# Patient Record
Sex: FEMALE | Race: WHITE | NOT HISPANIC OR LATINO | Employment: FULL TIME | URBAN - METROPOLITAN AREA
[De-identification: names, ages, dates, MRNs, and addresses within clinical notes are randomized per-mention and may not be internally consistent; named-entity substitution may affect disease eponyms.]

---

## 2017-06-12 ENCOUNTER — HOSPITAL ENCOUNTER (OUTPATIENT)
Dept: RADIOLOGY | Facility: HOSPITAL | Age: 32
Discharge: HOME | End: 2017-06-12
Attending: NURSE PRACTITIONER
Payer: MEDICAID

## 2017-06-12 DIAGNOSIS — R05.9 COUGH: ICD-10-CM

## 2017-06-12 PROCEDURE — 71020 XR CHEST PA AND LATERAL: CPT | Mod: 26,,, | Performed by: RADIOLOGY

## 2017-06-12 PROCEDURE — 71020 XR CHEST PA AND LATERAL: CPT | Mod: TC

## 2017-06-26 ENCOUNTER — HOSPITAL ENCOUNTER (EMERGENCY)
Facility: HOSPITAL | Age: 32
Discharge: HOME | End: 2017-06-26
Attending: EMERGENCY MEDICINE
Payer: MEDICAID

## 2017-06-26 VITALS
HEIGHT: 62 IN | OXYGEN SATURATION: 96 % | BODY MASS INDEX: 37.17 KG/M2 | DIASTOLIC BLOOD PRESSURE: 82 MMHG | HEART RATE: 100 BPM | SYSTOLIC BLOOD PRESSURE: 147 MMHG | TEMPERATURE: 99 F | WEIGHT: 202 LBS | RESPIRATION RATE: 20 BRPM

## 2017-06-26 DIAGNOSIS — J02.0 STREP PHARYNGITIS: Primary | ICD-10-CM

## 2017-06-26 PROCEDURE — 99283 EMERGENCY DEPT VISIT LOW MDM: CPT

## 2017-06-26 RX ORDER — CLINDAMYCIN HYDROCHLORIDE 150 MG/1
450 CAPSULE ORAL EVERY 8 HOURS
Qty: 45 CAPSULE | Refills: 0 | Status: SHIPPED | OUTPATIENT
Start: 2017-06-26 | End: 2017-07-01

## 2017-06-26 NOTE — ED PROVIDER NOTES
"SCRIBE #1 NOTE: I, Inder Suarezcoran, am scribing for, and in the presence of, Geovanny Roca MD. I have scribed the entire note.      History      Chief Complaint   Patient presents with    Sore Throat     PT states, "My throat is hurting so bad I'm having a hard time swallowing."       Review of patient's allergies indicates:   Allergen Reactions    Demerol (pf) [meperidine (pf)] Hives    Medrol [methylprednisolone] Anaphylaxis     Any cortisone    Adhesive      Other reaction(s): Unknown    Amoxicillin-pot clavulanate      Other reaction(s): Unknown    Atarax [hydroxyzine hcl]     Hydralazine analogues Hives    Iodine      Other reaction(s): Unknown    Penicillins Hives and Nausea And Vomiting    Phenergan [promethazine] Nausea And Vomiting    Ativan [lorazepam] Hives and Anxiety        HPI   HPI    2017, 7:13 AM   History obtained from the patient      History of Present Illness: Priti Swann is a 32 y.o. female patient who presents to the Emergency Department for sore throat which onset gradually overnight. Symptoms are constant and moderate in severity. Sx are exacerbated by nothing and relieved by nothing. Associated sxs include non productive cough, subjective fever, and L ear pain. No other sxs reported.  Patient denies any N/V/D, chills, facial swelling, difficulty swallowing, congestion, tinnitus and all other sxs at this time. No further complaints or concerns at this time.     Arrival mode: Personal vehicle      PCP: RICHI Ferrer       Past Medical History:  Past Medical History:   Diagnosis Date    Allergy     Asthma     Bipolar 1 disorder     Depression     GERD (gastroesophageal reflux disease)     Hypertension     pt denied    Pancreatitis     PTSD (post-traumatic stress disorder)     S/P partial hysterectomy 2011       Past Surgical History:  Past Surgical History:   Procedure Laterality Date    APPENDECTOMY       SECTION      " CHOLECYSTECTOMY      HYSTERECTOMY      LASER LAPAROSCOPY           Family History:  Family History   Problem Relation Age of Onset    Diabetes Mother     Hypertension Mother     Atrial fibrillation Maternal Grandfather        Social History:  Social History     Social History Main Topics    Smoking status: Former Smoker     Quit date: 6/5/2013    Smokeless tobacco: Not on file    Alcohol use No      Comment: occasional     Drug use: No    Sexual activity: Yes     Partners: Male       ROS   Review of Systems   Constitutional: Positive for fever (subjective). Negative for chills.   HENT: Positive for ear pain (L) and sore throat. Negative for congestion, dental problem, facial swelling, rhinorrhea, sinus pressure and trouble swallowing.    Respiratory: Positive for cough. Negative for chest tightness and shortness of breath.    Cardiovascular: Negative for chest pain.   Gastrointestinal: Negative for abdominal pain, nausea and vomiting.   Genitourinary: Negative for difficulty urinating and dysuria.   Musculoskeletal: Negative for back pain and neck pain.   Skin: Negative for rash.   Neurological: Negative for dizziness, weakness, light-headedness, numbness and headaches.   Psychiatric/Behavioral: Negative for agitation and confusion.   All other systems reviewed and are negative.      Physical Exam      Initial Vitals [06/26/17 0711]   BP Pulse Resp Temp SpO2   (!) 147/82 100 20 99.1 °F (37.3 °C) 96 %      MAP       103.67          Physical Exam  Nursing Notes and Vital Signs Reviewed.  Constitutional: Patient is in no apparent distress. Well-developed and well-nourished.  Head: Atraumatic. Normocephalic.  Eyes: PERRL. EOM intact. Conjunctivae are not pale. No scleral icterus.  Ears: Right TM normal. Bulging noted to the L TM. No erythema appreciated. No effusion or air-fluid levels. No perforation.   Nose: Patent nares. Turbinates are normal. No drainage.   Throat: Moist mucous membranes. Posterior  "oropharynx is erythematous. Tonsillar exudate is present. 2+ tonsils noted. No trismus. Normal handling of secretions. No stridor.  Neck: Supple. Full ROM. Cervical lymphadenopathy noted.  Cardiovascular: Regular rate. Regular rhythm. No murmurs, rubs, or gallops. Distal pulses are 2+ and symmetric.  Pulmonary/Chest: No respiratory distress. Clear to auscultation bilaterally. No wheezing, rales, or rhonchi.  Abdominal: Soft and non-distended.  There is no tenderness.  No rebound, guarding, or rigidity. Good bowel sounds.  Musculoskeletal: Moves all extremities. No obvious deformities. No edema. No calf tenderness.  Skin: Warm and dry.  Neurological:  Alert, awake, and appropriate.  Normal speech.  No acute focal neurological deficits are appreciated.  Psychiatric: Normal affect. Good eye contact. Appropriate in content.    ED Course    Procedures  ED Vital Signs:  Vitals:    06/26/17 0711   BP: (!) 147/82   Pulse: 100   Resp: 20   Temp: 99.1 °F (37.3 °C)   TempSrc: Oral   SpO2: 96%   Weight: 91.6 kg (202 lb)   Height: 5' 2" (1.575 m)       Abnormal Lab Results:  Labs Reviewed - No data to display     All Lab Results:  None    Imaging Results:  Imaging Results    None                 The Emergency Provider reviewed the vital signs and test results, which are outlined above.    ED Discussion     7:18 AM: Pt is in NAD. Pt is awake, alert, and oriented. Discussed with pt all pertinent ED information and results. Discussed pt dx and plan of tx. Gave pt all f/u and return to the ED instructions. All questions and concerns were addressed at this time. Pt expresses understanding of information and instructions, and is comfortable with plan to discharge. Pt is stable for discharge.        ED Medication(s):  Medications - No data to display    Discharge Medication List as of 6/26/2017  7:19 AM      START taking these medications    Details   clindamycin (CLEOCIN) 150 MG capsule Take 3 capsules (450 mg total) by mouth every 8 " (eight) hours., Starting Mon 6/26/2017, Until Sat 7/1/2017, Print             Follow-up Information     RICHI Ferrer.    Specialty:  Family Medicine  Contact information:  [address] [facility] [address]  [address] [place] [id number]  [phone]                     Medical Decision Making              Scribe Attestation:   Scribe #1: I performed the above scribed service and the documentation accurately describes the services I performed. I attest to the accuracy of the note.    Attending:   Physician Attestation Statement for Scribe #1: I, Geovanny Roca MD, personally performed the services described in this documentation, as scribed by Inder Holt, in my presence, and it is both accurate and complete.          Clinical Impression       ICD-10-CM ICD-9-CM   1. Strep pharyngitis J02.0 034.0       Disposition:   Disposition: Discharged  Condition: Stable         Geovanny Roca MD  06/26/17 0818

## 2017-06-27 ENCOUNTER — HOSPITAL ENCOUNTER (EMERGENCY)
Facility: HOSPITAL | Age: 32
Discharge: HOME | End: 2017-06-27
Attending: EMERGENCY MEDICINE
Payer: MEDICAID

## 2017-06-27 VITALS
RESPIRATION RATE: 18 BRPM | HEART RATE: 89 BPM | OXYGEN SATURATION: 95 % | TEMPERATURE: 99 F | SYSTOLIC BLOOD PRESSURE: 110 MMHG | DIASTOLIC BLOOD PRESSURE: 64 MMHG

## 2017-06-27 DIAGNOSIS — J02.0 STREP PHARYNGITIS: Primary | ICD-10-CM

## 2017-06-27 DIAGNOSIS — J02.9 SORE THROAT: ICD-10-CM

## 2017-06-27 DIAGNOSIS — R11.10 VOMITING, INTRACTABILITY OF VOMITING NOT SPECIFIED, PRESENCE OF NAUSEA NOT SPECIFIED, UNSPECIFIED VOMITING TYPE: ICD-10-CM

## 2017-06-27 LAB
ALBUMIN SERPL BCP-MCNC: 4 G/DL
ALP SERPL-CCNC: 60 U/L
ALT SERPL W/O P-5'-P-CCNC: 19 U/L
ANION GAP SERPL CALC-SCNC: 12 MMOL/L
AST SERPL-CCNC: 19 U/L
B-HCG UR QL: NEGATIVE
BACTERIA #/AREA URNS HPF: ABNORMAL /HPF
BASOPHILS # BLD AUTO: 0.03 K/UL
BASOPHILS NFR BLD: 0.2 %
BILIRUB SERPL-MCNC: 0.6 MG/DL
BILIRUB UR QL STRIP: ABNORMAL
BUN SERPL-MCNC: 15 MG/DL
CALCIUM SERPL-MCNC: 9 MG/DL
CHLORIDE SERPL-SCNC: 105 MMOL/L
CLARITY UR: CLEAR
CO2 SERPL-SCNC: 20 MMOL/L
COLOR UR: YELLOW
CREAT SERPL-MCNC: 0.8 MG/DL
DIFFERENTIAL METHOD: ABNORMAL
EOSINOPHIL # BLD AUTO: 0 K/UL
EOSINOPHIL NFR BLD: 0.2 %
ERYTHROCYTE [DISTWIDTH] IN BLOOD BY AUTOMATED COUNT: 13.6 %
EST. GFR  (AFRICAN AMERICAN): >60 ML/MIN/1.73 M^2
EST. GFR  (NON AFRICAN AMERICAN): >60 ML/MIN/1.73 M^2
GLUCOSE SERPL-MCNC: 148 MG/DL
GLUCOSE UR QL STRIP: NEGATIVE
HCT VFR BLD AUTO: 43.3 %
HGB BLD-MCNC: 15.8 G/DL
HGB UR QL STRIP: ABNORMAL
HYALINE CASTS #/AREA URNS LPF: 0 /LPF
KETONES UR QL STRIP: ABNORMAL
LEUKOCYTE ESTERASE UR QL STRIP: NEGATIVE
LYMPHOCYTES # BLD AUTO: 1.3 K/UL
LYMPHOCYTES NFR BLD: 6.3 %
MCH RBC QN AUTO: 30.4 PG
MCHC RBC AUTO-ENTMCNC: 36.5 %
MCV RBC AUTO: 83 FL
MICROSCOPIC COMMENT: ABNORMAL
MONOCYTES # BLD AUTO: 1.3 K/UL
MONOCYTES NFR BLD: 6.5 %
NEUTROPHILS # BLD AUTO: 17.1 K/UL
NEUTROPHILS NFR BLD: 86.8 %
NITRITE UR QL STRIP: NEGATIVE
PH UR STRIP: 7 [PH] (ref 5–8)
PLATELET # BLD AUTO: 249 K/UL
PMV BLD AUTO: 10.5 FL
POTASSIUM SERPL-SCNC: 3.7 MMOL/L
PROT SERPL-MCNC: 8.4 G/DL
PROT UR QL STRIP: ABNORMAL
RBC # BLD AUTO: 5.19 M/UL
RBC #/AREA URNS HPF: 5 /HPF (ref 0–4)
SODIUM SERPL-SCNC: 137 MMOL/L
SP GR UR STRIP: 1.02 (ref 1–1.03)
SQUAMOUS #/AREA URNS HPF: 15 /HPF
URN SPEC COLLECT METH UR: ABNORMAL
UROBILINOGEN UR STRIP-ACNC: NEGATIVE EU/DL
WBC # BLD AUTO: 19.71 K/UL
WBC #/AREA URNS HPF: 4 /HPF (ref 0–5)

## 2017-06-27 PROCEDURE — 96375 TX/PRO/DX INJ NEW DRUG ADDON: CPT

## 2017-06-27 PROCEDURE — 85025 COMPLETE CBC W/AUTO DIFF WBC: CPT

## 2017-06-27 PROCEDURE — 96374 THER/PROPH/DIAG INJ IV PUSH: CPT

## 2017-06-27 PROCEDURE — 25000003 PHARM REV CODE 250: Performed by: EMERGENCY MEDICINE

## 2017-06-27 PROCEDURE — 63600175 PHARM REV CODE 636 W HCPCS: Performed by: EMERGENCY MEDICINE

## 2017-06-27 PROCEDURE — 99284 EMERGENCY DEPT VISIT MOD MDM: CPT | Mod: 25

## 2017-06-27 PROCEDURE — 81000 URINALYSIS NONAUTO W/SCOPE: CPT

## 2017-06-27 PROCEDURE — 80053 COMPREHEN METABOLIC PANEL: CPT

## 2017-06-27 PROCEDURE — 81025 URINE PREGNANCY TEST: CPT

## 2017-06-27 PROCEDURE — 96361 HYDRATE IV INFUSION ADD-ON: CPT

## 2017-06-27 RX ORDER — KETOROLAC TROMETHAMINE 30 MG/ML
30 INJECTION, SOLUTION INTRAMUSCULAR; INTRAVENOUS
Status: COMPLETED | OUTPATIENT
Start: 2017-06-27 | End: 2017-06-27

## 2017-06-27 RX ORDER — ONDANSETRON 4 MG/1
4 TABLET, ORALLY DISINTEGRATING ORAL EVERY 6 HOURS PRN
Qty: 15 TABLET | Refills: 0 | Status: SHIPPED | OUTPATIENT
Start: 2017-06-27 | End: 2017-12-30 | Stop reason: CLARIF

## 2017-06-27 RX ORDER — CLINDAMYCIN PALMITATE HYDROCHLORIDE (PEDIATRIC) 75 MG/5ML
300 SOLUTION ORAL EVERY 6 HOURS
Qty: 560 ML | Refills: 0 | Status: SHIPPED | OUTPATIENT
Start: 2017-06-27 | End: 2017-07-04

## 2017-06-27 RX ORDER — HYDROCODONE BITARTRATE AND ACETAMINOPHEN 7.5; 325 MG/15ML; MG/15ML
10 SOLUTION ORAL EVERY 4 HOURS PRN
Qty: 120 ML | Refills: 0 | Status: SHIPPED | OUTPATIENT
Start: 2017-06-27 | End: 2017-12-30 | Stop reason: CLARIF

## 2017-06-27 RX ORDER — ONDANSETRON 2 MG/ML
4 INJECTION INTRAMUSCULAR; INTRAVENOUS
Status: COMPLETED | OUTPATIENT
Start: 2017-06-27 | End: 2017-06-27

## 2017-06-27 RX ADMIN — ONDANSETRON 4 MG: 2 INJECTION INTRAMUSCULAR; INTRAVENOUS at 11:06

## 2017-06-27 RX ADMIN — KETOROLAC TROMETHAMINE 30 MG: 30 INJECTION, SOLUTION INTRAMUSCULAR; INTRAVENOUS at 11:06

## 2017-06-27 RX ADMIN — SODIUM CHLORIDE 1000 ML: 0.9 INJECTION, SOLUTION INTRAVENOUS at 11:06

## 2017-06-27 NOTE — ED PROVIDER NOTES
SCRIBE #1 NOTE: I, Inder Holt, am scribing for, and in the presence of, Geovanny Roca MD. I have scribed the entire note.      History      Chief Complaint   Patient presents with    Sore Throat     pt here yesterday for sore throat, states she is now vomiting and throat pain is worse       Review of patient's allergies indicates:   Allergen Reactions    Demerol (pf) [meperidine (pf)] Hives    Medrol [methylprednisolone] Anaphylaxis     Any cortisone    Adhesive      Other reaction(s): Unknown    Amoxicillin-pot clavulanate      Other reaction(s): Unknown    Atarax [hydroxyzine hcl]     Hydralazine analogues Hives    Iodine      Other reaction(s): Unknown    Penicillins Hives and Nausea And Vomiting    Phenergan [promethazine] Nausea And Vomiting    Ativan [lorazepam] Hives and Anxiety        HPI   HPI    6/27/2017, 10:55 AM   History obtained from the patient      History of Present Illness: Priti Swann is a 32 y.o. female patient who presents to the Emergency Department for N/V which onset suddenly this AM. Symptoms are episodic and moderate in severity. Sx are exacerbated by nothing and relieved by nothing. Associated sxs include sore throat and fever. Pt was seen here in the ED yesterday and she was treated and dx with strep. Pt states she is not able to take the clindamycin she was prescribed. No other sxs reported. Patient denies any chills, facial swelling, difficulty swallowing, inability to handle secretions, cough, congestion, rhinorrhea, ear pain and all other sxs at this time. No further complaints or concerns at this time.     Arrival mode: Personal vehicle      PCP: RICHI Ferrer       Past Medical History:  Past Medical History:   Diagnosis Date    Allergy     Asthma     Bipolar 1 disorder     Depression     GERD (gastroesophageal reflux disease)     Hypertension     pt denied    Pancreatitis     PTSD (post-traumatic stress disorder)     S/P partial  hysterectomy 2011       Past Surgical History:  Past Surgical History:   Procedure Laterality Date    APPENDECTOMY       SECTION      CHOLECYSTECTOMY      HYSTERECTOMY      LASER LAPAROSCOPY           Family History:  Family History   Problem Relation Age of Onset    Diabetes Mother     Hypertension Mother     Atrial fibrillation Maternal Grandfather        Social History:  Social History     Social History Main Topics    Smoking status: Former Smoker     Quit date: 2013    Smokeless tobacco: Not on file    Alcohol use No      Comment: occasional     Drug use: No    Sexual activity: Yes     Partners: Male       ROS   Review of Systems   Constitutional: Positive for fever. Negative for chills.   HENT: Positive for sore throat. Negative for congestion, dental problem, ear discharge, ear pain, facial swelling, sinus pressure and trouble swallowing.    Respiratory: Negative for chest tightness and shortness of breath.    Cardiovascular: Negative for chest pain.   Gastrointestinal: Positive for nausea and vomiting. Negative for abdominal pain.   Musculoskeletal: Negative for back pain and neck pain.   Skin: Negative for rash.   Neurological: Negative for dizziness, numbness and headaches.   Psychiatric/Behavioral: Negative for agitation and confusion.   All other systems reviewed and are negative.      Physical Exam      Initial Vitals [17 1054]   BP Pulse Resp Temp SpO2   (!) 157/99 102 20 (!) 101.4 °F (38.6 °C) 98 %      MAP       118.33          Physical Exam  Nursing Notes and Vital Signs Reviewed.  Constitutional: Patient is in mild distress. Well-developed and well-nourished. Actively vomiting.  Head: Atraumatic. Normocephalic.  Eyes: PERRL. EOM intact. Conjunctivae are not pale. No scleral icterus.  ENT: Mucous membranes are moist. Oropharynx is erythematous and symmetric with exudates present.    Neck: Supple. Full ROM. Anterior cervical lymphadenopathy  noted.  Cardiovascular: Regular rate. Regular rhythm. No murmurs, rubs, or gallops. Distal pulses are 2+ and symmetric.  Pulmonary/Chest: No respiratory distress. Clear to auscultation bilaterally. No wheezing, rales, or rhonchi.  Abdominal: Soft and non-distended.  There is no tenderness.  No rebound, guarding, or rigidity. Good bowel sounds.  Musculoskeletal: Moves all extremities. No obvious deformities. No edema. No calf tenderness.  Skin: Warm and dry.  Neurological:  Alert, awake, and appropriate.  Normal speech.  No acute focal neurological deficits are appreciated.  Psychiatric: Normal affect. Good eye contact. Appropriate in content.    ED Course    Procedures  ED Vital Signs:  Vitals:    06/27/17 1054   BP: (!) 157/99   Pulse: 102   Resp: 20   Temp: (!) 101.4 °F (38.6 °C)   TempSrc: Oral   SpO2: 98%       Abnormal Lab Results:  Labs Reviewed   CBC W/ AUTO DIFFERENTIAL - Abnormal; Notable for the following:        Result Value    WBC 19.71 (*)     MCHC 36.5 (*)     Gran # 17.1 (*)     Mono # 1.3 (*)     Gran% 86.8 (*)     Lymph% 6.3 (*)     All other components within normal limits   COMPREHENSIVE METABOLIC PANEL - Abnormal; Notable for the following:     CO2 20 (*)     Glucose 148 (*)     All other components within normal limits   URINALYSIS - Abnormal; Notable for the following:     Protein, UA 1+ (*)     Ketones, UA 2+ (*)     Bilirubin (UA) 1+ (*)     Occult Blood UA 1+ (*)     All other components within normal limits   URINALYSIS MICROSCOPIC - Abnormal; Notable for the following:     RBC, UA 5 (*)     Bacteria, UA Few (*)     All other components within normal limits   PREGNANCY TEST, URINE RAPID        All Lab Results:  None    Imaging Results:  Imaging Results    None                 The Emergency Provider reviewed the vital signs and test results, which are outlined above.    ED Discussion     12:35 AM: Reassessed pt at this time.  Pt states her condition has improved at this time and she is  feeling better. Pt is laying comfortably in ED bed and in NAD. Pt is awake, alert, and oriented. Discussed with pt all pertinent ED information and results. Discussed pt dx and plan of tx. Gave pt all f/u and return to the ED instructions. All questions and concerns were addressed at this time. Pt expresses understanding of information and instructions, and is comfortable with plan to discharge. Pt is stable for discharge.        ED Medication(s):  Medications   ondansetron injection 4 mg (4 mg Intravenous Given 6/27/17 1113)   sodium chloride 0.9% bolus 1,000 mL (0 mLs Intravenous Stopped 6/27/17 1148)   ketorolac injection 30 mg (30 mg Intravenous Given 6/27/17 1143)       New Prescriptions    CLINDAMYCIN (CLEOCIN) 75 MG/5 ML SOLR    Take 20 mLs (300 mg total) by mouth every 6 (six) hours.    ONDANSETRON (ZOFRAN-ODT) 4 MG TBDL    Take 1 tablet (4 mg total) by mouth every 6 (six) hours as needed.       Follow-up Information     RICHI Ferrer.    Specialty:  Family Medicine  Contact information:  [address] [address]  [address]  [facility] [address]  [phone]                     Medical Decision Making              Scribe Attestation:   Scribe #1: I performed the above scribed service and the documentation accurately describes the services I performed. I attest to the accuracy of the note.    Attending:   Physician Attestation Statement for Scribe #1: I, Geovanny Roca MD, personally performed the services described in this documentation, as scribed by Inder Holt, in my presence, and it is both accurate and complete.          Clinical Impression       ICD-10-CM ICD-9-CM   1. Strep pharyngitis J02.0 034.0   2. Sore throat J02.9 462   3. Vomiting, intractability of vomiting not specified, presence of nausea not specified, unspecified vomiting type R11.10 787.03       Disposition:   Disposition: Discharged  Condition: Stable         Geovanny Roca MD  06/27/17 0781

## 2017-06-27 NOTE — ED NOTES
Patient complains of sore throat, n/v. Symptoms have been present since yesterday. Pt states she was dx with strep throat yesterday and was given a rx of clindamycin, she reports that family member had been vomiting yesterday.   Level of Consciousness: The patient is awake, alert, and oriented.  Appearance: Sitting up in treat area with no acute distress noted. Clothing and hygiene are clean and worn appropriately.  Skin: Skin is intact; color consistent with ethnicity.    Musculoskeletal: Moves all extremities well in full range of motion. No obvious deformities or swelling noted.  Respiratory: Airway open and patent, respirations spontaneous, even and unlabored. No accessory muscles in use.   Cardiac: Regular rate, no peripheral edema noted..  Abdomen:  No distention noted. N/V reported  Neurologic: PERRLA, face exhibits symmetrical expression, reports normal sensation to all extremities and face.    Patient verbalized understanding of status and plan of care.

## 2017-10-05 ENCOUNTER — HOSPITAL ENCOUNTER (EMERGENCY)
Facility: HOSPITAL | Age: 32
Discharge: HOME | End: 2017-10-05
Attending: EMERGENCY MEDICINE
Payer: MEDICAID

## 2017-10-05 VITALS
HEIGHT: 62 IN | DIASTOLIC BLOOD PRESSURE: 79 MMHG | SYSTOLIC BLOOD PRESSURE: 136 MMHG | HEART RATE: 59 BPM | WEIGHT: 217 LBS | TEMPERATURE: 98 F | OXYGEN SATURATION: 98 % | RESPIRATION RATE: 18 BRPM | BODY MASS INDEX: 39.93 KG/M2

## 2017-10-05 DIAGNOSIS — T78.40XA ALLERGIC REACTION, INITIAL ENCOUNTER: Primary | ICD-10-CM

## 2017-10-05 PROCEDURE — 99283 EMERGENCY DEPT VISIT LOW MDM: CPT | Mod: 25

## 2017-10-05 PROCEDURE — 96372 THER/PROPH/DIAG INJ SC/IM: CPT

## 2017-10-05 PROCEDURE — 25000003 PHARM REV CODE 250: Performed by: EMERGENCY MEDICINE

## 2017-10-05 PROCEDURE — 63600175 PHARM REV CODE 636 W HCPCS: Performed by: EMERGENCY MEDICINE

## 2017-10-05 RX ORDER — DIPHENHYDRAMINE HYDROCHLORIDE 50 MG/ML
25 INJECTION INTRAMUSCULAR; INTRAVENOUS
Status: COMPLETED | OUTPATIENT
Start: 2017-10-05 | End: 2017-10-05

## 2017-10-05 RX ORDER — FAMOTIDINE 20 MG/1
20 TABLET, FILM COATED ORAL
Status: COMPLETED | OUTPATIENT
Start: 2017-10-05 | End: 2017-10-05

## 2017-10-05 RX ADMIN — DIPHENHYDRAMINE HYDROCHLORIDE 25 MG: 50 INJECTION, SOLUTION INTRAMUSCULAR; INTRAVENOUS at 02:10

## 2017-10-05 RX ADMIN — FAMOTIDINE 20 MG: 20 TABLET ORAL at 02:10

## 2017-10-05 NOTE — ED PROVIDER NOTES
SCRIBE #1 NOTE: I, Dlephine Angel, am scribing for, and in the presence of, Kenneth Glass MD. I have scribed the entire note.      History      Chief Complaint   Patient presents with    Allergic Reaction     x 2 hours, with itching and vomiting        Review of patient's allergies indicates:   Allergen Reactions    Demerol (pf) [meperidine (pf)] Hives    Medrol [methylprednisolone] Anaphylaxis     Any cortisone    Adhesive      Other reaction(s): Unknown    Amoxicillin-pot clavulanate      Other reaction(s): Unknown    Atarax [hydroxyzine hcl]     Hydralazine analogues Hives    Iodine      Other reaction(s): Unknown    Penicillins Hives and Nausea And Vomiting    Phenergan [promethazine] Nausea And Vomiting    Ativan [lorazepam] Hives and Anxiety        HPI   HPI    10/5/2017, 2:42 AM   History obtained from the patient      History of Present Illness: Priti Obrien is a 32 y.o. female patient who presents to the Emergency Department for allergic reaction which onset gradually 2 hours ago. Pt states she was at work when sxs started. Pt states she is unsure if she was bitten by an insect. Symptoms are constant and moderate in severity. No mitigating or exacerbating factors reported. Associated sxs include itching to face and bilat arms. Patient denies any fever, chills, cough, wheezing, SOB, difficulty swallowing, voice change, and all other sxs at this time. No further complaints or concerns at this time.     Arrival mode: Personal vehicle     PCP: RICHI Ferrer       Past Medical History:  Past Medical History:   Diagnosis Date    Allergy     Asthma     Bipolar 1 disorder     Depression     GERD (gastroesophageal reflux disease)     Hypertension     pt denied    Pancreatitis     PTSD (post-traumatic stress disorder)     S/P partial hysterectomy 2011       Past Surgical History:  Past Surgical History:   Procedure Laterality Date    APPENDECTOMY       SECTION       CHOLECYSTECTOMY      HYSTERECTOMY      LASER LAPAROSCOPY           Family History:  Family History   Problem Relation Age of Onset    Diabetes Mother     Hypertension Mother     Atrial fibrillation Maternal Grandfather        Social History:  Social History     Social History Main Topics    Smoking status: Former Smoker     Quit date: 6/5/2013    Smokeless tobacco: Unknown    Alcohol use No      Comment: occasional     Drug use: No    Sexual activity: Yes     Partners: Male       ROS   Review of Systems   Constitutional: Negative for chills and fever.   HENT: Negative for sore throat, trouble swallowing and voice change.    Respiratory: Negative for cough, shortness of breath and wheezing.    Cardiovascular: Negative for chest pain.   Gastrointestinal: Negative for nausea.   Genitourinary: Negative for dysuria.   Musculoskeletal: Negative for back pain.   Skin: Negative for rash.        (+) itching to face and bilat arms   Neurological: Negative for weakness.   Hematological: Does not bruise/bleed easily.   All other systems reviewed and are negative.      Physical Exam      Initial Vitals [10/05/17 0222]   BP Pulse Resp Temp SpO2   (!) 147/106 78 16 98.2 °F (36.8 °C) 96 %      MAP       119.67          Physical Exam  Nursing Notes and Vital Signs Reviewed.  Constitutional: Patient is in no acute distress. Well-developed and well-nourished.  Head: Atraumatic. Normocephalic.  Eyes: PERRL. EOM intact. Conjunctivae are not pale. No scleral icterus.  ENT: Mucous membranes are moist. Oropharynx is clear and symmetric.    Neck: Supple. Full ROM. No lymphadenopathy.  Cardiovascular: Regular rate. Regular rhythm. No murmurs, rubs, or gallops. Distal pulses are 2+ and symmetric.  Pulmonary/Chest: No respiratory distress. Clear to auscultation bilaterally. No wheezing, rales, or rhonchi.  Abdominal: Soft and non-distended.  There is no tenderness.  No rebound, guarding, or rigidity. Good bowel  "sounds.  Genitourinary: No CVA tenderness  Musculoskeletal: Moves all extremities. No obvious deformities. No edema. No calf tenderness.  Skin: Warm and dry. Erythema bilat arms and face with mild edema.  Neurological:  Alert, awake, and appropriate.  Normal speech.  No acute focal neurological deficits are appreciated.  Psychiatric: Normal affect. Good eye contact. Appropriate in content.    ED Course    Procedures  ED Vital Signs:  Vitals:    10/05/17 0222 10/05/17 0346   BP: (!) 147/106 136/79   Pulse: 78 (!) 59   Resp: 16 18   Temp: 98.2 °F (36.8 °C)    TempSrc: Oral    SpO2: 96% 98%   Weight: 98.4 kg (217 lb)    Height: 5' 2" (1.575 m)             The Emergency Provider reviewed the vital signs and test results, which are outlined above.    ED Discussion     3:41 AM: Reassessed pt at this time.  Pt states her condition has improved at this time. Discussed with pt all pertinent ED information and results. Discussed pt dx of allergic reaction and plan of tx. Gave pt all f/u and return to the ED instructions. All questions and concerns were addressed at this time. Pt expresses understanding of information and instructions, and is comfortable with plan to discharge. Pt is stable for discharge.    Patient is safe for discharge. There is no suggestion of airway or ENT emergency. Patient is hemodynamically stable and there is no suggestion of active anaphylaxis or progressive worsening of current symptoms.    I discussed with patient and/or family/caretaker that evaluation in the ED does not suggest any emergent or life threatening medical conditions requiring immediate intervention beyond what was provided in the ED, and I believe patient is safe for discharge.  Regardless, an unremarkable evaluation in the ED does not preclude the development or presence of a serious of life threatening condition. As such, patient was instructed to return immediately for any worsening or change in current symptoms.    ED " Medication(s):  Medications   diphenhydrAMINE injection 25 mg (25 mg Intramuscular Given 10/5/17 0252)   famotidine tablet 20 mg (20 mg Oral Given 10/5/17 0252)       Discharge Medication List as of 10/5/2017  3:41 AM          Follow-up Information     RICHI Ferrer In 2 days.    Specialty:  Family Medicine  Contact information:  [address]  [address]  [facility] [address]  [phone]             [facility] .    Specialty:  Emergency Medicine  Why:  As needed, If symptoms worsen  Contact information:  [address]  [facility] [address] [phone]                   Medical Decision Making              Scribe Attestation:   Scribe #1: I performed the above scribed service and the documentation accurately describes the services I performed. I attest to the accuracy of the note.    Attending:   Physician Attestation Statement for Scribe #1: I, Kenneth Glass MD, personally performed the services described in this documentation, as scribed by Delphine Angel, in my presence, and it is both accurate and complete.          Clinical Impression       ICD-10-CM ICD-9-CM   1. Allergic reaction, initial encounter T78.40XA 995.3       Disposition:   Disposition: Discharged  Condition: Stable         Kenneth Glass MD  10/05/17 0514

## 2017-10-05 NOTE — ED NOTES
Pt examined by Kenneth Glass MD.  Patient has been educated on prescriptions, given discharge instructions and discharged to [facility]. See provider notes for exam.

## 2017-11-07 ENCOUNTER — HOSPITAL ENCOUNTER (EMERGENCY)
Facility: HOSPITAL | Age: 32
Discharge: HOME | End: 2017-11-07
Attending: EMERGENCY MEDICINE
Payer: MEDICAID

## 2017-11-07 VITALS
RESPIRATION RATE: 18 BRPM | HEART RATE: 81 BPM | DIASTOLIC BLOOD PRESSURE: 91 MMHG | SYSTOLIC BLOOD PRESSURE: 143 MMHG | OXYGEN SATURATION: 99 % | TEMPERATURE: 98 F | HEIGHT: 62 IN

## 2017-11-07 DIAGNOSIS — Z72.0 TOBACCO ABUSE DISORDER: ICD-10-CM

## 2017-11-07 DIAGNOSIS — J40 BRONCHITIS: Primary | ICD-10-CM

## 2017-11-07 PROCEDURE — 99283 EMERGENCY DEPT VISIT LOW MDM: CPT

## 2017-11-07 NOTE — ED PROVIDER NOTES
SCRIBE #1 NOTE: I, Temi Gomez, am scribing for, and in the presence of, Madelin Foster MD. I have scribed the entire note.      History      Chief Complaint   Patient presents with    Cough     productive cough since last night       Review of patient's allergies indicates:   Allergen Reactions    Demerol (pf) [meperidine (pf)] Hives    Medrol [methylprednisolone] Anaphylaxis     Any cortisone    Adhesive      Other reaction(s): Unknown    Amoxicillin-pot clavulanate      Other reaction(s): Unknown    Atarax [hydroxyzine hcl]     Hydralazine analogues Hives    Iodine      Other reaction(s): Unknown    Penicillins Hives and Nausea And Vomiting    Phenergan [promethazine] Nausea And Vomiting    Ativan [lorazepam] Hives and Anxiety        HPI   HPI    2017, 12:48 PM   History obtained from the patient      History of Present Illness: Priti Obrien is a 32 y.o. female patient who presents to the Emergency Department for cough which onset gradually 1 day ago. Symptoms are constant and moderate in severity. Pt states that she is a smoker and is trying to quit. No mitigating or exacerbating factors reported. Associated sxs include CP secondary to cough. Patient denies any fever, n/v/d, abd pain, SOB, congestion, rhinorrhea, sore throat, HA, weakness, wheezing, and all other sxs at this time. No further complaints or concerns at this time.       Arrival mode: Personal vehicle    PCP: RICHI Ferrer       Past Medical History:  Past Medical History:   Diagnosis Date    Allergy     Asthma     Bipolar 1 disorder     Depression     GERD (gastroesophageal reflux disease)     Hypertension     pt denied    Pancreatitis     PTSD (post-traumatic stress disorder)     S/P partial hysterectomy 2011       Past Surgical History:  Past Surgical History:   Procedure Laterality Date    APPENDECTOMY       SECTION      CHOLECYSTECTOMY      HYSTERECTOMY      LASER LAPAROSCOPY            Family History:  Family History   Problem Relation Age of Onset    Diabetes Mother     Hypertension Mother     Atrial fibrillation Maternal Grandfather        Social History:  Social History     Social History Main Topics    Smoking status: Former Smoker     Quit date: 6/5/2013    Smokeless tobacco: Not given    Alcohol use No      Comment: occasional     Drug use: No    Sexual activity: Yes     Partners: Male       ROS   Review of Systems   Constitutional: Negative for fever.   HENT: Negative for congestion, rhinorrhea and sore throat.    Respiratory: Positive for cough. Negative for shortness of breath.    Cardiovascular: Positive for chest pain (secondary to cough).   Gastrointestinal: Negative for abdominal pain, constipation, diarrhea, nausea and vomiting.   Genitourinary: Negative for decreased urine volume, difficulty urinating, dysuria, flank pain and hematuria.   Musculoskeletal: Negative for back pain.   Skin: Negative for rash.   Neurological: Negative for dizziness, weakness, light-headedness, numbness and headaches.   Hematological: Does not bruise/bleed easily.       Physical Exam      Initial Vitals [11/07/17 1245]   BP Pulse Resp Temp SpO2   (!) 143/91 81 18 98.1 °F (36.7 °C) 99 %      MAP       108.33          Physical Exam  Nursing Notes and Vital Signs Reviewed.  Constitutional: Patient is in no acute distress. Well-developed and well-nourished.  Head: Atraumatic. Normocephalic.  Eyes: PERRL. EOM intact. Conjunctivae are not pale. No scleral icterus.  ENT: Mucous membranes are moist. Oropharynx is clear and symmetric.    Neck: Supple. Full ROM. No lymphadenopathy.  Cardiovascular: Regular rate. Regular rhythm. No murmurs, rubs, or gallops. Distal pulses are 2+ and symmetric.  Pulmonary/Chest: No respiratory distress. Clear to auscultation bilaterally. No wheezing, rales, or rhonchi. Dry cough  Abdominal: Soft and non-distended.  There is no tenderness.  No rebound, guarding, or  "rigidity. Good bowel sounds.  Musculoskeletal: Moves all extremities. No obvious deformities. No edema. No calf tenderness.  Skin: Warm and dry.  Neurological:  Alert, awake, and appropriate.  Normal speech.  No acute focal neurological deficits are appreciated.  Psychiatric: Normal affect. Good eye contact. Appropriate in content.    ED Course    Procedures  ED Vital Signs:  Vitals:    11/07/17 1245   BP: (!) 143/91   Pulse: 81   Resp: 18   Temp: 98.1 °F (36.7 °C)   TempSrc: Oral   SpO2: 99%   Height: 5' 2" (1.575 m)            The Emergency Provider reviewed the vital signs and test results, which are outlined above.    ED Discussion     12:55 PM: Discussed with pt all pertinent ED information and plan of tx. Gave pt all f/u and return to the ED instructions. All questions and concerns were addressed at this time. Pt expresses understanding of information and instructions, and is comfortable with plan to discharge. Pt is stable for discharge.      ED Medication(s):  Medications - No data to display    Discharge Medication List as of 11/7/2017 12:53 PM          Follow-up Information     RICHI Ferrer. Schedule an appointment as soon as possible for a visit in 2 days.    Specialty:  Family Medicine  Why:  Return to the Emergency Room, If symptoms worsen  Contact information:  [address]  [address] [place] [id number]  [phone]                     Medical Decision Making        Additional MDM:   Smoking Cessation: The patient was counseled on tobacco related  health complications. Appropriate patient literature was given to the patient concerning tobacco cessation.        Scribe Attestation:   Scribe #1: I performed the above scribed service and the documentation accurately describes the services I performed. I attest to the accuracy of the note.    Attending:   Physician Attestation Statement for Scribe #1: I, Madelin Foster MD, personally performed the services described in this " documentation, as scribed by Temi Gomez, in my presence, and it is both accurate and complete.          Clinical Impression       ICD-10-CM ICD-9-CM   1. Bronchitis J40 490   2. Tobacco abuse disorder Z72.0 305.1       Disposition:   Disposition: Discharged  Condition: Stable         Madelin Foster MD  11/07/17 0874

## 2017-11-14 NOTE — PHYSICIAN QUERY
PT Name: Priti Obrien  MR #: [id number]     Physician Query Form - Documentation Clarification      CDS/: Darrius Hewitt               Contact information:    Query not valid    This form is a permanent document in the medical record.     Query Date: November 14, 2017    By submitting this query, we are merely seeking further clarification of documentation. Please utilize your independent clinical judgment when addressing the question(s) below.    The Medical record reflects the following:    Supporting Clinical Findings Location in Medical Record   Social History Main Topics    Smoking status: Former Smoker       Quit date: 6/5/2013    Smokeless tobacco: Not given     Smoking Cessation: The patient was counseled on tobacco related  health complications. Appropriate patient literature was given to the patient concerning tobacco cessation.     2. Tobacco abuse disorder Z72.0 305.1                  Social Hx              MDM            Clinical Impression                                                                                      Doctor, Please specify diagnosis or diagnoses associated with above clinical findings.    Provider Use Only                                                                                                                               [  ] Clinically undetermined

## 2017-12-30 ENCOUNTER — HOSPITAL ENCOUNTER (EMERGENCY)
Facility: HOSPITAL | Age: 32
Discharge: HOME | End: 2017-12-30
Payer: MEDICAID

## 2017-12-30 VITALS
DIASTOLIC BLOOD PRESSURE: 93 MMHG | WEIGHT: 218.81 LBS | BODY MASS INDEX: 40.27 KG/M2 | SYSTOLIC BLOOD PRESSURE: 144 MMHG | HEART RATE: 77 BPM | RESPIRATION RATE: 20 BRPM | HEIGHT: 62 IN | TEMPERATURE: 99 F | OXYGEN SATURATION: 97 %

## 2017-12-30 DIAGNOSIS — R05.9 COUGH: ICD-10-CM

## 2017-12-30 DIAGNOSIS — R09.81 NASAL CONGESTION: ICD-10-CM

## 2017-12-30 DIAGNOSIS — J02.9 SORE THROAT: ICD-10-CM

## 2017-12-30 DIAGNOSIS — J06.9 ACUTE URI: Primary | ICD-10-CM

## 2017-12-30 LAB
DEPRECATED S PYO AG THROAT QL EIA: NEGATIVE
FLUAV AG SPEC QL IA: NEGATIVE
FLUBV AG SPEC QL IA: NEGATIVE
SPECIMEN SOURCE: NORMAL

## 2017-12-30 PROCEDURE — 87880 STREP A ASSAY W/OPTIC: CPT | Mod: 59

## 2017-12-30 PROCEDURE — 87400 INFLUENZA A/B EACH AG IA: CPT

## 2017-12-30 PROCEDURE — 87147 CULTURE TYPE IMMUNOLOGIC: CPT | Mod: 59

## 2017-12-30 PROCEDURE — 99283 EMERGENCY DEPT VISIT LOW MDM: CPT

## 2017-12-30 PROCEDURE — 87081 CULTURE SCREEN ONLY: CPT

## 2017-12-30 RX ORDER — HYDROCHLOROTHIAZIDE 25 MG/1
25 TABLET ORAL DAILY
COMMUNITY
End: 2019-02-09

## 2017-12-30 RX ORDER — HYDROCODONE BITARTRATE AND ACETAMINOPHEN 5; 325 MG/1; MG/1
1 TABLET ORAL EVERY 6 HOURS PRN
Status: ON HOLD | COMMUNITY
End: 2018-02-13 | Stop reason: HOSPADM

## 2017-12-30 RX ORDER — BUSPIRONE HYDROCHLORIDE 10 MG/1
10 TABLET ORAL 3 TIMES DAILY
Status: ON HOLD | COMMUNITY
End: 2018-02-20 | Stop reason: HOSPADM

## 2017-12-30 RX ORDER — MELOXICAM 7.5 MG/1
7.5 TABLET ORAL DAILY
COMMUNITY
End: 2019-02-09

## 2017-12-30 RX ORDER — TIZANIDINE 4 MG/1
4 TABLET ORAL EVERY 6 HOURS PRN
Status: ON HOLD | COMMUNITY
End: 2018-02-20 | Stop reason: HOSPADM

## 2017-12-31 NOTE — ED PROVIDER NOTES
SCRIBE #1 NOTE: I, Maritza Collins, am scribing for, and in the presence of, ANIKET Willingham. I have scribed the entire note.      History      Chief Complaint   Patient presents with    URI     congested, sore throat for 2 days, headache       Review of patient's allergies indicates:   Allergen Reactions    Demerol (pf) [meperidine (pf)] Hives    Medrol [methylprednisolone] Anaphylaxis     Any cortisone    Adhesive      Other reaction(s): Unknown    Amoxicillin-pot clavulanate      Other reaction(s): Unknown    Atarax [hydroxyzine hcl]     Hydralazine analogues Hives    Iodine      Other reaction(s): Unknown    Penicillins Hives and Nausea And Vomiting    Phenergan [promethazine] Nausea And Vomiting    Ativan [lorazepam] Hives and Anxiety        HPI   HPI    2017, 7:40 PM   History obtained from the patient      History of Present Illness: Priti Obrien is a 32 y.o. female patient who presents to the Emergency Department for sore throat which onset gradually 2 days ago. Associated sx include mild cough, nasal congestion, and post nasal drip. Symptoms are constant and mild in severity. Reports strep and flu exposure. No prior tx for sxs. No modifying factors reported. Associated sxs include chills and hot flashes. Patient denies myalgias, voice change, difficulty swallowing, ear pain, rash, abd pain, N/V/D, and all other sxs at this time. No further complaints or concerns at this time.     Arrival mode: Personal vehicle    PCP: RICHI Ferrer       Past Medical History:  Past Medical History:   Diagnosis Date    Allergy     Asthma     Bipolar 1 disorder     Depression     GERD (gastroesophageal reflux disease)     Hypertension     pt denied    Pancreatitis     PTSD (post-traumatic stress disorder)     S/P partial hysterectomy 2011       Past Surgical History:  Past Surgical History:   Procedure Laterality Date    APPENDECTOMY       SECTION      CHOLECYSTECTOMY       HYSTERECTOMY      LASER LAPAROSCOPY           Family History:  Family History   Problem Relation Age of Onset    Diabetes Mother     Hypertension Mother     Atrial fibrillation Maternal Grandfather        Social History:  Social History     Social History Main Topics    Smoking status: Current Every Day Smoker     Packs/day: 0.25     Types: Cigarettes     Last attempt to quit: 6/5/2013    Smokeless tobacco: Never Used    Alcohol use No      Comment: occasional     Drug use: No    Sexual activity: Yes     Partners: Male       ROS   Review of Systems   Constitutional: Positive for chills. Negative for fever.   HENT: Positive for congestion, postnasal drip and sore throat. Negative for ear discharge, ear pain, trouble swallowing and voice change.    Respiratory: Positive for cough. Negative for shortness of breath.    Cardiovascular: Negative for chest pain.   Gastrointestinal: Negative for abdominal pain, diarrhea, nausea and vomiting.   Genitourinary: Negative for dysuria.   Musculoskeletal: Negative for back pain.   Skin: Negative for rash.   Neurological: Negative for weakness.   Hematological: Does not bruise/bleed easily.   All other systems reviewed and are negative.      Physical Exam      Initial Vitals [12/30/17 1920]   BP Pulse Resp Temp SpO2   (!) 144/93 77 20 98.5 °F (36.9 °C) 97 %      MAP       110          Physical Exam  Nursing Notes and Vital Signs Reviewed.  Constitutional: Patient is in no acute distress. Awake and alert. Well-developed and well-nourished.  Head: Atraumatic. Normocephalic.  Eyes: PERRL. EOM intact. Conjunctivae are not pale. No scleral icterus.  Ears: Right TM normal. Left TM normal. No erythema. No bulging. No effusion or air-fluid levels. No perforation.   Nose: Patent nares. Turbinates are normal. No drainage.   Throat: Moist mucous membranes. Posterior oropharynx is mildly erythematous with post nasal drip. Tonsillar exudate is not present. No trismus. Normal  "handling of secretions. No stridor. No peritonsillar abscess. No uvula shift.   Neck: Supple. Full ROM. No lymphadenopathy.  Cardiovascular: Regular rate. Regular rhythm. No murmurs, rubs, or gallops. .  Pulmonary/Chest: No respiratory distress. Clear to auscultation bilaterally. No wheezing, rales, or rhonchi.  Abdominal: Soft. Non-distended.  Musculoskeletal: Moves all extremities. No obvious deformities.   Skin: Warm and dry. No rash.  Neurological:  Alert, awake, and appropriate.  Normal speech.  No acute focal neurological deficits are appreciated.  Psychiatric: Normal affect. Good eye contact. Appropriate in content.    ED Course    Procedures  ED Vital Signs:  Vitals:    12/30/17 1920   BP: (!) 144/93   Pulse: 77   Resp: 20   Temp: 98.5 °F (36.9 °C)   TempSrc: Oral   SpO2: 97%   Weight: 99.2 kg (218 lb 12.9 oz)   Height: 5' 2" (1.575 m)       Abnormal Lab Results:  Labs Reviewed   THROAT SCREEN, RAPID   CULTURE, STREP A,  THROAT   INFLUENZA A AND B ANTIGEN        All Lab Results:  Results for orders placed or performed during the hospital encounter of 12/30/17   Rapid strep screen   Result Value Ref Range    Rapid Strep A Screen Negative Negative   Influenza antigen Nasopharyngeal Swab   Result Value Ref Range    Influenza A Ag, EIA Negative Negative    Influenza B Ag, EIA Negative Negative    Flu A & B Source Nasopharyngeal Swab      The Emergency Provider reviewed the vital signs and test results, which are outlined above.    ED Discussion     8:26 PM: Discussed with pt strep and flu are negative. Discussed pt dx and plan of tx. Informed pt to follow up with PCP. All questions and concerns were addressed at this time. Pt expresses understanding of information and instructions, and is comfortable with plan to discharge. Pt is stable for discharge.    Pre-hypertension/Hypertension: The pt has been informed that they may have pre-hypertension or hypertension based on a blood pressure reading in the ED. I " recommend that the pt call the PCP listed on their discharge instructions or a physician of their choice this week to arrange f/u for further evaluation of possible pre-hypertension or hypertension.       ED Medication(s):  Medications - No data to display    Discharge Medication List as of 12/30/2017  8:30 PM          Follow-up Information     RICHI Ferrer In 3 days.    Specialty:  Family Medicine  Contact information:  [address] [address]  [address] [place] [address]  [phone]                    Medical Decision Making    Medical Decision Making:   Clinical Tests:   Lab Tests: Reviewed and Ordered           Scribe Attestation:   Scribe #1: I performed the above scribed service and the documentation accurately describes the services I performed. I attest to the accuracy of the note.    Attending:   Physician Attestation Statement for Scribe #1: I, ANIKET Willingham, personally performed the services described in this documentation, as scribed by Maritza Collins, in my presence, and it is both accurate and complete.          Clinical Impression       ICD-10-CM ICD-9-CM   1. Acute URI J06.9 465.9   2. Sore throat J02.9 462   3. Nasal congestion R09.81 478.19   4. Cough R05 786.2       Disposition:   Disposition: Discharged  Condition: Stable           Nevaeh Bryson PA-C  12/30/17 5055

## 2018-01-03 LAB
BACTERIA THROAT CULT: NORMAL
BACTERIA THROAT CULT: NORMAL

## 2018-02-09 ENCOUNTER — HOSPITAL ENCOUNTER (INPATIENT)
Facility: HOSPITAL | Age: 33
LOS: 4 days | Discharge: PSYCHIATRIC OR SUBSTANCE USE FACILITY | DRG: 917 | End: 2018-02-13
Attending: EMERGENCY MEDICINE | Admitting: INTERNAL MEDICINE

## 2018-02-09 DIAGNOSIS — G93.41 ACUTE METABOLIC ENCEPHALOPATHY: ICD-10-CM

## 2018-02-09 DIAGNOSIS — J69.0 ASPIRATION PNEUMONIA OF RIGHT MIDDLE LOBE, UNSPECIFIED ASPIRATION PNEUMONIA TYPE: ICD-10-CM

## 2018-02-09 DIAGNOSIS — J69.0 ASPIRATION PNEUMONIA OF RIGHT UPPER LOBE, UNSPECIFIED ASPIRATION PNEUMONIA TYPE: ICD-10-CM

## 2018-02-09 DIAGNOSIS — R41.82 ALTERED MENTAL STATUS: ICD-10-CM

## 2018-02-09 DIAGNOSIS — T50.901A DRUG OVERDOSE: Primary | ICD-10-CM

## 2018-02-09 DIAGNOSIS — Z97.8 ENDOTRACHEALLY INTUBATED: ICD-10-CM

## 2018-02-09 PROBLEM — T14.91XA SUICIDAL BEHAVIOR WITH ATTEMPTED SELF-INJURY: Status: ACTIVE | Noted: 2018-02-09

## 2018-02-09 LAB
ALBUMIN SERPL BCP-MCNC: 3.5 G/DL
ALLENS TEST: ABNORMAL
ALP SERPL-CCNC: 44 U/L
ALT SERPL W/O P-5'-P-CCNC: 18 U/L
AMPHET+METHAMPHET UR QL: ABNORMAL
ANION GAP SERPL CALC-SCNC: 12 MMOL/L
APAP SERPL-MCNC: 14 UG/ML
APTT BLDCRRT: 24 SEC
AST SERPL-CCNC: 18 U/L
B-HCG UR QL: NEGATIVE
BACTERIA #/AREA URNS HPF: ABNORMAL /HPF
BARBITURATES UR QL SCN>200 NG/ML: ABNORMAL
BASOPHILS # BLD AUTO: 0.02 K/UL
BASOPHILS NFR BLD: 0.2 %
BENZODIAZ UR QL SCN>200 NG/ML: NEGATIVE
BILIRUB SERPL-MCNC: 0.7 MG/DL
BILIRUB UR QL STRIP: NEGATIVE
BUN SERPL-MCNC: 12 MG/DL
BZE UR QL SCN: NEGATIVE
CALCIUM SERPL-MCNC: 7.8 MG/DL
CANNABINOIDS UR QL SCN: NEGATIVE
CHLORIDE SERPL-SCNC: 107 MMOL/L
CK MB SERPL-MCNC: 1.6 NG/ML
CK MB SERPL-RTO: 1.4 %
CK SERPL-CCNC: 113 U/L
CK SERPL-CCNC: 113 U/L
CLARITY UR: CLEAR
CO2 SERPL-SCNC: 20 MMOL/L
COLOR UR: YELLOW
CREAT SERPL-MCNC: 0.7 MG/DL
CREAT UR-MCNC: 378.6 MG/DL
DELSYS: ABNORMAL
DIFFERENTIAL METHOD: ABNORMAL
EOSINOPHIL # BLD AUTO: 0.1 K/UL
EOSINOPHIL NFR BLD: 1 %
ERYTHROCYTE [DISTWIDTH] IN BLOOD BY AUTOMATED COUNT: 13.5 %
ERYTHROCYTE [SEDIMENTATION RATE] IN BLOOD BY WESTERGREN METHOD: 20 MM/H
EST. GFR  (AFRICAN AMERICAN): >60 ML/MIN/1.73 M^2
EST. GFR  (NON AFRICAN AMERICAN): >60 ML/MIN/1.73 M^2
ETHANOL SERPL-MCNC: <10 MG/DL
FIO2: 100
GLUCOSE SERPL-MCNC: 188 MG/DL
GLUCOSE UR QL STRIP: NEGATIVE
HCO3 UR-SCNC: 24.3 MMOL/L (ref 24–28)
HCT VFR BLD AUTO: 35.2 %
HGB BLD-MCNC: 12.2 G/DL
HGB UR QL STRIP: NEGATIVE
HYALINE CASTS #/AREA URNS LPF: 2 /LPF
INR PPP: 1.1
KETONES UR QL STRIP: NEGATIVE
LACTATE SERPL-SCNC: 2.2 MMOL/L
LEUKOCYTE ESTERASE UR QL STRIP: NEGATIVE
LYMPHOCYTES # BLD AUTO: 1.3 K/UL
LYMPHOCYTES NFR BLD: 15.3 %
MCH RBC QN AUTO: 30 PG
MCHC RBC AUTO-ENTMCNC: 34.7 G/DL
MCV RBC AUTO: 87 FL
METHADONE UR QL SCN>300 NG/ML: NEGATIVE
MICROSCOPIC COMMENT: ABNORMAL
MODE: ABNORMAL
MONOCYTES # BLD AUTO: 0.4 K/UL
MONOCYTES NFR BLD: 5.2 %
NEUTROPHILS # BLD AUTO: 6.4 K/UL
NEUTROPHILS NFR BLD: 78.3 %
NITRITE UR QL STRIP: NEGATIVE
OPIATES UR QL SCN: NEGATIVE
PCO2 BLDA: 44.6 MMHG (ref 35–45)
PCP UR QL SCN>25 NG/ML: NEGATIVE
PEEP: 5
PH SMN: 7.34 [PH] (ref 7.35–7.45)
PH UR STRIP: 6 [PH] (ref 5–8)
PLATELET # BLD AUTO: 250 K/UL
PMV BLD AUTO: 10.3 FL
PO2 BLDA: 354 MMHG (ref 80–100)
POC BE: -1 MMOL/L
POC SATURATED O2: 100 % (ref 95–100)
POTASSIUM SERPL-SCNC: 3.5 MMOL/L
PROT SERPL-MCNC: 6.2 G/DL
PROT UR QL STRIP: ABNORMAL
PROTHROMBIN TIME: 11.2 SEC
RBC # BLD AUTO: 4.06 M/UL
RBC #/AREA URNS HPF: 1 /HPF (ref 0–4)
SALICYLATES SERPL-MCNC: <5 MG/DL
SAMPLE: ABNORMAL
SITE: ABNORMAL
SODIUM SERPL-SCNC: 139 MMOL/L
SP GR UR STRIP: >=1.03 (ref 1–1.03)
SQUAMOUS #/AREA URNS HPF: 1 /HPF
TOXICOLOGY INFORMATION: ABNORMAL
TROPONIN I SERPL DL<=0.01 NG/ML-MCNC: <0.006 NG/ML
URN SPEC COLLECT METH UR: ABNORMAL
UROBILINOGEN UR STRIP-ACNC: NEGATIVE EU/DL
VT: 450
WBC # BLD AUTO: 8.23 K/UL
WBC #/AREA URNS HPF: 0 /HPF (ref 0–5)

## 2018-02-09 PROCEDURE — 84295 ASSAY OF SERUM SODIUM: CPT

## 2018-02-09 PROCEDURE — 85347 COAGULATION TIME ACTIVATED: CPT

## 2018-02-09 PROCEDURE — 25000003 PHARM REV CODE 250: Performed by: EMERGENCY MEDICINE

## 2018-02-09 PROCEDURE — 25000003 PHARM REV CODE 250

## 2018-02-09 PROCEDURE — 96365 THER/PROPH/DIAG IV INF INIT: CPT

## 2018-02-09 PROCEDURE — 81025 URINE PREGNANCY TEST: CPT

## 2018-02-09 PROCEDURE — 27100108

## 2018-02-09 PROCEDURE — 80329 ANALGESICS NON-OPIOID 1 OR 2: CPT

## 2018-02-09 PROCEDURE — 87040 BLOOD CULTURE FOR BACTERIA: CPT

## 2018-02-09 PROCEDURE — 25000003 PHARM REV CODE 250: Performed by: INTERNAL MEDICINE

## 2018-02-09 PROCEDURE — 81000 URINALYSIS NONAUTO W/SCOPE: CPT

## 2018-02-09 PROCEDURE — 80320 DRUG SCREEN QUANTALCOHOLS: CPT

## 2018-02-09 PROCEDURE — 84484 ASSAY OF TROPONIN QUANT: CPT

## 2018-02-09 PROCEDURE — 85025 COMPLETE CBC W/AUTO DIFF WBC: CPT

## 2018-02-09 PROCEDURE — 84132 ASSAY OF SERUM POTASSIUM: CPT

## 2018-02-09 PROCEDURE — 96361 HYDRATE IV INFUSION ADD-ON: CPT

## 2018-02-09 PROCEDURE — 80307 DRUG TEST PRSMV CHEM ANLYZR: CPT

## 2018-02-09 PROCEDURE — 82330 ASSAY OF CALCIUM: CPT

## 2018-02-09 PROCEDURE — 93005 ELECTROCARDIOGRAM TRACING: CPT

## 2018-02-09 PROCEDURE — 63600175 PHARM REV CODE 636 W HCPCS: Performed by: EMERGENCY MEDICINE

## 2018-02-09 PROCEDURE — 36600 WITHDRAWAL OF ARTERIAL BLOOD: CPT

## 2018-02-09 PROCEDURE — 27200966 HC CLOSED SUCTION SYSTEM

## 2018-02-09 PROCEDURE — 83605 ASSAY OF LACTIC ACID: CPT

## 2018-02-09 PROCEDURE — 5A1935Z RESPIRATORY VENTILATION, LESS THAN 24 CONSECUTIVE HOURS: ICD-10-PCS | Performed by: EMERGENCY MEDICINE

## 2018-02-09 PROCEDURE — 94002 VENT MGMT INPAT INIT DAY: CPT

## 2018-02-09 PROCEDURE — 96376 TX/PRO/DX INJ SAME DRUG ADON: CPT

## 2018-02-09 PROCEDURE — 85730 THROMBOPLASTIN TIME PARTIAL: CPT

## 2018-02-09 PROCEDURE — 85610 PROTHROMBIN TIME: CPT

## 2018-02-09 PROCEDURE — 82550 ASSAY OF CK (CPK): CPT

## 2018-02-09 PROCEDURE — 99900035 HC TECH TIME PER 15 MIN (STAT)

## 2018-02-09 PROCEDURE — 31500 INSERT EMERGENCY AIRWAY: CPT

## 2018-02-09 PROCEDURE — 63600175 PHARM REV CODE 636 W HCPCS

## 2018-02-09 PROCEDURE — 82553 CREATINE MB FRACTION: CPT

## 2018-02-09 PROCEDURE — 80053 COMPREHEN METABOLIC PANEL: CPT

## 2018-02-09 PROCEDURE — 99291 CRITICAL CARE FIRST HOUR: CPT | Mod: 25

## 2018-02-09 PROCEDURE — 93010 ELECTROCARDIOGRAM REPORT: CPT | Mod: ,,, | Performed by: INTERNAL MEDICINE

## 2018-02-09 PROCEDURE — 0BH17EZ INSERTION OF ENDOTRACHEAL AIRWAY INTO TRACHEA, VIA NATURAL OR ARTIFICIAL OPENING: ICD-10-PCS | Performed by: EMERGENCY MEDICINE

## 2018-02-09 PROCEDURE — 20000000 HC ICU ROOM

## 2018-02-09 RX ORDER — SODIUM CHLORIDE 9 MG/ML
INJECTION, SOLUTION INTRAVENOUS CONTINUOUS
Status: ACTIVE | OUTPATIENT
Start: 2018-02-09 | End: 2018-02-10

## 2018-02-09 RX ORDER — IPRATROPIUM BROMIDE AND ALBUTEROL SULFATE 2.5; .5 MG/3ML; MG/3ML
3 SOLUTION RESPIRATORY (INHALATION) EVERY 4 HOURS PRN
Status: DISCONTINUED | OUTPATIENT
Start: 2018-02-10 | End: 2018-02-11

## 2018-02-09 RX ORDER — ONDANSETRON 2 MG/ML
4 INJECTION INTRAMUSCULAR; INTRAVENOUS EVERY 8 HOURS PRN
Status: DISCONTINUED | OUTPATIENT
Start: 2018-02-10 | End: 2018-02-13 | Stop reason: HOSPADM

## 2018-02-09 RX ORDER — ETOMIDATE 2 MG/ML
20 INJECTION INTRAVENOUS
Status: COMPLETED | OUTPATIENT
Start: 2018-02-09 | End: 2018-02-09

## 2018-02-09 RX ORDER — PROPOFOL 10 MG/ML
20 INJECTION, EMULSION INTRAVENOUS
Status: DISCONTINUED | OUTPATIENT
Start: 2018-02-09 | End: 2018-02-10

## 2018-02-09 RX ORDER — FENTANYL CITRATE-0.9 % NACL/PF 10 MCG/ML
PLASTIC BAG, INJECTION (ML) INTRAVENOUS CONTINUOUS
Status: DISCONTINUED | OUTPATIENT
Start: 2018-02-10 | End: 2018-02-10

## 2018-02-09 RX ORDER — ACETAMINOPHEN 325 MG/1
650 TABLET ORAL EVERY 6 HOURS PRN
Status: DISCONTINUED | OUTPATIENT
Start: 2018-02-10 | End: 2018-02-13 | Stop reason: HOSPADM

## 2018-02-09 RX ORDER — SUCCINYLCHOLINE CHLORIDE 20 MG/ML
100 INJECTION INTRAMUSCULAR; INTRAVENOUS
Status: COMPLETED | OUTPATIENT
Start: 2018-02-09 | End: 2018-02-09

## 2018-02-09 RX ORDER — PROPOFOL 10 MG/ML
20 INJECTION, EMULSION INTRAVENOUS
Status: COMPLETED | OUTPATIENT
Start: 2018-02-09 | End: 2018-02-09

## 2018-02-09 RX ORDER — PROPOFOL 10 MG/ML
40 VIAL (ML) INTRAVENOUS
Status: COMPLETED | OUTPATIENT
Start: 2018-02-09 | End: 2018-02-09

## 2018-02-09 RX ORDER — TRAZODONE HYDROCHLORIDE 50 MG/1
50 TABLET ORAL NIGHTLY
Status: ON HOLD | COMMUNITY
End: 2018-02-20 | Stop reason: HOSPADM

## 2018-02-09 RX ORDER — CEFTRIAXONE 1 G/1
1 INJECTION, POWDER, FOR SOLUTION INTRAMUSCULAR; INTRAVENOUS
Status: DISCONTINUED | OUTPATIENT
Start: 2018-02-09 | End: 2018-02-10

## 2018-02-09 RX ORDER — ENOXAPARIN SODIUM 100 MG/ML
40 INJECTION SUBCUTANEOUS EVERY 24 HOURS
Status: DISCONTINUED | OUTPATIENT
Start: 2018-02-09 | End: 2018-02-12

## 2018-02-09 RX ORDER — SUCCINYLCHOLINE CHLORIDE 20 MG/ML
100 INJECTION INTRAMUSCULAR; INTRAVENOUS
Status: DISCONTINUED | OUTPATIENT
Start: 2018-02-09 | End: 2018-02-09

## 2018-02-09 RX ORDER — ETOMIDATE 2 MG/ML
20 INJECTION INTRAVENOUS
Status: DISCONTINUED | OUTPATIENT
Start: 2018-02-09 | End: 2018-02-09

## 2018-02-09 RX ORDER — FAMOTIDINE 10 MG/ML
20 INJECTION INTRAVENOUS 2 TIMES DAILY
Status: DISCONTINUED | OUTPATIENT
Start: 2018-02-09 | End: 2018-02-12

## 2018-02-09 RX ORDER — CYCLOBENZAPRINE HCL 5 MG
5 TABLET ORAL 3 TIMES DAILY PRN
Status: ON HOLD | COMMUNITY
End: 2018-02-20 | Stop reason: HOSPADM

## 2018-02-09 RX ADMIN — PROPOFOL 40 MG: 10 INJECTION, EMULSION INTRAVENOUS at 09:02

## 2018-02-09 RX ADMIN — ETOMIDATE 20 MG: 20 INJECTION, SOLUTION INTRAVENOUS at 08:02

## 2018-02-09 RX ADMIN — SODIUM CHLORIDE 1000 ML: 0.9 INJECTION, SOLUTION INTRAVENOUS at 09:02

## 2018-02-09 RX ADMIN — PROPOFOL 20 MCG/KG/MIN: 10 INJECTION, EMULSION INTRAVENOUS at 09:02

## 2018-02-09 RX ADMIN — SUCCINYLCHOLINE CHLORIDE 100 MG: 20 INJECTION, SOLUTION INTRAMUSCULAR; INTRAVENOUS at 08:02

## 2018-02-09 RX ADMIN — SODIUM CHLORIDE 1000 ML: 0.9 INJECTION, SOLUTION INTRAVENOUS at 10:02

## 2018-02-09 RX ADMIN — SODIUM CHLORIDE: 0.9 INJECTION, SOLUTION INTRAVENOUS at 11:02

## 2018-02-09 RX ADMIN — AZITHROMYCIN MONOHYDRATE 500 MG: 500 INJECTION, POWDER, LYOPHILIZED, FOR SOLUTION INTRAVENOUS at 11:02

## 2018-02-10 PROBLEM — J18.9 PNEUMONIA: Status: ACTIVE | Noted: 2018-02-10

## 2018-02-10 LAB
ALBUMIN SERPL BCP-MCNC: 3.3 G/DL
ALLENS TEST: ABNORMAL
ALP SERPL-CCNC: 41 U/L
ALT SERPL W/O P-5'-P-CCNC: 19 U/L
ANION GAP SERPL CALC-SCNC: 9 MMOL/L
APAP SERPL-MCNC: 4 UG/ML
AST SERPL-CCNC: 22 U/L
BASOPHILS # BLD AUTO: 0.02 K/UL
BASOPHILS NFR BLD: 0.2 %
BILIRUB SERPL-MCNC: 0.5 MG/DL
BUN SERPL-MCNC: 13 MG/DL
CALCIUM SERPL-MCNC: 7.8 MG/DL
CHLORIDE SERPL-SCNC: 110 MMOL/L
CO2 SERPL-SCNC: 21 MMOL/L
CREAT SERPL-MCNC: 0.8 MG/DL
DELSYS: ABNORMAL
DIFFERENTIAL METHOD: ABNORMAL
EOSINOPHIL # BLD AUTO: 0.1 K/UL
EOSINOPHIL NFR BLD: 1.2 %
ERYTHROCYTE [DISTWIDTH] IN BLOOD BY AUTOMATED COUNT: 13.8 %
ERYTHROCYTE [SEDIMENTATION RATE] IN BLOOD BY WESTERGREN METHOD: 20 MM/H
EST. GFR  (AFRICAN AMERICAN): >60 ML/MIN/1.73 M^2
EST. GFR  (NON AFRICAN AMERICAN): >60 ML/MIN/1.73 M^2
FIO2: 50
GLUCOSE SERPL-MCNC: 100 MG/DL
HCO3 UR-SCNC: 22.1 MMOL/L (ref 24–28)
HCT VFR BLD AUTO: 35.3 %
HGB BLD-MCNC: 12 G/DL
INR PPP: 1.1
LDH SERPL L TO P-CCNC: 155 U/L
LYMPHOCYTES # BLD AUTO: 2 K/UL
LYMPHOCYTES NFR BLD: 21.8 %
MAGNESIUM SERPL-MCNC: 1.9 MG/DL
MCH RBC QN AUTO: 29.7 PG
MCHC RBC AUTO-ENTMCNC: 34 G/DL
MCV RBC AUTO: 87 FL
MODE: ABNORMAL
MONOCYTES # BLD AUTO: 0.7 K/UL
MONOCYTES NFR BLD: 7.4 %
NEUTROPHILS # BLD AUTO: 6.5 K/UL
NEUTROPHILS NFR BLD: 69.4 %
PCO2 BLDA: 37.6 MMHG (ref 35–45)
PEEP: 5
PH SMN: 7.38 [PH] (ref 7.35–7.45)
PHOSPHATE SERPL-MCNC: 3.6 MG/DL
PLATELET # BLD AUTO: 248 K/UL
PMV BLD AUTO: 10.5 FL
PO2 BLDA: 223 MMHG (ref 80–100)
POC BE: -3 MMOL/L
POC SATURATED O2: 100 % (ref 95–100)
POTASSIUM SERPL-SCNC: 3.8 MMOL/L
PROT SERPL-MCNC: 5.9 G/DL
PROTHROMBIN TIME: 11.3 SEC
RBC # BLD AUTO: 4.04 M/UL
SAMPLE: ABNORMAL
SITE: ABNORMAL
SODIUM SERPL-SCNC: 140 MMOL/L
VT: 400
WBC # BLD AUTO: 9.33 K/UL

## 2018-02-10 PROCEDURE — 83735 ASSAY OF MAGNESIUM: CPT

## 2018-02-10 PROCEDURE — 25000003 PHARM REV CODE 250: Performed by: FAMILY MEDICINE

## 2018-02-10 PROCEDURE — 63600175 PHARM REV CODE 636 W HCPCS: Performed by: INTERNAL MEDICINE

## 2018-02-10 PROCEDURE — 27000221 HC OXYGEN, UP TO 24 HOURS

## 2018-02-10 PROCEDURE — 25000242 PHARM REV CODE 250 ALT 637 W/ HCPCS: Performed by: INTERNAL MEDICINE

## 2018-02-10 PROCEDURE — 94003 VENT MGMT INPAT SUBQ DAY: CPT

## 2018-02-10 PROCEDURE — 97802 MEDICAL NUTRITION INDIV IN: CPT

## 2018-02-10 PROCEDURE — 99291 CRITICAL CARE FIRST HOUR: CPT | Mod: ,,, | Performed by: INTERNAL MEDICINE

## 2018-02-10 PROCEDURE — 82803 BLOOD GASES ANY COMBINATION: CPT

## 2018-02-10 PROCEDURE — 85025 COMPLETE CBC W/AUTO DIFF WBC: CPT

## 2018-02-10 PROCEDURE — 84100 ASSAY OF PHOSPHORUS: CPT

## 2018-02-10 PROCEDURE — 99900035 HC TECH TIME PER 15 MIN (STAT)

## 2018-02-10 PROCEDURE — S0030 INJECTION, METRONIDAZOLE: HCPCS | Performed by: FAMILY MEDICINE

## 2018-02-10 PROCEDURE — 20000000 HC ICU ROOM

## 2018-02-10 PROCEDURE — 36415 COLL VENOUS BLD VENIPUNCTURE: CPT

## 2018-02-10 PROCEDURE — 85610 PROTHROMBIN TIME: CPT

## 2018-02-10 PROCEDURE — 80053 COMPREHEN METABOLIC PANEL: CPT

## 2018-02-10 PROCEDURE — 36600 WITHDRAWAL OF ARTERIAL BLOOD: CPT

## 2018-02-10 PROCEDURE — 25000003 PHARM REV CODE 250: Performed by: INTERNAL MEDICINE

## 2018-02-10 PROCEDURE — 63600175 PHARM REV CODE 636 W HCPCS: Performed by: EMERGENCY MEDICINE

## 2018-02-10 PROCEDURE — 94640 AIRWAY INHALATION TREATMENT: CPT

## 2018-02-10 PROCEDURE — S0028 INJECTION, FAMOTIDINE, 20 MG: HCPCS | Performed by: INTERNAL MEDICINE

## 2018-02-10 PROCEDURE — 83615 LACTATE (LD) (LDH) ENZYME: CPT

## 2018-02-10 PROCEDURE — 80329 ANALGESICS NON-OPIOID 1 OR 2: CPT

## 2018-02-10 RX ORDER — METRONIDAZOLE 500 MG/100ML
500 INJECTION, SOLUTION INTRAVENOUS
Status: DISCONTINUED | OUTPATIENT
Start: 2018-02-10 | End: 2018-02-10

## 2018-02-10 RX ORDER — MOXIFLOXACIN HYDROCHLORIDE 400 MG/1
400 TABLET ORAL DAILY
Status: DISCONTINUED | OUTPATIENT
Start: 2018-02-11 | End: 2018-02-13 | Stop reason: HOSPADM

## 2018-02-10 RX ADMIN — ENOXAPARIN SODIUM 40 MG: 100 INJECTION SUBCUTANEOUS at 12:02

## 2018-02-10 RX ADMIN — ENOXAPARIN SODIUM 40 MG: 100 INJECTION SUBCUTANEOUS at 04:02

## 2018-02-10 RX ADMIN — CEFTRIAXONE SODIUM 1 G: 1 INJECTION, POWDER, FOR SOLUTION INTRAMUSCULAR; INTRAVENOUS at 10:02

## 2018-02-10 RX ADMIN — CEFTRIAXONE SODIUM 1 G: 1 INJECTION, POWDER, FOR SOLUTION INTRAMUSCULAR; INTRAVENOUS at 12:02

## 2018-02-10 RX ADMIN — SODIUM CHLORIDE: 0.9 INJECTION, SOLUTION INTRAVENOUS at 09:02

## 2018-02-10 RX ADMIN — SODIUM CHLORIDE: 0.9 INJECTION, SOLUTION INTRAVENOUS at 05:02

## 2018-02-10 RX ADMIN — METRONIDAZOLE 500 MG: 500 SOLUTION INTRAVENOUS at 10:02

## 2018-02-10 RX ADMIN — SODIUM CHLORIDE 1000 ML: 0.9 INJECTION, SOLUTION INTRAVENOUS at 04:02

## 2018-02-10 RX ADMIN — FAMOTIDINE 20 MG: 10 INJECTION, SOLUTION INTRAVENOUS at 08:02

## 2018-02-10 RX ADMIN — FAMOTIDINE 20 MG: 10 INJECTION, SOLUTION INTRAVENOUS at 12:02

## 2018-02-10 RX ADMIN — Medication 50 MCG/HR: at 12:02

## 2018-02-10 RX ADMIN — ACETAMINOPHEN 650 MG: 325 TABLET ORAL at 11:02

## 2018-02-10 RX ADMIN — FAMOTIDINE 20 MG: 10 INJECTION, SOLUTION INTRAVENOUS at 09:02

## 2018-02-10 RX ADMIN — IPRATROPIUM BROMIDE AND ALBUTEROL SULFATE 3 ML: .5; 3 SOLUTION RESPIRATORY (INHALATION) at 11:02

## 2018-02-10 NOTE — ASSESSMENT & PLAN NOTE
Due to intentional polypharmacy overdose of various prescribed medications (Trazodone, Meloxicam, Cyclobenzaprine, Tizanidine, and Buspirone).  UDS positive for amphetamines and barbiturates.

## 2018-02-10 NOTE — HOSPITAL COURSE
Intubated and on full mechanical ventilation in the MICU. Hemodynamically stable.     2/11 - Extubated successfully. Physicians emergency certificate executed. Social work conulted for psych placement.   2/12 doing well, no more sputum production. Some chest tightness

## 2018-02-10 NOTE — ASSESSMENT & PLAN NOTE
PEC when extubated.  If she survives and recovers neurologically, will need in-patient psychiatric placement.

## 2018-02-10 NOTE — PLAN OF CARE
Problem: Patient Care Overview  Goal: Plan of Care Review  Outcome: Ongoing (interventions implemented as appropriate)  Recommendation/Intervention:   1. If medically feasible, recommend to initiate enteral nutrition within 48h. Recommend Peptamen Intense VHP @ goal rate of 50ml/hr. Initiate @ 10ml/hr, increase by 10ml q8h until goal. Provides 1200 kcal (100% EEN), 110gm protein (100% EPN), 924ml free water, and 120% RDIs. Hold for residuals >500ml.  2. If pt is extubated, advance diet as tolerated to reguar with texture per SLP     Goals:   1. Meet >85% EEN and EPN within 72h  2. Prevent weight loss of >2% per week   3. Promote nutrition related labs wnl

## 2018-02-10 NOTE — EICU
Patient admitted from ED after suicide attempt with intentional polysubstance overdose.  Intubated in ED due to obtundation.  CXR suggests possible aspiration pneumonitis.  Urine toxicology is positive for barbiturates and amphetamines.  Acetaminophen level is measurable, although unknown dose/amount of ingestion.    She is not overbreathing the ventilator rate with  ml (6.7 ml/kg).  HR 45-50 and systolic BP in 90s while on propofol, so sedation changed to fentanyl.  Of note, allergies include hives with meperidine, so it will be necessary to watch closely for possible reaction to fentanyl.    · Recheck acetaminophen level to determine whether N-acetylcysteine is warranted.  · Soft restraints ordered.  · Fentanyl infusion for sedation at present.

## 2018-02-10 NOTE — ASSESSMENT & PLAN NOTE
Continue ventilator support. Ventilator settings reviewed and adjusted to optimize gas exchange. Initiate ventilator liberation. Weaning parameters. Extubate when criteria met.  Titrate FIO2 to keep SAO2 > 92%. Bronchodilators per protocol.

## 2018-02-10 NOTE — CONSULTS
[facility]  Adult Nutrition  Consult Note    SUMMARY     Recommendations    Recommendation/Intervention:   1. If medically feasible, recommend to initiate enteral nutrition within 48h. Recommend Peptamen Intense VHP @ goal rate of 50ml/hr. Initiate @ 10ml/hr, increase by 10ml q8h until goal. Provides 1200 kcal (100% EEN), 110gm protein (100% EPN), 924ml free water, and 120% RDIs. Hold for residuals >500ml.  2. If pt is extubated, advance diet as tolerated to reguar with texture per SLP    Goals:   1. Meet >85% EEN and EPN within 72h  2. Prevent weight loss of >2% per week   3. Promote nutrition related labs wnl    Nutrition Goal Status: new  Communication of RD Recs: other (comment) (care plan)    Reason for Assessment    Reason for Assessment: physician consult  Diagnosis:   1. Drug overdose    2. Endotracheally intubated    3. Altered mental status    4. Aspiration pneumonia of right upper lobe, unspecified aspiration pneumonia type    5. Acute metabolic encephalopathy      Past Medical History:   Diagnosis Date    Allergy     Asthma     Bipolar 1 disorder     Depression     GERD (gastroesophageal reflux disease)     Hypertension     pt denied    Pancreatitis     PTSD (post-traumatic stress disorder)     S/P partial hysterectomy September 2011     General Information Comments: Remote assessment completed. Pt is intubated and sedated, no nutrition support at this time.    Nutrition Discharge Planning: pending medical course    Nutrition Prescription Ordered    Current Diet Order: NPO    Evaluation of Received Nutrients/Fluid Intake    % Intake of Estimated Energy Needs: 0 - 25 %  % Meal Intake: NPO     Nutrition/Diet History    Food Preferences: No cultural/spiritual preferences noted  Factors Affecting Nutritional Intake: NPO, on mechanical ventilation    Labs/Tests/Procedures/Meds    Lab Results   Component Value Date     02/10/2018    K 3.8 02/10/2018     02/10/2018    CO2  "21 (L) 02/10/2018    BUN 13 02/10/2018    CREATININE 0.8 02/10/2018    CALCIUM 7.8 (L) 02/10/2018    PHOS 3.6 02/10/2018    MG 1.9 02/10/2018    ESTGFRAFRICA >60 02/10/2018    EGFRNONAA >60 02/10/2018    ALBUMIN 3.3 (L) 02/10/2018     Lab Results   Component Value Date    ALT 19 02/10/2018    AST 22 02/10/2018    ALKPHOS 41 (L) 02/10/2018     No results found for: POCTGLUCOSE  Lab Results   Component Value Date    HGBA1C 7.0 (H) 01/25/2015     Scheduled Meds:   cefTRIAXone (ROCEPHIN) IVPB  1 g Intravenous Q12H    enoxaparin  40 mg Subcutaneous Daily    famotidine (PF)  20 mg Intravenous BID    metronidazole  500 mg Intravenous Q8H     Continuous Infusions:   sodium chloride 0.9% 150 mL/hr at 02/10/18 1017    fentanyl 5 mL/hr at 02/10/18 1000     Physical Findings    Tubes: orogastric tube  Oral/Mouth Cavity: WDL  Skin: intact    Anthropometrics    Temp: 97.7 °F (36.5 °C)     Height: 5' 6" (167.6 cm)  Weight Method: Bed Scale  Weight: 98.4 kg (216 lb 14.9 oz)     Ideal Body Weight (IBW), Female: 130 lb     % Ideal Body Weight, Female (lb): 166.87 lb  BMI (Calculated): 35.1  BMI Grade: 35 - 39.9 - obesity - grade II     Estimated/Assessed Needs    Weight Used For Calorie Calculations: 98.4 kg (216 lb 14.9 oz)   Energy Calorie Requirements (kcal): 5828-6445  Energy Need Method: Kcal/kg (11-14 g/kg)    Weight Used For Protein Calculations: 59 kg (130 lb) (IBW)  Protein Requirements: 108-118 gm/d (1.8-2 gm/kg IBW)     Fluid Requirements: 1 ml/kcal, or per team     Assessment and Plan    Drug overdose    Nutrition Diagnosis:  Inadequate oral intake    Related to (etiology):   Intubation s/p drug overdose    Signs and Symptoms (as evidenced by):   Pt intubated and NPO without nutrition support    Interventions/Recommendations (treatment strategy):  If medically feasible, recommend to initiate enteral nutrition within 48h. Recommend Peptamen Intense VHP @ goal rate of 50ml/hr. See RD note 2/10/2018 for full recs "     Nutrition Diagnosis Status:   New          Monitor and Evaluation    Food and Nutrient Intake: enteral nutrition intake  Food and Nutrient Adminstration: enteral and parenteral nutrition administration  Anthropometric Measurements: weight, weight change  Biochemical Data, Medical Tests and Procedures: electrolyte and renal panel, gastrointestinal profile, glucose/endocrine profile, inflammatory profile  Nutrition-Focused Physical Findings: overall appearance, extremities, muscles and bones, skin    Nutrition Risk    Level of Risk: other (see comments) (f/u 2x/wk)    Nutrition Follow-Up    RD Follow-up?: Yes

## 2018-02-10 NOTE — SUBJECTIVE & OBJECTIVE
Past Medical History:   Diagnosis Date    Allergy     Asthma     Bipolar 1 disorder     Depression     GERD (gastroesophageal reflux disease)     Hypertension     pt denied    Pancreatitis     PTSD (post-traumatic stress disorder)     S/P partial hysterectomy 2011       Past Surgical History:   Procedure Laterality Date    APPENDECTOMY       SECTION      CHOLECYSTECTOMY      HYSTERECTOMY      LASER LAPAROSCOPY         Review of patient's allergies indicates:   Allergen Reactions    Demerol (pf) [meperidine (pf)] Hives    Medrol [methylprednisolone] Anaphylaxis     Any cortisone    Adhesive      Other reaction(s): Unknown    Amoxicillin-pot clavulanate      Other reaction(s): Unknown    Atarax [hydroxyzine hcl]     Hydralazine analogues Hives    Iodine      Other reaction(s): Unknown    Penicillins Hives and Nausea And Vomiting    Phenergan [promethazine] Nausea And Vomiting    Ativan [lorazepam] Hives and Anxiety       No current facility-administered medications on file prior to encounter.      Current Outpatient Prescriptions on File Prior to Encounter   Medication Sig    busPIRone (BUSPAR) 10 MG tablet Take 10 mg by mouth 3 (three) times daily.    meloxicam (MOBIC) 7.5 MG tablet Take 7.5 mg by mouth once daily.    tiZANidine (ZANAFLEX) 4 MG tablet Take 4 mg by mouth every 6 (six) hours as needed.    blood-glucose meter (FREESTYLE SYSTEM KIT) kit Use as instructed    hydroCHLOROthiazide (HYDRODIURIL) 25 MG tablet Take 25 mg by mouth once daily.    hydrocodone-acetaminophen 5-325mg (NORCO) 5-325 mg per tablet Take 1 tablet by mouth every 6 (six) hours as needed for Pain.     Family History     Problem Relation (Age of Onset)    Atrial fibrillation Maternal Grandfather    Diabetes Mother    Hypertension Mother        Social History Main Topics    Smoking status: Current Every Day Smoker     Packs/day: 0.25     Types: Cigarettes     Last attempt to quit: 2013     Smokeless tobacco: Never Used    Alcohol use No      Comment: occasional     Drug use: No    Sexual activity: Yes     Partners: Male     Review of Systems   Unable to perform ROS: Patient unresponsive     Objective:     Vital Signs (Most Recent):  Temp: 98.9 °F (37.2 °C) (02/09/18 2158)  Pulse: (!) 53 (02/09/18 2247)  Resp: 20 (02/09/18 2247)  BP: (!) 104/57 (02/09/18 2247)  SpO2: 100 % (02/09/18 2247) Vital Signs (24h Range):  Temp:  [98.9 °F (37.2 °C)] 98.9 °F (37.2 °C)  Pulse:  [43-64] 53  Resp:  [7-26] 20  SpO2:  [92 %-100 %] 100 %  BP: ()/(48-93) 104/57     Weight: 100.4 kg (221 lb 4.8 oz)  Body mass index is 35.72 kg/m².    Physical Exam   Constitutional: She appears well-developed and well-nourished. She is intubated.   Obese female, intubated   HENT:   Head: Normocephalic and atraumatic.   Eyes: Conjunctivae are normal. No scleral icterus.   Pupils very sluggish, barely reactive.   Cardiovascular: Regular rhythm, S1 normal, S2 normal and normal heart sounds.    No murmur heard.  Pulmonary/Chest: Breath sounds normal. She is intubated. She has no wheezes. She has no rales.   Abdominal: Soft. Bowel sounds are normal. She exhibits no distension.   Musculoskeletal: She exhibits no edema or deformity.   Lymphadenopathy:     She has no cervical adenopathy.   Neurological: She is unresponsive. GCS eye subscore is 1. GCS verbal subscore is 1. GCS motor subscore is 1.   Skin: Skin is warm. No rash noted. No erythema.   Psychiatric:   Defered   Nursing note and vitals reviewed.          Significant Labs:   ABGs:   Recent Labs  Lab 02/09/18 2120   PH 7.344*   PCO2 44.6   HCO3 24.3   POCSATURATED 100   BE -1     Bilirubin:   Recent Labs  Lab 02/09/18 2113   BILITOT 0.7     Blood Culture: No results for input(s): LABBLOO in the last 48 hours.  BMP:   Recent Labs  Lab 02/09/18 2113   *      K 3.5      CO2 20*   BUN 12   CREATININE 0.7   CALCIUM 7.8*     CBC:   Recent Labs  Lab 02/09/18 2113    WBC 8.23   HGB 12.2   HCT 35.2*        CMP:   Recent Labs  Lab 02/09/18 2113      K 3.5      CO2 20*   *   BUN 12   CREATININE 0.7   CALCIUM 7.8*   PROT 6.2   ALBUMIN 3.5   BILITOT 0.7   ALKPHOS 44*   AST 18   ALT 18   ANIONGAP 12   EGFRNONAA >60     Cardiac Markers: No results for input(s): CKMB, MYOGLOBIN, BNP, TROPISTAT in the last 48 hours.  Coagulation:   Recent Labs  Lab 02/09/18 2113   INR 1.1   APTT 24.0     Lactic Acid: No results for input(s): LACTATE in the last 48 hours.  Lipase: No results for input(s): LIPASE in the last 48 hours.  Lipid Panel: No results for input(s): CHOL, HDL, LDLCALC, TRIG, CHOLHDL in the last 48 hours.  Magnesium: No results for input(s): MG in the last 48 hours.  Prealbumin: No results for input(s): PREALBUMIN in the last 48 hours.  Respiratory Culture: No results for input(s): GSRESP, RESPIRATORYC in the last 48 hours.  Troponin:   Recent Labs  Lab 02/09/18 2113   TROPONINI <0.006     TSH: No results for input(s): TSH in the last 4320 hours.  Urine Culture: No results for input(s): LABURIN in the last 48 hours.  Urine Studies:   Recent Labs  Lab 02/09/18 2113   COLORU Yellow   APPEARANCEUA Clear   PHUR 6.0   SPECGRAV >=1.030*   PROTEINUA 2+*   GLUCUA Negative   KETONESU Negative   BILIRUBINUA Negative   OCCULTUA Negative   NITRITE Negative   UROBILINOGEN Negative   LEUKOCYTESUR Negative   RBCUA 1   WBCUA 0   BACTERIA Rare   SQUAMEPITHEL 1   HYALINECASTS 2*     All pertinent labs within the past 24 hours have been reviewed.    Significant Imaging: I have reviewed and interpreted all pertinent imaging results/findings within the past 24 hours.     Imaging Results          CT Head Without Contrast (Final result)  Result time 02/09/18 22:02:55    Final result by Fransico Best MD (02/09/18 22:02:55)                 Impression:         Negative noncontrast CT scan of the brain.   All CT scans at this facility use dose modulation, iterative  reconstruction, and/or weight based dosing when appropriate to reduce radiation dose to as low as reasonable achievable.      Electronically signed by: LORENA WILSON MD  Date:     02/09/18  Time:    22:02              Narrative:    CT HEAD WITHOUT CONTRAST    Date: 02/09/18 21:51:50    Clinical indication:  Decreased alertness      Technique:  Standard noncontrast CT scan of the brain. Comparison is made with previous study of 02/14/2014.     Findings:  The ventricles are nondilated. Gray and white matter structures reveal normal attenuation. No hemorrhage, mass effect or edema is identified.     There is chronic scalloped concavity to the vertex on the right.  This is unchanged compared with previous exam.                             X-Ray Chest AP Portable (Final result)  Result time 02/09/18 21:22:18    Final result by Lorena Wilson MD (02/09/18 21:22:18)                 Impression:      The patient has been intubated.  Endotracheal tube and nasogastric tube appear in good position.  There is patchy infiltrate in the right suprahilar region.      Electronically signed by: LORENA WILSON MD  Date:     02/09/18  Time:    21:22              Narrative:    Exam: XR CHEST AP PORTABLE,    Date:  02/09/18 21:13:44    History: Presence of specified devices    Comparison:  06/12/2017    Findings: The patient has been intubated with endotracheal tube positioned with its tip at the level of T3.  Nasogastric tube extends in the stomach.  A right central line remains in place with tip in superior vena cava.  The heart is normal in size.  There is patchy infiltrate in the right suprahilar region..                              I have independently reviewed and interpreted the EKG. My findings include sinus bradycardia 48 bpm.    I have independently reviewed all pertinent labs within the past 24 hours.    I have independently reviewed, visualized and interpreted all pertinent imaging results within the past 24 hours and  discussed the findings with the ED physician, Dr. Griggs.

## 2018-02-10 NOTE — ASSESSMENT & PLAN NOTE
Hyperglycemia:   low dose SSI for hyperglycemia with monitoring for glucose control and prevention of insulin toxicity. Blood glucose target 100 - 180 mg/dl

## 2018-02-10 NOTE — PLAN OF CARE
Problem: Patient Care Overview  Goal: Plan of Care Review  Outcome: Ongoing (interventions implemented as appropriate)  Pt extubated at 1225, drowsy- but oriented.  Pt remains calm and cooperative through the shift.  Pt gives nurse permission to share information with her Grandmother and Aunt freely.  POC discussed at bedside, pt is agreeable.

## 2018-02-10 NOTE — PLAN OF CARE
Problem: Patient Care Overview  Goal: Plan of Care Review  Outcome: Ongoing (interventions implemented as appropriate)  poc reviewed c pt.  Pt nods approp on vent, follows commands, remains calm. Low dose fent for sedation, ivf.  1 L NS fluid bolus ordered for low bp, positive response.  Persistent bradycardia in 40s-50s.

## 2018-02-10 NOTE — ASSESSMENT & PLAN NOTE
Monitor fever curve. panculture for temperatures greater than 101 degrees F. Source control: MOXIFLOXACIN
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
## 2018-02-10 NOTE — PROGRESS NOTES
Pt brought in per ambulance; pt not responding; pt intubated per Dr. Griggs; placed on mechanical ventilator.

## 2018-02-10 NOTE — ED PROVIDER NOTES
"SCRIBE #1 NOTE: I, Maritza Dennis, am scribing for, and in the presence of, Nico Griggs MD. I have scribed the entire note.      History      Chief Complaint   Patient presents with    Drug Overdose       Review of patient's allergies indicates:   Allergen Reactions    Demerol (pf) [meperidine (pf)] Hives    Medrol [methylprednisolone] Anaphylaxis     Any cortisone    Adhesive      Other reaction(s): Unknown    Amoxicillin-pot clavulanate      Other reaction(s): Unknown    Atarax [hydroxyzine hcl]     Hydralazine analogues Hives    Iodine      Other reaction(s): Unknown    Penicillins Hives and Nausea And Vomiting    Phenergan [promethazine] Nausea And Vomiting    Ativan [lorazepam] Hives and Anxiety        HPI   HPI    2/9/2018, 8:49 PM   History obtained from the EMS  HPI limited due to patient's unresponsiveness      History of Present Illness: Priti Obrien is a 32 y.o. female patient who presents to the Emergency Department for a drug overdose that occurred within the past hour. 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 The medications include Trazodone, Meloxicam, Cyclobenzaprine, Tizanidine, and Buspirone. Unsure of how many pills patient took but EMS states the medications were filled in November and some bottles are empty. EMS also brought patient's suicide note which states that patient is sorry for being a mess up and to kiss her baby girl for her. Pt was given Narcan 2 mg with no response. Her BP is 106/63, , HR 48.    Arrival mode: EMS    PCP: RICHI Ferrer       Past Medical History:  Past Medical History:   Diagnosis Date    Allergy     Asthma     Bipolar 1 disorder     Depression     GERD (gastroesophageal reflux disease)     Hypertension     pt denied    " Pancreatitis     PTSD (post-traumatic stress disorder)     S/P partial hysterectomy 2011       Past Surgical History:  Past Surgical History:   Procedure Laterality Date    APPENDECTOMY       SECTION      CHOLECYSTECTOMY      HYSTERECTOMY      LASER LAPAROSCOPY           Family History:  Family History   Problem Relation Age of Onset    Diabetes Mother     Hypertension Mother     Atrial fibrillation Maternal Grandfather        Social History:  Social History     Social History Main Topics    Smoking status: Current Every Day Smoker     Packs/day: 0.25     Types: Cigarettes     Last attempt to quit: 2013    Smokeless tobacco: Never Used    Alcohol use No      Comment: occasional     Drug use: No    Sexual activity: Yes     Partners: Male       ROS   Review of Systems   Unable to perform ROS: Patient unresponsive       Physical Exam      Initial Vitals [18]   BP Pulse Resp Temp SpO2   (!) 100/50 (!) 49 (!) 7 -- (!) 92 %      MAP       66.67         Physical Exam  Nursing Notes and Vital Signs Reviewed.  Physical exam is limited due to the patient's condition.   General: Somnolent. Patient is unresponsive to verbal and painful stimuli (does not respond to sternal rub).   Head: Atraumatic   Eyes: Pupils are 3 mm and sluggish. No corneal reflex.  ENT: Airway patent. ETT is in place. Pt has a gag reflex.  Cardiovascular: Bradycardic.  Respiratory: Agonal respirations.   Abdominal: Soft. No distension   Musculoskeletal: No deformities   Skin: Pale   Neurological: Full neuro exam limited due to patient's condition. GCS of 6.      ED Course    Intubation  Date/Time: 2018 9:00 PM  Performed by: JEANNETTE TITUS  Authorized by: JEANNETTE TITUS   Consent Done: Emergent Situation  Indications: respiratory failure  Intubation method: direct  Patient status: paralyzed (RSI)  Preoxygenation: BVM  Sedatives: etomidate  Paralytic: succinylcholine  Laryngoscope size:  Mac 4  Tube size: 7.5 mm  Tube type: cuffed  Number of attempts: 2  Ventilation between attempts: BVM  Cricoid pressure: yes  Cords visualized: yes  Post-procedure assessment: chest rise,  ETCO2 monitor and CO2 detector  Breath sounds: clear (bilaterally)  Cuff inflated: yes  ETT to lip: 22 cm  Tube secured with: adhesive tape  Chest x-ray interpreted by radiologist.  Chest x-ray findings: endotracheal tube in appropriate position  Patient tolerance: Patient tolerated the procedure well with no immediate complications  Complications: No  Specimens: No  Implants: No    Critical Care  Date/Time: 2/9/2018 10:02 PM  Performed by: JEANNETTE TITUS  Authorized by: JEANNETTE TITUS   Direct patient critical care time: 20 minutes  Additional history critical care time: 8 minutes  Ordering / reviewing critical care time: 10 minutes  Documentation critical care time: 7 minutes  Consulting other physicians critical care time: 5 minutes  Total critical care time (exclusive of procedural time) : 50 minutes  Critical care time was exclusive of separately billable procedures and treating other patients and teaching time.  Critical care was necessary to treat or prevent imminent or life-threatening deterioration of the following conditions: respiratory failure, CNS failure or compromise and toxidrome.  Critical care was time spent personally by me on the following activities: blood draw for specimens, development of treatment plan with patient or surrogate, discussions with consultants, gastric intubation, interpretation of cardiac output measurements, evaluation of patient's response to treatment, examination of patient, obtaining history from patient or surrogate, ordering and performing treatments and interventions, ordering and review of laboratory studies, ordering and review of radiographic studies, pulse oximetry, re-evaluation of patient's condition, review of old charts and ventilator management.        ED  "Vital Signs:  Vitals:    02/09/18 2037 02/09/18 2039 02/09/18 2040 02/09/18 2048   BP: (!) 100/50 (!) 100/50  (!) 157/93   Pulse: (!) 49 (!) 59 (!) 52 (!) 48   Resp: (!) 7 (!) 26  10   Temp:       TempSrc:       SpO2: (!) 92% 99%  100%   Weight:       Height:        02/09/18 2057 02/09/18 2059 02/09/18 2100 02/09/18 2103   BP:    (!) 127/58   Pulse: 64  (!) 46 (!) 44   Resp:    20   Temp:       TempSrc:       SpO2: 100%  100% 100%   Weight:  100.4 kg (221 lb 4.8 oz)     Height:  5' 6" (1.676 m)      02/09/18 2118 02/09/18 2124 02/09/18 2158 02/09/18 2201   BP: (!) 93/48   (!) 89/54   Pulse: (!) 44 (!) 43  (!) 50   Resp: 20   20   Temp:   98.9 °F (37.2 °C)    TempSrc:   Rectal    SpO2: 100% 100%  100%   Weight:       Height:        02/09/18 2237   BP:    Pulse: (!) 49   Resp:    Temp:    TempSrc:    SpO2: 100%   Weight:    Height:        Abnormal Lab Results:  Labs Reviewed   URINALYSIS - Abnormal; Notable for the following:        Result Value    Specific Gravity, UA >=1.030 (*)     Protein, UA 2+ (*)     All other components within normal limits   DRUG SCREEN PANEL, URINE EMERGENCY - Abnormal; Notable for the following:     Creatinine, Random Ur 378.6 (*)     All other components within normal limits   COMPREHENSIVE METABOLIC PANEL - Abnormal; Notable for the following:     CO2 20 (*)     Glucose 188 (*)     Calcium 7.8 (*)     Alkaline Phosphatase 44 (*)     All other components within normal limits   CBC W/ AUTO DIFFERENTIAL - Abnormal; Notable for the following:     Hematocrit 35.2 (*)     Gran% 78.3 (*)     Lymph% 15.3 (*)     All other components within normal limits   URINALYSIS MICROSCOPIC - Abnormal; Notable for the following:     Hyaline Casts, UA 2 (*)     All other components within normal limits   ISTAT PROCEDURE - Abnormal; Notable for the following:     POC PH 7.344 (*)     POC PO2 354 (*)     All other components within normal limits   CULTURE, BLOOD   CULTURE, BLOOD   PREGNANCY TEST, URINE RAPID "   TROPONIN I   APTT   PROTIME-INR   CK   CK-MB   ACETAMINOPHEN LEVEL   ALCOHOL,MEDICAL (ETHANOL)   SALICYLATE LEVEL        All Lab Results:  Results for orders placed or performed during the hospital encounter of 02/09/18   Urinalysis   Result Value Ref Range    Specimen UA Urine, Catheterized     Color, UA Yellow Yellow, Straw, [place]    Appearance, UA Clear Clear    pH, UA 6.0 5.0 - 8.0    Specific Gravity, UA >=1.030 (A) 1.005 - 1.030    Protein, UA 2+ (A) Negative    Glucose, UA Negative Negative    Ketones, UA Negative Negative    Bilirubin (UA) Negative Negative    Occult Blood UA Negative Negative    Nitrite, UA Negative Negative    Urobilinogen, UA Negative <2.0 EU/dL    Leukocytes, UA Negative Negative   Pregnancy, urine rapid   Result Value Ref Range    Preg Test, Ur Negative    Drug screen panel, emergency   Result Value Ref Range    Benzodiazepines Negative     Methadone metabolites Negative     Cocaine (Metab.) Negative     Opiate Scrn, Ur Negative     Barbiturate Screen, Ur Presumptive Positive     Amphetamine Screen, Ur Presumptive Positive     THC Negative     Phencyclidine Negative     Creatinine, Random Ur 378.6 (H) 15.0 - 325.0 mg/dL    Toxicology Information SEE COMMENT    Comprehensive metabolic panel   Result Value Ref Range    Sodium 139 136 - 145 mmol/L    Potassium 3.5 3.5 - 5.1 mmol/L    Chloride 107 95 - 110 mmol/L    CO2 20 (L) 23 - 29 mmol/L    Glucose 188 (H) 70 - 110 mg/dL    BUN, Bld 12 6 - 20 mg/dL    Creatinine 0.7 0.5 - 1.4 mg/dL    Calcium 7.8 (L) 8.7 - 10.5 mg/dL    Total Protein 6.2 6.0 - 8.4 g/dL    Albumin 3.5 3.5 - 5.2 g/dL    Total Bilirubin 0.7 0.1 - 1.0 mg/dL    Alkaline Phosphatase 44 (L) 55 - 135 U/L    AST 18 10 - 40 U/L    ALT 18 10 - 44 U/L    Anion Gap 12 8 - 16 mmol/L    eGFR if African American >60 >60 mL/min/1.73 m^2    eGFR if non African American >60 >60 mL/min/1.73 m^2   CBC auto differential   Result Value Ref Range    WBC 8.23 3.90 - 12.70 K/uL    RBC 4.06  4.00 - 5.40 M/uL    Hemoglobin 12.2 12.0 - 16.0 g/dL    Hematocrit 35.2 (L) 37.0 - 48.5 %    MCV 87 82 - 98 fL    MCH 30.0 27.0 - 31.0 pg    MCHC 34.7 32.0 - 36.0 g/dL    RDW 13.5 11.5 - 14.5 %    Platelets 250 150 - 350 K/uL    MPV 10.3 9.2 - 12.9 fL    Gran # (ANC) 6.4 1.8 - 7.7 K/uL    Lymph # 1.3 1.0 - 4.8 K/uL    Mono # 0.4 0.3 - 1.0 K/uL    Eos # 0.1 0.0 - 0.5 K/uL    Baso # 0.02 0.00 - 0.20 K/uL    Gran% 78.3 (H) 38.0 - 73.0 %    Lymph% 15.3 (L) 18.0 - 48.0 %    Mono% 5.2 4.0 - 15.0 %    Eosinophil% 1.0 0.0 - 8.0 %    Basophil% 0.2 0.0 - 1.9 %    Differential Method Automated    Troponin I   Result Value Ref Range    Troponin I <0.006 0.000 - 0.026 ng/mL   APTT   Result Value Ref Range    aPTT 24.0 21.0 - 32.0 sec   Protime-INR   Result Value Ref Range    Prothrombin Time 11.2 9.0 - 12.5 sec    INR 1.1 0.8 - 1.2   CK   Result Value Ref Range     20 - 180 U/L   CK-MB   Result Value Ref Range     20 - 180 U/L    CPK MB 1.6 0.1 - 6.5 ng/mL    MB% 1.4 0.0 - 5.0 %   Urinalysis Microscopic   Result Value Ref Range    RBC, UA 1 0 - 4 /hpf    WBC, UA 0 0 - 5 /hpf    Bacteria, UA Rare None-Occ /hpf    Squam Epithel, UA 1 /hpf    Hyaline Casts, UA 2 (A) 0-1/lpf /lpf    Microscopic Comment SEE COMMENT    ISTAT PROCEDURE   Result Value Ref Range    POC PH 7.344 (L) 7.35 - 7.45    POC PCO2 44.6 35 - 45 mmHg    POC PO2 354 (H) 80 - 100 mmHg    POC HCO3 24.3 24 - 28 mmol/L    POC BE -1 -2 to 2 mmol/L    POC SATURATED O2 100 95 - 100 %    Rate 20     Sample ARTERIAL     Site RR     Allens Test Pass     DelSys Adult Vent     Mode AC/PRVC     Vt 450     PEEP 5     FiO2 100      Imaging Results:  Imaging Results          CT Head Without Contrast (Final result)  Result time 02/09/18 22:02:55    Final result by Fransico Best MD (02/09/18 22:02:55)                 Impression:         Negative noncontrast CT scan of the brain.   All CT scans at this facility use dose modulation, iterative reconstruction, and/or  weight based dosing when appropriate to reduce radiation dose to as low as reasonable achievable.      Electronically signed by: LORENA WILSON MD  Date:     02/09/18  Time:    22:02              Narrative:    CT HEAD WITHOUT CONTRAST    Date: 02/09/18 21:51:50    Clinical indication:  Decreased alertness      Technique:  Standard noncontrast CT scan of the brain. Comparison is made with previous study of 02/14/2014.     Findings:  The ventricles are nondilated. Gray and white matter structures reveal normal attenuation. No hemorrhage, mass effect or edema is identified.     There is chronic scalloped concavity to the vertex on the right.  This is unchanged compared with previous exam.                             X-Ray Chest AP Portable (Final result)  Result time 02/09/18 21:22:18    Final result by Lorena Wilson MD (02/09/18 21:22:18)                 Impression:      The patient has been intubated.  Endotracheal tube and nasogastric tube appear in good position.  There is patchy infiltrate in the right suprahilar region.      Electronically signed by: LORENA WILSON MD  Date:     02/09/18  Time:    21:22              Narrative:    Exam: XR CHEST AP PORTABLE,    Date:  02/09/18 21:13:44    History: Presence of specified devices    Comparison:  06/12/2017    Findings: The patient has been intubated with endotracheal tube positioned with its tip at the level of T3.  Nasogastric tube extends in the stomach.  A right central line remains in place with tip in superior vena cava.  The heart is normal in size.  There is patchy infiltrate in the right suprahilar region..                             The EKG was ordered, reviewed, and independently interpreted by the ED provider.  Interpretation time: 22:41  Rate: 44 BPM  Rhythm: sinus bradycardia  Interpretation: Sinus Bradycardia. No STEMI.      The Emergency Provider reviewed the vital signs and test results, which are outlined above.    ED Discussion     8:55 PM:  Patient is more awake now. She is trying to remove the ET tube. She is not opening her eyes or following any commands. Will give patient Propofol.    9:48 PM: Consult with Poison Control. They think Tizanidine may be the reason why patient is bradycardic and recommend symptomatic support at this time.     10:31 PM: Discussed case with Dr. Cooper ([facility]). Dr. Cooper agrees with current care and management of pt and accepts admission.   Admitting Service: Hospital medicine   Admitting Physician: Dr. Cooper  Admit to: ICU      ED Medication(s):  Medications   cefTRIAXone injection 1 g (not administered)   azithromycin 500 mg in 0.9 % sodium chloride 250 mL IVPB (ready to mix system) (not administered)   propofol (DIPRIVAN) 10 mg/mL IVP (40 mg Intravenous Given 2/9/18 2111)   propofol (DIPRIVAN) 10 mg/mL infusion (0 mcg/kg/min × 100.4 kg Intravenous Stopped 2/9/18 2240)   sodium chloride 0.9% bolus 1,000 mL (0 mLs Intravenous Stopped 2/9/18 2206)   etomidate injection 20 mg (20 mg Intravenous Given 2/9/18 2035)   succinylcholine injection 100 mg (100 mg Intravenous Given 2/9/18 2043)   sodium chloride 0.9% bolus 1,000 mL (1,000 mLs Intravenous New Bag 2/9/18 2226)       New Prescriptions    No medications on file            Medical Decision Making    Medical Decision Making:   Clinical Tests:   Lab Tests: Ordered and Reviewed  Radiological Study: Reviewed and Ordered           Scribe Attestation:   Scribe #1: I performed the above scribed service and the documentation accurately describes the services I performed. I attest to the accuracy of the note.    Attending:   Physician Attestation Statement for Scribe #1: I, Nico Griggs MD, personally performed the services described in this documentation, as scribed by Maritza Collins, in my presence, and it is both accurate and complete.          Clinical Impression       ICD-10-CM ICD-9-CM   1. Drug overdose T50.901A 977.9     E980.5   2. Endotracheally  intubated Z97.8 V49.89   3. Altered mental status R41.82 780.97   4. Aspiration pneumonia of right upper lobe, unspecified aspiration pneumonia type J69.0 507.0       Disposition:   Disposition: Admitted (ICU)  Condition: Critical           Nico Griggs MD  02/09/18 2337       Nico Griggs MD  02/10/18 0050

## 2018-02-10 NOTE — ED NOTES
Report received and care assumed at this time. Pt to ER via EMS from home s/p suicide attempt in which pt took various prescribed medications after writing a suicide note.  Pt arrived with GCS of 6. No family present at this time

## 2018-02-10 NOTE — ASSESSMENT & PLAN NOTE
SHORT TERM SEDATION:     []        Midazolam  [x]        Propofol  []        Dexmedetomidine     ANALGESIA:     []        Morphine   [x]        Fentanyl  []        Hydromorphone.         RASS  => - 2     Neurochecks per routine.  Daily sedation vacation

## 2018-02-10 NOTE — ASSESSMENT & PLAN NOTE
Nutrition Diagnosis:  Inadequate oral intake    Related to (etiology):   Intubation s/p drug overdose    Signs and Symptoms (as evidenced by):   Pt intubated and NPO without nutrition support    Interventions/Recommendations (treatment strategy):  If medically feasible, recommend to initiate enteral nutrition within 48h. Recommend Peptamen Intense VHP @ goal rate of 50ml/hr. See RD note 2/10/2018 for full recs     Nutrition Diagnosis Status:   New

## 2018-02-10 NOTE — PLAN OF CARE
D/C planning initiated . Patient is intubated and medical condition not stablwe at this time. Patient is PEC. CM to determine placement once patient i     02/10/18 0830   Discharge Assessment   Assessment Type Discharge Planning Assessment   Confirmed/corrected address and phone number on facesheet? No   Assessment information obtained from? Medical Record   Expected Length of Stay (days) (TBD)   Communicated expected length of stay with patient/caregiver no   Prior to hospitilization cognitive status: Coma/Sedated/Intubated   Current cognitive status: Coma/Sedated/Intubated   Able to Return to Prior Arrangements unable to determine at this time (comments)   Is patient able to care for self after discharge? Unable to determine at this time (comments)   Patient's perception of discharge disposition psychiatric hospital  (PEC)   Readmission Within The Last 30 Days no previous admission in last 30 days   Patient currently being followed by outpatient case management? Unable to determine (comments)   Equipment Currently Used at Home other (see comments)   Do you have any problems affording any of your prescribed medications? TBD   Discharge Plan A Psychiatric hospital   Discharge Plan B Psychiatric hospital   Patient/Family In Agreement With Plan other (see comments)   s medically cleared.

## 2018-02-10 NOTE — HPI
"Ms. 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 After the news broke out on local news channels today, per grandmother, patient left a suicide note that states "I'm sorry for being a mess up and please kiss my baby girl, etc". She then ingested unknown amount of Trazodone, Meloxicam, Cyclobenzaprine, Tizanidine, and Buspirone. All these bottles are empty at the bedside. Patient was brought to the ED in an obtunded state, she was emergently intubated. Patient received narcan 2 mg with no effect. UDS positive for amphetamines and barbiturates. Poison control was contacted, who suggested supportive care.  "

## 2018-02-10 NOTE — H&P
"[facility] Hospital Medicine  History & Physical    Patient Name: Priti Obrien  MRN: [id number]  Admission Date: 2018  Attending Physician: Ezio Cooper MD  Primary Care Provider: RICHI Ferrer         Patient information was obtained from past medical records and ER records.     Subjective:     Principal Problem:Acute metabolic encephalopathy    Chief Complaint:   Chief Complaint   Patient presents with    Drug Overdose        HPI: Ms. Obrien is a 31 y/o obese  female with h/o bipolar disorder who works as a  at a shelter was fired from her job for sexual misconduct with a male inmate. After the news broke out on local news channels today, per grandmother, patient left a suicide note that states "I'm sorry for being a mess up and please kiss my baby girl, etc". She then ingested unknown amount of Trazodone, Meloxicam, Cyclobenzaprine, Tizanidine, and Buspirone. All these bottles are empty at the bedside. Patient was brought to the ED in an obtunded state, she was emergently intubated. Patient received narcan 2 mg with no effect. UDS positive for amphetamines and barbiturates. Poison control was contacted, who suggested supportive care.    Past Medical History:   Diagnosis Date    Allergy     Asthma     Bipolar 1 disorder     Depression     GERD (gastroesophageal reflux disease)     Hypertension     pt denied    Pancreatitis     PTSD (post-traumatic stress disorder)     S/P partial hysterectomy 2011       Past Surgical History:   Procedure Laterality Date    APPENDECTOMY       SECTION      CHOLECYSTECTOMY      HYSTERECTOMY      LASER LAPAROSCOPY         Review of patient's allergies indicates:   Allergen Reactions    Demerol (pf) [meperidine (pf)] Hives    Medrol [methylprednisolone] Anaphylaxis     Any cortisone    Adhesive      Other reaction(s): Unknown    Amoxicillin-pot clavulanate      Other reaction(s): Unknown    " Atarax [hydroxyzine hcl]     Hydralazine analogues Hives    Iodine      Other reaction(s): Unknown    Penicillins Hives and Nausea And Vomiting    Phenergan [promethazine] Nausea And Vomiting    Ativan [lorazepam] Hives and Anxiety       No current facility-administered medications on file prior to encounter.      Current Outpatient Prescriptions on File Prior to Encounter   Medication Sig    busPIRone (BUSPAR) 10 MG tablet Take 10 mg by mouth 3 (three) times daily.    meloxicam (MOBIC) 7.5 MG tablet Take 7.5 mg by mouth once daily.    tiZANidine (ZANAFLEX) 4 MG tablet Take 4 mg by mouth every 6 (six) hours as needed.    blood-glucose meter (FREESTYLE SYSTEM KIT) kit Use as instructed    hydroCHLOROthiazide (HYDRODIURIL) 25 MG tablet Take 25 mg by mouth once daily.    hydrocodone-acetaminophen 5-325mg (NORCO) 5-325 mg per tablet Take 1 tablet by mouth every 6 (six) hours as needed for Pain.     Family History     Problem Relation (Age of Onset)    Atrial fibrillation Maternal Grandfather    Diabetes Mother    Hypertension Mother        Social History Main Topics    Smoking status: Current Every Day Smoker     Packs/day: 0.25     Types: Cigarettes     Last attempt to quit: 6/5/2013    Smokeless tobacco: Never Used    Alcohol use No      Comment: occasional     Drug use: No    Sexual activity: Yes     Partners: Male     Review of Systems   Unable to perform ROS: Patient unresponsive     Objective:     Vital Signs (Most Recent):  Temp: 98.9 °F (37.2 °C) (02/09/18 2158)  Pulse: (!) 53 (02/09/18 2247)  Resp: 20 (02/09/18 2247)  BP: (!) 104/57 (02/09/18 2247)  SpO2: 100 % (02/09/18 2247) Vital Signs (24h Range):  Temp:  [98.9 °F (37.2 °C)] 98.9 °F (37.2 °C)  Pulse:  [43-64] 53  Resp:  [7-26] 20  SpO2:  [92 %-100 %] 100 %  BP: ()/(48-93) 104/57     Weight: 100.4 kg (221 lb 4.8 oz)  Body mass index is 35.72 kg/m².    Physical Exam   Constitutional: She appears well-developed and well-nourished. She is  intubated.   Obese female, intubated   HENT:   Head: Normocephalic and atraumatic.   Eyes: Conjunctivae are normal. No scleral icterus.   Pupils very sluggish, barely reactive.   Cardiovascular: Regular rhythm, S1 normal, S2 normal and normal heart sounds.    No murmur heard.  Pulmonary/Chest: Breath sounds normal. She is intubated. She has no wheezes. She has no rales.   Abdominal: Soft. Bowel sounds are normal. She exhibits no distension.   Musculoskeletal: She exhibits no edema or deformity.   Lymphadenopathy:     She has no cervical adenopathy.   Neurological: She is unresponsive. GCS eye subscore is 1. GCS verbal subscore is 1. GCS motor subscore is 1.   Skin: Skin is warm. No rash noted. No erythema.   Psychiatric:   Defered   Nursing note and vitals reviewed.          Significant Labs:   ABGs:   Recent Labs  Lab 02/09/18 2120   PH 7.344*   PCO2 44.6   HCO3 24.3   POCSATURATED 100   BE -1     Bilirubin:   Recent Labs  Lab 02/09/18 2113   BILITOT 0.7     Blood Culture: No results for input(s): LABBLOO in the last 48 hours.  BMP:   Recent Labs  Lab 02/09/18 2113   *      K 3.5      CO2 20*   BUN 12   CREATININE 0.7   CALCIUM 7.8*     CBC:   Recent Labs  Lab 02/09/18 2113   WBC 8.23   HGB 12.2   HCT 35.2*        CMP:   Recent Labs  Lab 02/09/18 2113      K 3.5      CO2 20*   *   BUN 12   CREATININE 0.7   CALCIUM 7.8*   PROT 6.2   ALBUMIN 3.5   BILITOT 0.7   ALKPHOS 44*   AST 18   ALT 18   ANIONGAP 12   EGFRNONAA >60     Cardiac Markers: No results for input(s): CKMB, MYOGLOBIN, BNP, TROPISTAT in the last 48 hours.  Coagulation:   Recent Labs  Lab 02/09/18 2113   INR 1.1   APTT 24.0     Lactic Acid: No results for input(s): LACTATE in the last 48 hours.  Lipase: No results for input(s): LIPASE in the last 48 hours.  Lipid Panel: No results for input(s): CHOL, HDL, LDLCALC, TRIG, CHOLHDL in the last 48 hours.  Magnesium: No results for input(s): MG in the last 48  hours.  Prealbumin: No results for input(s): PREALBUMIN in the last 48 hours.  Respiratory Culture: No results for input(s): GSRESP, RESPIRATORYC in the last 48 hours.  Troponin:   Recent Labs  Lab 02/09/18 2113   TROPONINI <0.006     TSH: No results for input(s): TSH in the last 4320 hours.  Urine Culture: No results for input(s): LABURIN in the last 48 hours.  Urine Studies:   Recent Labs  Lab 02/09/18 2113   COLORU Yellow   APPEARANCEUA Clear   PHUR 6.0   SPECGRAV >=1.030*   PROTEINUA 2+*   GLUCUA Negative   KETONESU Negative   BILIRUBINUA Negative   OCCULTUA Negative   NITRITE Negative   UROBILINOGEN Negative   LEUKOCYTESUR Negative   RBCUA 1   WBCUA 0   BACTERIA Rare   SQUAMEPITHEL 1   HYALINECASTS 2*     All pertinent labs within the past 24 hours have been reviewed.    Significant Imaging: I have reviewed and interpreted all pertinent imaging results/findings within the past 24 hours.     Imaging Results          CT Head Without Contrast (Final result)  Result time 02/09/18 22:02:55    Final result by Lorena Wilson MD (02/09/18 22:02:55)                 Impression:         Negative noncontrast CT scan of the brain.   All CT scans at this facility use dose modulation, iterative reconstruction, and/or weight based dosing when appropriate to reduce radiation dose to as low as reasonable achievable.      Electronically signed by: LORENA WILSON MD  Date:     02/09/18  Time:    22:02              Narrative:    CT HEAD WITHOUT CONTRAST    Date: 02/09/18 21:51:50    Clinical indication:  Decreased alertness      Technique:  Standard noncontrast CT scan of the brain. Comparison is made with previous study of 02/14/2014.     Findings:  The ventricles are nondilated. Gray and white matter structures reveal normal attenuation. No hemorrhage, mass effect or edema is identified.     There is chronic scalloped concavity to the vertex on the right.  This is unchanged compared with previous exam.                              X-Ray Chest AP Portable (Final result)  Result time 02/09/18 21:22:18    Final result by Lorena Wilson MD (02/09/18 21:22:18)                 Impression:      The patient has been intubated.  Endotracheal tube and nasogastric tube appear in good position.  There is patchy infiltrate in the right suprahilar region.      Electronically signed by: LORENA WILSON MD  Date:     02/09/18  Time:    21:22              Narrative:    Exam: XR CHEST AP PORTABLE,    Date:  02/09/18 21:13:44    History: Presence of specified devices    Comparison:  06/12/2017    Findings: The patient has been intubated with endotracheal tube positioned with its tip at the level of T3.  Nasogastric tube extends in the stomach.  A right central line remains in place with tip in superior vena cava.  The heart is normal in size.  There is patchy infiltrate in the right suprahilar region..                              I have independently reviewed and interpreted the EKG. My findings include sinus bradycardia 48 bpm.    I have independently reviewed all pertinent labs within the past 24 hours.    I have independently reviewed, visualized and interpreted all pertinent imaging results within the past 24 hours and discussed the findings with the ED physician, Dr. Griggs.            Assessment/Plan:     * Acute metabolic encephalopathy    GCS 6 upon arrival. Intubated in ED.  Currently on propofol due to agitation.  Continue Supportive care.  Intubated. Pulmonary consult.          Drug overdose    Due to intentional polypharmacy overdose of various prescribed medications (Trazodone, Meloxicam, Cyclobenzaprine, Tizanidine, and Buspirone).  UDS positive for amphetamines and barbiturates.            Borderline personality disorder    On multiple psychotropic medications at home.          Suicidal behavior with attempted self-injury    PEC when extubated.  If she survives and recovers neurologically, will need in-patient psychiatric  placement.          Morbid obesity with BMI of 40.0-44.9, adult                 VTE Risk Mitigation         Ordered     enoxaparin injection 40 mg  Daily     Route:  Subcutaneous        02/09/18 2316     Medium Risk of VTE  Once      02/09/18 2316     Place sequential compression device  Until discontinued      02/09/18 2316          Ezio Cooper MD  Department of Hospital Medicine   [facility]

## 2018-02-10 NOTE — ASSESSMENT & PLAN NOTE
Patchy infiltrate in right suprahilar region. ? Aspiration.  Empiric coverage: De escalate to oral Moxifloxacin once extubated.

## 2018-02-10 NOTE — SUBJECTIVE & OBJECTIVE
Past Medical History:   Diagnosis Date    Allergy     Asthma     Bipolar 1 disorder     Depression     GERD (gastroesophageal reflux disease)     Hypertension     pt denied    Pancreatitis     PTSD (post-traumatic stress disorder)     S/P partial hysterectomy 2011       Past Surgical History:   Procedure Laterality Date    APPENDECTOMY       SECTION      CHOLECYSTECTOMY      HYSTERECTOMY      LASER LAPAROSCOPY         Review of patient's allergies indicates:   Allergen Reactions    Demerol (pf) [meperidine (pf)] Hives    Medrol [methylprednisolone] Anaphylaxis     Any cortisone    Adhesive      Other reaction(s): Unknown    Amoxicillin-pot clavulanate      Other reaction(s): Unknown    Atarax [hydroxyzine hcl]     Hydralazine analogues Hives    Iodine      Other reaction(s): Unknown    Penicillins Hives and Nausea And Vomiting    Phenergan [promethazine] Nausea And Vomiting    Ativan [lorazepam] Hives and Anxiety       Scheduled Meds:   cefTRIAXone (ROCEPHIN) IVPB  1 g Intravenous Q12H    enoxaparin  40 mg Subcutaneous Daily    famotidine (PF)  20 mg Intravenous BID    metronidazole  500 mg Intravenous Q8H     Continuous Infusions:   sodium chloride 0.9% 150 mL/hr at 02/10/18 1400     PRN Meds:.acetaminophen, albuterol-ipratropium 2.5mg-0.5mg/3mL, ondansetron    Family History     Problem Relation (Age of Onset)    Atrial fibrillation Maternal Grandfather    Diabetes Mother    Hypertension Mother        Social History Main Topics    Smoking status: Current Every Day Smoker     Packs/day: 0.25     Types: Cigarettes     Last attempt to quit: 2013    Smokeless tobacco: Never Used    Alcohol use No      Comment: occasional     Drug use: No    Sexual activity: Yes     Partners: Male         Review of Systems   Unable to perform ROS: Patient unresponsive     Objective:     Vital Signs (Most Recent):  Temp: 98.2 °F (36.8 °C) (02/10/18 1315)  Pulse: (!) 57 (02/10/18  1400)  Resp: 16 (02/10/18 1400)  BP: (!) 132/50 (02/10/18 1400)  SpO2: 100 % (02/10/18 1400) Vital Signs (24h Range):  Temp:  [96.6 °F (35.9 °C)-98.9 °F (37.2 °C)] 98.2 °F (36.8 °C)  Pulse:  [43-66] 57  Resp:  [7-26] 16  SpO2:  [92 %-100 %] 100 %  BP: ()/(36-93) 132/50     Weight: 98.4 kg (216 lb 14.9 oz)  Body mass index is 35.01 kg/m².      Intake/Output Summary (Last 24 hours) at 02/10/18 1517  Last data filed at 02/10/18 1400   Gross per 24 hour   Intake          6325.42 ml   Output             1950 ml   Net          4375.42 ml       Physical Exam   Constitutional: She appears well-developed and well-nourished. She is sedated and intubated.   HENT:   Head: Normocephalic and atraumatic.   Eyes: Conjunctivae are normal. No scleral icterus.   Cardiovascular: Regular rhythm, S1 normal, S2 normal and normal heart sounds.    No murmur heard.  Pulmonary/Chest: Breath sounds normal. She is intubated. She has no wheezes. She has no rales.   Abdominal: Soft. Bowel sounds are normal. She exhibits no distension.   Musculoskeletal: She exhibits no edema or deformity.   Lymphadenopathy:     She has no cervical adenopathy.   Skin: Skin is warm. No rash noted. No erythema.   Psychiatric:   Defered   Nursing note and vitals reviewed.      Vents:  Vent Mode: Spont (02/10/18 1102)  Set Rate: 0 bmp (02/10/18 1102)  Vt Set: 400 mL (02/10/18 1102)  Pressure Support: 10 cmH20 (02/10/18 1102)  PEEP/CPAP: 5 cmH20 (02/10/18 1102)  Oxygen Concentration (%): 28 (02/10/18 1230)  Peak Airway Pressure: 15 cmH2O (02/10/18 1102)  Plateau Pressure: 0 cmH20 (02/10/18 1102)  Total Ve: 7.1 mL (02/10/18 1102)  Negative Inspiratory Force (cm H2O): -50 (02/10/18 1225)  F/VT Ratio<105 (RSBI): (!) 28.51 (02/10/18 1102)    Lines/Drains/Airways     Peripheral Intravenous Line                 Peripheral IV - Single Lumen 02/09/18 2000 Left less than 1 day         Peripheral IV - Single Lumen 02/10/18 0001 Left Forearm less than 1 day          Peripheral IV - Single Lumen 02/10/18 0300 Right Hand less than 1 day                Significant Labs:    CBC/Anemia Profile:    Recent Labs  Lab 02/09/18  2113 02/10/18  0415   WBC 8.23 9.33   HGB 12.2 12.0   HCT 35.2* 35.3*    248   MCV 87 87   RDW 13.5 13.8        Chemistries:    Recent Labs  Lab 02/09/18  2113 02/10/18  0222 02/10/18  0415     --  140   K 3.5  --  3.8     --  110   CO2 20*  --  21*   BUN 12  --  13   CREATININE 0.7  --  0.8   CALCIUM 7.8*  --  7.8*   ALBUMIN 3.5  --  3.3*   PROT 6.2  --  5.9*   BILITOT 0.7  --  0.5   ALKPHOS 44*  --  41*   ALT 18  --  19   AST 18  --  22   MG  --  1.9  --    PHOS  --  3.6  --        ABGs:   Recent Labs  Lab 02/10/18  0515   PH 7.378   PCO2 37.6   HCO3 22.1*   POCSATURATED 100   BE -3     Coagulation:   Recent Labs  Lab 02/09/18  2113 02/10/18  0222   INR 1.1 1.1   APTT 24.0  --      Lactic Acid:   Recent Labs  Lab 02/09/18  2251   LACTATE 2.2     Troponin:   Recent Labs  Lab 02/09/18 2113   TROPONINI <0.006     Significant Imaging:       X-Ray Chest AP Portable      The patient has been intubated with endotracheal tube positioned with its tip at the level of T3.  Nasogastric tube extends in the stomach.  A right central line remains in place with tip in superior vena cava.  The heart is normal in size.  There is patchy infiltrate in the right suprahilar region.    CT Head Without Contrast        The ventricles are nondilated. Gray and white matter structures reveal normal attenuation. No hemorrhage, mass effect or edema is identified.

## 2018-02-10 NOTE — CONSULTS
"[facility] -   Critical Care Medicine  Consult Note    Patient Name: Priti Obrien  MRN: [id number]  Admission Date: 2018  Hospital Length of Stay: 1 days  Code Status: Full Code  Attending Physician: Anish Vela MD   Primary Care Provider: RICHI Ferrer   Principal Problem: Acute metabolic encephalopathy      Subjective:     HPI:  33 y/o female with h/o bipolar disorder who works as a  at a [employer] was fired from her job for sexual misconduct with a male inmate. After the news broke out on local news channels today, per grandmother, patient left a suicide note that states "I'm sorry for being a mess up and please kiss my baby girl, etc". She then ingested unknown amount of Trazodone, Meloxicam, Cyclobenzaprine, Tizanidine, and Buspirone. All these bottles are empty at the bedside. Patient was brought to the ED in an obtunded state, she was emergently intubated. Patient received narcan 2 mg with no effect. UDS positive for amphetamines and barbiturates. Poison control was contacted, who suggested supportive care.       Hospital/ICU Course:  Intubated and on full mechanical ventilation in the MICU. Hemodynamically stable.     Past Medical History:   Diagnosis Date    Allergy     Asthma     Bipolar 1 disorder     Depression     GERD (gastroesophageal reflux disease)     Hypertension     pt denied    Pancreatitis     PTSD (post-traumatic stress disorder)     S/P partial hysterectomy 2011       Past Surgical History:   Procedure Laterality Date    APPENDECTOMY       SECTION      CHOLECYSTECTOMY      HYSTERECTOMY      LASER LAPAROSCOPY         Review of patient's allergies indicates:   Allergen Reactions    Demerol (pf) [meperidine (pf)] Hives    Medrol [methylprednisolone] Anaphylaxis     Any cortisone    Adhesive      Other reaction(s): Unknown    Amoxicillin-pot clavulanate      Other reaction(s): Unknown    Atarax [hydroxyzine hcl]  "    Hydralazine analogues Hives    Iodine      Other reaction(s): Unknown    Penicillins Hives and Nausea And Vomiting    Phenergan [promethazine] Nausea And Vomiting    Ativan [lorazepam] Hives and Anxiety       Scheduled Meds:   cefTRIAXone (ROCEPHIN) IVPB  1 g Intravenous Q12H    enoxaparin  40 mg Subcutaneous Daily    famotidine (PF)  20 mg Intravenous BID    metronidazole  500 mg Intravenous Q8H     Continuous Infusions:   sodium chloride 0.9% 150 mL/hr at 02/10/18 1400     PRN Meds:.acetaminophen, albuterol-ipratropium 2.5mg-0.5mg/3mL, ondansetron    Family History     Problem Relation (Age of Onset)    Atrial fibrillation Maternal Grandfather    Diabetes Mother    Hypertension Mother        Social History Main Topics    Smoking status: Current Every Day Smoker     Packs/day: 0.25     Types: Cigarettes     Last attempt to quit: 6/5/2013    Smokeless tobacco: Never Used    Alcohol use No      Comment: occasional     Drug use: No    Sexual activity: Yes     Partners: Male         Review of Systems   Unable to perform ROS: Patient unresponsive     Objective:     Vital Signs (Most Recent):  Temp: 98.2 °F (36.8 °C) (02/10/18 1315)  Pulse: (!) 57 (02/10/18 1400)  Resp: 16 (02/10/18 1400)  BP: (!) 132/50 (02/10/18 1400)  SpO2: 100 % (02/10/18 1400) Vital Signs (24h Range):  Temp:  [96.6 °F (35.9 °C)-98.9 °F (37.2 °C)] 98.2 °F (36.8 °C)  Pulse:  [43-66] 57  Resp:  [7-26] 16  SpO2:  [92 %-100 %] 100 %  BP: ()/(36-93) 132/50     Weight: 98.4 kg (216 lb 14.9 oz)  Body mass index is 35.01 kg/m².      Intake/Output Summary (Last 24 hours) at 02/10/18 1517  Last data filed at 02/10/18 1400   Gross per 24 hour   Intake          6325.42 ml   Output             1950 ml   Net          4375.42 ml       Physical Exam   Constitutional: She appears well-developed and well-nourished. She is sedated and intubated.   HENT:   Head: Normocephalic and atraumatic.   Eyes: Conjunctivae are normal. No scleral icterus.    Cardiovascular: Regular rhythm, S1 normal, S2 normal and normal heart sounds.    No murmur heard.  Pulmonary/Chest: Breath sounds normal. She is intubated. She has no wheezes. She has no rales.   Abdominal: Soft. Bowel sounds are normal. She exhibits no distension.   Musculoskeletal: She exhibits no edema or deformity.   Lymphadenopathy:     She has no cervical adenopathy.   Skin: Skin is warm. No rash noted. No erythema.   Psychiatric:   Defered   Nursing note and vitals reviewed.      Vents:  Vent Mode: Spont (02/10/18 1102)  Set Rate: 0 bmp (02/10/18 1102)  Vt Set: 400 mL (02/10/18 1102)  Pressure Support: 10 cmH20 (02/10/18 1102)  PEEP/CPAP: 5 cmH20 (02/10/18 1102)  Oxygen Concentration (%): 28 (02/10/18 1230)  Peak Airway Pressure: 15 cmH2O (02/10/18 1102)  Plateau Pressure: 0 cmH20 (02/10/18 1102)  Total Ve: 7.1 mL (02/10/18 1102)  Negative Inspiratory Force (cm H2O): -50 (02/10/18 1225)  F/VT Ratio<105 (RSBI): (!) 28.51 (02/10/18 1102)    Lines/Drains/Airways     Peripheral Intravenous Line                 Peripheral IV - Single Lumen 02/09/18 2000 Left less than 1 day         Peripheral IV - Single Lumen 02/10/18 0001 Left Forearm less than 1 day         Peripheral IV - Single Lumen 02/10/18 0300 Right Hand less than 1 day                Significant Labs:    CBC/Anemia Profile:    Recent Labs  Lab 02/09/18  2113 02/10/18  0415   WBC 8.23 9.33   HGB 12.2 12.0   HCT 35.2* 35.3*    248   MCV 87 87   RDW 13.5 13.8        Chemistries:    Recent Labs  Lab 02/09/18  2113 02/10/18  0222 02/10/18  0415     --  140   K 3.5  --  3.8     --  110   CO2 20*  --  21*   BUN 12  --  13   CREATININE 0.7  --  0.8   CALCIUM 7.8*  --  7.8*   ALBUMIN 3.5  --  3.3*   PROT 6.2  --  5.9*   BILITOT 0.7  --  0.5   ALKPHOS 44*  --  41*   ALT 18  --  19   AST 18  --  22   MG  --  1.9  --    PHOS  --  3.6  --        ABGs:   Recent Labs  Lab 02/10/18  0515   PH 7.378   PCO2 37.6   HCO3 22.1*   POCSATURATED 100   BE -3      Coagulation:   Recent Labs  Lab 02/09/18  2113 02/10/18  0222   INR 1.1 1.1   APTT 24.0  --      Lactic Acid:   Recent Labs  Lab 02/09/18  2251   LACTATE 2.2     Troponin:   Recent Labs  Lab 02/09/18 2113   TROPONINI <0.006     Significant Imaging:       X-Ray Chest AP Portable      The patient has been intubated with endotracheal tube positioned with its tip at the level of T3.  Nasogastric tube extends in the stomach.  A right central line remains in place with tip in superior vena cava.  The heart is normal in size.  There is patchy infiltrate in the right suprahilar region.    CT Head Without Contrast        The ventricles are nondilated. Gray and white matter structures reveal normal attenuation. No hemorrhage, mass effect or edema is identified.     Assessment/Plan:       I have reviewed all labs and imaging studies and compared to previous results. I have also discussed labs with all the teams in the medical care of the patient and my plan is outlined below     Neuro   * Acute metabolic encephalopathy      SHORT TERM SEDATION:     []        Midazolam  [x]        Propofol  []        Dexmedetomidine     ANALGESIA:     []        Morphine   [x]        Fentanyl  []        Hydromorphone.         RASS  => - 2     Neurochecks per routine.  Daily sedation vacation        Psychiatric   Suicidal behavior with attempted self-injury    Physicians emergency certificate initiated.        Borderline personality disorder    Psych medications on hold whilst patient is mechanically ventilated.        Pulmonary    Continue ventilator support. Ventilator settings reviewed and adjusted to optimize gas exchange. Initiate ventilator liberation. Weaning parameters. Extubate when criteria met.  Titrate FIO2 to keep SAO2 > 92%. Bronchodilators per protocol.        Pneumonia    Patchy infiltrate in right suprahilar region. ? Aspiration.  Empiric coverage: De escalate to oral Moxifloxacin once extubated.         Cardiac/Vascular      Monitor hemodynamics. MAP goal of 60 mmHg.        Renal/    Monitor BUN / Cr. Montor urine output. Monitor and replete electrolytes as needed.         ID    Monitor fever curve. panculture for temperatures greater than 101 degrees F. Source control: MOXIFLOXACIN        Immunology/Multi System    Immunization status reviewed and updated.        Hematology    Monitor hemogram. Transfuse as needed.        Endocrine   Diabetes mellitus type 2 in obese    Hyperglycemia:   low dose SSI for hyperglycemia with monitoring for glucose control and prevention of insulin toxicity. Blood glucose target 100 - 180 mg/dl            Other   Drug overdose    Supportive care per Poison Control.             Critical Care Daily Checklist:    A: Awake: RASS Goal/Actual Goal: RASS Goal: 0-->alert and calm  Actual: [place]ant Agitation Sedation Scale (RASS): Alert and calm   B: Spontaneous Breathing Trial Performed? Spon. Breathing Trial Initiated?: Initiated (by MD at bedside) (02/10/18 0928)   C: SAT & SBT Coordinated?  Yes                      D: Delirium: CAM-ICU Overall CAM-ICU: Negative   E: Early Mobility Performed? Yes   F: Feeding Goal: Goals: 1. Meet >85% EEN and EPN 2. Prevent weight loss of >2% per week 3. Promote nutrition related labs wnl  Status: Nutrition Goal Status: new   Current Diet Order   Procedures    Diet Diabetic 1800 Calories     PEC- please send all styrofoam and plastic utensils      AS: Analgesia/Sedation IV FENTANYL   T: Thromboembolic Prophylaxis ENOXAPARIN   H: HOB > 300 Yes   U: Stress Ulcer Prophylaxis (if needed) FAMOTIDINE   G: Glucose Control SBGM   B: Bowel Function Stool Occurrence: 0   I: Indwelling Catheter (Lines & [place]re) Necessity YES   D: De-escalation of Antimicrobials/Pharmacotherapies YES    Plan for the day/ETD Extubate patient    Code Status:  Family/Goals of Care: Full Code  Home self care     Critical Care Time: 60 minutes  Critical secondary to Patient has a condition that poses threat to  life and bodily function: ACUTE RESPIRATORY FAILURE, ACUTE METABOLIC ENCEPHALOPATHY, DRUG OVERDOSE,SUICIDAL BEHAVIOUR WITH ATTEMPTED SELF INJURY.     Critical care was time spent personally by me on the following activities: development of treatment plan with patient or surrogate and bedside caregivers, discussions with consultants, evaluation of patient's response to treatment, examination of patient, ordering and performing treatments and interventions, ordering and review of laboratory studies, ordering and review of radiographic studies, pulse oximetry, re-evaluation of patient's condition. This critical care time did not overlap with that of any other provider or involve time for any procedures.    Thank you for your consult. I will follow-up with patient. Please contact us if you have any additional questions.     Vijay Storey MD  Critical Care Medicine  [facility]

## 2018-02-10 NOTE — ED NOTES
Per dr. Griggs, ok to hook OG up to low wall suction intermittent. Yellow drainage noted in suction cannister

## 2018-02-10 NOTE — ASSESSMENT & PLAN NOTE
GCS 6 upon arrival. Intubated in ED.  Currently on propofol due to agitation.  Continue Supportive care.  Intubated. Pulmonary consult.

## 2018-02-11 PROBLEM — G93.41 ACUTE METABOLIC ENCEPHALOPATHY: Status: RESOLVED | Noted: 2018-02-09 | Resolved: 2018-02-11

## 2018-02-11 LAB
ALBUMIN SERPL BCP-MCNC: 3.1 G/DL
ALP SERPL-CCNC: 41 U/L
ALT SERPL W/O P-5'-P-CCNC: 17 U/L
ANION GAP SERPL CALC-SCNC: 8 MMOL/L
AST SERPL-CCNC: 20 U/L
BASOPHILS # BLD AUTO: 0.02 K/UL
BASOPHILS NFR BLD: 0.2 %
BILIRUB SERPL-MCNC: 0.4 MG/DL
BUN SERPL-MCNC: 8 MG/DL
CALCIUM SERPL-MCNC: 7.9 MG/DL
CHLORIDE SERPL-SCNC: 113 MMOL/L
CO2 SERPL-SCNC: 19 MMOL/L
CREAT SERPL-MCNC: 0.8 MG/DL
DIFFERENTIAL METHOD: ABNORMAL
EOSINOPHIL # BLD AUTO: 0.1 K/UL
EOSINOPHIL NFR BLD: 1.5 %
ERYTHROCYTE [DISTWIDTH] IN BLOOD BY AUTOMATED COUNT: 14 %
EST. GFR  (AFRICAN AMERICAN): >60 ML/MIN/1.73 M^2
EST. GFR  (NON AFRICAN AMERICAN): >60 ML/MIN/1.73 M^2
GLUCOSE SERPL-MCNC: 100 MG/DL
HCT VFR BLD AUTO: 34 %
HGB BLD-MCNC: 11.5 G/DL
LYMPHOCYTES # BLD AUTO: 1.9 K/UL
LYMPHOCYTES NFR BLD: 21.8 %
MCH RBC QN AUTO: 29.4 PG
MCHC RBC AUTO-ENTMCNC: 33.8 G/DL
MCV RBC AUTO: 87 FL
MONOCYTES # BLD AUTO: 0.6 K/UL
MONOCYTES NFR BLD: 6.7 %
NEUTROPHILS # BLD AUTO: 5.9 K/UL
NEUTROPHILS NFR BLD: 69.8 %
PLATELET # BLD AUTO: 127 K/UL
PMV BLD AUTO: 11 FL
POTASSIUM SERPL-SCNC: 3.6 MMOL/L
PROT SERPL-MCNC: 6 G/DL
RBC # BLD AUTO: 3.91 M/UL
SODIUM SERPL-SCNC: 140 MMOL/L
WBC # BLD AUTO: 8.5 K/UL

## 2018-02-11 PROCEDURE — 63600175 PHARM REV CODE 636 W HCPCS: Performed by: INTERNAL MEDICINE

## 2018-02-11 PROCEDURE — 85025 COMPLETE CBC W/AUTO DIFF WBC: CPT

## 2018-02-11 PROCEDURE — 80053 COMPREHEN METABOLIC PANEL: CPT

## 2018-02-11 PROCEDURE — 20000000 HC ICU ROOM

## 2018-02-11 PROCEDURE — 94640 AIRWAY INHALATION TREATMENT: CPT

## 2018-02-11 PROCEDURE — 27000221 HC OXYGEN, UP TO 24 HOURS

## 2018-02-11 PROCEDURE — S0028 INJECTION, FAMOTIDINE, 20 MG: HCPCS | Performed by: INTERNAL MEDICINE

## 2018-02-11 PROCEDURE — 25000003 PHARM REV CODE 250: Performed by: INTERNAL MEDICINE

## 2018-02-11 PROCEDURE — 36415 COLL VENOUS BLD VENIPUNCTURE: CPT

## 2018-02-11 PROCEDURE — 25000242 PHARM REV CODE 250 ALT 637 W/ HCPCS: Performed by: FAMILY MEDICINE

## 2018-02-11 PROCEDURE — 99233 SBSQ HOSP IP/OBS HIGH 50: CPT | Mod: ,,, | Performed by: INTERNAL MEDICINE

## 2018-02-11 RX ORDER — IPRATROPIUM BROMIDE AND ALBUTEROL SULFATE 2.5; .5 MG/3ML; MG/3ML
3 SOLUTION RESPIRATORY (INHALATION)
Status: DISCONTINUED | OUTPATIENT
Start: 2018-02-11 | End: 2018-02-12

## 2018-02-11 RX ADMIN — IPRATROPIUM BROMIDE AND ALBUTEROL SULFATE 3 ML: .5; 3 SOLUTION RESPIRATORY (INHALATION) at 08:02

## 2018-02-11 RX ADMIN — ACETAMINOPHEN 650 MG: 325 TABLET ORAL at 11:02

## 2018-02-11 RX ADMIN — IPRATROPIUM BROMIDE AND ALBUTEROL SULFATE 3 ML: .5; 3 SOLUTION RESPIRATORY (INHALATION) at 03:02

## 2018-02-11 RX ADMIN — FAMOTIDINE 20 MG: 10 INJECTION, SOLUTION INTRAVENOUS at 09:02

## 2018-02-11 RX ADMIN — ENOXAPARIN SODIUM 40 MG: 100 INJECTION SUBCUTANEOUS at 04:02

## 2018-02-11 RX ADMIN — FAMOTIDINE 20 MG: 10 INJECTION, SOLUTION INTRAVENOUS at 08:02

## 2018-02-11 RX ADMIN — IPRATROPIUM BROMIDE AND ALBUTEROL SULFATE 3 ML: .5; 3 SOLUTION RESPIRATORY (INHALATION) at 11:02

## 2018-02-11 RX ADMIN — MOXIFLOXACIN HYDROCHLORIDE 400 MG: 400 TABLET, FILM COATED ORAL at 08:02

## 2018-02-11 NOTE — HOSPITAL COURSE
Pt able to respond to Q's, she has been inpt for mental health issues and has had prior suicide attempt.  GM (confirms HPI) and aunt present and pt authorizes GM as surrogate decision maker.  Labs ok, clinically stable  2/11 - Pt extubated yesterday afternoon, doing well. Labs stable (creatinine and liver enzymes ok), pt is uncertain if she has to go to long-term at time of discharge, is concerned about not being able to see her daughter again. Case discussed w/pulm, stable for downgrade to tele. Will continue oral antibx due to concern for aspiration pneumonia.  2/12 - platelets decreased, otherwise normal kidney and liver function in patient, having no side effects of overdose.  2/13/18 No acute issues overnight. K+ 3.0 today, will replete. Platelets increased to 220 today. Continue PO avelox for treatment of PNA. The patient is medically cleared for placement. The patient was seen and examined today and deemed stable for discharge. The patient is being discharged to [facility] for inpatient psych treatment. The patient is being discharged on Levaquin and will replace a 7 day total ABX coarse. The patient will follow up with her PCP.

## 2018-02-11 NOTE — PROGRESS NOTES
"[facility] Hospital Medicine  Progress Note    Patient Name: Priti Obrien  MRN: [id number]  Patient Class: IP- Inpatient   Admission Date: 2/9/2018  Length of Stay: 1 days  Attending Physician: Anish Vela MD  Primary Care Provider: RICHI Ferrer        Subjective:     Principal Problem:Acute metabolic encephalopathy    HPI:  Ms. Obrien is a 31 y/o obese  female with h/o bipolar disorder who works as a  at a [employer] was fired from her job for sexual misconduct with a male inmate. After the news broke out on local news channels today, per grandmother, patient left a suicide note that states "I'm sorry for being a mess up and please kiss my baby girl, etc". She then ingested unknown amount of Trazodone, Meloxicam, Cyclobenzaprine, Tizanidine, and Buspirone. All these bottles are empty at the bedside. Patient was brought to the ED in an obtunded state, she was emergently intubated. Patient received narcan 2 mg with no effect. UDS positive for amphetamines and barbiturates. Poison control was contacted, who suggested supportive care.    Hospital Course:  Pt able to respond to Q's, she has been inpt for mental health issues and has had prior suicide attempt.  GM (confirms HPI) and aunt present and pt authorizes GM as surrogate decision maker.  Labs ok, clinically stable    Interval History:     Review of Systems   Unable to perform ROS: Intubated     Objective:     Vital Signs (Most Recent):  Temp: 97.8 °F (36.6 °C) (02/10/18 1515)  Pulse: 72 (02/10/18 1700)  Resp: 17 (02/10/18 1700)  BP: (!) 131/52 (02/10/18 1700)  SpO2: 99 % (02/10/18 1700) Vital Signs (24h Range):  Temp:  [96.6 °F (35.9 °C)-98.9 °F (37.2 °C)] 97.8 °F (36.6 °C)  Pulse:  [43-72] 72  Resp:  [7-26] 17  SpO2:  [92 %-100 %] 99 %  BP: ()/(36-93) 131/52     Weight: 98.4 kg (216 lb 14.9 oz)  Body mass index is 35.01 kg/m².    Intake/Output Summary (Last 24 hours) at 02/10/18 1830  Last data filed " at 02/10/18 1800   Gross per 24 hour   Intake          7165.42 ml   Output             2000 ml   Net          5165.42 ml      Physical Exam   Constitutional: She appears well-developed and well-nourished. She is intubated.   Obese female, intubated   HENT:   Head: Normocephalic and atraumatic.   Eyes: Conjunctivae are normal. No scleral icterus.   Pupils very sluggish, barely reactive.   Cardiovascular: Regular rhythm, S1 normal, S2 normal and normal heart sounds.    No murmur heard.  Pulmonary/Chest: Breath sounds normal. She is intubated. She has no wheezes. She has no rales.   Abdominal: Soft. Bowel sounds are normal. She exhibits no distension.   Musculoskeletal: She exhibits no edema or deformity.   Lymphadenopathy:     She has no cervical adenopathy.   Neurological: She is unresponsive. GCS eye subscore is 1. GCS verbal subscore is 1. GCS motor subscore is 1.   Skin: Skin is warm. No rash noted. No erythema.   Psychiatric:   Defered   Nursing note and vitals reviewed.      Significant Labs: All pertinent labs within the past 24 hours have been reviewed.    Significant Imaging: I have reviewed and interpreted all pertinent imaging results/findings within the past 24 hours.    Assessment/Plan:      * Acute metabolic encephalopathy    GCS 6 upon arrival. Intubated in ED.  Currently on propofol due to agitation.  Continue Supportive care.  Intubated. Pulmonary consult.    02/10 - resolved          Pneumonia    On antibiotics, following cultures (STREPTOCOCCUS GROUP C from throat)        Suicidal behavior with attempted self-injury    PEC when extubated.  If she survives and recovers neurologically, will need in-patient psychiatric placement.    02/10 - CM consulted for placement          Drug overdose    Due to intentional polypharmacy overdose of various prescribed medications (Trazodone, Meloxicam, Cyclobenzaprine, Tizanidine, and Buspirone).  UDS positive for amphetamines and barbiturates.    02/10 - labs ok,  increased IV fluids            Morbid obesity with BMI of 40.0-44.9, adult               Borderline personality disorder    On multiple psychotropic medications at home.            VTE Risk Mitigation         Ordered     enoxaparin injection 40 mg  Daily     Route:  Subcutaneous        02/09/18 2316     Medium Risk of VTE  Once      02/09/18 2316     Place sequential compression device  Until discontinued      02/09/18 2316          Critical care time spent on the evaluation and treatment of severe organ dysfunction, review of pertinent labs and imaging studies, discussions with consulting providers and discussions with patient/family: 66 minutes.    Anish Vela MD  Department of Hospital Medicine   [facility]

## 2018-02-11 NOTE — PROGRESS NOTES
Patient complained of chest pain when breathing in and coughing.  I called and spoke with Dr Cox concerning patients complaint.  Patient has been placed back on nasal cannula due to low O2 sats to 88 and 89% on room air.   PRN breathing treatments are ordered and she agreed to giving patient a treatment and reassess.  Patient has a loose cough that is non productive.  After breathing treatment patient reports she does have relief.  Will continue to monitor, chest xray to be done in a.m.

## 2018-02-11 NOTE — DISCHARGE INSTRUCTIONS
You will need to get an STD workup after leaving the hospital.  Address through primary care.  You need to establish care with a mental health profession after hospital discharge and follow up as instructed.  You need to follow all advice for treatment.

## 2018-02-11 NOTE — PLAN OF CARE
Problem: Patient Care Overview  Goal: Plan of Care Review  Outcome: Ongoing (interventions implemented as appropriate)  Patient remains PEC'd this a.m.  She has had multiple crying episodes during the night.  I have sat and offered to listen if she would like to talk.  She refused either time.  IV fluids discontinued at midnight.  Patient stated her chest was better after breathing treatment.  A chest xray is ordered for this a.m.  See MAR for medication doses and times of administration.

## 2018-02-11 NOTE — ASSESSMENT & PLAN NOTE
Patchy infiltrate in right suprahilar region. ? Aspiration.  Empiric coverage: Oral Moxifloxacin X 7 days

## 2018-02-11 NOTE — PROGRESS NOTES
[facility] -   Pulmonology  Progress Note    Patient Name: Priti Obrien  MRN: [id number]  Admission Date: 2/9/2018  Hospital Length of Stay: 2 days  Code Status: Full Code  Attending Provider: Anish Vela MD  Primary Care Provider: RICHI Ferrer   Principal Problem: Acute metabolic encephalopathy    Subjective:     Interval History: Seen and examined at bedside. Hospital chart reviewed. No acute interval detrimental events noted. She reports that she  has moderately improved    Review of Systems   Constitutional: Negative for chills and weight loss.   HENT: Negative for congestion, hearing loss and nosebleeds.    Eyes: Negative for double vision and photophobia.   Respiratory: Negative for cough and shortness of breath.    Cardiovascular: Negative for chest pain and palpitations.   Gastrointestinal: Negative for nausea and vomiting.   Genitourinary: Negative for dysuria and urgency.   Musculoskeletal: Negative for myalgias and neck pain.   Skin: Negative for itching and rash.   Neurological: Negative for dizziness and headaches.   Endo/Heme/Allergies: Negative for environmental allergies.   Psychiatric/Behavioral: Positive for suicidal ideas.       Scheduled Meds:   albuterol-ipratropium 2.5mg-0.5mg/3mL  3 mL Nebulization Q4H WAKE    enoxaparin  40 mg Subcutaneous Daily    famotidine (PF)  20 mg Intravenous BID    moxifloxacin  400 mg Oral Daily     Continuous Infusions:  PRN Meds:.acetaminophen, ondansetron       Objective:     Vital Signs (Most Recent):  Temp: 98.5 °F (36.9 °C) (02/11/18 0715)  Pulse: 70 (02/11/18 0900)  Resp: 20 (02/11/18 0900)  BP: 123/71 (02/11/18 0900)  SpO2: 95 % (02/11/18 0900) Vital Signs (24h Range):  Temp:  [97.8 °F (36.6 °C)-99.3 °F (37.4 °C)] 98.5 °F (36.9 °C)  Pulse:  [55-77] 70  Resp:  [12-21] 20  SpO2:  [90 %-100 %] 95 %  BP: ()/(32-71) 123/71     Weight: 98.4 kg (216 lb 14.9 oz)  Body mass index is 35.01 kg/m².      Intake/Output Summary (Last 24  hours) at 02/11/18 1022  Last data filed at 02/11/18 0600   Gross per 24 hour   Intake             2190 ml   Output             1350 ml   Net              840 ml       Physical Exam   Constitutional: She appears well-developed and well-nourished.   HENT:   Head: Normocephalic and atraumatic.   Eyes: Conjunctivae are normal. No scleral icterus.   Cardiovascular: Regular rhythm, S1 normal, S2 normal and normal heart sounds.    No murmur heard.  Pulmonary/Chest: Breath sounds normal. She has no wheezes. She has no rales.   Abdominal: Soft. Bowel sounds are normal. She exhibits no distension.   Musculoskeletal: She exhibits no edema or deformity.   Lymphadenopathy:     She has no cervical adenopathy.   Skin: Skin is warm. No rash noted. No erythema.   Psychiatric:   Defered   Nursing note and vitals reviewed.      Vents:  Vent Mode: Spont (02/10/18 1102)  Set Rate: 0 bmp (02/10/18 1102)  Vt Set: 400 mL (02/10/18 1102)  Pressure Support: 10 cmH20 (02/10/18 1102)  PEEP/CPAP: 5 cmH20 (02/10/18 1102)  Oxygen Concentration (%): 28 (02/11/18 0830)  Peak Airway Pressure: 15 cmH2O (02/10/18 1102)  Plateau Pressure: 0 cmH20 (02/10/18 1102)  Total Ve: 7.1 mL (02/10/18 1102)  Negative Inspiratory Force (cm H2O): -50 (02/10/18 1225)  F/VT Ratio<105 (RSBI): (!) 28.51 (02/10/18 1102)    Lines/Drains/Airways     Peripheral Intravenous Line                 Peripheral IV - Single Lumen 02/09/18 2000 Left 1 day         Peripheral IV - Single Lumen 02/10/18 0001 Left Forearm 1 day         Peripheral IV - Single Lumen 02/10/18 0300 Right Hand 1 day                Significant Labs:    CBC/Anemia Profile:    Recent Labs  Lab 02/09/18  2113 02/10/18  0415 02/11/18  0824   WBC 8.23 9.33 8.50   HGB 12.2 12.0 11.5*   HCT 35.2* 35.3* 34.0*    248 127*   MCV 87 87 87   RDW 13.5 13.8 14.0        Chemistries:    Recent Labs  Lab 02/09/18  2113 02/10/18  0222 02/10/18  0415 02/11/18  0824     --  140 140   K 3.5  --  3.8 3.6     --   110 113*   CO2 20*  --  21* 19*   BUN 12  --  13 8   CREATININE 0.7  --  0.8 0.8   CALCIUM 7.8*  --  7.8* 7.9*   ALBUMIN 3.5  --  3.3* 3.1*   PROT 6.2  --  5.9* 6.0   BILITOT 0.7  --  0.5 0.4   ALKPHOS 44*  --  41* 41*   ALT 18  --  19 17   AST 18  --  22 20   MG  --  1.9  --   --    PHOS  --  3.6  --   --        ABGs:   Recent Labs  Lab 02/10/18  0515   PH 7.378   PCO2 37.6   HCO3 22.1*   POCSATURATED 100   BE -3     Lactic Acid:   Recent Labs  Lab 02/09/18  2251   LACTATE 2.2     Troponin:   Recent Labs  Lab 02/09/18  2113   TROPONINI <0.006       Significant Imaging:    CXR: Since the prior exam there has been interval extubation and interval removal of the NG tube. The right subclavian MediPort has its tip in the SVC. No pleural effusion or pneumothorax.  There is some patchy groundglass opacity in the right suprahilar region.  The lung volumes are low.  The heart size is normal.      Assessment/Plan:     Pneumonia    Patchy infiltrate in right suprahilar region. ? Aspiration.  Empiric coverage: Oral Moxifloxacin X 7 days        Suicidal behavior with attempted self-injury    Physicians emergency certificate executed. Social work consult for psych placement.         Drug overdose    Supportive care per Poison Control.         Diabetes mellitus type 2 in obese    Hyperglycemia:   low dose SSI for hyperglycemia with monitoring for glucose control and prevention of insulin toxicity. Blood glucose target 100 - 180 mg/dl            Morbid obesity with BMI of 40.0-44.9, adult    Weight loss encouraged,        Borderline personality disorder    Resume psych meds.          Will sign off for now. Please feel free to re consult if further pulmonary issues arise.     Vijay Storey MD  Pulmonology  [facility]

## 2018-02-11 NOTE — ASSESSMENT & PLAN NOTE
Due to intentional polypharmacy overdose of various prescribed medications (Trazodone, Meloxicam, Cyclobenzaprine, Tizanidine, and Buspirone).  UDS positive for amphetamines and barbiturates.    02/10 - labs ok, increased IV fluids

## 2018-02-11 NOTE — SUBJECTIVE & OBJECTIVE
Interval History:     Review of Systems   Unable to perform ROS: Intubated     Objective:     Vital Signs (Most Recent):  Temp: 97.8 °F (36.6 °C) (02/10/18 1515)  Pulse: 72 (02/10/18 1700)  Resp: 17 (02/10/18 1700)  BP: (!) 131/52 (02/10/18 1700)  SpO2: 99 % (02/10/18 1700) Vital Signs (24h Range):  Temp:  [96.6 °F (35.9 °C)-98.9 °F (37.2 °C)] 97.8 °F (36.6 °C)  Pulse:  [43-72] 72  Resp:  [7-26] 17  SpO2:  [92 %-100 %] 99 %  BP: ()/(36-93) 131/52     Weight: 98.4 kg (216 lb 14.9 oz)  Body mass index is 35.01 kg/m².    Intake/Output Summary (Last 24 hours) at 02/10/18 1830  Last data filed at 02/10/18 1800   Gross per 24 hour   Intake          7165.42 ml   Output             2000 ml   Net          5165.42 ml      Physical Exam   Constitutional: She appears well-developed and well-nourished. She is intubated.   Obese female, intubated   HENT:   Head: Normocephalic and atraumatic.   Eyes: Conjunctivae are normal. No scleral icterus.   Pupils very sluggish, barely reactive.   Cardiovascular: Regular rhythm, S1 normal, S2 normal and normal heart sounds.    No murmur heard.  Pulmonary/Chest: Breath sounds normal. She is intubated. She has no wheezes. She has no rales.   Abdominal: Soft. Bowel sounds are normal. She exhibits no distension.   Musculoskeletal: She exhibits no edema or deformity.   Lymphadenopathy:     She has no cervical adenopathy.   Neurological: She is unresponsive. GCS eye subscore is 1. GCS verbal subscore is 1. GCS motor subscore is 1.   Skin: Skin is warm. No rash noted. No erythema.   Psychiatric:   Defered   Nursing note and vitals reviewed.      Significant Labs: All pertinent labs within the past 24 hours have been reviewed.    Significant Imaging: I have reviewed and interpreted all pertinent imaging results/findings within the past 24 hours.

## 2018-02-11 NOTE — ASSESSMENT & PLAN NOTE
GCS 6 upon arrival. Intubated in ED.  Currently on propofol due to agitation.  Continue Supportive care.  Intubated. Pulmonary consult.    02/10 - resolved

## 2018-02-11 NOTE — SUBJECTIVE & OBJECTIVE
Interval History: Seen and examined at bedside. Hospital chart reviewed. No acute interval detrimental events noted. She reports that she  has moderately improved    Review of Systems   Constitutional: Negative for chills and weight loss.   HENT: Negative for congestion, hearing loss and nosebleeds.    Eyes: Negative for double vision and photophobia.   Respiratory: Negative for cough and shortness of breath.    Cardiovascular: Negative for chest pain and palpitations.   Gastrointestinal: Negative for nausea and vomiting.   Genitourinary: Negative for dysuria and urgency.   Musculoskeletal: Negative for myalgias and neck pain.   Skin: Negative for itching and rash.   Neurological: Negative for dizziness and headaches.   Endo/Heme/Allergies: Negative for environmental allergies.   Psychiatric/Behavioral: Positive for suicidal ideas.       Scheduled Meds:   albuterol-ipratropium 2.5mg-0.5mg/3mL  3 mL Nebulization Q4H WAKE    enoxaparin  40 mg Subcutaneous Daily    famotidine (PF)  20 mg Intravenous BID    moxifloxacin  400 mg Oral Daily     Continuous Infusions:  PRN Meds:.acetaminophen, ondansetron       Objective:     Vital Signs (Most Recent):  Temp: 98.5 °F (36.9 °C) (02/11/18 0715)  Pulse: 70 (02/11/18 0900)  Resp: 20 (02/11/18 0900)  BP: 123/71 (02/11/18 0900)  SpO2: 95 % (02/11/18 0900) Vital Signs (24h Range):  Temp:  [97.8 °F (36.6 °C)-99.3 °F (37.4 °C)] 98.5 °F (36.9 °C)  Pulse:  [55-77] 70  Resp:  [12-21] 20  SpO2:  [90 %-100 %] 95 %  BP: ()/(32-71) 123/71     Weight: 98.4 kg (216 lb 14.9 oz)  Body mass index is 35.01 kg/m².      Intake/Output Summary (Last 24 hours) at 02/11/18 1022  Last data filed at 02/11/18 0600   Gross per 24 hour   Intake             2190 ml   Output             1350 ml   Net              840 ml       Physical Exam   Constitutional: She appears well-developed and well-nourished.   HENT:   Head: Normocephalic and atraumatic.   Eyes: Conjunctivae are normal. No scleral  icterus.   Cardiovascular: Regular rhythm, S1 normal, S2 normal and normal heart sounds.    No murmur heard.  Pulmonary/Chest: Breath sounds normal. She has no wheezes. She has no rales.   Abdominal: Soft. Bowel sounds are normal. She exhibits no distension.   Musculoskeletal: She exhibits no edema or deformity.   Lymphadenopathy:     She has no cervical adenopathy.   Skin: Skin is warm. No rash noted. No erythema.   Psychiatric:   Defered   Nursing note and vitals reviewed.      Vents:  Vent Mode: Spont (02/10/18 1102)  Set Rate: 0 bmp (02/10/18 1102)  Vt Set: 400 mL (02/10/18 1102)  Pressure Support: 10 cmH20 (02/10/18 1102)  PEEP/CPAP: 5 cmH20 (02/10/18 1102)  Oxygen Concentration (%): 28 (02/11/18 0830)  Peak Airway Pressure: 15 cmH2O (02/10/18 1102)  Plateau Pressure: 0 cmH20 (02/10/18 1102)  Total Ve: 7.1 mL (02/10/18 1102)  Negative Inspiratory Force (cm H2O): -50 (02/10/18 1225)  F/VT Ratio<105 (RSBI): (!) 28.51 (02/10/18 1102)    Lines/Drains/Airways     Peripheral Intravenous Line                 Peripheral IV - Single Lumen 02/09/18 2000 Left 1 day         Peripheral IV - Single Lumen 02/10/18 0001 Left Forearm 1 day         Peripheral IV - Single Lumen 02/10/18 0300 Right Hand 1 day                Significant Labs:    CBC/Anemia Profile:    Recent Labs  Lab 02/09/18  2113 02/10/18  0415 02/11/18  0824   WBC 8.23 9.33 8.50   HGB 12.2 12.0 11.5*   HCT 35.2* 35.3* 34.0*    248 127*   MCV 87 87 87   RDW 13.5 13.8 14.0        Chemistries:    Recent Labs  Lab 02/09/18  2113 02/10/18  0222 02/10/18  0415 02/11/18  0824     --  140 140   K 3.5  --  3.8 3.6     --  110 113*   CO2 20*  --  21* 19*   BUN 12  --  13 8   CREATININE 0.7  --  0.8 0.8   CALCIUM 7.8*  --  7.8* 7.9*   ALBUMIN 3.5  --  3.3* 3.1*   PROT 6.2  --  5.9* 6.0   BILITOT 0.7  --  0.5 0.4   ALKPHOS 44*  --  41* 41*   ALT 18  --  19 17   AST 18  --  22 20   MG  --  1.9  --   --    PHOS  --  3.6  --   --        ABGs:   Recent  Labs  Lab 02/10/18  0515   PH 7.378   PCO2 37.6   HCO3 22.1*   POCSATURATED 100   BE -3     Lactic Acid:   Recent Labs  Lab 02/09/18  2251   LACTATE 2.2     Troponin:   Recent Labs  Lab 02/09/18  2113   TROPONINI <0.006       Significant Imaging:    CXR: Since the prior exam there has been interval extubation and interval removal of the NG tube. The right subclavian MediPort has its tip in the SVC. No pleural effusion or pneumothorax.  There is some patchy groundglass opacity in the right suprahilar region.  The lung volumes are low.  The heart size is normal.

## 2018-02-11 NOTE — CARE UPDATE
received pt report; assumed care of pt. Pt resting w even, unlabored resp w O2 sat 98% via 2L NC. Per monitor, pt NSR w HR 70. pt w clear speech, +PERRLA, and +pulses to all extremities. abd soft, non-teder. PIVs to L and R arm remain free of any complications. Pt ambulates to commode to void without issue.   Pt denies SI and/or HI at this time; pt also denies any AH and/or VH at this time. Pt agrees to safety contact w Rn, stating that pt will make ANY staff member aware if she experiences any changes or worsening with thoughts, feelings, mood, vision, or auditory.   All hazards removed from pt room and pt remains in line of sight of sitter at bedside.   HOB elevated, call bell at pt side, and bed locked. will cont to monitor. full comp assessment to follow.

## 2018-02-12 ENCOUNTER — DOCUMENTATION ONLY (OUTPATIENT)
Dept: TRANSPLANT | Facility: CLINIC | Age: 33
End: 2018-02-12

## 2018-02-12 PROBLEM — J69.0 ASPIRATION PNEUMONIA OF RIGHT UPPER LOBE: Status: ACTIVE | Noted: 2018-02-12

## 2018-02-12 PROBLEM — T50.905A THROMBOCYTOPENIA DUE TO DRUGS: Status: ACTIVE | Noted: 2018-02-12

## 2018-02-12 PROBLEM — J69.0 ASPIRATION PNEUMONIA OF RIGHT UPPER LOBE: Status: ACTIVE | Noted: 2018-02-10

## 2018-02-12 PROBLEM — D69.59 THROMBOCYTOPENIA DUE TO DRUGS: Status: ACTIVE | Noted: 2018-02-12

## 2018-02-12 LAB
ALBUMIN SERPL BCP-MCNC: 3.3 G/DL
ALP SERPL-CCNC: 43 U/L
ALT SERPL W/O P-5'-P-CCNC: 18 U/L
ANION GAP SERPL CALC-SCNC: 9 MMOL/L
AST SERPL-CCNC: 25 U/L
BASOPHILS # BLD AUTO: 0.02 K/UL
BASOPHILS NFR BLD: 0.3 %
BILIRUB SERPL-MCNC: 0.3 MG/DL
BUN SERPL-MCNC: 6 MG/DL
CALCIUM SERPL-MCNC: 8.4 MG/DL
CHLORIDE SERPL-SCNC: 108 MMOL/L
CO2 SERPL-SCNC: 24 MMOL/L
CREAT SERPL-MCNC: 0.7 MG/DL
DIFFERENTIAL METHOD: ABNORMAL
EOSINOPHIL # BLD AUTO: 0.2 K/UL
EOSINOPHIL NFR BLD: 3.5 %
ERYTHROCYTE [DISTWIDTH] IN BLOOD BY AUTOMATED COUNT: 13.5 %
EST. GFR  (AFRICAN AMERICAN): >60 ML/MIN/1.73 M^2
EST. GFR  (NON AFRICAN AMERICAN): >60 ML/MIN/1.73 M^2
GLUCOSE SERPL-MCNC: 102 MG/DL
HCT VFR BLD AUTO: 35.3 %
HGB BLD-MCNC: 12 G/DL
LYMPHOCYTES # BLD AUTO: 1.9 K/UL
LYMPHOCYTES NFR BLD: 27.1 %
MCH RBC QN AUTO: 29.5 PG
MCHC RBC AUTO-ENTMCNC: 34 G/DL
MCV RBC AUTO: 87 FL
MONOCYTES # BLD AUTO: 0.5 K/UL
MONOCYTES NFR BLD: 7.5 %
NEUTROPHILS # BLD AUTO: 4.2 K/UL
NEUTROPHILS NFR BLD: 61.6 %
PLATELET # BLD AUTO: 83 K/UL
PMV BLD AUTO: 10.6 FL
POTASSIUM SERPL-SCNC: 3.6 MMOL/L
PROT SERPL-MCNC: 6.5 G/DL
RBC # BLD AUTO: 4.07 M/UL
SODIUM SERPL-SCNC: 141 MMOL/L
WBC # BLD AUTO: 6.83 K/UL

## 2018-02-12 PROCEDURE — 21400001 HC TELEMETRY ROOM

## 2018-02-12 PROCEDURE — 94640 AIRWAY INHALATION TREATMENT: CPT

## 2018-02-12 PROCEDURE — 80053 COMPREHEN METABOLIC PANEL: CPT

## 2018-02-12 PROCEDURE — 25000003 PHARM REV CODE 250: Performed by: FAMILY MEDICINE

## 2018-02-12 PROCEDURE — 36415 COLL VENOUS BLD VENIPUNCTURE: CPT

## 2018-02-12 PROCEDURE — 99232 SBSQ HOSP IP/OBS MODERATE 35: CPT | Mod: ,,, | Performed by: INTERNAL MEDICINE

## 2018-02-12 PROCEDURE — 25000003 PHARM REV CODE 250: Performed by: INTERNAL MEDICINE

## 2018-02-12 PROCEDURE — 25000242 PHARM REV CODE 250 ALT 637 W/ HCPCS: Performed by: FAMILY MEDICINE

## 2018-02-12 PROCEDURE — 27000221 HC OXYGEN, UP TO 24 HOURS

## 2018-02-12 PROCEDURE — 25000003 PHARM REV CODE 250: Performed by: NURSE PRACTITIONER

## 2018-02-12 PROCEDURE — 85025 COMPLETE CBC W/AUTO DIFF WBC: CPT

## 2018-02-12 RX ORDER — ALBUTEROL SULFATE 2.5 MG/.5ML
2.5 SOLUTION RESPIRATORY (INHALATION)
Status: DISCONTINUED | OUTPATIENT
Start: 2018-02-12 | End: 2018-02-12

## 2018-02-12 RX ORDER — FAMOTIDINE 20 MG/1
20 TABLET, FILM COATED ORAL 2 TIMES DAILY
Status: DISCONTINUED | OUTPATIENT
Start: 2018-02-12 | End: 2018-02-13 | Stop reason: HOSPADM

## 2018-02-12 RX ORDER — BUTALBITAL, ACETAMINOPHEN AND CAFFEINE 50; 325; 40 MG/1; MG/1; MG/1
1 TABLET ORAL ONCE
Status: COMPLETED | OUTPATIENT
Start: 2018-02-12 | End: 2018-02-12

## 2018-02-12 RX ORDER — ALBUTEROL SULFATE 2.5 MG/.5ML
2.5 SOLUTION RESPIRATORY (INHALATION)
Status: DISCONTINUED | OUTPATIENT
Start: 2018-02-13 | End: 2018-02-13 | Stop reason: HOSPADM

## 2018-02-12 RX ORDER — ALBUTEROL SULFATE 90 UG/1
2 AEROSOL, METERED RESPIRATORY (INHALATION) EVERY 8 HOURS
Status: DISCONTINUED | OUTPATIENT
Start: 2018-02-13 | End: 2018-02-12

## 2018-02-12 RX ADMIN — IPRATROPIUM BROMIDE AND ALBUTEROL SULFATE 3 ML: .5; 3 SOLUTION RESPIRATORY (INHALATION) at 08:02

## 2018-02-12 RX ADMIN — FAMOTIDINE 20 MG: 20 TABLET, FILM COATED ORAL at 10:02

## 2018-02-12 RX ADMIN — IPRATROPIUM BROMIDE AND ALBUTEROL SULFATE 3 ML: .5; 3 SOLUTION RESPIRATORY (INHALATION) at 02:02

## 2018-02-12 RX ADMIN — BUTALBITAL, ACETAMINOPHEN AND CAFFEINE 1 TABLET: 50; 325; 40 TABLET ORAL at 10:02

## 2018-02-12 RX ADMIN — FAMOTIDINE 20 MG: 20 TABLET, FILM COATED ORAL at 09:02

## 2018-02-12 RX ADMIN — IPRATROPIUM BROMIDE AND ALBUTEROL SULFATE 3 ML: .5; 3 SOLUTION RESPIRATORY (INHALATION) at 07:02

## 2018-02-12 RX ADMIN — IPRATROPIUM BROMIDE AND ALBUTEROL SULFATE 3 ML: .5; 3 SOLUTION RESPIRATORY (INHALATION) at 04:02

## 2018-02-12 RX ADMIN — MOXIFLOXACIN HYDROCHLORIDE 400 MG: 400 TABLET, FILM COATED ORAL at 09:02

## 2018-02-12 NOTE — ASSESSMENT & PLAN NOTE
PEC when extubated.  If she survives and recovers neurologically, will need in-patient psychiatric placement.    02/10 - CM consulted for placement  2/11 - counseled patient on reason to live for sake of daughter, consulted case management for resources and placement, will inquire about legal ramifications in the am. Will PEC in the am.

## 2018-02-12 NOTE — PROGRESS NOTES
"[facility] Hospital Medicine  Progress Note    Patient Name: Priti Obrien  MRN: [id number]  Patient Class: IP- Inpatient   Admission Date: 2/9/2018  Length of Stay: 2 days  Attending Physician: Anish Vela MD  Primary Care Provider: RICHI Ferrer        Subjective:     Principal Problem:Acute metabolic encephalopathy    HPI:  Ms. 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 After the news broke out on local news channels today, per grandmother, patient left a suicide note that states "I'm sorry for being a mess up and please kiss my baby girl, etc". She then ingested unknown amount of Trazodone, Meloxicam, Cyclobenzaprine, Tizanidine, and Buspirone. All these bottles are empty at the bedside. Patient was brought to the ED in an obtunded state, she was emergently intubated. Patient received narcan 2 mg with no effect. UDS positive for amphetamines and barbiturates. Poison control was contacted, who suggested supportive care.    Hospital Course:  Pt able to respond to Q's, she has been inpt for mental health issues and has had prior suicide attempt.  GM (confirms HPI) and aunt present and pt authorizes GM as surrogate decision maker.  Labs ok, clinically stable  2/11 - Pt extubated yesterday afternoon, doing well. Labs stable (creatinine and liver enzymes ok), pt is uncertain if she has to go to detention at time of discharge, is concerned about not being able to see her daughter again. Case discussed w/pulm, stable for downgrade to [facility]. Will continue oral antibx due to concern for aspiration pneumonia.    Interval History:     Review of Systems   Constitutional: Negative for activity change, appetite change, fever and unexpected weight change.   HENT: Negative for congestion, ear discharge and sore throat.    Eyes: Negative for visual disturbance. "   Respiratory: Positive for cough and wheezing. Negative for shortness of breath.    Cardiovascular: Negative for chest pain.   Gastrointestinal: Negative for abdominal pain, diarrhea, nausea and vomiting.   Endocrine: Negative for polyuria.   Genitourinary: Negative for dysuria.   Musculoskeletal: Negative for arthralgias and myalgias.   Skin: Negative for rash.   Allergic/Immunologic: Negative for immunocompromised state.   Neurological: Negative for dizziness, weakness, numbness and headaches.   Hematological: Negative for adenopathy.   Psychiatric/Behavioral: Positive for self-injury and suicidal ideas. Negative for confusion and decreased concentration. The patient is nervous/anxious.    All other systems reviewed and are negative.    Objective:     Vital Signs (Most Recent):  Temp: 99.2 °F (37.3 °C) (02/11/18 1905)  Pulse: 85 (02/11/18 2000)  Resp: (!) 22 (02/11/18 2000)  BP: 134/74 (02/11/18 2000)  SpO2: 97 % (02/11/18 2000) Vital Signs (24h Range):  Temp:  [98.5 °F (36.9 °C)-99.2 °F (37.3 °C)] 99.2 °F (37.3 °C)  Pulse:  [60-85] 85  Resp:  [15-23] 22  SpO2:  [90 %-99 %] 97 %  BP: (104-150)/(44-99) 134/74     Weight: 98.4 kg (216 lb 14.9 oz)  Body mass index is 35.01 kg/m².    Intake/Output Summary (Last 24 hours) at 02/11/18 2038  Last data filed at 02/11/18 1701   Gross per 24 hour   Intake              690 ml   Output             1500 ml   Net             -810 ml      Physical Exam   Constitutional: She appears well-developed and well-nourished.   Obese female, intubated   HENT:   Head: Normocephalic and atraumatic.   Eyes: Conjunctivae are normal. No scleral icterus.   Pupils very sluggish, barely reactive.   Cardiovascular: Regular rhythm, S1 normal, S2 normal and normal heart sounds.    No murmur heard.  Pulmonary/Chest: Breath sounds normal. She has no wheezes. She has no rales.   Abdominal: Soft. Bowel sounds are normal. She exhibits no distension.   Musculoskeletal: She exhibits no edema or deformity.    Lymphadenopathy:     She has no cervical adenopathy.   Neurological: She is unresponsive. GCS eye subscore is 1. GCS verbal subscore is 1. GCS motor subscore is 1.   Skin: Skin is warm. No rash noted. No erythema.   Psychiatric:   Defered   Nursing note and vitals reviewed.      Significant Labs: All pertinent labs within the past 24 hours have been reviewed.    Significant Imaging: I have reviewed all pertinent imaging results/findings within the past 24 hours.    Assessment/Plan:      Pneumonia    On antibiotics, following cultures (STREPTOCOCCUS GROUP C from throat)        Suicidal behavior with attempted self-injury    PEC when extubated.  If she survives and recovers neurologically, will need in-patient psychiatric placement.    02/10 - CM consulted for placement  2/11 - counseled patient on reason to live for sake of daughter, consulted case management for resources and placement, will inquire about legal ramifications in the am. Will PEC in the am.          Drug overdose    Due to intentional polypharmacy overdose of various prescribed medications (Trazodone, Meloxicam, Cyclobenzaprine, Tizanidine, and Buspirone).  UDS positive for amphetamines and barbiturates.    02/10 - labs ok, increased IV fluids            Morbid obesity with BMI of 40.0-44.9, adult               Borderline personality disorder    On multiple psychotropic medications at home.            VTE Risk Mitigation         Ordered     enoxaparin injection 40 mg  Daily     Route:  Subcutaneous        02/09/18 2316     Medium Risk of VTE  Once      02/09/18 2316     Place sequential compression device  Until discontinued      02/09/18 2316          Critical care time spent on the evaluation and treatment of severe organ dysfunction, review of pertinent labs and imaging studies, discussions with consulting providers and discussions with patient/family: 55 minutes.    Anish Vela MD  Department of Hospital Medicine   [facility]

## 2018-02-12 NOTE — PLAN OF CARE
CM to pursue inpatient psych once patient is medically cleared.     02/12/18 2771   Discharge Reassessment   Assessment Type Discharge Planning Reassessment   Provided patient/caregiver education on the expected discharge date and the discharge plan No   Do you have any problems affording any of your prescribed medications? No   Discharge Plan A Psychiatric hospital   Discharge Plan B Psychiatric hospital   Patient choice form signed by patient/caregiver N/A   Can the patient answer the patient profile reliably? Yes, cognitively intact   How does the patient rate their overall health at the present time? Good   Describe the patient's ability to walk at the present time. No restrictions   How often would a person be available to care for the patient? Whenever needed   Number of comorbid conditions (as recorded on the chart) Three   During the past month, has the patient often been bothered by feeling down, depressed or hopeless? No   During the past month, has the patient often been bothered by little interest or pleasure in doing things? No

## 2018-02-12 NOTE — PLAN OF CARE
Pt has med/tele orders; awaiting on assigned bed while pt awaits psych placement. Pt remains w even, unlabored resp, NAD w O2 sat 99% via rm air. Pt denies any complaint at this time. All hazards removed from pt room and pt remains in line of sight of sitter at bedside.   HOB elevated, call bell at pt side, and bed locked. will cont to monitor.

## 2018-02-12 NOTE — PROGRESS NOTES
Placed patient back on O2 at 2 LPM NC. O2 saturation was 87% on RA. Saturation increased to 98% on 2 LPM. Patient tolerated treatment well. No respiratory distress noted at this time.

## 2018-02-12 NOTE — SUBJECTIVE & OBJECTIVE
Interval History:     Review of Systems   Constitutional: Negative for activity change, appetite change, fever and unexpected weight change.   HENT: Negative for congestion, ear discharge and sore throat.    Eyes: Negative for visual disturbance.   Respiratory: Positive for cough and wheezing. Negative for shortness of breath.    Cardiovascular: Negative for chest pain.   Gastrointestinal: Negative for abdominal pain, diarrhea, nausea and vomiting.   Endocrine: Negative for polyuria.   Genitourinary: Negative for dysuria.   Musculoskeletal: Negative for arthralgias and myalgias.   Skin: Negative for rash.   Allergic/Immunologic: Negative for immunocompromised state.   Neurological: Negative for dizziness, weakness, numbness and headaches.   Hematological: Negative for adenopathy.   Psychiatric/Behavioral: Positive for self-injury and suicidal ideas. Negative for confusion and decreased concentration. The patient is nervous/anxious.    All other systems reviewed and are negative.    Objective:     Vital Signs (Most Recent):  Temp: 99.2 °F (37.3 °C) (02/11/18 1905)  Pulse: 85 (02/11/18 2000)  Resp: (!) 22 (02/11/18 2000)  BP: 134/74 (02/11/18 2000)  SpO2: 97 % (02/11/18 2000) Vital Signs (24h Range):  Temp:  [98.5 °F (36.9 °C)-99.2 °F (37.3 °C)] 99.2 °F (37.3 °C)  Pulse:  [60-85] 85  Resp:  [15-23] 22  SpO2:  [90 %-99 %] 97 %  BP: (104-150)/(44-99) 134/74     Weight: 98.4 kg (216 lb 14.9 oz)  Body mass index is 35.01 kg/m².    Intake/Output Summary (Last 24 hours) at 02/11/18 2038  Last data filed at 02/11/18 1701   Gross per 24 hour   Intake              690 ml   Output             1500 ml   Net             -810 ml      Physical Exam   Constitutional: She appears well-developed and well-nourished.   Obese female, intubated   HENT:   Head: Normocephalic and atraumatic.   Eyes: Conjunctivae are normal. No scleral icterus.   Pupils very sluggish, barely reactive.   Cardiovascular: Regular rhythm, S1 normal, S2 normal  and normal heart sounds.    No murmur heard.  Pulmonary/Chest: Breath sounds normal. She has no wheezes. She has no rales.   Abdominal: Soft. Bowel sounds are normal. She exhibits no distension.   Musculoskeletal: She exhibits no edema or deformity.   Lymphadenopathy:     She has no cervical adenopathy.   Neurological: She is unresponsive. GCS eye subscore is 1. GCS verbal subscore is 1. GCS motor subscore is 1.   Skin: Skin is warm. No rash noted. No erythema.   Psychiatric:   Defered   Nursing note and vitals reviewed.      Significant Labs: All pertinent labs within the past 24 hours have been reviewed.    Significant Imaging: I have reviewed all pertinent imaging results/findings within the past 24 hours.

## 2018-02-12 NOTE — PLAN OF CARE
Problem: Patient Care Overview  Goal: Plan of Care Review  Outcome: Ongoing (interventions implemented as appropriate)  PEC remains with patient during the night.  Patient ambulatory up to bedside commode.  Patient states her chest pain/soreness has gotten better today.  No acute changes over night, see MAR for medication doses and times of administration.

## 2018-02-13 VITALS
HEIGHT: 66 IN | TEMPERATURE: 98 F | RESPIRATION RATE: 20 BRPM | BODY MASS INDEX: 34.86 KG/M2 | OXYGEN SATURATION: 98 % | DIASTOLIC BLOOD PRESSURE: 101 MMHG | SYSTOLIC BLOOD PRESSURE: 162 MMHG | HEART RATE: 84 BPM | WEIGHT: 216.94 LBS

## 2018-02-13 PROBLEM — D69.59 THROMBOCYTOPENIA DUE TO DRUGS: Status: RESOLVED | Noted: 2018-02-12 | Resolved: 2018-02-13

## 2018-02-13 PROBLEM — T50.902A SUICIDE ATTEMPT BY DRUG INGESTION: Status: ACTIVE | Noted: 2018-02-13

## 2018-02-13 PROBLEM — I10 ESSENTIAL HYPERTENSION: Status: ACTIVE | Noted: 2018-02-13

## 2018-02-13 PROBLEM — G44.209 TENSION TYPE HEADACHE: Status: ACTIVE | Noted: 2018-02-13

## 2018-02-13 PROBLEM — T50.901A DRUG OVERDOSE: Status: RESOLVED | Noted: 2018-02-09 | Resolved: 2018-02-13

## 2018-02-13 PROBLEM — T50.905A THROMBOCYTOPENIA DUE TO DRUGS: Status: RESOLVED | Noted: 2018-02-12 | Resolved: 2018-02-13

## 2018-02-13 PROBLEM — K86.1 CHRONIC PANCREATITIS: Status: ACTIVE | Noted: 2018-02-13

## 2018-02-13 LAB
ALBUMIN SERPL BCP-MCNC: 3.4 G/DL
ALLENS TEST: ABNORMAL
ALP SERPL-CCNC: 47 U/L
ALT SERPL W/O P-5'-P-CCNC: 24 U/L
ANION GAP SERPL CALC-SCNC: 10 MMOL/L
AST SERPL-CCNC: 23 U/L
BASOPHILS # BLD AUTO: 0.02 K/UL
BASOPHILS NFR BLD: 0.3 %
BILIRUB SERPL-MCNC: 0.2 MG/DL
BUN SERPL-MCNC: 7 MG/DL
CALCIUM SERPL-MCNC: 9.1 MG/DL
CHLORIDE SERPL-SCNC: 105 MMOL/L
CO2 SERPL-SCNC: 25 MMOL/L
CREAT SERPL-MCNC: 0.7 MG/DL
DELSYS: ABNORMAL
DIFFERENTIAL METHOD: ABNORMAL
EOSINOPHIL # BLD AUTO: 0.3 K/UL
EOSINOPHIL NFR BLD: 3.2 %
ERYTHROCYTE [DISTWIDTH] IN BLOOD BY AUTOMATED COUNT: 13.6 %
EST. GFR  (AFRICAN AMERICAN): >60 ML/MIN/1.73 M^2
EST. GFR  (NON AFRICAN AMERICAN): >60 ML/MIN/1.73 M^2
FLOW: 2
GLUCOSE SERPL-MCNC: 140 MG/DL
GLUCOSE SERPL-MCNC: 218 MG/DL (ref 70–110)
HCO3 UR-SCNC: 24.6 MMOL/L (ref 24–28)
HCT VFR BLD AUTO: 35.7 %
HCT VFR BLD CALC: 34 %PCV (ref 36–54)
HGB BLD-MCNC: 12.3 G/DL
LYMPHOCYTES # BLD AUTO: 1.9 K/UL
LYMPHOCYTES NFR BLD: 23.5 %
MCH RBC QN AUTO: 29.6 PG
MCHC RBC AUTO-ENTMCNC: 34.5 G/DL
MCV RBC AUTO: 86 FL
MODE: ABNORMAL
MONOCYTES # BLD AUTO: 0.6 K/UL
MONOCYTES NFR BLD: 7.1 %
NEUTROPHILS # BLD AUTO: 5.2 K/UL
NEUTROPHILS NFR BLD: 65.9 %
PCO2 BLDA: 43.2 MMHG (ref 35–45)
PH SMN: 7.36 [PH] (ref 7.35–7.45)
PLATELET # BLD AUTO: 220 K/UL
PMV BLD AUTO: 10.2 FL
PO2 BLDA: 102 MMHG (ref 80–100)
POC BE: -1 MMOL/L
POC IONIZED CALCIUM: 1.13 MMOL/L (ref 1.06–1.42)
POC SATURATED O2: 98 % (ref 95–100)
POTASSIUM BLD-SCNC: 3.5 MMOL/L (ref 3.5–5.1)
POTASSIUM SERPL-SCNC: 3 MMOL/L
PROT SERPL-MCNC: 6.9 G/DL
RBC # BLD AUTO: 4.15 M/UL
SAMPLE: ABNORMAL
SITE: ABNORMAL
SODIUM BLD-SCNC: 138 MMOL/L (ref 136–145)
SODIUM SERPL-SCNC: 140 MMOL/L
WBC # BLD AUTO: 7.88 K/UL

## 2018-02-13 PROCEDURE — 36415 COLL VENOUS BLD VENIPUNCTURE: CPT

## 2018-02-13 PROCEDURE — 80053 COMPREHEN METABOLIC PANEL: CPT

## 2018-02-13 PROCEDURE — 25000003 PHARM REV CODE 250: Performed by: FAMILY MEDICINE

## 2018-02-13 PROCEDURE — 94640 AIRWAY INHALATION TREATMENT: CPT

## 2018-02-13 PROCEDURE — 25000003 PHARM REV CODE 250: Performed by: NURSE PRACTITIONER

## 2018-02-13 PROCEDURE — 25000242 PHARM REV CODE 250 ALT 637 W/ HCPCS: Performed by: FAMILY MEDICINE

## 2018-02-13 PROCEDURE — 85025 COMPLETE CBC W/AUTO DIFF WBC: CPT

## 2018-02-13 RX ORDER — LEVOFLOXACIN 500 MG/1
500 TABLET, FILM COATED ORAL DAILY
Qty: 4 TABLET | Refills: 0 | Status: SHIPPED | OUTPATIENT
Start: 2018-02-13 | End: 2018-02-13

## 2018-02-13 RX ORDER — LEVOFLOXACIN 500 MG/1
500 TABLET, FILM COATED ORAL DAILY
Qty: 4 TABLET | Refills: 0 | Status: ON HOLD | OUTPATIENT
Start: 2018-02-13 | End: 2018-02-20 | Stop reason: HOSPADM

## 2018-02-13 RX ORDER — POTASSIUM CHLORIDE 20 MEQ/1
40 TABLET, EXTENDED RELEASE ORAL ONCE
Status: COMPLETED | OUTPATIENT
Start: 2018-02-13 | End: 2018-02-13

## 2018-02-13 RX ADMIN — MOXIFLOXACIN HYDROCHLORIDE 400 MG: 400 TABLET, FILM COATED ORAL at 08:02

## 2018-02-13 RX ADMIN — ALBUTEROL SULFATE 2.5 MG: 2.5 SOLUTION RESPIRATORY (INHALATION) at 07:02

## 2018-02-13 RX ADMIN — POTASSIUM CHLORIDE 40 MEQ: 1500 TABLET, EXTENDED RELEASE ORAL at 10:02

## 2018-02-13 RX ADMIN — GUAIFENESIN AND DEXTROMETHORPHAN HYDROBROMIDE 1 TABLET: 600; 30 TABLET, EXTENDED RELEASE ORAL at 12:02

## 2018-02-13 RX ADMIN — FAMOTIDINE 20 MG: 20 TABLET, FILM COATED ORAL at 08:02

## 2018-02-13 NOTE — ASSESSMENT & PLAN NOTE
PEC when extubated.  If she survives and recovers neurologically, will need in-patient psychiatric placement.    02/10 - CM consulted for placement  2/11 - counseled patient on reason to live for sake of daughter, consulted case management for resources and placement, will inquire about legal ramifications in the am. Will PEC in the am.  2/12 - consulted case management for inpt psych placement.  2/13 The patient is medically cleared for psych placement

## 2018-02-13 NOTE — SUBJECTIVE & OBJECTIVE
Interval History: No acute issues overnight. K+ 3.0 today, will replete. Platelets increased to 220 today. Continue PO avelox for treatment of PNA. The patient is medically cleared for placement.       Review of Systems   Constitutional: Negative for activity change, appetite change, chills, diaphoresis, fatigue, fever and unexpected weight change.   HENT: Negative for congestion, drooling, ear discharge, facial swelling, rhinorrhea, sinus pressure, sneezing and sore throat.    Eyes: Negative for discharge, redness, itching and visual disturbance.   Respiratory: Positive for cough and wheezing. Negative for apnea, choking, chest tightness, shortness of breath and stridor.    Cardiovascular: Negative for chest pain, palpitations and leg swelling.   Gastrointestinal: Negative for abdominal distention, abdominal pain, anal bleeding, blood in stool, constipation, diarrhea, nausea and vomiting.   Endocrine: Negative for polyuria.   Genitourinary: Negative for decreased urine volume, difficulty urinating, dysuria, frequency, hematuria, pelvic pain, urgency, vaginal bleeding and vaginal discharge.   Musculoskeletal: Negative for arthralgias, back pain, gait problem, joint swelling, myalgias, neck pain and neck stiffness.   Skin: Negative for color change, pallor, rash and wound.   Allergic/Immunologic: Negative for immunocompromised state.   Neurological: Negative for dizziness, seizures, facial asymmetry, speech difficulty, weakness, light-headedness, numbness and headaches.   Hematological: Negative for adenopathy.   Psychiatric/Behavioral: Positive for self-injury and suicidal ideas. Negative for agitation, confusion, decreased concentration and hallucinations. The patient is nervous/anxious.    All other systems reviewed and are negative.    Objective:     Vital Signs (Most Recent):  Temp: 98.1 °F (36.7 °C) (02/13/18 1140)  Pulse: 84 (02/13/18 1140)  Resp: 20 (02/13/18 1140)  BP: (!) 162/101 (02/13/18 1140)  SpO2: 98 %  (02/13/18 1140) Vital Signs (24h Range):  Temp:  [98 °F (36.7 °C)-98.8 °F (37.1 °C)] 98.1 °F (36.7 °C)  Pulse:  [76-95] 84  Resp:  [16-20] 20  SpO2:  [94 %-99 %] 98 %  BP: (115-162)/() 162/101     Weight: 98.4 kg (216 lb 14.9 oz)  Body mass index is 35.01 kg/m².    Intake/Output Summary (Last 24 hours) at 02/13/18 1152  Last data filed at 02/13/18 0400   Gross per 24 hour   Intake              720 ml   Output                0 ml   Net              720 ml      Physical Exam   Constitutional: She is oriented to person, place, and time. She appears well-developed and well-nourished.   Obese female   HENT:   Head: Normocephalic and atraumatic.   Eyes: Conjunctivae and EOM are normal. Pupils are equal, round, and reactive to light. No scleral icterus.   Neck: Normal range of motion. Neck supple.   Cardiovascular: Normal rate, regular rhythm, S1 normal, S2 normal, normal heart sounds and intact distal pulses.    No murmur heard.  Pulmonary/Chest: Effort normal and breath sounds normal. No respiratory distress. She has no wheezes. She has no rales.   Abdominal: Soft. Bowel sounds are normal. She exhibits no distension. There is no tenderness.   Musculoskeletal: Normal range of motion. She exhibits no edema, tenderness or deformity.   Lymphadenopathy:     She has no cervical adenopathy.   Neurological: She is alert and oriented to person, place, and time. She has normal reflexes. GCS eye subscore is 4. GCS verbal subscore is 5. GCS motor subscore is 6.   Skin: Skin is warm and dry. No rash noted. No erythema.   Psychiatric: She has a normal mood and affect. Her behavior is normal.   Defered   Nursing note and vitals reviewed.      Significant Labs: All pertinent labs within the past 24 hours have been reviewed.    Significant Imaging:   Imaging Results          X-Ray Chest 1 View (Final result)  Result time 02/13/18 08:17:14    Final result by Wayne Jernigan MD (02/13/18 08:17:14)                 Impression:         Stable exam.  No acute findings.      Electronically signed by: EMILIE PEÑALOZA MD  Date:     02/13/18  Time:    08:17              Narrative:    Procedure: XR CHEST 1 VIEW, 02/13/18 05:22:36    Clinical indication:  RESPIRATORY FAILURE     Comparison: 02/12/2018    Findings: The right chest wall Mediport catheter is unchanged.  Heart size is normal. The lung fields are clear. No acute cardiopulmonary infiltrate.                             X-Ray Chest 1 View (Final result)  Result time 02/12/18 09:23:10    Final result by Sammy Nix MD (02/12/18 09:23:10)                 Impression:     No acute cardiopulmonary disease.            Electronically signed by: SAMMY NIX MD  Date:     02/12/18  Time:    09:23              Narrative:    Exam: Portable chest radiograph    Clinical History:   RESPIRATORY FAILURE .     Comparison: Chest x-ray, 2/11/18    Findings:.     The lungs are clear. The cardiac silhouette is within normal limits. There is a right subclavian Mediport with its tip in the SVC.  No pleural effusion or pneumothorax.                             X-Ray Chest 1 View (Final result)  Result time 02/11/18 08:27:41    Final result by Sammy Nix MD (02/11/18 08:27:41)                 Impression:     See above.            Electronically signed by: SAMMY NIX MD  Date:     02/11/18  Time:    08:27              Narrative:    Exam: Portable chest radiograph    Clinical History:   RESPIRATORY FAILURE .     Comparison: Chest x-ray, 02/09/18.    Findings:.     Since the prior exam there has been interval extubation and interval removal of the NG tube. The right subclavian MediPort has its tip in the SVC. No pleural effusion or pneumothorax.  There is some patchy groundglass opacity in the right suprahilar region.  The lung volumes are low.  The heart size is normal.                             CT Head Without Contrast (Final result)  Result time 02/09/18 22:02:55    Final result by Fransico Best MD  (02/09/18 22:02:55)                 Impression:         Negative noncontrast CT scan of the brain.   All CT scans at this facility use dose modulation, iterative reconstruction, and/or weight based dosing when appropriate to reduce radiation dose to as low as reasonable achievable.      Electronically signed by: LORENA WILSON MD  Date:     02/09/18  Time:    22:02              Narrative:    CT HEAD WITHOUT CONTRAST    Date: 02/09/18 21:51:50    Clinical indication:  Decreased alertness      Technique:  Standard noncontrast CT scan of the brain. Comparison is made with previous study of 02/14/2014.     Findings:  The ventricles are nondilated. Gray and white matter structures reveal normal attenuation. No hemorrhage, mass effect or edema is identified.     There is chronic scalloped concavity to the vertex on the right.  This is unchanged compared with previous exam.                             X-Ray Chest AP Portable (Final result)  Result time 02/09/18 21:22:18    Final result by Lorena Wilson MD (02/09/18 21:22:18)                 Impression:      The patient has been intubated.  Endotracheal tube and nasogastric tube appear in good position.  There is patchy infiltrate in the right suprahilar region.      Electronically signed by: LORENA WILSON MD  Date:     02/09/18  Time:    21:22              Narrative:    Exam: XR CHEST AP PORTABLE,    Date:  02/09/18 21:13:44    History: Presence of specified devices    Comparison:  06/12/2017    Findings: The patient has been intubated with endotracheal tube positioned with its tip at the level of T3.  Nasogastric tube extends in the stomach.  A right central line remains in place with tip in superior vena cava.  The heart is normal in size.  There is patchy infiltrate in the right suprahilar region..

## 2018-02-13 NOTE — PLAN OF CARE
Problem: Patient Care Overview  Goal: Plan of Care Review  Outcome: Ongoing (interventions implemented as appropriate)  Patient AAO and VSS.   Migraine headache relieved with one time dose of Butalbitol-apap-caffeine. Patient reports no further thoughts of suicide. States she would not have called 911 if she really wanted to die. Sitter at bedside throughout the night.  Remains free of injury. Fall precautions maintained with bed low and wheels locked. Chart review for 24 hours completed. Will continue to monitor till discharge.

## 2018-02-13 NOTE — SUBJECTIVE & OBJECTIVE
Interval History: much improved    Objective:     Vital Signs (Most Recent):  Temp: 98.3 °F (36.8 °C) (02/12/18 1651)  Pulse: 95 (02/12/18 1914)  Resp: 18 (02/12/18 1914)  BP: 138/78 (02/12/18 1651)  SpO2: 99 % (02/12/18 1914) Vital Signs (24h Range):  Temp:  [98.2 °F (36.8 °C)-99.2 °F (37.3 °C)] 98.3 °F (36.8 °C)  Pulse:  [65-95] 95  Resp:  [13-25] 18  SpO2:  [96 %-100 %] 99 %  BP: (126-153)/(61-98) 138/78     Weight: 98.4 kg (216 lb 14.9 oz)  Body mass index is 35.01 kg/m².      Intake/Output Summary (Last 24 hours) at 02/12/18 1915  Last data filed at 02/12/18 1225   Gross per 24 hour   Intake              360 ml   Output             1550 ml   Net            -1190 ml       Physical Exam   Constitutional: She is oriented to person, place, and time. She appears well-developed and well-nourished.   HENT:   Head: Normocephalic and atraumatic.   Mouth/Throat: Oropharyngeal exudate present.   Eyes: Conjunctivae are normal. Pupils are equal, round, and reactive to light.   Neck: Neck supple. No JVD present. No tracheal deviation present. No thyromegaly present.   Cardiovascular: Normal rate, regular rhythm and normal heart sounds.    Pulmonary/Chest: Effort normal. No respiratory distress. She has decreased breath sounds. She has wheezes in the right lower field and the left lower field. She has no rhonchi. She has no rales. She exhibits no tenderness.   Abdominal: Soft. Bowel sounds are normal.   Musculoskeletal: Normal range of motion. She exhibits no edema.   Lymphadenopathy:     She has no cervical adenopathy.   Neurological: She is alert and oriented to person, place, and time.   Skin: Skin is warm and dry.   Nursing note and vitals reviewed.      Vents:  Vent Mode: Spont (02/10/18 1102)  Set Rate: 0 bmp (02/10/18 1102)  Vt Set: 400 mL (02/10/18 1102)  Pressure Support: 10 cmH20 (02/10/18 1102)  PEEP/CPAP: 5 cmH20 (02/10/18 1102)  Oxygen Concentration (%): 28 (02/11/18 0830)  Peak Airway Pressure: 15 cmH2O  (02/10/18 1102)  Plateau Pressure: 0 cmH20 (02/10/18 1102)  Total Ve: 7.1 mL (02/10/18 1102)  Negative Inspiratory Force (cm H2O): -50 (02/10/18 1225)  F/VT Ratio<105 (RSBI): (!) 28.51 (02/10/18 1102)    Lines/Drains/Airways     Peripheral Intravenous Line                 Peripheral IV - Single Lumen 02/09/18 2000 Left 2 days                Significant Labs:    CBC/Anemia Profile:    Recent Labs  Lab 02/11/18  0824 02/12/18  0353   WBC 8.50 6.83   HGB 11.5* 12.0   HCT 34.0* 35.3*   * 83*   MCV 87 87   RDW 14.0 13.5        Chemistries:    Recent Labs  Lab 02/11/18  0824 02/12/18  0353    141   K 3.6 3.6   * 108   CO2 19* 24   BUN 8 6   CREATININE 0.8 0.7   CALCIUM 7.9* 8.4*   ALBUMIN 3.1* 3.3*   PROT 6.0 6.5   BILITOT 0.4 0.3   ALKPHOS 41* 43*   ALT 17 18   AST 20 25       BMP:   Recent Labs  Lab 02/12/18  0353         K 3.6      CO2 24   BUN 6   CREATININE 0.7   CALCIUM 8.4*     CMP:   Recent Labs  Lab 02/11/18  0824 02/12/18  0353    141   K 3.6 3.6   * 108   CO2 19* 24    102   BUN 8 6   CREATININE 0.8 0.7   CALCIUM 7.9* 8.4*   PROT 6.0 6.5   ALBUMIN 3.1* 3.3*   BILITOT 0.4 0.3   ALKPHOS 41* 43*   AST 20 25   ALT 17 18   ANIONGAP 8 9   EGFRNONAA >60 >60     All pertinent labs within the past 24 hours have been reviewed.    Significant Imaging:  I have reviewed all pertinent imaging results/findings within the past 24 hours.

## 2018-02-13 NOTE — ASSESSMENT & PLAN NOTE
PEC when extubated.  If she survives and recovers neurologically, will need in-patient psychiatric placement.    02/10 - CM consulted for placement  2/11 - counseled patient on reason to live for sake of daughter, consulted case management for resources and placement, will inquire about legal ramifications in the am. Will PEC in the am.  2/12 - consulted case management for inpt psych placement.

## 2018-02-13 NOTE — DISCHARGE SUMMARY
"[facility] Hospital Medicine  Discharge Summary      Patient Name: Priti Obrien  MRN: [id number]  Admission Date: 2/9/2018  Hospital Length of Stay: 4 days  Discharge Date and Time:  02/13/2018 12:23 PM  Attending Physician: Anish Vela MD   Discharging Provider: Devyn Paz NP  Primary Care Provider: RICHI Ferrer      HPI:   Ms. 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 After the news broke out on local news channels today, per grandmother, patient left a suicide note that states "I'm sorry for being a mess up and please kiss my baby girl, etc". She then ingested unknown amount of Trazodone, Meloxicam, Cyclobenzaprine, Tizanidine, and Buspirone. All these bottles are empty at the bedside. Patient was brought to the ED in an obtunded state, she was emergently intubated. Patient received narcan 2 mg with no effect. UDS positive for amphetamines and barbiturates. Poison control was contacted, who suggested supportive care.    * No surgery found *      Hospital Course:   Pt able to respond to Q's, she has been inpt for mental health issues and has had prior suicide attempt.  GM (confirms HPI) and aunt present and pt authorizes GM as surrogate decision maker.  Labs ok, clinically stable  2/11 - Pt extubated yesterday afternoon, doing well. Labs stable (creatinine and liver enzymes ok), pt is uncertain if she has to go to long term at time of discharge, is concerned about not being able to see her daughter again. Case discussed w/pulm, stable for downgrade to tele. Will continue oral antibx due to concern for aspiration pneumonia.  2/12 - platelets decreased, otherwise normal kidney and liver function in patient, having no side effects of overdose.  2/13/18 No acute issues overnight. K+ 3.0 today, will replete. Platelets increased to 220 today. Continue " PO avelox for treatment of PNA. The patient is medically cleared for placement. The patient was seen and examined today and deemed stable for discharge. The patient is being discharged to [facility] for inpatient psych treatment. The patient is being discharged on Levaquin and will replace a 7 day total ABX coarse. The patient will follow up with her PCP.      Consults:   Consults         Status Ordering Provider     Inpatient consult to Pulmonology  Once     Provider:  (Not yet assigned)    Acknowledged JEANNETTE TITUS     Inpatient consult to Registered Dietitian/Nutritionist  Once     Provider:  (Not yet assigned)    Completed ARIADNE BOWMAN     Inpatient consult to Social Work  Once     Provider:  (Not yet assigned)    Acknowledged EMMANUELLE RAI     Inpatient consult to Social Work  Once     Provider:  (Not yet assigned)    Acknowledged EMMANUELLE RAI          No new Assessment & Plan notes have been filed under this hospital service since the last note was generated.  Service: Hospital Medicine    Final Active Diagnoses:    Diagnosis Date Noted POA    PRINCIPAL PROBLEM:  Aspiration pneumonia of right upper lobe [J69.0] 02/12/2018 Yes    Suicidal behavior with attempted self-injury [T14.91XA] 02/09/2018 Yes    Diabetes mellitus type 2 in obese [E11.69, E66.9] 05/22/2014 Yes    Morbid obesity with BMI of 40.0-44.9, adult [E66.01, Z68.41] 05/01/2014 Not Applicable    Borderline personality disorder [F60.3] 11/07/2013 Yes      Problems Resolved During this Admission:    Diagnosis Date Noted Date Resolved POA    Thrombocytopenia due to drugs [D69.59, T50.905A] 02/12/2018 02/13/2018 Unknown    Drug overdose [T50.901A] 02/09/2018 02/13/2018 Yes    Acute metabolic encephalopathy [G93.41] 02/09/2018 02/11/2018 Yes       Discharged Condition: stable    Disposition: Home or Self Care    Follow Up:  Follow-up Information     RICHI Ferrer. Schedule an appointment as soon as possible for a visit in  3 days.    Specialty:  Family Medicine  Contact information:  [address]  [address]  [facility] [address]  [phone]                 Patient Instructions:   No discharge procedures on file.    Significant Diagnostic Studies:   Imaging Results          X-Ray Chest 1 View (Final result)  Result time 02/13/18 08:17:14    Final result by Emilie Peñaloza MD (02/13/18 08:17:14)                 Impression:        Stable exam.  No acute findings.      Electronically signed by: EMILIE PEÑALOZA MD  Date:     02/13/18  Time:    08:17              Narrative:    Procedure: XR CHEST 1 VIEW, 02/13/18 05:22:36    Clinical indication:  RESPIRATORY FAILURE     Comparison: 02/12/2018    Findings: The right chest wall Mediport catheter is unchanged.  Heart size is normal. The lung fields are clear. No acute cardiopulmonary infiltrate.                             X-Ray Chest 1 View (Final result)  Result time 02/12/18 09:23:10    Final result by Sammy Nix MD (02/12/18 09:23:10)                 Impression:     No acute cardiopulmonary disease.            Electronically signed by: SAMMY NIX MD  Date:     02/12/18  Time:    09:23              Narrative:    Exam: Portable chest radiograph    Clinical History:   RESPIRATORY FAILURE .     Comparison: Chest x-ray, 2/11/18    Findings:.     The lungs are clear. The cardiac silhouette is within normal limits. There is a right subclavian Mediport with its tip in the SVC.  No pleural effusion or pneumothorax.                             X-Ray Chest 1 View (Final result)  Result time 02/11/18 08:27:41    Final result by Sammy Nix MD (02/11/18 08:27:41)                 Impression:     See above.            Electronically signed by: SAMMY NIX MD  Date:     02/11/18  Time:    08:27              Narrative:    Exam: Portable chest radiograph    Clinical History:   RESPIRATORY FAILURE .     Comparison: Chest x-ray, 02/09/18.    Findings:.     Since the prior exam there has  been interval extubation and interval removal of the NG tube. The right subclavian MediPort has its tip in the SVC. No pleural effusion or pneumothorax.  There is some patchy groundglass opacity in the right suprahilar region.  The lung volumes are low.  The heart size is normal.                             CT Head Without Contrast (Final result)  Result time 02/09/18 22:02:55    Final result by Lorena Wilson MD (02/09/18 22:02:55)                 Impression:         Negative noncontrast CT scan of the brain.   All CT scans at this facility use dose modulation, iterative reconstruction, and/or weight based dosing when appropriate to reduce radiation dose to as low as reasonable achievable.      Electronically signed by: LORENA WILSON MD  Date:     02/09/18  Time:    22:02              Narrative:    CT HEAD WITHOUT CONTRAST    Date: 02/09/18 21:51:50    Clinical indication:  Decreased alertness      Technique:  Standard noncontrast CT scan of the brain. Comparison is made with previous study of 02/14/2014.     Findings:  The ventricles are nondilated. Gray and white matter structures reveal normal attenuation. No hemorrhage, mass effect or edema is identified.     There is chronic scalloped concavity to the vertex on the right.  This is unchanged compared with previous exam.                             X-Ray Chest AP Portable (Final result)  Result time 02/09/18 21:22:18    Final result by Lorena Wilson MD (02/09/18 21:22:18)                 Impression:      The patient has been intubated.  Endotracheal tube and nasogastric tube appear in good position.  There is patchy infiltrate in the right suprahilar region.      Electronically signed by: LORENA WILSON MD  Date:     02/09/18  Time:    21:22              Narrative:    Exam: XR CHEST AP PORTABLE,    Date:  02/09/18 21:13:44    History: Presence of specified devices    Comparison:  06/12/2017    Findings: The patient has been intubated with endotracheal  tube positioned with its tip at the level of T3.  Nasogastric tube extends in the stomach.  A right central line remains in place with tip in superior vena cava.  The heart is normal in size.  There is patchy infiltrate in the right suprahilar region..                                 Pending Diagnostic Studies:     None         Medications:  Reconciled Home Medications:   Current Discharge Medication List      START taking these medications    Details   dextromethorphan-guaifenesin  mg (MUCINEX DM)  mg per 12 hr tablet Take 1 tablet by mouth 2 (two) times daily.  Qty: 20 tablet, Refills: 0      levoFLOXacin (LEVAQUIN) 500 MG tablet Take 1 tablet (500 mg total) by mouth once daily.  Qty: 4 tablet, Refills: 0         CONTINUE these medications which have NOT CHANGED    Details   busPIRone (BUSPAR) 10 MG tablet Take 10 mg by mouth 3 (three) times daily.      cyclobenzaprine (FLEXERIL) 5 MG tablet Take 5 mg by mouth 3 (three) times daily as needed for Muscle spasms.      meloxicam (MOBIC) 7.5 MG tablet Take 7.5 mg by mouth once daily.      tiZANidine (ZANAFLEX) 4 MG tablet Take 4 mg by mouth every 6 (six) hours as needed.      traZODone (DESYREL) 50 MG tablet Take 50 mg by mouth every evening.      blood-glucose meter (FREESTYLE SYSTEM KIT) kit Use as instructed  Qty: 1 each, Refills: 0      hydroCHLOROthiazide (HYDRODIURIL) 25 MG tablet Take 25 mg by mouth once daily.         STOP taking these medications       hydrocodone-acetaminophen 5-325mg (NORCO) 5-325 mg per tablet Comments:   Reason for Stopping:               Indwelling Lines/Drains at time of discharge:   Lines/Drains/Airways          No matching active lines, drains, or airways          Time spent on the discharge of patient: > 30 minutes  Patient was seen and examined on the date of discharge and determined to be suitable for discharge.         Devyn Paz NP  Department of Hospital Medicine  [facility]

## 2018-02-13 NOTE — PLAN OF CARE
Problem: Patient Care Overview  Goal: Plan of Care Review  Outcome: Outcome(s) achieved Date Met: 02/13/18  Patient remains free from falls and all safety precautions are maintained. Patient states no complaints at this time. Normal sinus on cardiac monitor. Bed locked/lowered. Call light within reach. Ambulated in room. Sitter at bedside. No s/s of acute distress. Discharging to facility. 12 hour chart check.

## 2018-02-13 NOTE — SUBJECTIVE & OBJECTIVE
Interval History:     Review of Systems   Constitutional: Negative for activity change, appetite change, fever and unexpected weight change.   HENT: Negative for congestion, ear discharge and sore throat.    Eyes: Negative for visual disturbance.   Respiratory: Positive for cough and wheezing. Negative for shortness of breath.    Cardiovascular: Negative for chest pain.   Gastrointestinal: Negative for abdominal pain, diarrhea, nausea and vomiting.   Endocrine: Negative for polyuria.   Genitourinary: Negative for dysuria.   Musculoskeletal: Negative for arthralgias and myalgias.   Skin: Negative for rash.   Allergic/Immunologic: Negative for immunocompromised state.   Neurological: Negative for dizziness, weakness, numbness and headaches.   Hematological: Negative for adenopathy.   Psychiatric/Behavioral: Positive for self-injury and suicidal ideas. Negative for confusion and decreased concentration. The patient is nervous/anxious.    All other systems reviewed and are negative.    Objective:     Vital Signs (Most Recent):  Temp: 98.8 °F (37.1 °C) (02/12/18 2142)  Pulse: 90 (02/12/18 2142)  Resp: 18 (02/12/18 2142)  BP: (!) 146/79 (02/12/18 2142)  SpO2: 99 % (02/12/18 2142) Vital Signs (24h Range):  Temp:  [98.2 °F (36.8 °C)-99.2 °F (37.3 °C)] 98.8 °F (37.1 °C)  Pulse:  [65-95] 90  Resp:  [13-25] 18  SpO2:  [96 %-100 %] 99 %  BP: (126-146)/(69-88) 146/79     Weight: 98.4 kg (216 lb 14.9 oz)  Body mass index is 35.01 kg/m².    Intake/Output Summary (Last 24 hours) at 02/12/18 2230  Last data filed at 02/12/18 1225   Gross per 24 hour   Intake              360 ml   Output              800 ml   Net             -440 ml      Physical Exam   Constitutional: She appears well-developed and well-nourished. She is intubated.   Obese female, intubated   HENT:   Head: Normocephalic and atraumatic.   Eyes: Conjunctivae are normal. No scleral icterus.   Pupils very sluggish, barely reactive.   Cardiovascular: Regular rhythm, S1  normal, S2 normal and normal heart sounds.    No murmur heard.  Pulmonary/Chest: Breath sounds normal. She is intubated. She has no wheezes. She has no rales.   Abdominal: Soft. Bowel sounds are normal. She exhibits no distension.   Musculoskeletal: She exhibits no edema or deformity.   Lymphadenopathy:     She has no cervical adenopathy.   Neurological: She is unresponsive. GCS eye subscore is 1. GCS verbal subscore is 1. GCS motor subscore is 1.   Skin: Skin is warm. No rash noted. No erythema.   Psychiatric:   Defered   Nursing note and vitals reviewed.      Significant Labs: All pertinent labs within the past 24 hours have been reviewed.    Significant Imaging: I have reviewed all pertinent imaging results/findings within the past 24 hours.

## 2018-02-13 NOTE — PROGRESS NOTES
"[facility] Hospital Medicine  Progress Note    Patient Name: Priti Obrien  MRN: [id number]  Patient Class: IP- Inpatient   Admission Date: 2/9/2018  Length of Stay: 4 days  Attending Physician: Anish Vela MD  Primary Care Provider: RICHI Ferrer        Subjective:     Principal Problem:Aspiration pneumonia of right upper lobe    HPI:  Ms. Obrien is a 31 y/o obese  female with h/o bipolar disorder who works as a  at a [employer] was fired from her job for sexual misconduct with a male inmate. After the news broke out on local news channels today, per grandmother, patient left a suicide note that states "I'm sorry for being a mess up and please kiss my baby girl, etc". She then ingested unknown amount of Trazodone, Meloxicam, Cyclobenzaprine, Tizanidine, and Buspirone. All these bottles are empty at the bedside. Patient was brought to the ED in an obtunded state, she was emergently intubated. Patient received narcan 2 mg with no effect. UDS positive for amphetamines and barbiturates. Poison control was contacted, who suggested supportive care.    Hospital Course:  Pt able to respond to Q's, she has been inpt for mental health issues and has had prior suicide attempt.  GM (confirms HPI) and aunt present and pt authorizes GM as surrogate decision maker.  Labs ok, clinically stable  2/11 - Pt extubated yesterday afternoon, doing well. Labs stable (creatinine and liver enzymes ok), pt is uncertain if she has to go to [facility] at time of discharge, is concerned about not being able to see her daughter again. Case discussed w/pulm, stable for downgrade to [employer]. Will continue oral antibx due to concern for aspiration pneumonia.  2/12 - platelets decreased, otherwise normal kidney and liver function in patient, having no side effects of overdose.  2/13/18 No acute issues overnight. K+ 3.0 today, will replete. Platelets increased to 220 today. Continue PO avelox for treatment " of PNA. The patient is medically cleared for placement.     Interval History: No acute issues overnight. K+ 3.0 today, will replete. Platelets increased to 220 today. Continue PO avelox for treatment of PNA. The patient is medically cleared for placement.       Review of Systems   Constitutional: Negative for activity change, appetite change, chills, diaphoresis, fatigue, fever and unexpected weight change.   HENT: Negative for congestion, drooling, ear discharge, facial swelling, rhinorrhea, sinus pressure, sneezing and sore throat.    Eyes: Negative for discharge, redness, itching and visual disturbance.   Respiratory: Positive for cough and wheezing. Negative for apnea, choking, chest tightness, shortness of breath and stridor.    Cardiovascular: Negative for chest pain, palpitations and leg swelling.   Gastrointestinal: Negative for abdominal distention, abdominal pain, anal bleeding, blood in stool, constipation, diarrhea, nausea and vomiting.   Endocrine: Negative for polyuria.   Genitourinary: Negative for decreased urine volume, difficulty urinating, dysuria, frequency, hematuria, pelvic pain, urgency, vaginal bleeding and vaginal discharge.   Musculoskeletal: Negative for arthralgias, back pain, gait problem, joint swelling, myalgias, neck pain and neck stiffness.   Skin: Negative for color change, pallor, rash and wound.   Allergic/Immunologic: Negative for immunocompromised state.   Neurological: Negative for dizziness, seizures, facial asymmetry, speech difficulty, weakness, light-headedness, numbness and headaches.   Hematological: Negative for adenopathy.   Psychiatric/Behavioral: Positive for self-injury and suicidal ideas. Negative for agitation, confusion, decreased concentration and hallucinations. The patient is nervous/anxious.    All other systems reviewed and are negative.    Objective:     Vital Signs (Most Recent):  Temp: 98.1 °F (36.7 °C) (02/13/18 1140)  Pulse: 84 (02/13/18 1140)  Resp: 20  (02/13/18 1140)  BP: (!) 162/101 (02/13/18 1140)  SpO2: 98 % (02/13/18 1140) Vital Signs (24h Range):  Temp:  [98 °F (36.7 °C)-98.8 °F (37.1 °C)] 98.1 °F (36.7 °C)  Pulse:  [76-95] 84  Resp:  [16-20] 20  SpO2:  [94 %-99 %] 98 %  BP: (115-162)/() 162/101     Weight: 98.4 kg (216 lb 14.9 oz)  Body mass index is 35.01 kg/m².    Intake/Output Summary (Last 24 hours) at 02/13/18 1152  Last data filed at 02/13/18 0400   Gross per 24 hour   Intake              720 ml   Output                0 ml   Net              720 ml      Physical Exam   Constitutional: She is oriented to person, place, and time. She appears well-developed and well-nourished.   Obese female   HENT:   Head: Normocephalic and atraumatic.   Eyes: Conjunctivae and EOM are normal. Pupils are equal, round, and reactive to light. No scleral icterus.   Neck: Normal range of motion. Neck supple.   Cardiovascular: Normal rate, regular rhythm, S1 normal, S2 normal, normal heart sounds and intact distal pulses.    No murmur heard.  Pulmonary/Chest: Effort normal and breath sounds normal. No respiratory distress. She has no wheezes. She has no rales.   Abdominal: Soft. Bowel sounds are normal. She exhibits no distension. There is no tenderness.   Musculoskeletal: Normal range of motion. She exhibits no edema, tenderness or deformity.   Lymphadenopathy:     She has no cervical adenopathy.   Neurological: She is alert and oriented to person, place, and time. She has normal reflexes. GCS eye subscore is 4. GCS verbal subscore is 5. GCS motor subscore is 6.   Skin: Skin is warm and dry. No rash noted. No erythema.   Psychiatric: She has a normal mood and affect. Her behavior is normal.   Defered   Nursing note and vitals reviewed.      Significant Labs: All pertinent labs within the past 24 hours have been reviewed.    Significant Imaging:   Imaging Results          X-Ray Chest 1 View (Final result)  Result time 02/13/18 08:17:14    Final result by Wayne ARSHAD  MD Rere (02/13/18 08:17:14)                 Impression:        Stable exam.  No acute findings.      Electronically signed by: EMILIE PEÑALOZA MD  Date:     02/13/18  Time:    08:17              Narrative:    Procedure: XR CHEST 1 VIEW, 02/13/18 05:22:36    Clinical indication:  RESPIRATORY FAILURE     Comparison: 02/12/2018    Findings: The right chest wall Mediport catheter is unchanged.  Heart size is normal. The lung fields are clear. No acute cardiopulmonary infiltrate.                             X-Ray Chest 1 View (Final result)  Result time 02/12/18 09:23:10    Final result by Sammy Nix MD (02/12/18 09:23:10)                 Impression:     No acute cardiopulmonary disease.            Electronically signed by: SAMMY NIX MD  Date:     02/12/18  Time:    09:23              Narrative:    Exam: Portable chest radiograph    Clinical History:   RESPIRATORY FAILURE .     Comparison: Chest x-ray, 2/11/18    Findings:.     The lungs are clear. The cardiac silhouette is within normal limits. There is a right subclavian Mediport with its tip in the SVC.  No pleural effusion or pneumothorax.                             X-Ray Chest 1 View (Final result)  Result time 02/11/18 08:27:41    Final result by Sammy Nix MD (02/11/18 08:27:41)                 Impression:     See above.            Electronically signed by: SAMMY NIX MD  Date:     02/11/18  Time:    08:27              Narrative:    Exam: Portable chest radiograph    Clinical History:   RESPIRATORY FAILURE .     Comparison: Chest x-ray, 02/09/18.    Findings:.     Since the prior exam there has been interval extubation and interval removal of the NG tube. The right subclavian MediPort has its tip in the SVC. No pleural effusion or pneumothorax.  There is some patchy groundglass opacity in the right suprahilar region.  The lung volumes are low.  The heart size is normal.                             CT Head Without Contrast (Final result)  Result  time 02/09/18 22:02:55    Final result by Lorena Wilson MD (02/09/18 22:02:55)                 Impression:         Negative noncontrast CT scan of the brain.   All CT scans at this facility use dose modulation, iterative reconstruction, and/or weight based dosing when appropriate to reduce radiation dose to as low as reasonable achievable.      Electronically signed by: LORENA WILSON MD  Date:     02/09/18  Time:    22:02              Narrative:    CT HEAD WITHOUT CONTRAST    Date: 02/09/18 21:51:50    Clinical indication:  Decreased alertness      Technique:  Standard noncontrast CT scan of the brain. Comparison is made with previous study of 02/14/2014.     Findings:  The ventricles are nondilated. Gray and white matter structures reveal normal attenuation. No hemorrhage, mass effect or edema is identified.     There is chronic scalloped concavity to the vertex on the right.  This is unchanged compared with previous exam.                             X-Ray Chest AP Portable (Final result)  Result time 02/09/18 21:22:18    Final result by Lorena Wilson MD (02/09/18 21:22:18)                 Impression:      The patient has been intubated.  Endotracheal tube and nasogastric tube appear in good position.  There is patchy infiltrate in the right suprahilar region.      Electronically signed by: LORENA WILSON MD  Date:     02/09/18  Time:    21:22              Narrative:    Exam: XR CHEST AP PORTABLE,    Date:  02/09/18 21:13:44    History: Presence of specified devices    Comparison:  06/12/2017    Findings: The patient has been intubated with endotracheal tube positioned with its tip at the level of T3.  Nasogastric tube extends in the stomach.  A right central line remains in place with tip in superior vena cava.  The heart is normal in size.  There is patchy infiltrate in the right suprahilar region..                                 Assessment/Plan:      * Aspiration pneumonia of right upper lobe    On  antibiotics, following cultures (STREPTOCOCCUS GROUP C from throat)  Blood cx negative.   Continue PO avelox   Mucinex         Thrombocytopenia due to drugs    - Improved, platelets increased to 220          Suicidal behavior with attempted self-injury    PEC when extubated.  If she survives and recovers neurologically, will need in-patient psychiatric placement.    02/10 - CM consulted for placement  2/11 - counseled patient on reason to live for sake of daughter, consulted case management for resources and placement, will inquire about legal ramifications in the am. Will PEC in the am.  2/12 - consulted case management for inpt psych placement.  2/13 The patient is medically cleared for psych placement         Drug overdose    Due to intentional polypharmacy overdose of various prescribed medications (Trazodone, Meloxicam, Cyclobenzaprine, Tizanidine, and Buspirone).  UDS positive for amphetamines and barbiturates.                Diabetes mellitus type 2 in obese              Morbid obesity with BMI of 40.0-44.9, adult    - Pt counseled on diet/exercise/weight loss            Borderline personality disorder    On multiple psychotropic medications at home.            VTE Risk Mitigation         Ordered     Medium Risk of VTE  Once      02/09/18 2316     Place sequential compression device  Until discontinued      02/09/18 2316              Devyn Paz NP  Department of Hospital Medicine   [facility]

## 2018-02-13 NOTE — PROGRESS NOTES
[facility]   Pulmonology  Progress Note    Patient Name: Priti Obrien  MRN: [id number]  Admission Date: 2/9/2018  Hospital Length of Stay: 3 days  Code Status: Full Code  Attending Provider: Anish Vela MD  Primary Care Provider: RICHI Ferrer   Principal Problem: Acute metabolic encephalopathy    Subjective: improved , some chest tightness     Interval History: much improved    Objective:     Vital Signs (Most Recent):  Temp: 98.3 °F (36.8 °C) (02/12/18 1651)  Pulse: 95 (02/12/18 1914)  Resp: 18 (02/12/18 1914)  BP: 138/78 (02/12/18 1651)  SpO2: 99 % (02/12/18 1914) Vital Signs (24h Range):  Temp:  [98.2 °F (36.8 °C)-99.2 °F (37.3 °C)] 98.3 °F (36.8 °C)  Pulse:  [65-95] 95  Resp:  [13-25] 18  SpO2:  [96 %-100 %] 99 %  BP: (126-153)/(61-98) 138/78     Weight: 98.4 kg (216 lb 14.9 oz)  Body mass index is 35.01 kg/m².      Intake/Output Summary (Last 24 hours) at 02/12/18 1915  Last data filed at 02/12/18 1225   Gross per 24 hour   Intake              360 ml   Output             1550 ml   Net            -1190 ml       Physical Exam   Constitutional: She is oriented to person, place, and time. She appears well-developed and well-nourished.   HENT:   Head: Normocephalic and atraumatic.   Mouth/Throat: Oropharyngeal exudate present.   Eyes: Conjunctivae are normal. Pupils are equal, round, and reactive to light.   Neck: Neck supple. No JVD present. No tracheal deviation present. No thyromegaly present.   Cardiovascular: Normal rate, regular rhythm and normal heart sounds.    Pulmonary/Chest: Effort normal. No respiratory distress. She has decreased breath sounds. She has wheezes in the right lower field and the left lower field. She has no rhonchi. She has no rales. She exhibits no tenderness.   Abdominal: Soft. Bowel sounds are normal.   Musculoskeletal: Normal range of motion. She exhibits no edema.   Lymphadenopathy:     She has no cervical adenopathy.   Neurological: She is alert and  oriented to person, place, and time.   Skin: Skin is warm and dry.   Nursing note and vitals reviewed.      Vents:  Vent Mode: Spont (02/10/18 1102)  Set Rate: 0 bmp (02/10/18 1102)  Vt Set: 400 mL (02/10/18 1102)  Pressure Support: 10 cmH20 (02/10/18 1102)  PEEP/CPAP: 5 cmH20 (02/10/18 1102)  Oxygen Concentration (%): 28 (02/11/18 0830)  Peak Airway Pressure: 15 cmH2O (02/10/18 1102)  Plateau Pressure: 0 cmH20 (02/10/18 1102)  Total Ve: 7.1 mL (02/10/18 1102)  Negative Inspiratory Force (cm H2O): -50 (02/10/18 1225)  F/VT Ratio<105 (RSBI): (!) 28.51 (02/10/18 1102)    Lines/Drains/Airways     Peripheral Intravenous Line                 Peripheral IV - Single Lumen 02/09/18 2000 Left 2 days                Significant Labs:    CBC/Anemia Profile:    Recent Labs  Lab 02/11/18  0824 02/12/18 0353   WBC 8.50 6.83   HGB 11.5* 12.0   HCT 34.0* 35.3*   * 83*   MCV 87 87   RDW 14.0 13.5        Chemistries:    Recent Labs  Lab 02/11/18  0824 02/12/18 0353    141   K 3.6 3.6   * 108   CO2 19* 24   BUN 8 6   CREATININE 0.8 0.7   CALCIUM 7.9* 8.4*   ALBUMIN 3.1* 3.3*   PROT 6.0 6.5   BILITOT 0.4 0.3   ALKPHOS 41* 43*   ALT 17 18   AST 20 25       BMP:   Recent Labs  Lab 02/12/18 0353         K 3.6      CO2 24   BUN 6   CREATININE 0.7   CALCIUM 8.4*     CMP:   Recent Labs  Lab 02/11/18 0824 02/12/18 0353    141   K 3.6 3.6   * 108   CO2 19* 24    102   BUN 8 6   CREATININE 0.8 0.7   CALCIUM 7.9* 8.4*   PROT 6.0 6.5   ALBUMIN 3.1* 3.3*   BILITOT 0.4 0.3   ALKPHOS 41* 43*   AST 20 25   ALT 17 18   ANIONGAP 8 9   EGFRNONAA >60 >60     All pertinent labs within the past 24 hours have been reviewed.    Significant Imaging:  I have reviewed all pertinent imaging results/findings within the past 24 hours.    Assessment/Plan:     Pneumonia    Patchy infiltrate in right suprahilar region. ? Aspiration.  Empiric coverage: Oral Moxifloxacin X 7 days  2/12 Switch to oral  antibiotics          Switch to Albuterol CAIO Colmenares MD  Pulmonology  [facility]

## 2018-02-13 NOTE — PLAN OF CARE
CM sent referrals out for PEC. CM called and fax to [facility] and spoke with Zoey.  Paperwork is under review. Accepted per Dr. Mckenzie. RN to call report to [phone] and please ask for chg nurse as well as give ETA for [facility]

## 2018-02-13 NOTE — ASSESSMENT & PLAN NOTE
On antibiotics, following cultures (STREPTOCOCCUS GROUP C from throat)  Blood cx negative.   Continue PO avelox   Mucinex

## 2018-02-13 NOTE — PROGRESS NOTES
"[facility] Hospital Medicine  Progress Note    Patient Name: Priti Obrien  MRN: [id number]  Patient Class: IP- Inpatient   Admission Date: 2/9/2018  Length of Stay: 3 days  Attending Physician: Anish Vela MD  Primary Care Provider: RICHI Ferrer        Subjective:     Principal Problem:Acute metabolic encephalopathy    HPI:  Ms. Obrien is a 33 y/o obese  female with h/o bipolar disorder who works as a  at a [employer] was fired from her job for sexual misconduct with a male inmate. After the news broke out on local news channels today, per grandmother, patient left a suicide note that states "I'm sorry for being a mess up and please kiss my baby girl, etc". She then ingested unknown amount of Trazodone, Meloxicam, Cyclobenzaprine, Tizanidine, and Buspirone. All these bottles are empty at the bedside. Patient was brought to the ED in an obtunded state, she was emergently intubated. Patient received narcan 2 mg with no effect. UDS positive for amphetamines and barbiturates. Poison control was contacted, who suggested supportive care.    Hospital Course:  Pt able to respond to Q's, she has been inpt for mental health issues and has had prior suicide attempt.  GM (confirms HPI) and aunt present and pt authorizes GM as surrogate decision maker.  Labs ok, clinically stable  2/11 - Pt extubated yesterday afternoon, doing well. Labs stable (creatinine and liver enzymes ok), pt is uncertain if she has to go to [facility] at time of discharge, is concerned about not being able to see her daughter again. Case discussed w/pulm, stable for downgrade to [facility]. Will continue oral antibx due to concern for aspiration pneumonia.  2/12 - platelets decreased, otherwise normal kidney and liver function in patient, having no side effects of overdose.    Interval History:     Review of Systems   Constitutional: Negative for activity change, appetite change, fever and unexpected weight " change.   HENT: Negative for congestion, ear discharge and sore throat.    Eyes: Negative for visual disturbance.   Respiratory: Positive for cough and wheezing. Negative for shortness of breath.    Cardiovascular: Negative for chest pain.   Gastrointestinal: Negative for abdominal pain, diarrhea, nausea and vomiting.   Endocrine: Negative for polyuria.   Genitourinary: Negative for dysuria.   Musculoskeletal: Negative for arthralgias and myalgias.   Skin: Negative for rash.   Allergic/Immunologic: Negative for immunocompromised state.   Neurological: Negative for dizziness, weakness, numbness and headaches.   Hematological: Negative for adenopathy.   Psychiatric/Behavioral: Positive for self-injury and suicidal ideas. Negative for confusion and decreased concentration. The patient is nervous/anxious.    All other systems reviewed and are negative.    Objective:     Vital Signs (Most Recent):  Temp: 98.8 °F (37.1 °C) (02/12/18 2142)  Pulse: 90 (02/12/18 2142)  Resp: 18 (02/12/18 2142)  BP: (!) 146/79 (02/12/18 2142)  SpO2: 99 % (02/12/18 2142) Vital Signs (24h Range):  Temp:  [98.2 °F (36.8 °C)-99.2 °F (37.3 °C)] 98.8 °F (37.1 °C)  Pulse:  [65-95] 90  Resp:  [13-25] 18  SpO2:  [96 %-100 %] 99 %  BP: (126-146)/(69-88) 146/79     Weight: 98.4 kg (216 lb 14.9 oz)  Body mass index is 35.01 kg/m².    Intake/Output Summary (Last 24 hours) at 02/12/18 2230  Last data filed at 02/12/18 1225   Gross per 24 hour   Intake              360 ml   Output              800 ml   Net             -440 ml      Physical Exam   Constitutional: She appears well-developed and well-nourished. She is intubated.   Obese female, intubated   HENT:   Head: Normocephalic and atraumatic.   Eyes: Conjunctivae are normal. No scleral icterus.   Pupils very sluggish, barely reactive.   Cardiovascular: Regular rhythm, S1 normal, S2 normal and normal heart sounds.    No murmur heard.  Pulmonary/Chest: Breath sounds normal. She is intubated. She has no  wheezes. She has no rales.   Abdominal: Soft. Bowel sounds are normal. She exhibits no distension.   Musculoskeletal: She exhibits no edema or deformity.   Lymphadenopathy:     She has no cervical adenopathy.   Neurological: She is unresponsive. GCS eye subscore is 1. GCS verbal subscore is 1. GCS motor subscore is 1.   Skin: Skin is warm. No rash noted. No erythema.   Psychiatric:   Defered   Nursing note and vitals reviewed.      Significant Labs: All pertinent labs within the past 24 hours have been reviewed.    Significant Imaging: I have reviewed all pertinent imaging results/findings within the past 24 hours.    Assessment/Plan:      Aspiration pneumonia of right upper lobe    On antibiotics, following cultures (STREPTOCOCCUS GROUP C from throat)        Suicidal behavior with attempted self-injury    PEC when extubated.  If she survives and recovers neurologically, will need in-patient psychiatric placement.    02/10 - CM consulted for placement  2/11 - counseled patient on reason to live for sake of daughter, consulted case management for resources and placement, will inquire about legal ramifications in the am. Will PEC in the am.  2/12 - consulted case management for inpt psych placement.          Drug overdose    Due to intentional polypharmacy overdose of various prescribed medications (Trazodone, Meloxicam, Cyclobenzaprine, Tizanidine, and Buspirone).  UDS positive for amphetamines and barbiturates.    02/10 - labs ok, increased IV fluids            Morbid obesity with BMI of 40.0-44.9, adult               Borderline personality disorder    On multiple psychotropic medications at home.            VTE Risk Mitigation         Ordered     Medium Risk of VTE  Once      02/09/18 2316     Place sequential compression device  Until discontinued      02/09/18 2316              Anish Vela MD  Department of Hospital Medicine   [facility]

## 2018-02-13 NOTE — ASSESSMENT & PLAN NOTE
Patchy infiltrate in right suprahilar region. ? Aspiration.  Empiric coverage: Oral Moxifloxacin X 7 days  2/12 Switch to oral antibiotics

## 2018-02-13 NOTE — NURSING
Patient received written and verbal discharge instructions. Patient verbalized all discharge instructions. Discharging to facility

## 2018-02-14 PROBLEM — F31.81: Status: ACTIVE | Noted: 2018-02-14

## 2018-02-14 NOTE — PLAN OF CARE
02/14/18 0828   Final Note   Assessment Type Final Discharge Note   Discharge Disposition (PEC to [facility])   Hospital Follow Up  Appt(s) scheduled? No   Discharge plans and expectations educations in teach back method with documentation complete? Yes   Right Care Referral Info   Post Acute Recommendation No Care

## 2018-02-15 LAB
BACTERIA BLD CULT: NORMAL
BACTERIA BLD CULT: NORMAL

## 2018-02-20 PROBLEM — T50.902A SUICIDE ATTEMPT BY DRUG INGESTION: Status: RESOLVED | Noted: 2018-02-13 | Resolved: 2018-02-20

## 2018-02-23 NOTE — PHYSICIAN QUERY
PT Name: Priti Obrien  MR #: [id number]    Physician Query Form - Respiratory Condition Clarification      CDS/: Humaira Monge               Contact information: [email or web address]    This form is a permanent document in the medical record.    Query Date: February 23, 2018    By submitting this query, we are merely seeking further clarification of documentation. Please utilize your independent clinical judgment when addressing the question(s) below.    The Medical record contains the following   Indicators   Supporting Clinical Findings Location in Medical Record      SOB, BARR, Wheezing, Productive Cough, Use of Accessory Muscles, etc.     x   Acute/Chronic Illness Drug overdose    Acute metabolic encephalopathy    Borderline personality disorder    Pneumonia         HM PN 2/10   x   Radiology Findings The patient has been intubated.  Endotracheal tube and nasogastric tube appear in good position.  There is patchy infiltrate in the right suprahilar region   CXR 2/9   x   Respiratory Distress or Failure ACUTE RESPIRATORY FAILURE Pulm CN 2/10      Hypoxia or Hypercapnia     x   RR         ABGs         O2 sat PH = 7.364  PCO2 = 43.2  PO2 = 102  HCO3 = 24.6      RR = 7  O2 Sat = 92%   ABGs 2/9            V/S Flowsheet 2/9   x   BiPAP/Intubation She is intubated H&P 2/9   x   Supplemental O2 Patient has been placed back on nasal cannula due to low O2 sats to 88 and 89% on room air Nursing PN 2/11 0031      Home O2, Oxygen Dependence        Treatment     x   Other Patient is unresponsive to verbal and painful stimuli (does not respond to sternal rub).     GCS of 6. ED Provider Notes 2/9     Provider, please specify diagnosis or diagnoses associated with above clinical findings.    [  ] Acute Respiratory Failure with Hypoxia  [ x ] Acute Respiratory Failure with Hypercapnia  [  ] Acute Respiratory Failure with Hypoxia and Hypercapnia  [  ] Other Acute Respiratory Failure  [  ] Other Respiratory  Diagnosis (please specify): _______________________________  [  ] Clinically Undetermined    Please document in your progress notes daily for the duration of treatment until resolved and include in your discharge summary.

## 2018-02-23 NOTE — PHYSICIAN QUERY
PT Name: Priti Obrien  MR #: [id number]     Physician Query Form - Documentation Clarification      CDS/: Humaira Monge               Contact information:[email or web address]    This form is a permanent document in the medical record.     Query Date: February 23, 2018    By submitting this query, we are merely seeking further clarification of documentation. Please utilize your independent clinical judgment when addressing the question(s) below.    The Medical record reflects the following:    Supporting Clinical Findings Location in Medical Record     K+ 3.0 today, will replete        PN 2/13     Potassium chloride SA CR tablet 40meq       MAR                                                                            Doctor, Please specify diagnosis or diagnoses associated with above clinical findings.    Provider Use Only      [ x  ]  Hypokalemia    [   ]  Other explanations with details_____________________________________                                                                                                             [  ] Clinically undetermined

## 2018-06-08 ENCOUNTER — HOSPITAL ENCOUNTER (EMERGENCY)
Facility: HOSPITAL | Age: 33
Discharge: HOME | End: 2018-06-08
Payer: MEDICAID

## 2018-06-08 VITALS
BODY MASS INDEX: 39.76 KG/M2 | HEIGHT: 62 IN | SYSTOLIC BLOOD PRESSURE: 150 MMHG | RESPIRATION RATE: 20 BRPM | OXYGEN SATURATION: 96 % | DIASTOLIC BLOOD PRESSURE: 84 MMHG | HEART RATE: 78 BPM | WEIGHT: 216.06 LBS | TEMPERATURE: 98 F

## 2018-06-08 DIAGNOSIS — R23.8 VESICULAR RASH: Primary | ICD-10-CM

## 2018-06-08 PROCEDURE — 99283 EMERGENCY DEPT VISIT LOW MDM: CPT

## 2018-06-08 RX ORDER — CLINDAMYCIN HYDROCHLORIDE 150 MG/1
300 CAPSULE ORAL EVERY 8 HOURS
Qty: 42 CAPSULE | Refills: 0 | Status: SHIPPED | OUTPATIENT
Start: 2018-06-08 | End: 2018-06-15

## 2018-06-08 RX ORDER — HYDROCODONE BITARTRATE AND ACETAMINOPHEN 5; 325 MG/1; MG/1
1 TABLET ORAL EVERY 4 HOURS PRN
Qty: 6 TABLET | Refills: 0 | Status: SHIPPED | OUTPATIENT
Start: 2018-06-08 | End: 2019-02-09

## 2018-06-08 RX ORDER — VALACYCLOVIR HYDROCHLORIDE 1 G/1
1000 TABLET, FILM COATED ORAL EVERY 8 HOURS
Qty: 21 TABLET | Refills: 0 | Status: SHIPPED | OUTPATIENT
Start: 2018-06-08 | End: 2019-02-09

## 2018-06-09 NOTE — ED PROVIDER NOTES
History      Chief Complaint   Patient presents with    Hand Pain     patient has bumps to palm with redness and pain       Review of patient's allergies indicates:   Allergen Reactions    Demerol (pf) [meperidine (pf)] Hives    Medrol [methylprednisolone] Anaphylaxis     Any cortisone    Adhesive      Other reaction(s): Unknown    Amoxicillin-pot clavulanate      Other reaction(s): Unknown    Atarax [hydroxyzine hcl] Hives    Hydralazine analogues Hives    Iodine      Other reaction(s): Unknown    Penicillins Hives and Nausea And Vomiting    Phenergan [promethazine] Nausea And Vomiting    Risperidone analogues Hives    Ativan [lorazepam] Hives and Anxiety        HPI   HPI    2018, 11:18 PM   History obtained from the patient       History of Present Illness: Priti Obrien is a 33 y.o. female patient who presents to the Emergency Department for blisters to left palm since yesterday.  She says they are very painful.  Denies f/n/v, or rash elsewhere.   Symptoms are moderate in severity.     No further complaints or concerns at this time.           PCP: RICHI Ferrer       Past Medical History:  Past Medical History:   Diagnosis Date    Allergy     Asthma     Childhood only    Bipolar 1 disorder     Borderline personality disorder     Depression     GERD (gastroesophageal reflux disease)     History of psychiatric hospitalization     Hx of psychiatric care     Hypertension     pt denied    Pancreatitis     Psychiatric problem     PTSD (post-traumatic stress disorder)     S/P partial hysterectomy 2011    Substance abuse     Suicide attempt          Past Surgical History:  Past Surgical History:   Procedure Laterality Date    APPENDECTOMY       SECTION      CHOLECYSTECTOMY      HYSTERECTOMY      LASER LAPAROSCOPY             Family History:  Family History   Problem Relation Age of Onset    Diabetes Mother     Hypertension Mother     Atrial fibrillation  Maternal Grandfather            Social History:  Social History     Social History Main Topics    Smoking status: Current Every Day Smoker     Packs/day: 0.25     Types: Cigarettes     Last attempt to quit: 6/5/2013    Smokeless tobacco: Never Used    Alcohol use No      Comment: occasional     Drug use: No    Sexual activity: Yes     Partners: Male     Birth control/ protection: None       ROS     Review of Systems   Constitutional: Negative for fever.   HENT: Negative for sore throat.    Respiratory: Negative for shortness of breath.    Cardiovascular: Negative for chest pain.   Gastrointestinal: Negative for nausea.   Genitourinary: Negative for dysuria.   Musculoskeletal: Negative for back pain.   Skin: Positive for rash.   Neurological: Negative for weakness.   Hematological: Does not bruise/bleed easily.   All other systems reviewed and are negative.      Physical Exam      Initial Vitals [06/08/18 2309]   BP Pulse Resp Temp SpO2   (!) 150/84 78 20 98.1 °F (36.7 °C) 96 %      MAP       106         Physical Exam  Vital signs and nursing notes reviewed.  Constitutional: Patient is in NAD. Awake and alert. Well-developed and well-nourished.  Head: Atraumatic. Normocephalic.  Eyes: PERRL. EOM intact. Conjunctivae nl. No scleral icterus.  ENT: Mucous membranes are moist. Oropharynx is clear.  Neck: Supple. No JVD. No lymphadenopathy.  No meningismus  Cardiovascular: Regular rate and rhythm. No murmurs, rubs, or gallops. Distal pulses are 2+ and symmetric.  Pulmonary/Chest: No respiratory distress. Clear to auscultation bilaterally. No wheezing, rales, or rhonchi.  Abdominal: Soft. Non-distended. No TTP. No rebound, guarding, or rigidity. Good bowel sounds.  Genitourinary: No CVA tenderness  Musculoskeletal: Moves all extremities. No edema.   Skin: Warm and dry.  Small vesiscular rash to left thenar eminence.  Also mild erythema to wrist.  See image:            Neurological: Awake and alert. No acute focal  "neurological deficits are appreciated.  Psychiatric: Normal affect. Good eye contact. Appropriate in content.      ED Course          Procedures  ED Vital Signs:  Vitals:    06/08/18 2309   BP: (!) 150/84   Pulse: 78   Resp: 20   Temp: 98.1 °F (36.7 °C)   TempSrc: Oral   SpO2: 96%   Weight: 98 kg (216 lb 0.8 oz)   Height: 5' 2" (1.575 m)                 Imaging Results:  Imaging Results    None            The Emergency Provider reviewed the vital signs and test results, which are outlined above.    ED Discussion             Medication(s) given in the ER:  Medications - No data to display        Follow-up Information     RICHI Ferrer In 2 days.    Specialty:  Family Medicine  Contact information:  [address] [place] [address] [address]  [facility] [address] [phone]                       New Prescriptions    CLINDAMYCIN (CLEOCIN) 150 MG CAPSULE    Take 2 capsules (300 mg total) by mouth every 8 (eight) hours.    HYDROCODONE-ACETAMINOPHEN (NORCO) 5-325 MG PER TABLET    Take 1 tablet by mouth every 4 (four) hours as needed for Pain.    VALACYCLOVIR (VALTREX) 1000 MG TABLET    Take 1 tablet (1,000 mg total) by mouth every 8 (eight) hours.          Medical Decision Making        All findings were reviewed with the patient/family in detail.   All remaining questions and concerns were addressed at that time.  Patient/family has been counseled regarding the need for follow-up as well as the indication to return to the emergency room should new or worrisome developments occur.        MDM               Clinical Impression:        ICD-10-CM ICD-9-CM   1. Vesicular rash R23.8 782.1             Wendy Zuniga PA-C  06/08/18 2326    "

## 2019-02-09 ENCOUNTER — HOSPITAL ENCOUNTER (EMERGENCY)
Facility: HOSPITAL | Age: 34
Discharge: HOME | End: 2019-02-09
Attending: EMERGENCY MEDICINE
Payer: MEDICAID

## 2019-02-09 VITALS
TEMPERATURE: 98 F | OXYGEN SATURATION: 100 % | DIASTOLIC BLOOD PRESSURE: 72 MMHG | HEIGHT: 62 IN | SYSTOLIC BLOOD PRESSURE: 134 MMHG | WEIGHT: 224.13 LBS | BODY MASS INDEX: 41.24 KG/M2 | RESPIRATION RATE: 16 BRPM | HEART RATE: 74 BPM

## 2019-02-09 DIAGNOSIS — F43.29 STRESS AND ADJUSTMENT REACTION: ICD-10-CM

## 2019-02-09 DIAGNOSIS — F41.9 ANXIETY: ICD-10-CM

## 2019-02-09 DIAGNOSIS — R07.89 CHEST DISCOMFORT: Primary | ICD-10-CM

## 2019-02-09 DIAGNOSIS — R07.9 CHEST PAIN: ICD-10-CM

## 2019-02-09 LAB
ANION GAP SERPL CALC-SCNC: 7 MMOL/L
BASOPHILS # BLD AUTO: 0.02 K/UL
BASOPHILS NFR BLD: 0.3 %
BUN SERPL-MCNC: 13 MG/DL
CALCIUM SERPL-MCNC: 9.1 MG/DL
CHLORIDE SERPL-SCNC: 107 MMOL/L
CO2 SERPL-SCNC: 26 MMOL/L
CREAT SERPL-MCNC: 0.8 MG/DL
DIFFERENTIAL METHOD: NORMAL
EOSINOPHIL # BLD AUTO: 0.2 K/UL
EOSINOPHIL NFR BLD: 3 %
ERYTHROCYTE [DISTWIDTH] IN BLOOD BY AUTOMATED COUNT: 13.4 %
EST. GFR  (AFRICAN AMERICAN): >60 ML/MIN/1.73 M^2
EST. GFR  (NON AFRICAN AMERICAN): >60 ML/MIN/1.73 M^2
GLUCOSE SERPL-MCNC: 165 MG/DL
HCT VFR BLD AUTO: 40.2 %
HGB BLD-MCNC: 13.8 G/DL
LYMPHOCYTES # BLD AUTO: 2 K/UL
LYMPHOCYTES NFR BLD: 26.4 %
MCH RBC QN AUTO: 29.4 PG
MCHC RBC AUTO-ENTMCNC: 34.3 G/DL
MCV RBC AUTO: 86 FL
MONOCYTES # BLD AUTO: 0.5 K/UL
MONOCYTES NFR BLD: 6.3 %
NEUTROPHILS # BLD AUTO: 4.9 K/UL
NEUTROPHILS NFR BLD: 64 %
PLATELET # BLD AUTO: 270 K/UL
PMV BLD AUTO: 10 FL
POTASSIUM SERPL-SCNC: 3.6 MMOL/L
RBC # BLD AUTO: 4.69 M/UL
SODIUM SERPL-SCNC: 140 MMOL/L
TROPONIN I SERPL DL<=0.01 NG/ML-MCNC: <0.006 NG/ML
WBC # BLD AUTO: 7.59 K/UL

## 2019-02-09 PROCEDURE — 99285 EMERGENCY DEPT VISIT HI MDM: CPT | Mod: 25

## 2019-02-09 PROCEDURE — 84484 ASSAY OF TROPONIN QUANT: CPT

## 2019-02-09 PROCEDURE — 25000003 PHARM REV CODE 250: Performed by: EMERGENCY MEDICINE

## 2019-02-09 PROCEDURE — 93010 EKG 12-LEAD: ICD-10-PCS | Mod: ,,, | Performed by: INTERNAL MEDICINE

## 2019-02-09 PROCEDURE — 36415 COLL VENOUS BLD VENIPUNCTURE: CPT

## 2019-02-09 PROCEDURE — 36000 PLACE NEEDLE IN VEIN: CPT

## 2019-02-09 PROCEDURE — 80048 BASIC METABOLIC PNL TOTAL CA: CPT

## 2019-02-09 PROCEDURE — 93010 ELECTROCARDIOGRAM REPORT: CPT | Mod: ,,, | Performed by: INTERNAL MEDICINE

## 2019-02-09 PROCEDURE — 85025 COMPLETE CBC W/AUTO DIFF WBC: CPT

## 2019-02-09 RX ORDER — ACETAMINOPHEN 325 MG/1
650 TABLET ORAL
Status: COMPLETED | OUTPATIENT
Start: 2019-02-09 | End: 2019-02-09

## 2019-02-09 RX ORDER — ONDANSETRON 4 MG/1
4 TABLET, FILM COATED ORAL EVERY 6 HOURS
Qty: 12 TABLET | Refills: 0 | Status: SHIPPED | OUTPATIENT
Start: 2019-02-09 | End: 2019-04-26 | Stop reason: ALTCHOICE

## 2019-02-09 RX ORDER — ONDANSETRON 4 MG/1
4 TABLET, ORALLY DISINTEGRATING ORAL
Status: COMPLETED | OUTPATIENT
Start: 2019-02-09 | End: 2019-02-09

## 2019-02-09 RX ADMIN — ACETAMINOPHEN 650 MG: 325 TABLET ORAL at 05:02

## 2019-02-09 RX ADMIN — ONDANSETRON 4 MG: 4 TABLET, ORALLY DISINTEGRATING ORAL at 05:02

## 2019-02-09 NOTE — ED NOTES
Pt. Resting in bed. No acute distress, RR equal and non labored, VSS. Bed in low, locked, and call light in reach. Side rails up X 2. Will continue to monitor.

## 2019-02-09 NOTE — ED PROVIDER NOTES
SCRIBE #1 NOTE: I, Maritza Collins, am scribing for, and in the presence of, Madelin Foster MD. I have scribed the entire note.       History     Chief Complaint   Patient presents with    Chest Pain     reports pressure to chest and feels dizzy     Review of patient's allergies indicates:   Allergen Reactions    Demerol (pf) [meperidine (pf)] Hives    Medrol [methylprednisolone] Anaphylaxis     Any cortisone    Adhesive      Other reaction(s): Unknown    Amoxicillin-pot clavulanate      Other reaction(s): Unknown    Atarax [hydroxyzine hcl] Hives    Hydralazine analogues Hives    Iodine      Other reaction(s): Unknown    Penicillins Hives and Nausea And Vomiting    Phenergan [promethazine] Nausea And Vomiting    Risperidone analogues Hives    Ativan [lorazepam] Hives and Anxiety         History of Present Illness     HPI    2/9/2019, 3:35 PM  History obtained from the patient      History of Present Illness: Priti Obrien is a 33 y.o. female patient with a PMHx of depression, bipolar 1 disorder, borderline personality disorder, PTSD, GERD, HTN, substance abuse who presents to the Emergency Department for evaluation of non-radiating chest pain described as tightness and as someone sitting on her chest. She states the pain gradually onset about 1 hour ago while getting dressed for work. She works in retail. Patient c/o associated palpitations, HA, and nausea and states that she has been under a lot of stress. She has family issues, is dealing with being sued, and today marks 1 year of her suicide attempt. She denies having any SI/HI. Sxs are constant and moderate in severity. No mitigating or exacerbating factors reported. Patient denies fever, chills, diaphoresis, SOB, abd pain, vomiting, lightheadedness, syncope, and all other sxs at this time.      Arrival mode: Personal vehicle    PCP: RICHI Ferrer        Past Medical History:  Past Medical History:   Diagnosis Date    Allergy      Asthma     Childhood only    Bipolar 1 disorder     Borderline personality disorder     Depression     GERD (gastroesophageal reflux disease)     History of psychiatric hospitalization     Hx of psychiatric care     Hypertension     pt denied    Pancreatitis     Psychiatric problem     PTSD (post-traumatic stress disorder)     S/P partial hysterectomy 2011    Substance abuse     Suicide attempt        Past Surgical History:  Past Surgical History:   Procedure Laterality Date    APPENDECTOMY       SECTION      CHOLECYSTECTOMY      HYSTERECTOMY      LASER LAPAROSCOPY           Family History:  Family History   Problem Relation Age of Onset    Diabetes Mother     Hypertension Mother     Atrial fibrillation Maternal Grandfather        Social History:  Social History     Tobacco Use    Smoking status: Current Every Day Smoker     Packs/day: 0.25     Types: Cigarettes     Last attempt to quit: 2013     Years since quittin.6    Smokeless tobacco: Never Used   Substance and Sexual Activity    Alcohol use: No     Comment: occasional     Drug use: No    Sexual activity: Yes     Partners: Male     Birth control/protection: None        Review of Systems     Review of Systems   Constitutional: Negative for chills, diaphoresis and fever.   HENT: Negative for sore throat.    Respiratory: Negative for cough and shortness of breath.    Cardiovascular: Positive for chest pain and palpitations. Negative for leg swelling.   Gastrointestinal: Positive for nausea. Negative for abdominal pain, diarrhea and vomiting.   Genitourinary: Negative for dysuria.   Musculoskeletal: Negative for back pain.   Skin: Negative for rash.   Neurological: Negative for syncope, weakness and light-headedness.   Hematological: Does not bruise/bleed easily.   Psychiatric/Behavioral: Negative for hallucinations and suicidal ideas.        (-) HI   All other systems reviewed and are negative.     Physical Exam  "    Initial Vitals [02/09/19 1525]   BP Pulse Resp Temp SpO2   (!) 165/90 82 20 97.6 °F (36.4 °C) 100 %      MAP       --          Physical Exam  Nursing Notes and Vital Signs Reviewed.  Constitutional: Patient is in no acute distress. Well-developed and well-nourished.  Head: Atraumatic. Normocephalic.  Eyes: PERRL. EOM intact. Conjunctivae are not pale. No scleral icterus.  ENT: Mucous membranes are moist. Oropharynx is clear and symmetric.    Neck: Supple. Full ROM. No lymphadenopathy.  Cardiovascular: Regular rate. Regular rhythm. No murmurs, rubs, or gallops. Distal pulses are 2+ and symmetric.  Pulmonary/Chest: No respiratory distress. Clear to auscultation bilaterally. No wheezing or rales.  Abdominal: Soft and non-distended.  There is no tenderness.  No rebound, guarding, or rigidity. Good bowel sounds.  Genitourinary: No CVA tenderness  Musculoskeletal: Moves all extremities. No obvious deformities. No edema. No calf tenderness.  Skin: Warm and dry.  Neurological:  Alert, awake, and appropriate.  Normal speech.  No acute focal neurological deficits are appreciated.  Psychiatric: Anxious. Good eye contact. Appropriate in content. No SI/HI.     ED Course   Procedures  ED Vital Signs:  Vitals:    02/09/19 1525 02/09/19 1554 02/09/19 1732 02/09/19 1733   BP: (!) 165/90 (!) 169/93 (!) 173/92    Pulse: 82 87 73    Resp: 20 16 18    Temp: 97.6 °F (36.4 °C)      TempSrc: Oral      SpO2: 100% 100%  100%   Weight: 101.6 kg (224 lb 1.6 oz)      Height: 5' 2" (1.575 m)       02/09/19 1755   BP: 134/72   Pulse: 74   Resp: 16   Temp: 98.2 °F (36.8 °C)   TempSrc: Oral   SpO2: 100%   Weight:    Height:        Abnormal Lab Results:  Labs Reviewed   BASIC METABOLIC PANEL - Abnormal; Notable for the following components:       Result Value    Glucose 165 (*)     Anion Gap 7 (*)     All other components within normal limits   CBC W/ AUTO DIFFERENTIAL   TROPONIN I        All Lab Results:  Results for orders placed or performed " during the hospital encounter of 02/09/19   CBC auto differential   Result Value Ref Range    WBC 7.59 3.90 - 12.70 K/uL    RBC 4.69 4.00 - 5.40 M/uL    Hemoglobin 13.8 12.0 - 16.0 g/dL    Hematocrit 40.2 37.0 - 48.5 %    MCV 86 82 - 98 fL    MCH 29.4 27.0 - 31.0 pg    MCHC 34.3 32.0 - 36.0 g/dL    RDW 13.4 11.5 - 14.5 %    Platelets 270 150 - 350 K/uL    MPV 10.0 9.2 - 12.9 fL    Gran # (ANC) 4.9 1.8 - 7.7 K/uL    Lymph # 2.0 1.0 - 4.8 K/uL    Mono # 0.5 0.3 - 1.0 K/uL    Eos # 0.2 0.0 - 0.5 K/uL    Baso # 0.02 0.00 - 0.20 K/uL    Gran% 64.0 38.0 - 73.0 %    Lymph% 26.4 18.0 - 48.0 %    Mono% 6.3 4.0 - 15.0 %    Eosinophil% 3.0 0.0 - 8.0 %    Basophil% 0.3 0.0 - 1.9 %    Differential Method Automated    Basic metabolic panel   Result Value Ref Range    Sodium 140 136 - 145 mmol/L    Potassium 3.6 3.5 - 5.1 mmol/L    Chloride 107 95 - 110 mmol/L    CO2 26 23 - 29 mmol/L    Glucose 165 (H) 70 - 110 mg/dL    BUN, Bld 13 6 - 20 mg/dL    Creatinine 0.8 0.5 - 1.4 mg/dL    Calcium 9.1 8.7 - 10.5 mg/dL    Anion Gap 7 (L) 8 - 16 mmol/L    eGFR if African American >60 >60 mL/min/1.73 m^2    eGFR if non African American >60 >60 mL/min/1.73 m^2   Troponin I   Result Value Ref Range    Troponin I <0.006 0.000 - 0.026 ng/mL     Imaging Results:  Imaging Results          X-Ray Chest PA And Lateral (Final result)  Result time 02/09/19 17:25:04    Final result by Sammy Nix MD (02/09/19 17:25:04)                 Impression:      No acute cardiopulmonary process.      Electronically signed by: Sammy Nix MD  Date:    02/09/2019  Time:    17:25             Narrative:    EXAMINATION:  XR CHEST PA AND LATERAL    CLINICAL HISTORY:  Chest pain, unspecified    COMPARISON:  Chest x-ray, February 13, 2018    FINDINGS:  The lungs are clear. The cardiac silhouette is within normal limits. No pleural effusion or pneumothorax. The bones are intact.  There is a right subclavian MediPort with its tip in the SVC.                                  The EKG was ordered, reviewed, and independently interpreted by the ED provider.  Interpretation time: 15:42  Rate: 77 BPM  Rhythm: normal sinus rhythm  Interpretation: No acute ST changes. No STEMI.         The Emergency Provider reviewed the vital signs and test results, which are outlined above.     ED Discussion     5:29 PM: Reassessed pt at this time. Pt states her condition has improved at this time. Discussed with pt all pertinent ED information and results. Discussed pt dx and plan of tx. Gave pt all f/u and return to the ED instructions. All questions and concerns were addressed at this time. Pt expresses understanding of information and instructions, and is comfortable with plan to discharge. Pt is stable for discharge.    I discussed with patient and/or family/caretaker that evaluation in the ED does not suggest any emergent or life threatening medical conditions requiring immediate intervention beyond what was provided in the ED, and I believe patient is safe for discharge.  Regardless, an unremarkable evaluation in the ED does not preclude the development or presence of a serious of life threatening condition. As such, patient was instructed to return immediately for any worsening or change in current symptoms.    ED Medication(s):  Medications   ondansetron disintegrating tablet 4 mg (4 mg Oral Given 2/9/19 1708)   acetaminophen tablet 650 mg (650 mg Oral Given 2/9/19 1709)       Discharge Medication List as of 2/9/2019  5:22 PM      START taking these medications    Details   ondansetron (ZOFRAN) 4 MG tablet Take 1 tablet (4 mg total) by mouth every 6 (six) hours., Starting Sat 2/9/2019, Print             Follow-up Information     RICHI Ferrer. Schedule an appointment as soon as possible for a visit in 2 days.    Specialty:  Family Medicine  Why:  Return to the Emergency Room, If symptoms worsen  Contact information:  [address]  [address]  [facility] [address]  [phone]                          Medical Decision Making:   Clinical Tests:   Lab Tests: Reviewed and Ordered  Radiological Study: Reviewed and Ordered  Medical Tests: Ordered and Reviewed             Scribe Attestation:   Scribe #1: I performed the above scribed service and the documentation accurately describes the services I performed. I attest to the accuracy of the note.     Attending:   Physician Attestation Statement for Scribe #1: I, Madelin Foster MD, personally performed the services described in this documentation, as scribed by Maritza Collins, in my presence, and it is both accurate and complete.           Clinical Impression       ICD-10-CM ICD-9-CM   1. Chest discomfort R07.89 786.59   2. Chest pain R07.9 786.50   3. Stress and adjustment reaction F43.29 309.89   4. Anxiety F41.9 300.00       Disposition:   Disposition: Discharged  Condition: Stable         Madelin Foster MD  02/10/19 1604

## 2019-04-26 ENCOUNTER — HOSPITAL ENCOUNTER (INPATIENT)
Facility: HOSPITAL | Age: 34
LOS: 2 days | Discharge: HOME | DRG: 440 | End: 2019-04-28
Attending: EMERGENCY MEDICINE | Admitting: HOSPITALIST
Payer: MEDICAID

## 2019-04-26 DIAGNOSIS — K85.90 ACUTE PANCREATITIS: ICD-10-CM

## 2019-04-26 DIAGNOSIS — K85.90 ACUTE PANCREATITIS, UNSPECIFIED COMPLICATION STATUS, UNSPECIFIED PANCREATITIS TYPE: Primary | ICD-10-CM

## 2019-04-26 LAB
ALBUMIN SERPL BCP-MCNC: 3.7 G/DL (ref 3.5–5.2)
ALBUMIN SERPL BCP-MCNC: 4 G/DL (ref 3.5–5.2)
ALP SERPL-CCNC: 56 U/L (ref 55–135)
ALT SERPL W/O P-5'-P-CCNC: 26 U/L (ref 10–44)
AMPHET+METHAMPHET UR QL: NEGATIVE
AMYLASE SERPL-CCNC: 3855 U/L (ref 20–110)
ANION GAP SERPL CALC-SCNC: 15 MMOL/L (ref 8–16)
ANION GAP SERPL CALC-SCNC: 6 MMOL/L (ref 8–16)
AST SERPL-CCNC: 24 U/L (ref 10–40)
BACTERIA #/AREA URNS HPF: ABNORMAL /HPF
BARBITURATES UR QL SCN>200 NG/ML: NEGATIVE
BASOPHILS # BLD AUTO: 0.03 K/UL (ref 0–0.2)
BASOPHILS NFR BLD: 0.2 % (ref 0–1.9)
BENZODIAZ UR QL SCN>200 NG/ML: NEGATIVE
BILIRUB SERPL-MCNC: 0.3 MG/DL (ref 0.1–1)
BILIRUB UR QL STRIP: NEGATIVE
BUN SERPL-MCNC: 14 MG/DL (ref 6–20)
BUN SERPL-MCNC: 14 MG/DL (ref 6–20)
BZE UR QL SCN: NEGATIVE
CALCIUM SERPL-MCNC: 8.8 MG/DL (ref 8.7–10.5)
CALCIUM SERPL-MCNC: 9.7 MG/DL (ref 8.7–10.5)
CANNABINOIDS UR QL SCN: NEGATIVE
CAOX CRY URNS QL MICRO: ABNORMAL
CHLORIDE SERPL-SCNC: 104 MMOL/L (ref 95–110)
CHLORIDE SERPL-SCNC: 106 MMOL/L (ref 95–110)
CHOLEST SERPL-MCNC: 197 MG/DL (ref 120–199)
CHOLEST/HDLC SERPL: 5.6 {RATIO} (ref 2–5)
CLARITY UR: CLEAR
CO2 SERPL-SCNC: 20 MMOL/L (ref 23–29)
CO2 SERPL-SCNC: 26 MMOL/L (ref 23–29)
COLOR UR: YELLOW
CREAT SERPL-MCNC: 0.7 MG/DL (ref 0.5–1.4)
CREAT SERPL-MCNC: 0.8 MG/DL (ref 0.5–1.4)
CREAT UR-MCNC: 238.6 MG/DL (ref 15–325)
DIFFERENTIAL METHOD: ABNORMAL
EOSINOPHIL # BLD AUTO: 0.4 K/UL (ref 0–0.5)
EOSINOPHIL NFR BLD: 2.6 % (ref 0–8)
ERYTHROCYTE [DISTWIDTH] IN BLOOD BY AUTOMATED COUNT: 13.6 % (ref 11.5–14.5)
EST. GFR  (AFRICAN AMERICAN): >60 ML/MIN/1.73 M^2
EST. GFR  (AFRICAN AMERICAN): >60 ML/MIN/1.73 M^2
EST. GFR  (NON AFRICAN AMERICAN): >60 ML/MIN/1.73 M^2
EST. GFR  (NON AFRICAN AMERICAN): >60 ML/MIN/1.73 M^2
ETHANOL SERPL-MCNC: <10 MG/DL
GLUCOSE SERPL-MCNC: 179 MG/DL (ref 70–110)
GLUCOSE SERPL-MCNC: 197 MG/DL (ref 70–110)
GLUCOSE UR QL STRIP: ABNORMAL
HCT VFR BLD AUTO: 43.1 % (ref 37–48.5)
HDLC SERPL-MCNC: 35 MG/DL (ref 40–75)
HDLC SERPL: 17.8 % (ref 20–50)
HGB BLD-MCNC: 15.5 G/DL (ref 12–16)
HGB UR QL STRIP: ABNORMAL
HYALINE CASTS #/AREA URNS LPF: 0 /LPF
KETONES UR QL STRIP: NEGATIVE
LDLC SERPL CALC-MCNC: 96.4 MG/DL (ref 63–159)
LEUKOCYTE ESTERASE UR QL STRIP: NEGATIVE
LIPASE SERPL-CCNC: >1000 U/L (ref 4–60)
LIPASE SERPL-CCNC: >1000 U/L (ref 4–60)
LYMPHOCYTES # BLD AUTO: 4.9 K/UL (ref 1–4.8)
LYMPHOCYTES NFR BLD: 33.9 % (ref 18–48)
MAGNESIUM SERPL-MCNC: 1.9 MG/DL (ref 1.6–2.6)
MCH RBC QN AUTO: 29.9 PG (ref 27–31)
MCHC RBC AUTO-ENTMCNC: 36 G/DL (ref 32–36)
MCV RBC AUTO: 83 FL (ref 82–98)
METHADONE UR QL SCN>300 NG/ML: NEGATIVE
MICROSCOPIC COMMENT: ABNORMAL
MONOCYTES # BLD AUTO: 0.9 K/UL (ref 0.3–1)
MONOCYTES NFR BLD: 6.1 % (ref 4–15)
NEUTROPHILS # BLD AUTO: 8.2 K/UL (ref 1.8–7.7)
NEUTROPHILS NFR BLD: 57.7 % (ref 38–73)
NITRITE UR QL STRIP: NEGATIVE
NONHDLC SERPL-MCNC: 162 MG/DL
OPIATES UR QL SCN: NORMAL
PCP UR QL SCN>25 NG/ML: NEGATIVE
PH UR STRIP: 6 [PH] (ref 5–8)
PHOSPHATE SERPL-MCNC: 3.4 MG/DL (ref 2.7–4.5)
PHOSPHATE SERPL-MCNC: 3.4 MG/DL (ref 2.7–4.5)
PLATELET # BLD AUTO: 293 K/UL (ref 150–350)
PMV BLD AUTO: 10.1 FL (ref 9.2–12.9)
POTASSIUM SERPL-SCNC: 3.8 MMOL/L (ref 3.5–5.1)
POTASSIUM SERPL-SCNC: 3.8 MMOL/L (ref 3.5–5.1)
PROT SERPL-MCNC: 8 G/DL (ref 6–8.4)
PROT UR QL STRIP: ABNORMAL
RBC # BLD AUTO: 5.19 M/UL (ref 4–5.4)
RBC #/AREA URNS HPF: 4 /HPF (ref 0–4)
SODIUM SERPL-SCNC: 138 MMOL/L (ref 136–145)
SODIUM SERPL-SCNC: 139 MMOL/L (ref 136–145)
SP GR UR STRIP: >=1.03 (ref 1–1.03)
SQUAMOUS #/AREA URNS HPF: 5 /HPF
TOXICOLOGY INFORMATION: NORMAL
TRIGL SERPL-MCNC: 328 MG/DL (ref 30–150)
URN SPEC COLLECT METH UR: ABNORMAL
UROBILINOGEN UR STRIP-ACNC: NEGATIVE EU/DL
WBC # BLD AUTO: 14.39 K/UL (ref 3.9–12.7)
WBC #/AREA URNS HPF: 2 /HPF (ref 0–5)
YEAST URNS QL MICRO: ABNORMAL

## 2019-04-26 PROCEDURE — 63600175 PHARM REV CODE 636 W HCPCS: Performed by: INTERNAL MEDICINE

## 2019-04-26 PROCEDURE — 82150 ASSAY OF AMYLASE: CPT

## 2019-04-26 PROCEDURE — 99285 EMERGENCY DEPT VISIT HI MDM: CPT | Mod: 25

## 2019-04-26 PROCEDURE — 63600175 PHARM REV CODE 636 W HCPCS: Performed by: HOSPITALIST

## 2019-04-26 PROCEDURE — 80320 DRUG SCREEN QUANTALCOHOLS: CPT

## 2019-04-26 PROCEDURE — 25000003 PHARM REV CODE 250: Performed by: EMERGENCY MEDICINE

## 2019-04-26 PROCEDURE — C9113 INJ PANTOPRAZOLE SODIUM, VIA: HCPCS | Performed by: HOSPITALIST

## 2019-04-26 PROCEDURE — 96375 TX/PRO/DX INJ NEW DRUG ADDON: CPT

## 2019-04-26 PROCEDURE — 63600175 PHARM REV CODE 636 W HCPCS: Performed by: EMERGENCY MEDICINE

## 2019-04-26 PROCEDURE — 83690 ASSAY OF LIPASE: CPT | Mod: 91

## 2019-04-26 PROCEDURE — 25000003 PHARM REV CODE 250: Performed by: HOSPITALIST

## 2019-04-26 PROCEDURE — 11000001 HC ACUTE MED/SURG PRIVATE ROOM

## 2019-04-26 PROCEDURE — 83735 ASSAY OF MAGNESIUM: CPT

## 2019-04-26 PROCEDURE — 83690 ASSAY OF LIPASE: CPT

## 2019-04-26 PROCEDURE — 96374 THER/PROPH/DIAG INJ IV PUSH: CPT

## 2019-04-26 PROCEDURE — 80307 DRUG TEST PRSMV CHEM ANLYZR: CPT

## 2019-04-26 PROCEDURE — 81000 URINALYSIS NONAUTO W/SCOPE: CPT

## 2019-04-26 PROCEDURE — 80053 COMPREHEN METABOLIC PANEL: CPT

## 2019-04-26 PROCEDURE — 96376 TX/PRO/DX INJ SAME DRUG ADON: CPT

## 2019-04-26 PROCEDURE — 82787 IGG 1 2 3 OR 4 EACH: CPT

## 2019-04-26 PROCEDURE — 85025 COMPLETE CBC W/AUTO DIFF WBC: CPT

## 2019-04-26 PROCEDURE — 36415 COLL VENOUS BLD VENIPUNCTURE: CPT

## 2019-04-26 PROCEDURE — 96361 HYDRATE IV INFUSION ADD-ON: CPT

## 2019-04-26 PROCEDURE — 80061 LIPID PANEL: CPT

## 2019-04-26 PROCEDURE — 80069 RENAL FUNCTION PANEL: CPT

## 2019-04-26 RX ORDER — HYDROMORPHONE HYDROCHLORIDE 1 MG/ML
1 INJECTION, SOLUTION INTRAMUSCULAR; INTRAVENOUS; SUBCUTANEOUS EVERY 4 HOURS PRN
Status: DISCONTINUED | OUTPATIENT
Start: 2019-04-26 | End: 2019-04-28 | Stop reason: HOSPADM

## 2019-04-26 RX ORDER — SODIUM CHLORIDE 0.9 % (FLUSH) 0.9 %
5 SYRINGE (ML) INJECTION
Status: DISCONTINUED | OUTPATIENT
Start: 2019-04-26 | End: 2019-04-28 | Stop reason: HOSPADM

## 2019-04-26 RX ORDER — HEPARIN SODIUM 5000 [USP'U]/ML
5000 INJECTION, SOLUTION INTRAVENOUS; SUBCUTANEOUS EVERY 8 HOURS
Status: DISCONTINUED | OUTPATIENT
Start: 2019-04-26 | End: 2019-04-28 | Stop reason: HOSPADM

## 2019-04-26 RX ORDER — PANTOPRAZOLE SODIUM 40 MG/10ML
40 INJECTION, POWDER, LYOPHILIZED, FOR SOLUTION INTRAVENOUS DAILY
Status: DISCONTINUED | OUTPATIENT
Start: 2019-04-26 | End: 2019-04-28 | Stop reason: HOSPADM

## 2019-04-26 RX ORDER — IBUPROFEN 200 MG
24 TABLET ORAL
Status: DISCONTINUED | OUTPATIENT
Start: 2019-04-26 | End: 2019-04-28 | Stop reason: HOSPADM

## 2019-04-26 RX ORDER — GLUCAGON 1 MG
1 KIT INJECTION
Status: DISCONTINUED | OUTPATIENT
Start: 2019-04-26 | End: 2019-04-28 | Stop reason: HOSPADM

## 2019-04-26 RX ORDER — SODIUM CHLORIDE 9 MG/ML
INJECTION, SOLUTION INTRAVENOUS CONTINUOUS
Status: ACTIVE | OUTPATIENT
Start: 2019-04-26 | End: 2019-04-26

## 2019-04-26 RX ORDER — ONDANSETRON 2 MG/ML
8 INJECTION INTRAMUSCULAR; INTRAVENOUS EVERY 8 HOURS PRN
Status: DISCONTINUED | OUTPATIENT
Start: 2019-04-26 | End: 2019-04-28 | Stop reason: HOSPADM

## 2019-04-26 RX ORDER — MORPHINE SULFATE 4 MG/ML
4 INJECTION, SOLUTION INTRAMUSCULAR; INTRAVENOUS
Status: COMPLETED | OUTPATIENT
Start: 2019-04-26 | End: 2019-04-26

## 2019-04-26 RX ORDER — MORPHINE SULFATE 4 MG/ML
2 INJECTION, SOLUTION INTRAMUSCULAR; INTRAVENOUS EVERY 6 HOURS PRN
Status: DISCONTINUED | OUTPATIENT
Start: 2019-04-26 | End: 2019-04-26

## 2019-04-26 RX ORDER — ONDANSETRON 2 MG/ML
4 INJECTION INTRAMUSCULAR; INTRAVENOUS
Status: COMPLETED | OUTPATIENT
Start: 2019-04-26 | End: 2019-04-26

## 2019-04-26 RX ORDER — MORPHINE SULFATE 4 MG/ML
4 INJECTION, SOLUTION INTRAMUSCULAR; INTRAVENOUS EVERY 6 HOURS PRN
Status: DISCONTINUED | OUTPATIENT
Start: 2019-04-26 | End: 2019-04-26

## 2019-04-26 RX ORDER — IBUPROFEN 200 MG
16 TABLET ORAL
Status: DISCONTINUED | OUTPATIENT
Start: 2019-04-26 | End: 2019-04-28 | Stop reason: HOSPADM

## 2019-04-26 RX ADMIN — PANTOPRAZOLE SODIUM 40 MG: 40 INJECTION, POWDER, LYOPHILIZED, FOR SOLUTION INTRAVENOUS at 03:04

## 2019-04-26 RX ADMIN — ONDANSETRON 4 MG: 2 INJECTION INTRAMUSCULAR; INTRAVENOUS at 01:04

## 2019-04-26 RX ADMIN — MORPHINE SULFATE 4 MG: 4 INJECTION INTRAVENOUS at 01:04

## 2019-04-26 RX ADMIN — HYDROMORPHONE HYDROCHLORIDE 1 MG: 1 INJECTION, SOLUTION INTRAMUSCULAR; INTRAVENOUS; SUBCUTANEOUS at 04:04

## 2019-04-26 RX ADMIN — HYDROMORPHONE HYDROCHLORIDE 1 MG: 1 INJECTION, SOLUTION INTRAMUSCULAR; INTRAVENOUS; SUBCUTANEOUS at 08:04

## 2019-04-26 RX ADMIN — ONDANSETRON 8 MG: 2 INJECTION INTRAMUSCULAR; INTRAVENOUS at 09:04

## 2019-04-26 RX ADMIN — MORPHINE SULFATE 4 MG: 4 INJECTION INTRAVENOUS at 06:04

## 2019-04-26 RX ADMIN — SODIUM CHLORIDE: 0.9 INJECTION, SOLUTION INTRAVENOUS at 03:04

## 2019-04-26 RX ADMIN — SODIUM CHLORIDE 1000 ML: 0.9 INJECTION, SOLUTION INTRAVENOUS at 03:04

## 2019-04-26 RX ADMIN — ONDANSETRON 8 MG: 2 INJECTION INTRAMUSCULAR; INTRAVENOUS at 02:04

## 2019-04-26 RX ADMIN — SODIUM CHLORIDE 1000 ML: 0.9 INJECTION, SOLUTION INTRAVENOUS at 01:04

## 2019-04-26 RX ADMIN — ONDANSETRON 8 MG: 2 INJECTION INTRAMUSCULAR; INTRAVENOUS at 06:04

## 2019-04-26 RX ADMIN — HYDROMORPHONE HYDROCHLORIDE 1 MG: 1 INJECTION, SOLUTION INTRAMUSCULAR; INTRAVENOUS; SUBCUTANEOUS at 11:04

## 2019-04-26 RX ADMIN — MORPHINE SULFATE 4 MG: 4 INJECTION INTRAVENOUS at 03:04

## 2019-04-26 NOTE — NURSING
Fall precautions maintained. Pt free from injuries/fall.  Repositions and ambulates independently.  C/o abd pain; Prn meds available.  Bed locked and low, side rails up x 2, phone and call light w/in reach.   POC and meds discussed, pt verbalized understanding.   Chart check done. Will cont to monitor.

## 2019-04-26 NOTE — H&P
[facility] Hospital Medicine  History & Physical    Patient Name: Priti Obrien  MRN: [id number]  Admission Date: 2019  Attending Physician: Kenneth Glass MD   Primary Care Provider: RICHI Ferrer         Patient information was obtained from patient and ER records.     Subjective:     Principal Problem:Acute pancreatitis    Chief Complaint:   Chief Complaint   Patient presents with    Abdominal Pain     RUQ abdominal pain onset 2100 last night. +N/V reports hx of pancreatitis        HPI: 33F h/o idiopathic pancreatitis presents with epigastric pain x 1 day.  Associated with n/v.  Patient denies any fever, chills, dysuria, hematuria, hematochezia, constipation, diarrhea, and all other sxs at this time. PSHx includes appendectomy, , cholecystectomy, hysterectomy, and laparoscopy. No further complaints or concerns at this time.  In ER, lipase>1000, amylase 3855, wbc 14.39.  CT ab/pelvis show possible mild peripancreatic stranding may represent acute pancreatitis.  Patient given 1L fluid bolus and IV morphine and zofran in ER.  Hospital medicine called for admission.            Past Medical History:   Diagnosis Date    Allergy     Asthma     Childhood only    Bipolar 1 disorder     Borderline personality disorder     Depression     GERD (gastroesophageal reflux disease)     History of psychiatric hospitalization     Hx of psychiatric care     Hypertension     pt denied    Pancreatitis     Psychiatric problem     PTSD (post-traumatic stress disorder)     S/P partial hysterectomy 2011    Substance abuse     Suicide attempt        Past Surgical History:   Procedure Laterality Date    APPENDECTOMY       SECTION      CHOLECYSTECTOMY      HYSTERECTOMY      LASER LAPAROSCOPY         Review of patient's allergies indicates:   Allergen Reactions    Demerol (pf) [meperidine (pf)] Hives    Medrol [methylprednisolone] Anaphylaxis     Any cortisone     Adhesive      Other reaction(s): Unknown    Amoxicillin-pot clavulanate      Other reaction(s): Unknown    Atarax [hydroxyzine hcl] Hives    Hydralazine analogues Hives    Iodine      Other reaction(s): Unknown    Penicillins Hives and Nausea And Vomiting    Phenergan [promethazine] Nausea And Vomiting    Risperidone analogues Hives    Ativan [lorazepam] Hives and Anxiety       No current facility-administered medications on file prior to encounter.      Current Outpatient Medications on File Prior to Encounter   Medication Sig    [DISCONTINUED] ondansetron (ZOFRAN) 4 MG tablet Take 1 tablet (4 mg total) by mouth every 6 (six) hours.     Family History     Problem Relation (Age of Onset)    Atrial fibrillation Maternal Grandfather    Diabetes Mother    Hypertension Mother        Tobacco Use    Smoking status: Current Every Day Smoker     Packs/day: 0.25     Types: Cigarettes     Last attempt to quit: 2013     Years since quittin.8    Smokeless tobacco: Never Used   Substance and Sexual Activity    Alcohol use: No     Comment: occasional     Drug use: No    Sexual activity: Yes     Partners: Male     Birth control/protection: None     Review of Systems   Constitutional: Negative for chills, diaphoresis, fatigue, fever and unexpected weight change.   HENT: Negative for congestion, facial swelling, sore throat and trouble swallowing.    Eyes: Negative for photophobia, redness and visual disturbance.   Respiratory: Negative for apnea, cough, chest tightness, shortness of breath and wheezing.    Cardiovascular: Negative for chest pain, palpitations and leg swelling.   Gastrointestinal: Positive for abdominal pain, nausea and vomiting. Negative for abdominal distention, blood in stool, constipation and diarrhea.   Endocrine: Negative for polydipsia, polyphagia and polyuria.   Genitourinary: Negative for difficulty urinating, dysuria, flank pain, frequency, hematuria and urgency.   Musculoskeletal:  Negative for arthralgias, back pain, joint swelling, myalgias and neck stiffness.   Skin: Negative for pallor and rash.   Allergic/Immunologic: Negative for immunocompromised state.   Neurological: Negative for dizziness, tremors, syncope, weakness, light-headedness and headaches.   Psychiatric/Behavioral: Negative for agitation, confusion and suicidal ideas.   All other systems reviewed and are negative.    Objective:     Vital Signs (Most Recent):  Temp: 98.6 °F (37 °C) (04/26/19 0102)  Pulse: 82 (04/26/19 0217)  Resp: 20 (04/26/19 0217)  BP: (!) 159/71 (04/26/19 0217)  SpO2: 98 % (04/26/19 0217) Vital Signs (24h Range):  Temp:  [98.6 °F (37 °C)] 98.6 °F (37 °C)  Pulse:  [82-90] 82  Resp:  [20] 20  SpO2:  [97 %-98 %] 98 %  BP: (159-169)/(71-98) 159/71     Weight: 103.3 kg (227 lb 10 oz)  Body mass index is 41.63 kg/m².    Physical Exam   Constitutional: She is oriented to person, place, and time. She appears well-developed and well-nourished. No distress.   HENT:   Head: Normocephalic and atraumatic.   Mouth/Throat: Oropharynx is clear and moist.   Eyes: Pupils are equal, round, and reactive to light. Conjunctivae and EOM are normal. No scleral icterus.   Neck: Normal range of motion. Neck supple. No JVD present. No thyromegaly present.   Cardiovascular: Normal rate, regular rhythm and intact distal pulses. Exam reveals no gallop and no friction rub.   No murmur heard.  Pulmonary/Chest: Effort normal and breath sounds normal. No respiratory distress. She has no wheezes. She has no rales. She exhibits no tenderness.   Abdominal: Soft. Bowel sounds are normal. She exhibits no distension and no mass. There is tenderness (epigstrium). There is no guarding.   Musculoskeletal: Normal range of motion. She exhibits no tenderness.   Neurological: She is alert and oriented to person, place, and time. No cranial nerve deficit.   Skin: Skin is warm and dry. Capillary refill takes less than 2 seconds. No rash noted. She is not  diaphoretic. No erythema.   Psychiatric: She has a normal mood and affect.   Nursing note and vitals reviewed.        CRANIAL NERVES     CN III, IV, VI   Pupils are equal, round, and reactive to light.  Extraocular motions are normal.        Significant Labs:   CBC:   Recent Labs   Lab 04/26/19 0109   WBC 14.39*   HGB 15.5   HCT 43.1        CMP:   Recent Labs   Lab 04/26/19 0109      K 3.8      CO2 20*   *   BUN 14   CREATININE 0.8   CALCIUM 9.7   PROT 8.0   ALBUMIN 4.0   BILITOT 0.3   ALKPHOS 56   AST 24   ALT 26   ANIONGAP 15   EGFRNONAA >60     Lipase:   Recent Labs   Lab 04/26/19 0109   LIPASE >1000*     Urine Studies:   Recent Labs   Lab 04/26/19  0355   COLORU Yellow   APPEARANCEUA Clear   PHUR 6.0   SPECGRAV >=1.030*   PROTEINUA 1+*   GLUCUA Trace*   KETONESU Negative   BILIRUBINUA Negative   OCCULTUA Trace*   NITRITE Negative   UROBILINOGEN Negative   LEUKOCYTESUR Negative   RBCUA 4   WBCUA 2   BACTERIA Few*   SQUAMEPITHEL 5   HYALINECASTS 0     All pertinent labs within the past 24 hours have been reviewed.    Significant Imaging: I have reviewed all pertinent imaging results/findings within the past 24 hours.   Imaging Results          CT Abdomen Pelvis  Without Contrast (In process)    Procedure changed from CT Abdomen Pelvis With Contrast                   Assessment/Plan:     * Acute pancreatitis  - lipase>1000, amylase 3855, wbc 14.39  - CT ab/pelvis show possible mild peripancreatic stranding may represent acute pancreatitis  - Patient given 1L fluid bolus and IV morphine and zofran in ER    - Keep NPO  - check IgG4, lipid panel, uds, etoh  - repeat lipase at noon  - continue NS 150cc/hr  - zofran prn nausea  - morphine prn pain          VTE Risk Mitigation (From admission, onward)        Ordered     heparin (porcine) injection 5,000 Units  Every 8 hours      04/26/19 0338     Place RA hose  Until discontinued      04/26/19 0338     Place sequential compression device   Until discontinued      04/26/19 0338     IP VTE HIGH RISK PATIENT  Once      04/26/19 0338             Taurus Ahumada MD  Department of Hospital Medicine   [facility]

## 2019-04-26 NOTE — ED NOTES
Report received from Winifred. The patient is sitting up in bed playing on her phone. Pt converses with nurse at bedside. Airway is open and patent, respirations are spontaneous, normal respiratory effort and rate noted, skin warm and dry, appearance: in no acute distress and resting comfortably.

## 2019-04-26 NOTE — ED PROVIDER NOTES
SCRIBE #1 NOTE: I, Delphine Angel, am scribing for, and in the presence of, Aleksandar Collins MD. I have scribed the HPI, ROS, and PEx.    SCRIBE #2 NOTE: I, Hannah Marie, am scribing for, and in the presence of,  Kenneth Glass MD. I have scribed the remaining portions of the note not scribed by Scribe #1.        History     Chief Complaint   Patient presents with    Abdominal Pain     RUQ abdominal pain onset 2100 last night. +N/V reports hx of pancreatitis       Review of patient's allergies indicates:   Allergen Reactions    Demerol (pf) [meperidine (pf)] Hives    Medrol [methylprednisolone] Anaphylaxis     Any cortisone    Adhesive      Other reaction(s): Unknown    Amoxicillin-pot clavulanate      Other reaction(s): Unknown    Atarax [hydroxyzine hcl] Hives    Hydralazine analogues Hives    Iodine      Other reaction(s): Unknown    Penicillins Hives and Nausea And Vomiting    Phenergan [promethazine] Nausea And Vomiting    Risperidone analogues Hives    Ativan [lorazepam] Hives and Anxiety         History of Present Illness   HPI    2019, 1:14 AM  History obtained from the patient      History of Present Illness: Priti Obrien is a 33 y.o. female patient with PMHx of GERD, HTN, and pancreatitis who presents to the Emergency Department for upper abdominal pain which onset gradually at 9pm last night. Symptoms are constant and moderate in severity. No mitigating or exacerbating factors reported. Associated sxs include N/V. Patient denies any fever, chills, dysuria, hematuria, hematochezia, constipation, diarrhea, and all other sxs at this time. PSHx includes appendectomy, , cholecystectomy, hysterectomy, and laparoscopy. No further complaints or concerns at this time.     Arrival mode: Personal vehicle     PCP: RICHI Ferrer        Past Medical History:  Past Medical History:   Diagnosis Date    Allergy     Asthma     Childhood only    Bipolar 1 disorder     Borderline personality  disorder     Depression     GERD (gastroesophageal reflux disease)     History of psychiatric hospitalization     Hx of psychiatric care     Hypertension     pt denied    Pancreatitis     Psychiatric problem     PTSD (post-traumatic stress disorder)     S/P partial hysterectomy 2011    Substance abuse     Suicide attempt        Past Surgical History:  Past Surgical History:   Procedure Laterality Date    APPENDECTOMY       SECTION      CHOLECYSTECTOMY      HYSTERECTOMY      LASER LAPAROSCOPY           Family History:  Family History   Problem Relation Age of Onset    Diabetes Mother     Hypertension Mother     Atrial fibrillation Maternal Grandfather        Social History:  Social History     Tobacco Use    Smoking status: Current Every Day Smoker     Packs/day: 0.25     Types: Cigarettes     Last attempt to quit: 2013     Years since quittin.8    Smokeless tobacco: Never Used   Substance and Sexual Activity    Alcohol use: No     Comment: occasional     Drug use: No    Sexual activity: Yes     Partners: Male     Birth control/protection: None        Review of Systems   Review of Systems   Constitutional: Negative for chills and fever.   HENT: Negative for sore throat.    Respiratory: Negative for shortness of breath.    Cardiovascular: Negative for chest pain.   Gastrointestinal: Positive for abdominal pain (upper), nausea and vomiting. Negative for blood in stool, constipation and diarrhea.   Genitourinary: Negative for dysuria and hematuria.   Musculoskeletal: Negative for back pain.   Skin: Negative for rash.   Neurological: Negative for weakness.   Hematological: Does not bruise/bleed easily.   All other systems reviewed and are negative.     Physical Exam     Initial Vitals [19 0102]   BP Pulse Resp Temp SpO2   (!) 169/98 90 20 98.6 °F (37 °C) 97 %      MAP       --          Physical Exam  Nursing Notes and Vital Signs Reviewed.  Constitutional: Patient  "is in no acute distress. Well-developed and well-nourished. Morbidly obese.  Head: Atraumatic. Normocephalic.  Eyes: PERRL. EOM intact. Conjunctivae are not pale. No scleral icterus.  ENT: Mucous membranes are moist. Oropharynx is clear and symmetric.    Neck: Supple. Full ROM. No lymphadenopathy.  Cardiovascular: Regular rate. Regular rhythm. No murmurs, rubs, or gallops. Distal pulses are 2+ and symmetric.  Pulmonary/Chest: No respiratory distress. Clear to auscultation bilaterally. No wheezing or rales.  Abdominal: Soft and non-distended.  There is upper abdominal tenderness.  No rebound, guarding, or rigidity.   Musculoskeletal: Moves all extremities. No obvious deformities.  Skin: Warm and dry.  Neurological:  Alert, awake, and appropriate.  Normal speech.  No acute focal neurological deficits are appreciated.  Psychiatric: Normal affect. Good eye contact. Appropriate in content.     ED Course   Procedures  ED Vital Signs:  Vitals:    04/26/19 0102 04/26/19 0217 04/26/19 0449   BP: (!) 169/98 (!) 159/71 (!) 146/86   Pulse: 90 82 79   Resp: 20 20 20   Temp: 98.6 °F (37 °C)  98.4 °F (36.9 °C)   TempSrc: Oral  Oral   SpO2: 97% 98% 98%   Weight: 103.3 kg (227 lb 10 oz)     Height: 5' 2" (1.575 m)         Abnormal Lab Results:  Labs Reviewed   CBC W/ AUTO DIFFERENTIAL - Abnormal; Notable for the following components:       Result Value    WBC 14.39 (*)     Gran # (ANC) 8.2 (*)     Lymph # 4.9 (*)     All other components within normal limits   COMPREHENSIVE METABOLIC PANEL - Abnormal; Notable for the following components:    CO2 20 (*)     Glucose 197 (*)     All other components within normal limits   LIPASE - Abnormal; Notable for the following components:    Lipase >1000 (*)     All other components within normal limits   URINALYSIS, REFLEX TO URINE CULTURE - Abnormal; Notable for the following components:    Specific Gravity, UA >=1.030 (*)     Protein, UA 1+ (*)     Glucose, UA Trace (*)     Occult Blood UA " Trace (*)     All other components within normal limits    Narrative:     Preferred Collection Type->Urine, Clean Catch   AMYLASE - Abnormal; Notable for the following components:    Amylase 3,855 (*)     All other components within normal limits   URINALYSIS MICROSCOPIC - Abnormal; Notable for the following components:    Bacteria, UA Few (*)     Yeast, UA Rare (*)     All other components within normal limits    Narrative:     Preferred Collection Type->Urine, Clean Catch   DRUG SCREEN PANEL, URINE EMERGENCY    Narrative:     Preferred Collection Type->Urine, Clean Catch   ALCOHOL,MEDICAL (ETHANOL)   LIPID PANEL   ALCOHOL,MEDICAL (ETHANOL)   RENAL FUNCTION PANEL   PHOSPHORUS   MAGNESIUM   LIPID PANEL   IGG 4        All Lab Results:  Results for orders placed or performed during the hospital encounter of 04/26/19   CBC W/ AUTO DIFFERENTIAL   Result Value Ref Range    WBC 14.39 (H) 3.90 - 12.70 K/uL    RBC 5.19 4.00 - 5.40 M/uL    Hemoglobin 15.5 12.0 - 16.0 g/dL    Hematocrit 43.1 37.0 - 48.5 %    MCV 83 82 - 98 fL    MCH 29.9 27.0 - 31.0 pg    MCHC 36.0 32.0 - 36.0 g/dL    RDW 13.6 11.5 - 14.5 %    Platelets 293 150 - 350 K/uL    MPV 10.1 9.2 - 12.9 fL    Gran # (ANC) 8.2 (H) 1.8 - 7.7 K/uL    Lymph # 4.9 (H) 1.0 - 4.8 K/uL    Mono # 0.9 0.3 - 1.0 K/uL    Eos # 0.4 0.0 - 0.5 K/uL    Baso # 0.03 0.00 - 0.20 K/uL    Gran% 57.7 38.0 - 73.0 %    Lymph% 33.9 18.0 - 48.0 %    Mono% 6.1 4.0 - 15.0 %    Eosinophil% 2.6 0.0 - 8.0 %    Basophil% 0.2 0.0 - 1.9 %    Differential Method Automated    Comp. Metabolic Panel   Result Value Ref Range    Sodium 139 136 - 145 mmol/L    Potassium 3.8 3.5 - 5.1 mmol/L    Chloride 104 95 - 110 mmol/L    CO2 20 (L) 23 - 29 mmol/L    Glucose 197 (H) 70 - 110 mg/dL    BUN, Bld 14 6 - 20 mg/dL    Creatinine 0.8 0.5 - 1.4 mg/dL    Calcium 9.7 8.7 - 10.5 mg/dL    Total Protein 8.0 6.0 - 8.4 g/dL    Albumin 4.0 3.5 - 5.2 g/dL    Total Bilirubin 0.3 0.1 - 1.0 mg/dL    Alkaline Phosphatase 56 55  - 135 U/L    AST 24 10 - 40 U/L    ALT 26 10 - 44 U/L    Anion Gap 15 8 - 16 mmol/L    eGFR if African American >60 >60 mL/min/1.73 m^2    eGFR if non African American >60 >60 mL/min/1.73 m^2   Lipase   Result Value Ref Range    Lipase >1000 (H) 4 - 60 U/L   Urinalysis, Reflex to Urine Culture Urine, Clean Catch   Result Value Ref Range    Specimen UA Urine, Clean Catch     Color, UA Yellow Yellow, Straw, [place]    Appearance, UA Clear Clear    pH, UA 6.0 5.0 - 8.0    Specific Gravity, UA >=1.030 (A) 1.005 - 1.030    Protein, UA 1+ (A) Negative    Glucose, UA Trace (A) Negative    Ketones, UA Negative Negative    Bilirubin (UA) Negative Negative    Occult Blood UA Trace (A) Negative    Nitrite, UA Negative Negative    Urobilinogen, UA Negative <2.0 EU/dL    Leukocytes, UA Negative Negative   Amylase   Result Value Ref Range    Amylase 3,855 (H) 20 - 110 U/L   Drug screen panel, emergency   Result Value Ref Range    Benzodiazepines Negative     Methadone metabolites Negative     Cocaine (Metab.) Negative     Opiate Scrn, Ur Presumptive Positive     Barbiturate Screen, Ur Negative     Amphetamine Screen, Ur Negative     THC Negative     Phencyclidine Negative     Creatinine, Random Ur 238.6 15.0 - 325.0 mg/dL    Toxicology Information SEE COMMENT    Ethanol   Result Value Ref Range    Alcohol, Medical, Serum <10 <10 mg/dL   Urinalysis Microscopic   Result Value Ref Range    RBC, UA 4 0 - 4 /hpf    WBC, UA 2 0 - 5 /hpf    Bacteria, UA Few (A) None-Occ /hpf    Yeast, UA Rare (A) None    Squam Epithel, UA 5 /hpf    Hyaline Casts, UA 0 0-1/lpf /lpf    Ca Oxalate Mara, UA Rare None-Moderate    Microscopic Comment SEE COMMENT        Imaging Results:  Imaging Results          CT Abdomen Pelvis  Without Contrast (In process)    Procedure changed from CT Abdomen Pelvis With Contrast                3:12 AM: Per STAT radiology, pt's CT results: Possible mild peripancreatic stranding may represent acute pancreatitis. No fluid  collection or pseudocyst. Please correlate with lipase levels. Haziness in the central mesentery with mildly prominent mesenteric lymph nodes may represent mesenteric panniculitis or other infectious/inflammatory process. Mild prominence of the bladder wall may be secondary to underdistention. Please correlate with UA to evaluate for cystitis. Nonspecific 7mm nodule in the lateral L lung base.             The Emergency Provider reviewed the vital signs and test results, which are outlined above.     ED Discussion     1:21 AM: Dr. Collins transfers care of pt to Dr. Glass pending lab and imaging results.    2:34 AM: Evaluated pt at this time. Pt reports she has had recurrent pancreatitis since she was 8 or 9 y.o. Pt reports her last flare up was 3 years ago, she states this is the longest she has gone without a flare up.     3:25 AM: Discussed case with Dr. Ahumada ([facility]). Dr. Ahumada agrees with current care and management of pt and accepts admission.   Admitting Service: Hospital medicine   Admitting Physician: Dr. Ahumada  Admit to: m/s    3:29 AM: Re-evaluated pt. I have discussed test results, shared treatment plan, and the need for admission with patient. Pt expresses understanding at this time and agrees with all information. All questions answered. Pt has no further questions or concerns at this time. Pt is ready for admit.      ED Medication(s):  Medications   sodium chloride 0.9% flush 5 mL (has no administration in time range)   glucose chewable tablet 16 g (has no administration in time range)   glucose chewable tablet 24 g (has no administration in time range)   dextrose 50% injection 12.5 g (has no administration in time range)   dextrose 50% injection 25 g (has no administration in time range)   glucagon (human recombinant) injection 1 mg (has no administration in time range)   heparin (porcine) injection 5,000 Units (has no administration in time range)   0.9%  NaCl infusion ( Intravenous New Bag  4/26/19 0355)   morphine injection 4 mg (has no administration in time range)   morphine injection 2 mg (has no administration in time range)   ondansetron injection 8 mg (has no administration in time range)   pantoprazole injection 40 mg (40 mg Intravenous Given 4/26/19 0355)   sodium chloride 0.9% bolus 1,000 mL (0 mLs Intravenous Stopped 4/26/19 0322)   ondansetron injection 4 mg (4 mg Intravenous Given 4/26/19 0126)   morphine injection 4 mg (4 mg Intravenous Given 4/26/19 0125)   morphine injection 4 mg (4 mg Intravenous Given 4/26/19 0330)   sodium chloride 0.9% bolus 1,000 mL (0 mLs Intravenous Stopped 4/26/19 0450)     New Prescriptions    No medications on file          Medical Decision Making     Medical Decision Making:   Clinical Tests:   Lab Tests: Ordered and Reviewed  Radiological Study: Ordered and Reviewed             Scribe Attestation:   Scribe #1: I performed the above scribed service and the documentation accurately describes the services I performed. I attest to the accuracy of the note.     Attending:   Physician Attestation Statement for Scribe #1: I, Aleksandar Collins MD, personally performed the services described in this documentation, as scribed by Delphine Angel, in my presence, and it is both accurate and complete.       Scribe Attestation:   Scribe #2: I performed the above scribed service and the documentation accurately describes the services I performed. I attest to the accuracy of the note.    Attending Attestation:           Physician Attestation for Scribe:    Physician Attestation Statement for Scribe #2: I, Kenneth Glass MD, reviewed documentation, as scribed by Hannah Marie in my presence, and it is both accurate and complete. I also acknowledge and confirm the content of the note done by Scribe #1.           Clinical Impression       ICD-10-CM ICD-9-CM   1. Acute pancreatitis, unspecified complication status, unspecified pancreatitis type K85.90 577.0   2. Acute pancreatitis K85.90  577.0       Disposition:   Disposition: Admitted  Condition: Fair         Kenneth Glass MD  04/26/19 0525

## 2019-04-26 NOTE — ED NOTES
Called lab to find out what color the immunoglobulin G lab is drawn into - She was unable to tell me so she asked me to draw a gold and red top and they will figure it out there.

## 2019-04-26 NOTE — SUBJECTIVE & OBJECTIVE
Past Medical History:   Diagnosis Date    Allergy     Asthma     Childhood only    Bipolar 1 disorder     Borderline personality disorder     Depression     GERD (gastroesophageal reflux disease)     History of psychiatric hospitalization     Hx of psychiatric care     Hypertension     pt denied    Pancreatitis     Psychiatric problem     PTSD (post-traumatic stress disorder)     S/P partial hysterectomy 2011    Substance abuse     Suicide attempt        Past Surgical History:   Procedure Laterality Date    APPENDECTOMY       SECTION      CHOLECYSTECTOMY      HYSTERECTOMY      LASER LAPAROSCOPY         Review of patient's allergies indicates:   Allergen Reactions    Demerol (pf) [meperidine (pf)] Hives    Medrol [methylprednisolone] Anaphylaxis     Any cortisone    Adhesive      Other reaction(s): Unknown    Amoxicillin-pot clavulanate      Other reaction(s): Unknown    Atarax [hydroxyzine hcl] Hives    Hydralazine analogues Hives    Iodine      Other reaction(s): Unknown    Penicillins Hives and Nausea And Vomiting    Phenergan [promethazine] Nausea And Vomiting    Risperidone analogues Hives    Ativan [lorazepam] Hives and Anxiety       No current facility-administered medications on file prior to encounter.      Current Outpatient Medications on File Prior to Encounter   Medication Sig    [DISCONTINUED] ondansetron (ZOFRAN) 4 MG tablet Take 1 tablet (4 mg total) by mouth every 6 (six) hours.     Family History     Problem Relation (Age of Onset)    Atrial fibrillation Maternal Grandfather    Diabetes Mother    Hypertension Mother        Tobacco Use    Smoking status: Current Every Day Smoker     Packs/day: 0.25     Types: Cigarettes     Last attempt to quit: 2013     Years since quittin.8    Smokeless tobacco: Never Used   Substance and Sexual Activity    Alcohol use: No     Comment: occasional     Drug use: No    Sexual activity: Yes     Partners:  Male     Birth control/protection: None     Review of Systems   Constitutional: Negative for chills, diaphoresis, fatigue, fever and unexpected weight change.   HENT: Negative for congestion, facial swelling, sore throat and trouble swallowing.    Eyes: Negative for photophobia, redness and visual disturbance.   Respiratory: Negative for apnea, cough, chest tightness, shortness of breath and wheezing.    Cardiovascular: Negative for chest pain, palpitations and leg swelling.   Gastrointestinal: Positive for abdominal pain, nausea and vomiting. Negative for abdominal distention, blood in stool, constipation and diarrhea.   Endocrine: Negative for polydipsia, polyphagia and polyuria.   Genitourinary: Negative for difficulty urinating, dysuria, flank pain, frequency, hematuria and urgency.   Musculoskeletal: Negative for arthralgias, back pain, joint swelling, myalgias and neck stiffness.   Skin: Negative for pallor and rash.   Allergic/Immunologic: Negative for immunocompromised state.   Neurological: Negative for dizziness, tremors, syncope, weakness, light-headedness and headaches.   Psychiatric/Behavioral: Negative for agitation, confusion and suicidal ideas.   All other systems reviewed and are negative.    Objective:     Vital Signs (Most Recent):  Temp: 98.6 °F (37 °C) (04/26/19 0102)  Pulse: 82 (04/26/19 0217)  Resp: 20 (04/26/19 0217)  BP: (!) 159/71 (04/26/19 0217)  SpO2: 98 % (04/26/19 0217) Vital Signs (24h Range):  Temp:  [98.6 °F (37 °C)] 98.6 °F (37 °C)  Pulse:  [82-90] 82  Resp:  [20] 20  SpO2:  [97 %-98 %] 98 %  BP: (159-169)/(71-98) 159/71     Weight: 103.3 kg (227 lb 10 oz)  Body mass index is 41.63 kg/m².    Physical Exam   Constitutional: She is oriented to person, place, and time. She appears well-developed and well-nourished. No distress.   HENT:   Head: Normocephalic and atraumatic.   Mouth/Throat: Oropharynx is clear and moist.   Eyes: Pupils are equal, round, and reactive to light.  Conjunctivae and EOM are normal. No scleral icterus.   Neck: Normal range of motion. Neck supple. No JVD present. No thyromegaly present.   Cardiovascular: Normal rate, regular rhythm and intact distal pulses. Exam reveals no gallop and no friction rub.   No murmur heard.  Pulmonary/Chest: Effort normal and breath sounds normal. No respiratory distress. She has no wheezes. She has no rales. She exhibits no tenderness.   Abdominal: Soft. Bowel sounds are normal. She exhibits no distension and no mass. There is tenderness (epigstrium). There is no guarding.   Musculoskeletal: Normal range of motion. She exhibits no tenderness.   Neurological: She is alert and oriented to person, place, and time. No cranial nerve deficit.   Skin: Skin is warm and dry. Capillary refill takes less than 2 seconds. No rash noted. She is not diaphoretic. No erythema.   Psychiatric: She has a normal mood and affect.   Nursing note and vitals reviewed.        CRANIAL NERVES     CN III, IV, VI   Pupils are equal, round, and reactive to light.  Extraocular motions are normal.        Significant Labs:   CBC:   Recent Labs   Lab 04/26/19  0109   WBC 14.39*   HGB 15.5   HCT 43.1        CMP:   Recent Labs   Lab 04/26/19  0109      K 3.8      CO2 20*   *   BUN 14   CREATININE 0.8   CALCIUM 9.7   PROT 8.0   ALBUMIN 4.0   BILITOT 0.3   ALKPHOS 56   AST 24   ALT 26   ANIONGAP 15   EGFRNONAA >60     Lipase:   Recent Labs   Lab 04/26/19  0109   LIPASE >1000*     Urine Studies:   Recent Labs   Lab 04/26/19  0355   COLORU Yellow   APPEARANCEUA Clear   PHUR 6.0   SPECGRAV >=1.030*   PROTEINUA 1+*   GLUCUA Trace*   KETONESU Negative   BILIRUBINUA Negative   OCCULTUA Trace*   NITRITE Negative   UROBILINOGEN Negative   LEUKOCYTESUR Negative   RBCUA 4   WBCUA 2   BACTERIA Few*   SQUAMEPITHEL 5   HYALINECASTS 0     All pertinent labs within the past 24 hours have been reviewed.    Significant Imaging: I have reviewed all pertinent  imaging results/findings within the past 24 hours.   Imaging Results          CT Abdomen Pelvis  Without Contrast (In process)    Procedure changed from CT Abdomen Pelvis With Contrast

## 2019-04-26 NOTE — HPI
33F h/o idiopathic pancreatitis presents with epigastric pain x 1 day.  Associated with n/v.  Patient denies any fever, chills, dysuria, hematuria, hematochezia, constipation, diarrhea, and all other sxs at this time. PSHx includes appendectomy, , cholecystectomy, hysterectomy, and laparoscopy. No further complaints or concerns at this time.  In ER, lipase>1000, amylase 3855, wbc 14.39.  CT ab/pelvis show possible mild peripancreatic stranding may represent acute pancreatitis.  Patient given 1L fluid bolus and IV morphine and zofran in ER.  Hospital medicine called for admission.

## 2019-04-26 NOTE — SIGNIFICANT EVENT
34 y/o wf admitted with a dx of acute pancreatitis . The CT abdomen show acute pancreatitis. She is on NS at 125 cc /hrs . The IVF was changed to 200 cc/hr . The pain medication was changed from morphine to dilaudid. Cont current tx

## 2019-04-26 NOTE — ASSESSMENT & PLAN NOTE
- lipase>1000, amylase 3855, wbc 14.39  - CT ab/pelvis show possible mild peripancreatic stranding may represent acute pancreatitis  - Patient given 1L fluid bolus and IV morphine and zofran in ER    - Keep NPO  - check IgG4, lipid panel, uds, etoh  - repeat lipase at noon  - continue NS 150cc/hr  - zofran prn nausea  - morphine prn pain

## 2019-04-26 NOTE — PLAN OF CARE
Sw met with patient at bedside to complete assessment. No family present at time of visit the patient. Pt resides with her grandmother. Pt reports she is independent with all ADLs and denies any post hospital needs or services at this time. Transitional Care Folder, Discharge Planning Begins on Admission pamphlet, [facility] Bedside Delivery pamphlet, Advance Directive information given to patient along with the contact information given. Sw placed contact information on white board. Sw instructed patient or family to call with any questions or concerns.    D/C Plan: Home with Family  D/C Trans: Family    Pt's pcp is RICHI Ferrer. Pt uses   [facility] [id number]  [place], [place] - [address] [address]  [id number]  [place] [address]  Phone: [phone] Fax: [phone]       04/26/19 0935   Discharge Assessment   Assessment Type Discharge Planning Assessment   Confirmed/corrected address and phone number on facesheet? Yes   Assessment information obtained from? Patient   Communicated expected length of stay with patient/caregiver yes   Prior to hospitilization cognitive status: Alert/Oriented   Prior to hospitalization functional status: Independent   Current cognitive status: Alert/Oriented   Current Functional Status: Independent   Facility Arrived From: home    Lives With grandparent(s)   Able to Return to Prior Arrangements yes   Is patient able to care for self after discharge? Yes   Who are your caregiver(s) and their phone number(s)? Nicki Wills, mother, [phone]   Patient's perception of discharge disposition home or selfcare   Readmission Within the Last 30 Days no previous admission in last 30 days   Patient currently being followed by outpatient case management? No   Patient currently receives any other outside agency services? No   Equipment Currently Used at Home none   Do you have any problems affording any of your prescribed medications? TBD   Is the  patient taking medications as prescribed? yes   Does the patient have transportation home? Yes   Transportation Anticipated family or friend will provide   Does the patient receive services at the [facility]? No   Discharge Plan A Home with family   Discharge Plan B Home with family   DME Needed Upon Discharge  none   Patient/Family in Agreement with Plan yes

## 2019-04-26 NOTE — PLAN OF CARE
Problem: Adult Inpatient Plan of Care  Goal: Absence of Hospital-Acquired Illness or Injury  Outcome: Ongoing (interventions implemented as appropriate)  Intervention: Prevent Skin Injury  Patient independent encouraged to turn self frequently. Verbalizes understanding.  Intervention: Prevent Infection  Aseptic technique maintained.    Goal: Optimal Comfort and Wellbeing  Outcome: Ongoing (interventions implemented as appropriate)  Intervention: Monitor Pain and Promote Comfort  Complains of abdominal pain. Currently being treated.      Problem: Diabetes Comorbidity  Goal: Blood Glucose Level Within Desired Range  Outcome: Ongoing (interventions implemented as appropriate)  Intervention: Maintain Glycemic Control  Chart reviewed. Patient alert oriented x3. No acute distress noted.      Comments: Chart reviewed. No signs or symptoms of acute distress noted.

## 2019-04-27 LAB
ALBUMIN SERPL BCP-MCNC: 3.3 G/DL (ref 3.5–5.2)
ANION GAP SERPL CALC-SCNC: 9 MMOL/L (ref 8–16)
BASOPHILS # BLD AUTO: 0.02 K/UL (ref 0–0.2)
BASOPHILS NFR BLD: 0.2 % (ref 0–1.9)
BUN SERPL-MCNC: 10 MG/DL (ref 6–20)
CALCIUM SERPL-MCNC: 8.2 MG/DL (ref 8.7–10.5)
CHLORIDE SERPL-SCNC: 107 MMOL/L (ref 95–110)
CO2 SERPL-SCNC: 21 MMOL/L (ref 23–29)
CREAT SERPL-MCNC: 0.6 MG/DL (ref 0.5–1.4)
DIFFERENTIAL METHOD: NORMAL
EOSINOPHIL # BLD AUTO: 0.3 K/UL (ref 0–0.5)
EOSINOPHIL NFR BLD: 3.8 % (ref 0–8)
ERYTHROCYTE [DISTWIDTH] IN BLOOD BY AUTOMATED COUNT: 13.7 % (ref 11.5–14.5)
EST. GFR  (AFRICAN AMERICAN): >60 ML/MIN/1.73 M^2
EST. GFR  (NON AFRICAN AMERICAN): >60 ML/MIN/1.73 M^2
GLUCOSE SERPL-MCNC: 96 MG/DL (ref 70–110)
HCT VFR BLD AUTO: 39.2 % (ref 37–48.5)
HGB BLD-MCNC: 13.1 G/DL (ref 12–16)
LIPASE SERPL-CCNC: 124 U/L (ref 4–60)
LYMPHOCYTES # BLD AUTO: 2.6 K/UL (ref 1–4.8)
LYMPHOCYTES NFR BLD: 31.6 % (ref 18–48)
MAGNESIUM SERPL-MCNC: 1.9 MG/DL (ref 1.6–2.6)
MCH RBC QN AUTO: 28.7 PG (ref 27–31)
MCHC RBC AUTO-ENTMCNC: 33.4 G/DL (ref 32–36)
MCV RBC AUTO: 86 FL (ref 82–98)
MONOCYTES # BLD AUTO: 0.5 K/UL (ref 0.3–1)
MONOCYTES NFR BLD: 6.3 % (ref 4–15)
NEUTROPHILS # BLD AUTO: 4.8 K/UL (ref 1.8–7.7)
NEUTROPHILS NFR BLD: 58.5 % (ref 38–73)
OVALOCYTES BLD QL SMEAR: NORMAL
PHOSPHATE SERPL-MCNC: 2.9 MG/DL (ref 2.7–4.5)
PHOSPHATE SERPL-MCNC: 2.9 MG/DL (ref 2.7–4.5)
PLATELET # BLD AUTO: 217 K/UL (ref 150–350)
PMV BLD AUTO: 10.3 FL (ref 9.2–12.9)
POIKILOCYTOSIS BLD QL SMEAR: SLIGHT
POTASSIUM SERPL-SCNC: 3.8 MMOL/L (ref 3.5–5.1)
RBC # BLD AUTO: 4.57 M/UL (ref 4–5.4)
SODIUM SERPL-SCNC: 137 MMOL/L (ref 136–145)
WBC # BLD AUTO: 8.35 K/UL (ref 3.9–12.7)

## 2019-04-27 PROCEDURE — 85025 COMPLETE CBC W/AUTO DIFF WBC: CPT

## 2019-04-27 PROCEDURE — 11000001 HC ACUTE MED/SURG PRIVATE ROOM

## 2019-04-27 PROCEDURE — 83690 ASSAY OF LIPASE: CPT

## 2019-04-27 PROCEDURE — 63600175 PHARM REV CODE 636 W HCPCS: Performed by: INTERNAL MEDICINE

## 2019-04-27 PROCEDURE — 63600175 PHARM REV CODE 636 W HCPCS: Performed by: HOSPITALIST

## 2019-04-27 PROCEDURE — 25000003 PHARM REV CODE 250: Performed by: HOSPITALIST

## 2019-04-27 PROCEDURE — C9113 INJ PANTOPRAZOLE SODIUM, VIA: HCPCS | Performed by: HOSPITALIST

## 2019-04-27 PROCEDURE — 36415 COLL VENOUS BLD VENIPUNCTURE: CPT

## 2019-04-27 PROCEDURE — 80069 RENAL FUNCTION PANEL: CPT

## 2019-04-27 PROCEDURE — 83735 ASSAY OF MAGNESIUM: CPT

## 2019-04-27 RX ORDER — SODIUM CHLORIDE 9 MG/ML
INJECTION, SOLUTION INTRAVENOUS CONTINUOUS
Status: DISCONTINUED | OUTPATIENT
Start: 2019-04-27 | End: 2019-04-28

## 2019-04-27 RX ADMIN — HYDROMORPHONE HYDROCHLORIDE 1 MG: 1 INJECTION, SOLUTION INTRAMUSCULAR; INTRAVENOUS; SUBCUTANEOUS at 09:04

## 2019-04-27 RX ADMIN — HYDROMORPHONE HYDROCHLORIDE 1 MG: 1 INJECTION, SOLUTION INTRAMUSCULAR; INTRAVENOUS; SUBCUTANEOUS at 12:04

## 2019-04-27 RX ADMIN — HYDROMORPHONE HYDROCHLORIDE 1 MG: 1 INJECTION, SOLUTION INTRAMUSCULAR; INTRAVENOUS; SUBCUTANEOUS at 05:04

## 2019-04-27 RX ADMIN — PANTOPRAZOLE SODIUM 40 MG: 40 INJECTION, POWDER, LYOPHILIZED, FOR SOLUTION INTRAVENOUS at 09:04

## 2019-04-27 RX ADMIN — SODIUM CHLORIDE: 0.9 INJECTION, SOLUTION INTRAVENOUS at 05:04

## 2019-04-27 RX ADMIN — SODIUM CHLORIDE: 0.9 INJECTION, SOLUTION INTRAVENOUS at 12:04

## 2019-04-27 RX ADMIN — ONDANSETRON 8 MG: 2 INJECTION INTRAMUSCULAR; INTRAVENOUS at 01:04

## 2019-04-27 RX ADMIN — HYDROMORPHONE HYDROCHLORIDE 1 MG: 1 INJECTION, SOLUTION INTRAMUSCULAR; INTRAVENOUS; SUBCUTANEOUS at 01:04

## 2019-04-27 NOTE — PROGRESS NOTES
[facility] Hospital Medicine  Progress Note    Patient Name: Priti Obrien  MRN: [id number]  Patient Class: IP- Inpatient   Admission Date: 2019  Length of Stay: 1 days  Attending Physician: Cody Grey MD  Primary Care Provider: RICHI Ferrer    Subjective:     Principal Problem:Acute pancreatitis    HPI:  33F h/o idiopathic pancreatitis presents with epigastric pain x 1 day.  Associated with n/v.  Patient denies any fever, chills, dysuria, hematuria, hematochezia, constipation, diarrhea, and all other sxs at this time. PSHx includes appendectomy, , cholecystectomy, hysterectomy, and laparoscopy. No further complaints or concerns at this time.  In ER, lipase>1000, amylase 3855, wbc 14.39.  CT ab/pelvis show possible mild peripancreatic stranding may represent acute pancreatitis.  Patient given 1L fluid bolus and IV morphine and zofran in ER.  Hospital medicine called for admission.            Hospital Course:  No notes on file    Interval History: abdominal pain 6/10. Requesting ice chips.     Review of Systems   Constitutional: Negative for chills, diaphoresis, fatigue, fever and unexpected weight change.   HENT: Negative for congestion, facial swelling, sore throat and trouble swallowing.    Eyes: Negative for photophobia, redness and visual disturbance.   Respiratory: Negative for apnea, cough, chest tightness, shortness of breath and wheezing.    Cardiovascular: Negative for chest pain, palpitations and leg swelling.   Gastrointestinal: Positive for abdominal pain, nausea and vomiting. Negative for abdominal distention, blood in stool, constipation and diarrhea.   Endocrine: Negative for polydipsia, polyphagia and polyuria.   Genitourinary: Negative for difficulty urinating, dysuria, flank pain, frequency, hematuria and urgency.   Musculoskeletal: Negative for arthralgias, back pain, joint swelling, myalgias and neck stiffness.   Skin: Negative for pallor and rash.    Allergic/Immunologic: Negative for immunocompromised state.   Neurological: Negative for dizziness, tremors, syncope, weakness, light-headedness and headaches.   Psychiatric/Behavioral: Negative for agitation, confusion and suicidal ideas.   All other systems reviewed and are negative.         Objective:     Vital Signs (Most Recent):  Temp: 98.6 °F (37 °C) (04/27/19 1232)  Pulse: 67 (04/27/19 1232)  Resp: 18 (04/27/19 1232)  BP: (!) 118/57 (04/27/19 1232)  SpO2: 99 % (04/27/19 1232) Vital Signs (24h Range):  Temp:  [97.8 °F (36.6 °C)-98.6 °F (37 °C)] 98.6 °F (37 °C)  Pulse:  [52-70] 67  Resp:  [17-20] 18  SpO2:  [96 %-100 %] 99 %  BP: (116-135)/(57-90) 118/57     Weight: 103.3 kg (227 lb 10 oz)  Body mass index is 41.63 kg/m².    Intake/Output Summary (Last 24 hours) at 4/27/2019 1617  Last data filed at 4/27/2019 1600  Gross per 24 hour   Intake 6387.5 ml   Output --   Net 6387.5 ml      Physical Exam   Constitutional: She is oriented to person, place, and time. She appears well-developed and well-nourished. No distress.   HENT:   Head: Normocephalic and atraumatic.   Mouth/Throat: Oropharynx is clear and moist.   Eyes: Pupils are equal, round, and reactive to light. Conjunctivae and EOM are normal. No scleral icterus.   Neck: Normal range of motion. Neck supple. No JVD present. No thyromegaly present.   Cardiovascular: Normal rate, regular rhythm and intact distal pulses. Exam reveals no gallop and no friction rub.   No murmur heard.  Pulmonary/Chest: Effort normal and breath sounds normal. No respiratory distress. She has no wheezes. She has no rales. She exhibits no tenderness.   Abdominal: Soft. Bowel sounds are normal. She exhibits no distension and no mass. There is tenderness (epigstrium). There is no guarding.   Musculoskeletal: Normal range of motion. She exhibits no tenderness.   Neurological: She is alert and oriented to person, place, and time. No cranial nerve deficit.   Skin: Skin is warm and dry.  Capillary refill takes less than 2 seconds. No rash noted. She is not diaphoretic. No erythema.   Psychiatric: She has a normal mood and affect.   Nursing note and vitals reviewed.    Significant Labs:   CBC:   Recent Labs   Lab 04/26/19  0109 04/27/19  0631   WBC 14.39* 8.35   HGB 15.5 13.1   HCT 43.1 39.2    217     CMP:   Recent Labs   Lab 04/26/19  0109 04/26/19  0652 04/27/19  0631    138 137   K 3.8 3.8 3.8    106 107   CO2 20* 26 21*   * 179* 96   BUN 14 14 10   CREATININE 0.8 0.7 0.6   CALCIUM 9.7 8.8 8.2*   PROT 8.0  --   --    ALBUMIN 4.0 3.7 3.3*   BILITOT 0.3  --   --    ALKPHOS 56  --   --    AST 24  --   --    ALT 26  --   --    ANIONGAP 15 6* 9   EGFRNONAA >60 >60 >60     Results for KVNG BECKER (MRN [id number]) as of 4/27/2019 16:16   Ref. Range 4/26/2019 06:52 4/27/2019 06:27 4/27/2019 06:31   Lipase Latest Ref Range: 4 - 60 U/L  124 (H)        Significant Imaging: I have reviewed all pertinent imaging results/findings within the past 24 hours.    Assessment/Plan:      * Acute pancreatitis  - lipase>1000, amylase 3855, wbc 14.39  - CT ab/pelvis show possible mild peripancreatic stranding may represent acute pancreatitis  - Patient given 1L fluid bolus and IV morphine and zofran in ER    - Keep NPO  - check IgG4, lipid panel, uds, etoh  - repeat lipase at noon  - continue NS 150cc/hr  - zofran prn nausea  - morphine prn pain    4/27/19  Improving  Lipase <200  Clear liquids today        VTE Risk Mitigation (From admission, onward)        Ordered     heparin (porcine) injection 5,000 Units  Every 8 hours      04/26/19 0338     Place RA hose  Until discontinued      04/26/19 0338     Place sequential compression device  Until discontinued      04/26/19 0338     IP VTE HIGH RISK PATIENT  Once      04/26/19 0338          Adele Cadena NP  Department of Hospital Medicine   [facility]

## 2019-04-27 NOTE — PLAN OF CARE
Problem: Adult Inpatient Plan of Care  Goal: Plan of Care Review  Outcome: Ongoing (interventions implemented as appropriate)  Fall prevention precautions maintained, pt remained free of falls throughout shift, call bell and personal items within reach, pt's pain adequately controlled by prn pain medication, IV fluids continued. 24 hour chart check completed. Will continue to monitor

## 2019-04-27 NOTE — ASSESSMENT & PLAN NOTE
- lipase>1000, amylase 3855, wbc 14.39  - CT ab/pelvis show possible mild peripancreatic stranding may represent acute pancreatitis  - Patient given 1L fluid bolus and IV morphine and zofran in ER    - Keep NPO  - check IgG4, lipid panel, uds, etoh  - repeat lipase at noon  - continue NS 150cc/hr  - zofran prn nausea  - morphine prn pain    4/27/19  Improving  Lipase <200  Clear liquids today

## 2019-04-27 NOTE — PLAN OF CARE
Problem: Adult Inpatient Plan of Care  Goal: Plan of Care Review  Outcome: Ongoing (interventions implemented as appropriate)  Pt IV fluids hydration maintained; pain and nausea managed with prn meds-effective; ambulating in the room; all needs met

## 2019-04-27 NOTE — SUBJECTIVE & OBJECTIVE
Interval History: abdominal pain 6/10. Requesting ice chips.     Review of Systems   Constitutional: Negative for chills, diaphoresis, fatigue, fever and unexpected weight change.   HENT: Negative for congestion, facial swelling, sore throat and trouble swallowing.    Eyes: Negative for photophobia, redness and visual disturbance.   Respiratory: Negative for apnea, cough, chest tightness, shortness of breath and wheezing.    Cardiovascular: Negative for chest pain, palpitations and leg swelling.   Gastrointestinal: Positive for abdominal pain, nausea and vomiting. Negative for abdominal distention, blood in stool, constipation and diarrhea.   Endocrine: Negative for polydipsia, polyphagia and polyuria.   Genitourinary: Negative for difficulty urinating, dysuria, flank pain, frequency, hematuria and urgency.   Musculoskeletal: Negative for arthralgias, back pain, joint swelling, myalgias and neck stiffness.   Skin: Negative for pallor and rash.   Allergic/Immunologic: Negative for immunocompromised state.   Neurological: Negative for dizziness, tremors, syncope, weakness, light-headedness and headaches.   Psychiatric/Behavioral: Negative for agitation, confusion and suicidal ideas.   All other systems reviewed and are negative.         Objective:     Vital Signs (Most Recent):  Temp: 98.6 °F (37 °C) (04/27/19 1232)  Pulse: 67 (04/27/19 1232)  Resp: 18 (04/27/19 1232)  BP: (!) 118/57 (04/27/19 1232)  SpO2: 99 % (04/27/19 1232) Vital Signs (24h Range):  Temp:  [97.8 °F (36.6 °C)-98.6 °F (37 °C)] 98.6 °F (37 °C)  Pulse:  [52-70] 67  Resp:  [17-20] 18  SpO2:  [96 %-100 %] 99 %  BP: (116-135)/(57-90) 118/57     Weight: 103.3 kg (227 lb 10 oz)  Body mass index is 41.63 kg/m².    Intake/Output Summary (Last 24 hours) at 4/27/2019 1617  Last data filed at 4/27/2019 1600  Gross per 24 hour   Intake 6387.5 ml   Output --   Net 6387.5 ml      Physical Exam   Constitutional: She is oriented to person, place, and time. She  appears well-developed and well-nourished. No distress.   HENT:   Head: Normocephalic and atraumatic.   Mouth/Throat: Oropharynx is clear and moist.   Eyes: Pupils are equal, round, and reactive to light. Conjunctivae and EOM are normal. No scleral icterus.   Neck: Normal range of motion. Neck supple. No JVD present. No thyromegaly present.   Cardiovascular: Normal rate, regular rhythm and intact distal pulses. Exam reveals no gallop and no friction rub.   No murmur heard.  Pulmonary/Chest: Effort normal and breath sounds normal. No respiratory distress. She has no wheezes. She has no rales. She exhibits no tenderness.   Abdominal: Soft. Bowel sounds are normal. She exhibits no distension and no mass. There is tenderness (epigstrium). There is no guarding.   Musculoskeletal: Normal range of motion. She exhibits no tenderness.   Neurological: She is alert and oriented to person, place, and time. No cranial nerve deficit.   Skin: Skin is warm and dry. Capillary refill takes less than 2 seconds. No rash noted. She is not diaphoretic. No erythema.   Psychiatric: She has a normal mood and affect.   Nursing note and vitals reviewed.    Significant Labs:   CBC:   Recent Labs   Lab 04/26/19  0109 04/27/19  0631   WBC 14.39* 8.35   HGB 15.5 13.1   HCT 43.1 39.2    217     CMP:   Recent Labs   Lab 04/26/19  0109 04/26/19  0652 04/27/19  0631    138 137   K 3.8 3.8 3.8    106 107   CO2 20* 26 21*   * 179* 96   BUN 14 14 10   CREATININE 0.8 0.7 0.6   CALCIUM 9.7 8.8 8.2*   PROT 8.0  --   --    ALBUMIN 4.0 3.7 3.3*   BILITOT 0.3  --   --    ALKPHOS 56  --   --    AST 24  --   --    ALT 26  --   --    ANIONGAP 15 6* 9   EGFRNONAA >60 >60 >60     Results for KVNG BECKER (MRN [id number]) as of 4/27/2019 16:16   Ref. Range 4/26/2019 06:52 4/27/2019 06:27 4/27/2019 06:31   Lipase Latest Ref Range: 4 - 60 U/L  124 (H)        Significant Imaging: I have reviewed all pertinent imaging results/findings  within the past 24 hours.

## 2019-04-28 VITALS
TEMPERATURE: 98 F | BODY MASS INDEX: 41.89 KG/M2 | HEIGHT: 62 IN | OXYGEN SATURATION: 100 % | HEART RATE: 71 BPM | SYSTOLIC BLOOD PRESSURE: 144 MMHG | RESPIRATION RATE: 18 BRPM | WEIGHT: 227.63 LBS | DIASTOLIC BLOOD PRESSURE: 72 MMHG

## 2019-04-28 LAB
ALBUMIN SERPL BCP-MCNC: 3.1 G/DL (ref 3.5–5.2)
ANION GAP SERPL CALC-SCNC: 7 MMOL/L (ref 8–16)
BASOPHILS # BLD AUTO: 0.02 K/UL (ref 0–0.2)
BASOPHILS NFR BLD: 0.3 % (ref 0–1.9)
BUN SERPL-MCNC: 7 MG/DL (ref 6–20)
CALCIUM SERPL-MCNC: 8.1 MG/DL (ref 8.7–10.5)
CHLORIDE SERPL-SCNC: 107 MMOL/L (ref 95–110)
CO2 SERPL-SCNC: 24 MMOL/L (ref 23–29)
CREAT SERPL-MCNC: 0.7 MG/DL (ref 0.5–1.4)
DIFFERENTIAL METHOD: ABNORMAL
EOSINOPHIL # BLD AUTO: 0.3 K/UL (ref 0–0.5)
EOSINOPHIL NFR BLD: 3.6 % (ref 0–8)
ERYTHROCYTE [DISTWIDTH] IN BLOOD BY AUTOMATED COUNT: 13.5 % (ref 11.5–14.5)
EST. GFR  (AFRICAN AMERICAN): >60 ML/MIN/1.73 M^2
EST. GFR  (NON AFRICAN AMERICAN): >60 ML/MIN/1.73 M^2
GLUCOSE SERPL-MCNC: 126 MG/DL (ref 70–110)
HCT VFR BLD AUTO: 35.2 % (ref 37–48.5)
HGB BLD-MCNC: 12 G/DL (ref 12–16)
LYMPHOCYTES # BLD AUTO: 1.7 K/UL (ref 1–4.8)
LYMPHOCYTES NFR BLD: 22.4 % (ref 18–48)
MAGNESIUM SERPL-MCNC: 1.8 MG/DL (ref 1.6–2.6)
MCH RBC QN AUTO: 28.8 PG (ref 27–31)
MCHC RBC AUTO-ENTMCNC: 34.1 G/DL (ref 32–36)
MCV RBC AUTO: 84 FL (ref 82–98)
MONOCYTES # BLD AUTO: 0.6 K/UL (ref 0.3–1)
MONOCYTES NFR BLD: 7.6 % (ref 4–15)
NEUTROPHILS # BLD AUTO: 5 K/UL (ref 1.8–7.7)
NEUTROPHILS NFR BLD: 66.1 % (ref 38–73)
PHOSPHATE SERPL-MCNC: 2.5 MG/DL (ref 2.7–4.5)
PHOSPHATE SERPL-MCNC: 2.5 MG/DL (ref 2.7–4.5)
PLATELET # BLD AUTO: 211 K/UL (ref 150–350)
PMV BLD AUTO: 9.9 FL (ref 9.2–12.9)
POTASSIUM SERPL-SCNC: 3.2 MMOL/L (ref 3.5–5.1)
RBC # BLD AUTO: 4.17 M/UL (ref 4–5.4)
SODIUM SERPL-SCNC: 138 MMOL/L (ref 136–145)
WBC # BLD AUTO: 7.59 K/UL (ref 3.9–12.7)

## 2019-04-28 PROCEDURE — 25000003 PHARM REV CODE 250: Performed by: HOSPITALIST

## 2019-04-28 PROCEDURE — 36415 COLL VENOUS BLD VENIPUNCTURE: CPT

## 2019-04-28 PROCEDURE — 83735 ASSAY OF MAGNESIUM: CPT

## 2019-04-28 PROCEDURE — 85025 COMPLETE CBC W/AUTO DIFF WBC: CPT

## 2019-04-28 PROCEDURE — 25000003 PHARM REV CODE 250: Performed by: NURSE PRACTITIONER

## 2019-04-28 PROCEDURE — 80069 RENAL FUNCTION PANEL: CPT

## 2019-04-28 PROCEDURE — 63600175 PHARM REV CODE 636 W HCPCS: Performed by: INTERNAL MEDICINE

## 2019-04-28 PROCEDURE — C9113 INJ PANTOPRAZOLE SODIUM, VIA: HCPCS | Performed by: HOSPITALIST

## 2019-04-28 PROCEDURE — 63600175 PHARM REV CODE 636 W HCPCS: Performed by: HOSPITALIST

## 2019-04-28 RX ORDER — ONDANSETRON HYDROCHLORIDE 8 MG/1
8 TABLET, FILM COATED ORAL EVERY 8 HOURS PRN
Qty: 20 TABLET | Refills: 0 | Status: SHIPPED | OUTPATIENT
Start: 2019-04-28 | End: 2019-04-28 | Stop reason: HOSPADM

## 2019-04-28 RX ORDER — SODIUM CHLORIDE 9 MG/ML
INJECTION, SOLUTION INTRAVENOUS CONTINUOUS
Status: DISCONTINUED | OUTPATIENT
Start: 2019-04-28 | End: 2019-04-28 | Stop reason: HOSPADM

## 2019-04-28 RX ORDER — POTASSIUM CHLORIDE 20 MEQ/1
40 TABLET, EXTENDED RELEASE ORAL ONCE
Status: COMPLETED | OUTPATIENT
Start: 2019-04-28 | End: 2019-04-28

## 2019-04-28 RX ORDER — ONDANSETRON 8 MG/1
8 TABLET, ORALLY DISINTEGRATING ORAL EVERY 8 HOURS PRN
Qty: 20 TABLET | Refills: 0 | Status: SHIPPED | OUTPATIENT
Start: 2019-04-28 | End: 2019-11-16 | Stop reason: CLARIF

## 2019-04-28 RX ADMIN — POTASSIUM CHLORIDE 40 MEQ: 1500 TABLET, EXTENDED RELEASE ORAL at 02:04

## 2019-04-28 RX ADMIN — SODIUM CHLORIDE: 0.9 INJECTION, SOLUTION INTRAVENOUS at 06:04

## 2019-04-28 RX ADMIN — PANTOPRAZOLE SODIUM 40 MG: 40 INJECTION, POWDER, LYOPHILIZED, FOR SOLUTION INTRAVENOUS at 09:04

## 2019-04-28 RX ADMIN — SODIUM CHLORIDE: 0.9 INJECTION, SOLUTION INTRAVENOUS at 12:04

## 2019-04-28 RX ADMIN — HYDROMORPHONE HYDROCHLORIDE 1 MG: 1 INJECTION, SOLUTION INTRAMUSCULAR; INTRAVENOUS; SUBCUTANEOUS at 02:04

## 2019-04-28 RX ADMIN — HYDROMORPHONE HYDROCHLORIDE 1 MG: 1 INJECTION, SOLUTION INTRAMUSCULAR; INTRAVENOUS; SUBCUTANEOUS at 10:04

## 2019-04-28 RX ADMIN — HYDROMORPHONE HYDROCHLORIDE 1 MG: 1 INJECTION, SOLUTION INTRAMUSCULAR; INTRAVENOUS; SUBCUTANEOUS at 06:04

## 2019-04-28 NOTE — DISCHARGE SUMMARY
[facility] Medicine  Discharge Summary      Patient Name: Priti Obrien  MRN: [id number]  Admission Date: 2019  Hospital Length of Stay: 2 days  Discharge Date and Time:  2019 2:58 PM  Attending Physician: Cody Grey MD   Discharging Provider: Adele Cadena NP  Primary Care Provider: RICHI Ferrer      HPI:   33F h/o idiopathic pancreatitis presents with epigastric pain x 1 day.  Associated with n/v.  Patient denies any fever, chills, dysuria, hematuria, hematochezia, constipation, diarrhea, and all other sxs at this time. PSHx includes appendectomy, , cholecystectomy, hysterectomy, and laparoscopy. No further complaints or concerns at this time.  In ER, lipase>1000, amylase 3855, wbc 14.39.  CT ab/pelvis show possible mild peripancreatic stranding may represent acute pancreatitis.  Patient given 1L fluid bolus and IV morphine and zofran in ER.  Hospital medicine called for admission.            * No surgery found *      Hospital Course:   Warner Obrien is a 33 year old female who was admitted to [facility] for acute pancreatitis. Treatment included analgesia, IVF's, bowel rest and electrolyte monitoring. With medical management, symptoms improved. Diet advanced and patient tolerated well. She was asked to follow up with PCP at discharge. Patient seen and examined on date of discharge and deemed suitable.      Consults:   Consults (From admission, onward)        Status Ordering Provider     Inpatient consult to Hospitalist  Once     Provider:  Taurus Ahumada MD    Acknowledged SAL GARCIA            Final Active Diagnoses:    Diagnosis Date Noted POA    PRINCIPAL PROBLEM:  Acute pancreatitis [K85.90] 2019 Yes      Problems Resolved During this Admission:       Discharged Condition: stable    Disposition: Home or Self Care    Follow Up:    Patient Instructions:      Activity as tolerated       Significant Diagnostic Studies:  Labs:   CMP   Recent Labs   Lab 04/27/19  0631 04/28/19  0640    138   K 3.8 3.2*    107   CO2 21* 24   GLU 96 126*   BUN 10 7   CREATININE 0.6 0.7   CALCIUM 8.2* 8.1*   ALBUMIN 3.3* 3.1*   ANIONGAP 9 7*   ESTGFRAFRICA >60 >60   EGFRNONAA >60 >60    and CBC   Recent Labs   Lab 04/27/19  0631 04/28/19  0640   WBC 8.35 7.59   HGB 13.1 12.0   HCT 39.2 35.2*    211       Pending Diagnostic Studies:     Procedure Component Value Units Date/Time    Immunoglobulin G Subclass 4 [[id number]] Collected:  04/26/19 0652    Order Status:  Sent Lab Status:  In process Updated:  04/26/19 1458    Specimen:  Blood          Medications:  Reconciled Home Medications:      Medication List      START taking these medications    ondansetron 8 MG tablet  Commonly known as:  ZOFRAN  Take 1 tablet (8 mg total) by mouth every 8 (eight) hours as needed for Nausea.            Indwelling Lines/Drains at time of discharge:   Lines/Drains/Airways          None        Time spent on the discharge of patient: >35 minutes  Patient was seen and examined on the date of discharge and determined to be suitable for discharge.      Adele Cadena NP  Department of Hospital Medicine  [facility]

## 2019-04-28 NOTE — PLAN OF CARE
Problem: Adult Inpatient Plan of Care  Goal: Plan of Care Review  Outcome: Outcome(s) achieved Date Met: 04/28/19  Remains injury free. Pain managed with medication. stable for discharge. Discharge instructions given, teach back done. Waiting for transportation to home.

## 2019-04-28 NOTE — PLAN OF CARE
04/28/19 1430   Final Note   Assessment Type Final Discharge Note   Anticipated Discharge Disposition Home   Right Care Referral Info   Post Acute Recommendation No Care

## 2019-04-28 NOTE — PLAN OF CARE
Problem: Adult Inpatient Plan of Care  Goal: Plan of Care Review  Outcome: Ongoing (interventions implemented as appropriate)  Fall prevention precautions maintained, pt remained free of falls throughout shift, call bell and personal items within reach, pt's pain adequately controlled by prn pain medication, IV fluids continued, tolerating clear liquid diet. 24 hour chart check completed. Will continue to monitor

## 2019-04-28 NOTE — HOSPITAL COURSE
Warner Obrien is a 33 year old female who was admitted to [facility] for acute pancreatitis. Treatment included analgesia, IVF's, bowel rest and electrolyte monitoring. With medical management, symptoms improved. Diet advanced and patient tolerated well. She was asked to follow up with PCP at discharge. Patient seen and examined on date of discharge and deemed suitable.

## 2019-04-29 LAB — IGG4 SER-MCNC: 39 MG/DL (ref 4–86)

## 2019-06-18 ENCOUNTER — HOSPITAL ENCOUNTER (INPATIENT)
Facility: HOSPITAL | Age: 34
LOS: 3 days | Discharge: HOME | DRG: 440 | End: 2019-06-21
Attending: EMERGENCY MEDICINE | Admitting: HOSPITALIST
Payer: MEDICAID

## 2019-06-18 DIAGNOSIS — K85.00 IDIOPATHIC ACUTE PANCREATITIS: ICD-10-CM

## 2019-06-18 DIAGNOSIS — K85.00 IDIOPATHIC ACUTE PANCREATITIS, UNSPECIFIED COMPLICATION STATUS: Primary | ICD-10-CM

## 2019-06-18 LAB
ALBUMIN SERPL BCP-MCNC: 3.9 G/DL (ref 3.5–5.2)
ALP SERPL-CCNC: 48 U/L (ref 55–135)
ALT SERPL W/O P-5'-P-CCNC: 35 U/L (ref 10–44)
AMYLASE SERPL-CCNC: 2858 U/L (ref 20–110)
ANION GAP SERPL CALC-SCNC: 14 MMOL/L (ref 8–16)
AST SERPL-CCNC: 25 U/L (ref 10–40)
BACTERIA #/AREA URNS HPF: ABNORMAL /HPF
BASOPHILS # BLD AUTO: 0.02 K/UL (ref 0–0.2)
BASOPHILS NFR BLD: 0.2 % (ref 0–1.9)
BILIRUB SERPL-MCNC: 0.5 MG/DL (ref 0.1–1)
BILIRUB UR QL STRIP: NEGATIVE
BUN SERPL-MCNC: 18 MG/DL (ref 6–20)
CALCIUM SERPL-MCNC: 9.2 MG/DL (ref 8.7–10.5)
CHLORIDE SERPL-SCNC: 105 MMOL/L (ref 95–110)
CLARITY UR: CLEAR
CO2 SERPL-SCNC: 19 MMOL/L (ref 23–29)
COLOR UR: YELLOW
CREAT SERPL-MCNC: 0.8 MG/DL (ref 0.5–1.4)
DIFFERENTIAL METHOD: ABNORMAL
EOSINOPHIL # BLD AUTO: 0.3 K/UL (ref 0–0.5)
EOSINOPHIL NFR BLD: 2.3 % (ref 0–8)
ERYTHROCYTE [DISTWIDTH] IN BLOOD BY AUTOMATED COUNT: 13.6 % (ref 11.5–14.5)
EST. GFR  (AFRICAN AMERICAN): >60 ML/MIN/1.73 M^2
EST. GFR  (NON AFRICAN AMERICAN): >60 ML/MIN/1.73 M^2
GLUCOSE SERPL-MCNC: 225 MG/DL (ref 70–110)
GLUCOSE UR QL STRIP: NEGATIVE
HCT VFR BLD AUTO: 42.1 % (ref 37–48.5)
HGB BLD-MCNC: 15 G/DL (ref 12–16)
HGB UR QL STRIP: ABNORMAL
HIV 1+2 AB+HIV1 P24 AG SERPL QL IA: NEGATIVE
HYALINE CASTS #/AREA URNS LPF: 0 /LPF
KETONES UR QL STRIP: ABNORMAL
LEUKOCYTE ESTERASE UR QL STRIP: ABNORMAL
LIPASE SERPL-CCNC: >1000 U/L (ref 4–60)
LYMPHOCYTES # BLD AUTO: 2.1 K/UL (ref 1–4.8)
LYMPHOCYTES NFR BLD: 18.9 % (ref 18–48)
MAGNESIUM SERPL-MCNC: 1.8 MG/DL (ref 1.6–2.6)
MCH RBC QN AUTO: 29.5 PG (ref 27–31)
MCHC RBC AUTO-ENTMCNC: 35.6 G/DL (ref 32–36)
MCV RBC AUTO: 83 FL (ref 82–98)
MICROSCOPIC COMMENT: ABNORMAL
MONOCYTES # BLD AUTO: 0.8 K/UL (ref 0.3–1)
MONOCYTES NFR BLD: 7.6 % (ref 4–15)
NEUTROPHILS # BLD AUTO: 7.9 K/UL (ref 1.8–7.7)
NEUTROPHILS NFR BLD: 71 % (ref 38–73)
NITRITE UR QL STRIP: NEGATIVE
PH UR STRIP: 6 [PH] (ref 5–8)
PHOSPHATE SERPL-MCNC: 3.5 MG/DL (ref 2.7–4.5)
PLATELET # BLD AUTO: 244 K/UL (ref 150–350)
PMV BLD AUTO: 9.9 FL (ref 9.2–12.9)
POTASSIUM SERPL-SCNC: 3.4 MMOL/L (ref 3.5–5.1)
PROT SERPL-MCNC: 7.1 G/DL (ref 6–8.4)
PROT UR QL STRIP: ABNORMAL
RBC # BLD AUTO: 5.08 M/UL (ref 4–5.4)
RBC #/AREA URNS HPF: 5 /HPF (ref 0–4)
SODIUM SERPL-SCNC: 138 MMOL/L (ref 136–145)
SP GR UR STRIP: 1.02 (ref 1–1.03)
SQUAMOUS #/AREA URNS HPF: 5 /HPF
URN SPEC COLLECT METH UR: ABNORMAL
UROBILINOGEN UR STRIP-ACNC: NEGATIVE EU/DL
WBC # BLD AUTO: 11.07 K/UL (ref 3.9–12.7)
WBC #/AREA URNS HPF: 1 /HPF (ref 0–5)

## 2019-06-18 PROCEDURE — S0028 INJECTION, FAMOTIDINE, 20 MG: HCPCS | Performed by: HOSPITALIST

## 2019-06-18 PROCEDURE — 83735 ASSAY OF MAGNESIUM: CPT

## 2019-06-18 PROCEDURE — 63600175 PHARM REV CODE 636 W HCPCS: Performed by: NURSE PRACTITIONER

## 2019-06-18 PROCEDURE — 25000003 PHARM REV CODE 250: Performed by: HOSPITALIST

## 2019-06-18 PROCEDURE — 80053 COMPREHEN METABOLIC PANEL: CPT

## 2019-06-18 PROCEDURE — 96375 TX/PRO/DX INJ NEW DRUG ADDON: CPT

## 2019-06-18 PROCEDURE — 25000003 PHARM REV CODE 250: Performed by: INTERNAL MEDICINE

## 2019-06-18 PROCEDURE — 96374 THER/PROPH/DIAG INJ IV PUSH: CPT

## 2019-06-18 PROCEDURE — 86703 HIV-1/HIV-2 1 RESULT ANTBDY: CPT

## 2019-06-18 PROCEDURE — 63600175 PHARM REV CODE 636 W HCPCS: Performed by: HOSPITALIST

## 2019-06-18 PROCEDURE — 83690 ASSAY OF LIPASE: CPT

## 2019-06-18 PROCEDURE — 85025 COMPLETE CBC W/AUTO DIFF WBC: CPT

## 2019-06-18 PROCEDURE — 11000001 HC ACUTE MED/SURG PRIVATE ROOM

## 2019-06-18 PROCEDURE — 63600175 PHARM REV CODE 636 W HCPCS: Performed by: EMERGENCY MEDICINE

## 2019-06-18 PROCEDURE — 25000003 PHARM REV CODE 250: Performed by: EMERGENCY MEDICINE

## 2019-06-18 PROCEDURE — 99285 EMERGENCY DEPT VISIT HI MDM: CPT | Mod: 25

## 2019-06-18 PROCEDURE — 84100 ASSAY OF PHOSPHORUS: CPT

## 2019-06-18 PROCEDURE — 82150 ASSAY OF AMYLASE: CPT

## 2019-06-18 PROCEDURE — 81000 URINALYSIS NONAUTO W/SCOPE: CPT

## 2019-06-18 RX ORDER — MORPHINE SULFATE 4 MG/ML
4 INJECTION, SOLUTION INTRAMUSCULAR; INTRAVENOUS EVERY 4 HOURS PRN
Status: DISCONTINUED | OUTPATIENT
Start: 2019-06-18 | End: 2019-06-18

## 2019-06-18 RX ORDER — IBUPROFEN 200 MG
24 TABLET ORAL
Status: DISCONTINUED | OUTPATIENT
Start: 2019-06-18 | End: 2019-06-21 | Stop reason: HOSPADM

## 2019-06-18 RX ORDER — GLUCAGON 1 MG
1 KIT INJECTION
Status: DISCONTINUED | OUTPATIENT
Start: 2019-06-18 | End: 2019-06-21 | Stop reason: HOSPADM

## 2019-06-18 RX ORDER — IBUPROFEN 200 MG
16 TABLET ORAL
Status: DISCONTINUED | OUTPATIENT
Start: 2019-06-18 | End: 2019-06-21 | Stop reason: HOSPADM

## 2019-06-18 RX ORDER — ONDANSETRON 2 MG/ML
8 INJECTION INTRAMUSCULAR; INTRAVENOUS EVERY 6 HOURS PRN
Status: DISCONTINUED | OUTPATIENT
Start: 2019-06-18 | End: 2019-06-21 | Stop reason: HOSPADM

## 2019-06-18 RX ORDER — ONDANSETRON 2 MG/ML
4 INJECTION INTRAMUSCULAR; INTRAVENOUS
Status: COMPLETED | OUTPATIENT
Start: 2019-06-18 | End: 2019-06-18

## 2019-06-18 RX ORDER — SODIUM CHLORIDE 0.9 % (FLUSH) 0.9 %
5 SYRINGE (ML) INJECTION
Status: DISCONTINUED | OUTPATIENT
Start: 2019-06-18 | End: 2019-06-21 | Stop reason: HOSPADM

## 2019-06-18 RX ORDER — BUSPIRONE HYDROCHLORIDE 7.5 MG/1
7.5 TABLET ORAL 3 TIMES DAILY
COMMUNITY
End: 2019-11-16 | Stop reason: CLARIF

## 2019-06-18 RX ORDER — HYDROMORPHONE HYDROCHLORIDE 2 MG/ML
1 INJECTION, SOLUTION INTRAMUSCULAR; INTRAVENOUS; SUBCUTANEOUS EVERY 4 HOURS PRN
Status: DISCONTINUED | OUTPATIENT
Start: 2019-06-18 | End: 2019-06-19

## 2019-06-18 RX ORDER — ENOXAPARIN SODIUM 100 MG/ML
40 INJECTION SUBCUTANEOUS EVERY 24 HOURS
Status: DISCONTINUED | OUTPATIENT
Start: 2019-06-18 | End: 2019-06-21 | Stop reason: HOSPADM

## 2019-06-18 RX ORDER — MORPHINE SULFATE 4 MG/ML
4 INJECTION, SOLUTION INTRAMUSCULAR; INTRAVENOUS EVERY 6 HOURS PRN
Status: DISCONTINUED | OUTPATIENT
Start: 2019-06-18 | End: 2019-06-18

## 2019-06-18 RX ORDER — FAMOTIDINE 20 MG/50ML
20 INJECTION, SOLUTION INTRAVENOUS 2 TIMES DAILY
Status: DISCONTINUED | OUTPATIENT
Start: 2019-06-18 | End: 2019-06-21 | Stop reason: HOSPADM

## 2019-06-18 RX ORDER — MORPHINE SULFATE 4 MG/ML
6 INJECTION, SOLUTION INTRAMUSCULAR; INTRAVENOUS
Status: COMPLETED | OUTPATIENT
Start: 2019-06-18 | End: 2019-06-18

## 2019-06-18 RX ORDER — ONDANSETRON 2 MG/ML
8 INJECTION INTRAMUSCULAR; INTRAVENOUS EVERY 8 HOURS PRN
Status: DISCONTINUED | OUTPATIENT
Start: 2019-06-18 | End: 2019-06-18

## 2019-06-18 RX ORDER — SODIUM CHLORIDE 9 MG/ML
INJECTION, SOLUTION INTRAVENOUS CONTINUOUS
Status: DISCONTINUED | OUTPATIENT
Start: 2019-06-18 | End: 2019-06-18

## 2019-06-18 RX ORDER — SODIUM CHLORIDE, SODIUM LACTATE, POTASSIUM CHLORIDE, CALCIUM CHLORIDE 600; 310; 30; 20 MG/100ML; MG/100ML; MG/100ML; MG/100ML
INJECTION, SOLUTION INTRAVENOUS CONTINUOUS
Status: DISCONTINUED | OUTPATIENT
Start: 2019-06-18 | End: 2019-06-21 | Stop reason: HOSPADM

## 2019-06-18 RX ORDER — MORPHINE SULFATE 4 MG/ML
4 INJECTION, SOLUTION INTRAMUSCULAR; INTRAVENOUS EVERY 4 HOURS PRN
Status: DISCONTINUED | OUTPATIENT
Start: 2019-06-18 | End: 2019-06-21 | Stop reason: HOSPADM

## 2019-06-18 RX ADMIN — HYDROMORPHONE HYDROCHLORIDE 1 MG: 2 INJECTION, SOLUTION INTRAMUSCULAR; INTRAVENOUS; SUBCUTANEOUS at 05:06

## 2019-06-18 RX ADMIN — ONDANSETRON 8 MG: 2 INJECTION INTRAMUSCULAR; INTRAVENOUS at 09:06

## 2019-06-18 RX ADMIN — SODIUM CHLORIDE 1000 ML: 0.9 INJECTION, SOLUTION INTRAVENOUS at 03:06

## 2019-06-18 RX ADMIN — MORPHINE SULFATE 4 MG: 4 INJECTION, SOLUTION INTRAMUSCULAR; INTRAVENOUS at 07:06

## 2019-06-18 RX ADMIN — HYDROMORPHONE HYDROCHLORIDE 1 MG: 2 INJECTION, SOLUTION INTRAMUSCULAR; INTRAVENOUS; SUBCUTANEOUS at 11:06

## 2019-06-18 RX ADMIN — SODIUM CHLORIDE 1000 ML: 0.9 INJECTION, SOLUTION INTRAVENOUS at 07:06

## 2019-06-18 RX ADMIN — SODIUM CHLORIDE: 0.9 INJECTION, SOLUTION INTRAVENOUS at 07:06

## 2019-06-18 RX ADMIN — MORPHINE SULFATE 6 MG: 4 INJECTION INTRAVENOUS at 03:06

## 2019-06-18 RX ADMIN — MORPHINE SULFATE 4 MG: 4 INJECTION, SOLUTION INTRAMUSCULAR; INTRAVENOUS at 02:06

## 2019-06-18 RX ADMIN — FAMOTIDINE 20 MG: 20 INJECTION, SOLUTION INTRAVENOUS at 08:06

## 2019-06-18 RX ADMIN — ONDANSETRON 8 MG: 2 INJECTION INTRAMUSCULAR; INTRAVENOUS at 03:06

## 2019-06-18 RX ADMIN — MORPHINE SULFATE 4 MG: 4 INJECTION INTRAVENOUS at 09:06

## 2019-06-18 RX ADMIN — ONDANSETRON 4 MG: 2 INJECTION INTRAMUSCULAR; INTRAVENOUS at 03:06

## 2019-06-18 RX ADMIN — SODIUM CHLORIDE: 0.9 INJECTION, SOLUTION INTRAVENOUS at 11:06

## 2019-06-18 RX ADMIN — HYDROMORPHONE HYDROCHLORIDE 1 MG: 2 INJECTION, SOLUTION INTRAMUSCULAR; INTRAVENOUS; SUBCUTANEOUS at 10:06

## 2019-06-18 RX ADMIN — FAMOTIDINE 20 MG: 20 INJECTION, SOLUTION INTRAVENOUS at 09:06

## 2019-06-18 RX ADMIN — SODIUM CHLORIDE, SODIUM LACTATE, POTASSIUM CHLORIDE, AND CALCIUM CHLORIDE: .6; .31; .03; .02 INJECTION, SOLUTION INTRAVENOUS at 02:06

## 2019-06-18 NOTE — ASSESSMENT & PLAN NOTE
- lipase>1000, amylase>3000  - keep NPO, bowel rest  - repeat lipase tomorrow AM  - aggressive IVF hydration  - dilaudid/morphine IV prn pain  - zofran IV prn nausea

## 2019-06-18 NOTE — PLAN OF CARE
Met with patient. Patient is independent with adls and iadls.  Patient denies any post hospital needs or services at this time.  Updated white board with 's name and number. Transitional Care Folder, Discharge Planning Begins on Admission pamphlet, [facility] Bedside Delivery pamphlet, Advance Directive information given to patient along with the contact information given.Instructed patient or family to call with any questions or concerns.     D/C Plan:  Home  PCP:  Dr. XIOMY Lunsford  Preferred Pharmacy:  [facility] [id number]  Discharge transportation:  Family/friend will provide  [facility]:  Refused portal access  Pharmacy Bedside Delivery:  Unsure     06/18/19 1310   Discharge Assessment   Assessment Type Discharge Planning Assessment   Confirmed/corrected address and phone number on facesheet? Yes   Assessment information obtained from? Patient;Medical Record   Expected Length of Stay (days)   (TBD)   Communicated expected length of stay with patient/caregiver yes   Prior to hospitilization cognitive status: Alert/Oriented   Prior to hospitalization functional status: Independent   Current cognitive status: Alert/Oriented  (patient appears slightly sedated/recently received pain meds, but is arousable & aware of surroundings)   Current Functional Status: Independent   Facility Arrived From: Home   Lives With alone   Able to Return to Prior Arrangements yes   Is patient able to care for self after discharge? Yes   Who are your caregiver(s) and their phone number(s)? Nicki Wills (mother) [phone]   Patient's perception of discharge disposition home or selfcare   Readmission Within the Last 30 Days no previous admission in last 30 days   Patient currently being followed by outpatient case management? No   Patient currently receives any other outside agency services? No   Equipment Currently Used at Home none   Do you have any problems affording any of your prescribed medications? TBD   Is the patient  taking medications as prescribed? yes   Does the patient have transportation home? Yes   Transportation Anticipated family or friend will provide   Dialysis Name and Scheduled days N/A   Does the patient receive services at the Coumadin Clinic? No   Discharge Plan A Home   DME Needed Upon Discharge  none   Patient/Family in Agreement with Plan yes

## 2019-06-18 NOTE — PLAN OF CARE
Problem: Adult Inpatient Plan of Care  Goal: Optimal Comfort and Wellbeing  Outcome: Ongoing (interventions implemented as appropriate)  Optimal comfort and decrease pain  Intervention: Provide Person-Centered Care  Free of pain

## 2019-06-18 NOTE — SUBJECTIVE & OBJECTIVE
Past Medical History:   Diagnosis Date    Allergy     Asthma     Childhood only    Bipolar 1 disorder     Borderline personality disorder     Depression     GERD (gastroesophageal reflux disease)     History of psychiatric hospitalization     Hx of psychiatric care     Hypertension     pt denied    Pancreatitis     Psychiatric problem     PTSD (post-traumatic stress disorder)     S/P partial hysterectomy 2011    Substance abuse     Suicide attempt        Past Surgical History:   Procedure Laterality Date    APPENDECTOMY       SECTION      CHOLECYSTECTOMY      HYSTERECTOMY      LASER LAPAROSCOPY         Review of patient's allergies indicates:   Allergen Reactions    Demerol (pf) [meperidine (pf)] Hives    Medrol [methylprednisolone] Anaphylaxis     Any cortisone    Adhesive      Other reaction(s): Unknown    Amoxicillin-pot clavulanate      Other reaction(s): Unknown    Atarax [hydroxyzine hcl] Hives    Hydralazine analogues Hives    Iodine      Other reaction(s): Unknown    Penicillins Hives and Nausea And Vomiting    Phenergan [promethazine] Hives and Nausea And Vomiting    Risperidone analogues Hives    Ativan [lorazepam] Hives and Anxiety       No current facility-administered medications on file prior to encounter.      Current Outpatient Medications on File Prior to Encounter   Medication Sig    busPIRone (BUSPAR) 7.5 MG tablet Take 7.5 mg by mouth 3 (three) times daily.    ondansetron (ZOFRAN-ODT) 8 MG TbDL Take 1 tablet (8 mg total) by mouth every 8 (eight) hours as needed.     Family History     Problem Relation (Age of Onset)    Atrial fibrillation Maternal Grandfather    Diabetes Mother    Hypertension Mother        Tobacco Use    Smoking status: Former Smoker     Packs/day: 0.25     Types: Cigarettes     Last attempt to quit: 2013     Years since quittin.0    Smokeless tobacco: Never Used   Substance and Sexual Activity    Alcohol use: No      Comment: occasional     Drug use: Yes     Frequency: 1.0 times per week     Types: Marijuana    Sexual activity: Yes     Partners: Male     Birth control/protection: None     Review of Systems   Constitutional: Negative for chills, diaphoresis, fatigue, fever and unexpected weight change.   HENT: Negative for congestion, facial swelling, sore throat and trouble swallowing.    Eyes: Negative for photophobia, redness and visual disturbance.   Respiratory: Negative for apnea, cough, chest tightness, shortness of breath and wheezing.    Cardiovascular: Negative for chest pain, palpitations and leg swelling.   Gastrointestinal: Positive for abdominal pain and nausea. Negative for abdominal distention, blood in stool, constipation, diarrhea and vomiting.   Endocrine: Negative for polydipsia, polyphagia and polyuria.   Genitourinary: Negative for difficulty urinating, dysuria, flank pain, frequency, hematuria and urgency.   Musculoskeletal: Negative for arthralgias, back pain, joint swelling, myalgias and neck stiffness.   Skin: Negative for pallor and rash.   Allergic/Immunologic: Negative for immunocompromised state.   Neurological: Negative for dizziness, tremors, syncope, weakness, light-headedness and headaches.   Psychiatric/Behavioral: Negative for agitation, confusion and suicidal ideas.   All other systems reviewed and are negative.    Objective:     Vital Signs (Most Recent):  Temp: 98.7 °F (37.1 °C) (06/18/19 0301)  Pulse: 68 (06/18/19 0423)  Resp: 18 (06/18/19 0301)  BP: (!) 162/79 (06/18/19 0423)  SpO2: 96 % (06/18/19 0423) Vital Signs (24h Range):  Temp:  [98.7 °F (37.1 °C)] 98.7 °F (37.1 °C)  Pulse:  [68-74] 68  Resp:  [18] 18  SpO2:  [96 %] 96 %  BP: (162-168)/() 162/79     Weight: 102.9 kg (226 lb 13.7 oz)  Body mass index is 41.49 kg/m².    Physical Exam   Constitutional: She is oriented to person, place, and time. She appears well-developed and well-nourished. No distress.   HENT:   Head:  Normocephalic and atraumatic.   Mouth/Throat: Oropharynx is clear and moist.   Eyes: Pupils are equal, round, and reactive to light. Conjunctivae and EOM are normal. No scleral icterus.   Neck: Normal range of motion. Neck supple. No JVD present. No thyromegaly present.   Cardiovascular: Normal rate, regular rhythm and intact distal pulses. Exam reveals no gallop and no friction rub.   No murmur heard.  Pulmonary/Chest: Effort normal and breath sounds normal. No respiratory distress. She has no wheezes. She has no rales. She exhibits no tenderness.   Abdominal: Soft. Bowel sounds are normal. She exhibits no distension and no mass. There is tenderness. There is guarding. There is no rebound.   Musculoskeletal: Normal range of motion. She exhibits no tenderness.   Neurological: She is alert and oriented to person, place, and time. No cranial nerve deficit.   Skin: Skin is warm and dry. Capillary refill takes less than 2 seconds. No rash noted. She is not diaphoretic. No erythema.   Psychiatric: She has a normal mood and affect.   Nursing note and vitals reviewed.        CRANIAL NERVES     CN III, IV, VI   Pupils are equal, round, and reactive to light.  Extraocular motions are normal.        Significant Labs:   CBC:   Recent Labs   Lab 06/18/19  0330   WBC 11.07   HGB 15.0   HCT 42.1        CMP:   Recent Labs   Lab 06/18/19  0330      K 3.4*      CO2 19*   *   BUN 18   CREATININE 0.8   CALCIUM 9.2   PROT 7.1   ALBUMIN 3.9   BILITOT 0.5   ALKPHOS 48*   AST 25   ALT 35   ANIONGAP 14   EGFRNONAA >60     Lipase:   Recent Labs   Lab 06/18/19  0330   LIPASE >1000*     Magnesium:   Recent Labs   Lab 06/18/19  0330   MG 1.8     Urine Studies:   Recent Labs   Lab 06/18/19  0359   COLORU Yellow   APPEARANCEUA Clear   PHUR 6.0   SPECGRAV 1.025   PROTEINUA 1+*   GLUCUA Negative   KETONESU Trace*   BILIRUBINUA Negative   OCCULTUA 1+*   NITRITE Negative   UROBILINOGEN Negative   LEUKOCYTESUR 1+*   RBCUA 5*    WBCUA 1   BACTERIA Rare   SQUAMEPITHEL 5   HYALINECASTS 0     All pertinent labs within the past 24 hours have been reviewed.    Significant Imaging: I have reviewed all pertinent imaging results/findings within the past 24 hours.   Imaging Results    None

## 2019-06-18 NOTE — ED PROVIDER NOTES
SCRIBE #1 NOTE: I, Hannah Marie, am scribing for, and in the presence of, Kenneth Glass MD. I have scribed the entire note.      History      Chief Complaint   Patient presents with    Abdominal Pain     since 0100 with nausea. Hx of pancreatitis       Review of patient's allergies indicates:   Allergen Reactions    Demerol (pf) [meperidine (pf)] Hives    Medrol [methylprednisolone] Anaphylaxis     Any cortisone    Adhesive      Other reaction(s): Unknown    Amoxicillin-pot clavulanate      Other reaction(s): Unknown    Atarax [hydroxyzine hcl] Hives    Hydralazine analogues Hives    Iodine      Other reaction(s): Unknown    Penicillins Hives and Nausea And Vomiting    Phenergan [promethazine] Hives and Nausea And Vomiting    Risperidone analogues Hives    Ativan [lorazepam] Hives and Anxiety        HPI   HPI    2019, 3:02 AM   History obtained from the patient      History of Present Illness: Priti Obrien is a 34 y.o. female patient with a PMHx of HTN, pancreatitis, substance abuse, PSHx of cholecystectomy, appendectomy, hysterectomy,  who presents to the Emergency Department for epigastric abd pain which onset suddenly at 0100 this AM. Symptoms are constant and moderate in severity. No mitigating or exacerbating factors reported. Associated sxs include n/v. Patient denies any fever, chills, diarrhea, constipation, hematochezia, melena, dysuria, hematuria, frequency, and all other sxs at this time. Prior Tx includes 4mg Zofran en route. No further complaints or concerns at this time.       Arrival mode: EMS    PCP: RICHI Ferrer       Past Medical History:  Past Medical History:   Diagnosis Date    Allergy     Asthma     Childhood only    Bipolar 1 disorder     Borderline personality disorder     Depression     GERD (gastroesophageal reflux disease)     History of psychiatric hospitalization     Hx of psychiatric care     Hypertension     pt denied    Pancreatitis      Psychiatric problem     PTSD (post-traumatic stress disorder)     S/P partial hysterectomy 2011    Substance abuse     Suicide attempt        Past Surgical History:  Past Surgical History:   Procedure Laterality Date    APPENDECTOMY       SECTION      CHOLECYSTECTOMY      HYSTERECTOMY      LASER LAPAROSCOPY           Family History:  Family History   Problem Relation Age of Onset    Diabetes Mother     Hypertension Mother     Atrial fibrillation Maternal Grandfather        Social History:  Social History     Tobacco Use    Smoking status: Former Smoker     Packs/day: 0.25     Types: Cigarettes     Last attempt to quit: 2013     Years since quittin.0    Smokeless tobacco: Never Used   Substance and Sexual Activity    Alcohol use: No     Comment: occasional     Drug use: Yes     Frequency: 1.0 times per week     Types: Marijuana    Sexual activity: Yes     Partners: Male     Birth control/protection: None       ROS   Review of Systems   Constitutional: Negative for chills and fever.   HENT: Negative for congestion and sore throat.    Respiratory: Negative for shortness of breath.    Cardiovascular: Negative for chest pain.   Gastrointestinal: Positive for abdominal pain, nausea and vomiting. Negative for blood in stool, constipation and diarrhea.   Genitourinary: Negative for dysuria, frequency and hematuria.   Musculoskeletal: Negative for myalgias.   Skin: Negative for rash.   Neurological: Negative for weakness and numbness.   Hematological: Does not bruise/bleed easily.   All other systems reviewed and are negative.    Physical Exam      Initial Vitals [19 0301]   BP Pulse Resp Temp SpO2   (!) 168/100 74 18 98.7 °F (37.1 °C) 96 %      MAP       --          Physical Exam  Nursing Notes and Vital Signs Reviewed.  Constitutional: Patient is in moderate distress. Well-developed and well-nourished. Obese.   Head: Atraumatic. Normocephalic.  Eyes: PERRL. EOM intact.  "Conjunctivae are not pale. No scleral icterus.  ENT: Mucous membranes are moist. Oropharynx is clear and symmetric.    Neck: Supple. Full ROM. No lymphadenopathy.  Cardiovascular: Regular rate. Regular rhythm. No murmurs, rubs, or gallops. Distal pulses are 2+ and symmetric.  Pulmonary/Chest: No respiratory distress. Clear to auscultation bilaterally. No wheezing or rales.  Abdominal: Soft and non-distended.  There is epigastric tenderness.  No rebound, guarding, or rigidity. Good bowel sounds.  Genitourinary: No CVA tenderness  Musculoskeletal: Moves all extremities. No obvious deformities. No edema. No calf tenderness.  Skin: Warm and dry.  Neurological:  Alert, awake, and appropriate.  Normal speech.  No acute focal neurological deficits are appreciated.  Psychiatric: Normal affect. Good eye contact. Appropriate in content.    ED Course    Procedures  ED Vital Signs:  Vitals:    06/18/19 0301 06/18/19 0328 06/18/19 0423   BP: (!) 168/100  (!) 162/79   Pulse: 74  68   Resp: 18     Temp: 98.7 °F (37.1 °C)     TempSrc: Oral     SpO2: 96%  96%   Weight:  102.9 kg (226 lb 13.7 oz)    Height: 5' 2" (1.575 m)         Abnormal Lab Results:  Labs Reviewed   CBC W/ AUTO DIFFERENTIAL - Abnormal; Notable for the following components:       Result Value    Gran # (ANC) 7.9 (*)     All other components within normal limits   COMPREHENSIVE METABOLIC PANEL - Abnormal; Notable for the following components:    Potassium 3.4 (*)     CO2 19 (*)     Glucose 225 (*)     Alkaline Phosphatase 48 (*)     All other components within normal limits   LIPASE - Abnormal; Notable for the following components:    Lipase >1000 (*)     All other components within normal limits   URINALYSIS, REFLEX TO URINE CULTURE - Abnormal; Notable for the following components:    Protein, UA 1+ (*)     Ketones, UA Trace (*)     Occult Blood UA 1+ (*)     Leukocytes, UA 1+ (*)     All other components within normal limits    Narrative:     Preferred Collection " Type->Urine, Clean Catch   URINALYSIS MICROSCOPIC - Abnormal; Notable for the following components:    RBC, UA 5 (*)     All other components within normal limits    Narrative:     Preferred Collection Type->Urine, Clean Catch   AMYLASE - Abnormal; Notable for the following components:    Amylase 2,858 (*)     All other components within normal limits   AMYLASE   MAGNESIUM   PHOSPHORUS   MAGNESIUM   PHOSPHORUS   HIV 1 / 2 ANTIBODY        All Lab Results:  Results for orders placed or performed during the hospital encounter of 06/18/19   CBC W/ AUTO DIFFERENTIAL   Result Value Ref Range    WBC 11.07 3.90 - 12.70 K/uL    RBC 5.08 4.00 - 5.40 M/uL    Hemoglobin 15.0 12.0 - 16.0 g/dL    Hematocrit 42.1 37.0 - 48.5 %    Mean Corpuscular Volume 83 82 - 98 fL    Mean Corpuscular Hemoglobin 29.5 27.0 - 31.0 pg    Mean Corpuscular Hemoglobin Conc 35.6 32.0 - 36.0 g/dL    RDW 13.6 11.5 - 14.5 %    Platelets 244 150 - 350 K/uL    MPV 9.9 9.2 - 12.9 fL    Gran # (ANC) 7.9 (H) 1.8 - 7.7 K/uL    Lymph # 2.1 1.0 - 4.8 K/uL    Mono # 0.8 0.3 - 1.0 K/uL    Eos # 0.3 0.0 - 0.5 K/uL    Baso # 0.02 0.00 - 0.20 K/uL    Gran% 71.0 38.0 - 73.0 %    Lymph% 18.9 18.0 - 48.0 %    Mono% 7.6 4.0 - 15.0 %    Eosinophil% 2.3 0.0 - 8.0 %    Basophil% 0.2 0.0 - 1.9 %    Differential Method Automated    Comp. Metabolic Panel   Result Value Ref Range    Sodium 138 136 - 145 mmol/L    Potassium 3.4 (L) 3.5 - 5.1 mmol/L    Chloride 105 95 - 110 mmol/L    CO2 19 (L) 23 - 29 mmol/L    Glucose 225 (H) 70 - 110 mg/dL    BUN, Bld 18 6 - 20 mg/dL    Creatinine 0.8 0.5 - 1.4 mg/dL    Calcium 9.2 8.7 - 10.5 mg/dL    Total Protein 7.1 6.0 - 8.4 g/dL    Albumin 3.9 3.5 - 5.2 g/dL    Total Bilirubin 0.5 0.1 - 1.0 mg/dL    Alkaline Phosphatase 48 (L) 55 - 135 U/L    AST 25 10 - 40 U/L    ALT 35 10 - 44 U/L    Anion Gap 14 8 - 16 mmol/L    eGFR if African American >60 >60 mL/min/1.73 m^2    eGFR if non African American >60 >60 mL/min/1.73 m^2   Lipase   Result  Value Ref Range    Lipase >1000 (H) 4 - 60 U/L   Urinalysis, Reflex to Urine Culture Urine, Clean Catch   Result Value Ref Range    Specimen UA Urine, Clean Catch     Color, UA Yellow Yellow, Straw, [place]    Appearance, UA Clear Clear    pH, UA 6.0 5.0 - 8.0    Specific Gravity, UA 1.025 1.005 - 1.030    Protein, UA 1+ (A) Negative    Glucose, UA Negative Negative    Ketones, UA Trace (A) Negative    Bilirubin (UA) Negative Negative    Occult Blood UA 1+ (A) Negative    Nitrite, UA Negative Negative    Urobilinogen, UA Negative <2.0 EU/dL    Leukocytes, UA 1+ (A) Negative   Urinalysis Microscopic   Result Value Ref Range    RBC, UA 5 (H) 0 - 4 /hpf    WBC, UA 1 0 - 5 /hpf    Bacteria Rare None-Occ /hpf    Squam Epithel, UA 5 /hpf    Hyaline Casts, UA 0 0-1/lpf /lpf    Microscopic Comment SEE COMMENT    Amylase   Result Value Ref Range    Amylase 2,858 (H) 20 - 110 U/L   Magnesium   Result Value Ref Range    Magnesium 1.8 1.6 - 2.6 mg/dL   Phosphorus   Result Value Ref Range    Phosphorus 3.5 2.7 - 4.5 mg/dL              The Emergency Provider reviewed the vital signs and test results, which are outlined above.    ED Discussion     5:16 AM: Discussed case with Dr. Ahumada ([facility]). Dr. Ahumada agrees with current care and management of pt and accepts admission.   Admitting Service: [facility]   Admitting Physician: Dr. Ahumada  Admit to: m/s    5:21 AM: Re-evaluated pt. I have discussed test results, shared treatment plan, and the need for admission with patient. Pt expresses understanding at this time and agree with all information. All questions answered. Pt has no further questions or concerns at this time. Pt is ready for admit.    ED Medication(s):  Medications   hydromorphone (PF) injection 1 mg (1 mg Intravenous Given 6/18/19 0536)   morphine injection 4 mg (has no administration in time range)   ondansetron injection 8 mg (has no administration in time range)   0.9%  NaCl infusion (has no  administration in time range)   sodium chloride 0.9% bolus 1,000 mL (has no administration in time range)   famotidine IVPB 20 mg (has no administration in time range)   sodium chloride 0.9% flush 5 mL (has no administration in time range)   glucose chewable tablet 16 g (has no administration in time range)   glucose chewable tablet 24 g (has no administration in time range)   dextrose 50% injection 12.5 g (has no administration in time range)   dextrose 50% injection 25 g (has no administration in time range)   glucagon (human recombinant) injection 1 mg (has no administration in time range)   enoxaparin injection 40 mg (has no administration in time range)   sodium chloride 0.9% bolus 1,000 mL (1,000 mLs Intravenous New Bag 6/18/19 0336)   morphine injection 6 mg (6 mg Intravenous Given 6/18/19 0334)   ondansetron injection 4 mg (4 mg Intravenous Given 6/18/19 0343)             Medical Decision Making    Medical Decision Making:   Clinical Tests:   Lab Tests: Ordered and Reviewed           Scribe Attestation:   Scribe #1: I performed the above scribed service and the documentation accurately describes the services I performed. I attest to the accuracy of the note.    Attending:   Physician Attestation Statement for Scribe #1: I, Kenneth Glass MD, personally performed the services described in this documentation, as scribed by Hannah Marie, in my presence, and it is both accurate and complete.          Clinical Impression       ICD-10-CM ICD-9-CM   1. Idiopathic acute pancreatitis K85.00 577.0       Disposition:   Disposition: Admitted  Condition: Fair         Kenneth Glass MD  06/18/19 5365

## 2019-06-18 NOTE — H&P
[facility] Hospital Medicine  History & Physical    Patient Name: Priti Obrien  MRN: [id number]  Admission Date: 2019  Attending Physician: Kenneth Glass MD   Primary Care Provider: RICHI Ferrer         Patient information was obtained from patient and ER records.     Subjective:     Principal Problem:Idiopathic acute pancreatitis    Chief Complaint:   Chief Complaint   Patient presents with    Abdominal Pain     since 0100 with nausea. Hx of pancreatitis        HPI: 33F h/o idiopathic pancreatitis presents with epigastric pain x 1 day.  Associated with nausea.  Patient denies vomiting, fever, chills, dysuria, hematuria, hematochezia, constipation, diarrhea, and all other sxs at this time. PSHx includes appendectomy, , cholecystectomy, hysterectomy, and laparoscopy.  Patient was last admitted to [facility] for pancreatitis in 2019.  In ER, lipase>1000, amylase 2858, K 3.4, glucose 225.  Cr 0.8.  Patient was given 1L NS bolus and 6 mg IV morphine in ER.      Past Medical History:   Diagnosis Date    Allergy     Asthma     Childhood only    Bipolar 1 disorder     Borderline personality disorder     Depression     GERD (gastroesophageal reflux disease)     History of psychiatric hospitalization     Hx of psychiatric care     Hypertension     pt denied    Pancreatitis     Psychiatric problem     PTSD (post-traumatic stress disorder)     S/P partial hysterectomy 2011    Substance abuse     Suicide attempt        Past Surgical History:   Procedure Laterality Date    APPENDECTOMY       SECTION      CHOLECYSTECTOMY      HYSTERECTOMY      LASER LAPAROSCOPY         Review of patient's allergies indicates:   Allergen Reactions    Demerol (pf) [meperidine (pf)] Hives    Medrol [methylprednisolone] Anaphylaxis     Any cortisone    Adhesive      Other reaction(s): Unknown    Amoxicillin-pot clavulanate      Other reaction(s): Unknown    Atarax  [hydroxyzine hcl] Hives    Hydralazine analogues Hives    Iodine      Other reaction(s): Unknown    Penicillins Hives and Nausea And Vomiting    Phenergan [promethazine] Hives and Nausea And Vomiting    Risperidone analogues Hives    Ativan [lorazepam] Hives and Anxiety       No current facility-administered medications on file prior to encounter.      Current Outpatient Medications on File Prior to Encounter   Medication Sig    busPIRone (BUSPAR) 7.5 MG tablet Take 7.5 mg by mouth 3 (three) times daily.    ondansetron (ZOFRAN-ODT) 8 MG TbDL Take 1 tablet (8 mg total) by mouth every 8 (eight) hours as needed.     Family History     Problem Relation (Age of Onset)    Atrial fibrillation Maternal Grandfather    Diabetes Mother    Hypertension Mother        Tobacco Use    Smoking status: Former Smoker     Packs/day: 0.25     Types: Cigarettes     Last attempt to quit: 2013     Years since quittin.0    Smokeless tobacco: Never Used   Substance and Sexual Activity    Alcohol use: No     Comment: occasional     Drug use: Yes     Frequency: 1.0 times per week     Types: Marijuana    Sexual activity: Yes     Partners: Male     Birth control/protection: None     Review of Systems   Constitutional: Negative for chills, diaphoresis, fatigue, fever and unexpected weight change.   HENT: Negative for congestion, facial swelling, sore throat and trouble swallowing.    Eyes: Negative for photophobia, redness and visual disturbance.   Respiratory: Negative for apnea, cough, chest tightness, shortness of breath and wheezing.    Cardiovascular: Negative for chest pain, palpitations and leg swelling.   Gastrointestinal: Positive for abdominal pain and nausea. Negative for abdominal distention, blood in stool, constipation, diarrhea and vomiting.   Endocrine: Negative for polydipsia, polyphagia and polyuria.   Genitourinary: Negative for difficulty urinating, dysuria, flank pain, frequency, hematuria and urgency.    Musculoskeletal: Negative for arthralgias, back pain, joint swelling, myalgias and neck stiffness.   Skin: Negative for pallor and rash.   Allergic/Immunologic: Negative for immunocompromised state.   Neurological: Negative for dizziness, tremors, syncope, weakness, light-headedness and headaches.   Psychiatric/Behavioral: Negative for agitation, confusion and suicidal ideas.   All other systems reviewed and are negative.    Objective:     Vital Signs (Most Recent):  Temp: 98.7 °F (37.1 °C) (06/18/19 0301)  Pulse: 68 (06/18/19 0423)  Resp: 18 (06/18/19 0301)  BP: (!) 162/79 (06/18/19 0423)  SpO2: 96 % (06/18/19 0423) Vital Signs (24h Range):  Temp:  [98.7 °F (37.1 °C)] 98.7 °F (37.1 °C)  Pulse:  [68-74] 68  Resp:  [18] 18  SpO2:  [96 %] 96 %  BP: (162-168)/() 162/79     Weight: 102.9 kg (226 lb 13.7 oz)  Body mass index is 41.49 kg/m².    Physical Exam   Constitutional: She is oriented to person, place, and time. She appears well-developed and well-nourished. No distress.   HENT:   Head: Normocephalic and atraumatic.   Mouth/Throat: Oropharynx is clear and moist.   Eyes: Pupils are equal, round, and reactive to light. Conjunctivae and EOM are normal. No scleral icterus.   Neck: Normal range of motion. Neck supple. No JVD present. No thyromegaly present.   Cardiovascular: Normal rate, regular rhythm and intact distal pulses. Exam reveals no gallop and no friction rub.   No murmur heard.  Pulmonary/Chest: Effort normal and breath sounds normal. No respiratory distress. She has no wheezes. She has no rales. She exhibits no tenderness.   Abdominal: Soft. Bowel sounds are normal. She exhibits no distension and no mass. There is tenderness. There is guarding. There is no rebound.   Musculoskeletal: Normal range of motion. She exhibits no tenderness.   Neurological: She is alert and oriented to person, place, and time. No cranial nerve deficit.   Skin: Skin is warm and dry. Capillary refill takes less than 2  seconds. No rash noted. She is not diaphoretic. No erythema.   Psychiatric: She has a normal mood and affect.   Nursing note and vitals reviewed.        CRANIAL NERVES     CN III, IV, VI   Pupils are equal, round, and reactive to light.  Extraocular motions are normal.        Significant Labs:   CBC:   Recent Labs   Lab 06/18/19  0330   WBC 11.07   HGB 15.0   HCT 42.1        CMP:   Recent Labs   Lab 06/18/19  0330      K 3.4*      CO2 19*   *   BUN 18   CREATININE 0.8   CALCIUM 9.2   PROT 7.1   ALBUMIN 3.9   BILITOT 0.5   ALKPHOS 48*   AST 25   ALT 35   ANIONGAP 14   EGFRNONAA >60     Lipase:   Recent Labs   Lab 06/18/19  0330   LIPASE >1000*     Magnesium:   Recent Labs   Lab 06/18/19  0330   MG 1.8     Urine Studies:   Recent Labs   Lab 06/18/19  0359   COLORU Yellow   APPEARANCEUA Clear   PHUR 6.0   SPECGRAV 1.025   PROTEINUA 1+*   GLUCUA Negative   KETONESU Trace*   BILIRUBINUA Negative   OCCULTUA 1+*   NITRITE Negative   UROBILINOGEN Negative   LEUKOCYTESUR 1+*   RBCUA 5*   WBCUA 1   BACTERIA Rare   SQUAMEPITHEL 5   HYALINECASTS 0     All pertinent labs within the past 24 hours have been reviewed.    Significant Imaging: I have reviewed all pertinent imaging results/findings within the past 24 hours.   Imaging Results    None           Assessment/Plan:     * Idiopathic acute pancreatitis  - lipase>1000, amylase>3000  - keep NPO, bowel rest  - repeat lipase tomorrow AM  - aggressive IVF hydration  - dilaudid/morphine IV prn pain  - zofran IV prn nausea      VTE Risk Mitigation (From admission, onward)        Ordered     enoxaparin injection 40 mg  Daily      06/18/19 0523     Place RA hose  Until discontinued      06/18/19 0523     Place sequential compression device  Until discontinued      06/18/19 0523     IP VTE HIGH RISK PATIENT  Once      06/18/19 0523             Taurus Ahumada MD  Department of Hospital Medicine   [facility]

## 2019-06-18 NOTE — PROGRESS NOTES
Patient seen and examined today. Priti Obrien is a 34 year old female who was admitted for acute pancreatitis. Patient was hospitalized 2 months ago for the same. During this time, her triglycerides  Were 381. No other etiologies were identified. She denies OCPs or ETOH. She expressed being around a lot of stress over the last 2-3 days. Conservative management includes bowel rest, analgesia, and IV hydration. Will repeat lipid panel.

## 2019-06-18 NOTE — HPI
33F h/o idiopathic pancreatitis presents with epigastric pain x 1 day.  Associated with nausea.  Patient denies vomiting, fever, chills, dysuria, hematuria, hematochezia, constipation, diarrhea, and all other sxs at this time. PSHx includes appendectomy, , cholecystectomy, hysterectomy, and laparoscopy.  Patient was last admitted to [facility] for pancreatitis in 2019.  In ER, lipase>1000, amylase 2858, K 3.4, glucose 225.  Cr 0.8.  Patient was given 1L NS bolus and 6 mg IV morphine in ER.

## 2019-06-19 LAB
ALBUMIN SERPL BCP-MCNC: 3.5 G/DL (ref 3.5–5.2)
ANION GAP SERPL CALC-SCNC: 8 MMOL/L (ref 8–16)
BASOPHILS # BLD AUTO: 0.01 K/UL (ref 0–0.2)
BASOPHILS NFR BLD: 0.1 % (ref 0–1.9)
BUN SERPL-MCNC: 11 MG/DL (ref 6–20)
CALCIUM SERPL-MCNC: 8.5 MG/DL (ref 8.7–10.5)
CHLORIDE SERPL-SCNC: 102 MMOL/L (ref 95–110)
CHOLEST SERPL-MCNC: 144 MG/DL (ref 120–199)
CHOLEST/HDLC SERPL: 6 {RATIO} (ref 2–5)
CO2 SERPL-SCNC: 25 MMOL/L (ref 23–29)
CREAT SERPL-MCNC: 0.7 MG/DL (ref 0.5–1.4)
DIFFERENTIAL METHOD: NORMAL
EOSINOPHIL # BLD AUTO: 0.3 K/UL (ref 0–0.5)
EOSINOPHIL NFR BLD: 3 % (ref 0–8)
ERYTHROCYTE [DISTWIDTH] IN BLOOD BY AUTOMATED COUNT: 14 % (ref 11.5–14.5)
EST. GFR  (AFRICAN AMERICAN): >60 ML/MIN/1.73 M^2
EST. GFR  (NON AFRICAN AMERICAN): >60 ML/MIN/1.73 M^2
GLUCOSE SERPL-MCNC: 139 MG/DL (ref 70–110)
HCT VFR BLD AUTO: 38.7 % (ref 37–48.5)
HDLC SERPL-MCNC: 24 MG/DL (ref 40–75)
HDLC SERPL: 16.7 % (ref 20–50)
HGB BLD-MCNC: 13.1 G/DL (ref 12–16)
LDLC SERPL CALC-MCNC: 83 MG/DL (ref 63–159)
LIPASE SERPL-CCNC: 632 U/L (ref 4–60)
LYMPHOCYTES # BLD AUTO: 2.1 K/UL (ref 1–4.8)
LYMPHOCYTES NFR BLD: 22.5 % (ref 18–48)
MAGNESIUM SERPL-MCNC: 1.8 MG/DL (ref 1.6–2.6)
MCH RBC QN AUTO: 28.8 PG (ref 27–31)
MCHC RBC AUTO-ENTMCNC: 33.9 G/DL (ref 32–36)
MCV RBC AUTO: 85 FL (ref 82–98)
MONOCYTES # BLD AUTO: 0.6 K/UL (ref 0.3–1)
MONOCYTES NFR BLD: 6.6 % (ref 4–15)
NEUTROPHILS # BLD AUTO: 6.2 K/UL (ref 1.8–7.7)
NEUTROPHILS NFR BLD: 67.8 % (ref 38–73)
NONHDLC SERPL-MCNC: 120 MG/DL
PHOSPHATE SERPL-MCNC: 3.2 MG/DL (ref 2.7–4.5)
PHOSPHATE SERPL-MCNC: 3.2 MG/DL (ref 2.7–4.5)
PLATELET # BLD AUTO: 216 K/UL (ref 150–350)
PMV BLD AUTO: 10 FL (ref 9.2–12.9)
POTASSIUM SERPL-SCNC: 3.4 MMOL/L (ref 3.5–5.1)
RBC # BLD AUTO: 4.55 M/UL (ref 4–5.4)
SODIUM SERPL-SCNC: 135 MMOL/L (ref 136–145)
TRIGL SERPL-MCNC: 185 MG/DL (ref 30–150)
WBC # BLD AUTO: 9.1 K/UL (ref 3.9–12.7)

## 2019-06-19 PROCEDURE — 25000003 PHARM REV CODE 250: Performed by: INTERNAL MEDICINE

## 2019-06-19 PROCEDURE — 11000001 HC ACUTE MED/SURG PRIVATE ROOM

## 2019-06-19 PROCEDURE — 63600175 PHARM REV CODE 636 W HCPCS: Performed by: NURSE PRACTITIONER

## 2019-06-19 PROCEDURE — 83735 ASSAY OF MAGNESIUM: CPT

## 2019-06-19 PROCEDURE — 80061 LIPID PANEL: CPT

## 2019-06-19 PROCEDURE — 63600175 PHARM REV CODE 636 W HCPCS: Performed by: HOSPITALIST

## 2019-06-19 PROCEDURE — 36415 COLL VENOUS BLD VENIPUNCTURE: CPT

## 2019-06-19 PROCEDURE — S0028 INJECTION, FAMOTIDINE, 20 MG: HCPCS | Performed by: HOSPITALIST

## 2019-06-19 PROCEDURE — 25000003 PHARM REV CODE 250: Performed by: HOSPITALIST

## 2019-06-19 PROCEDURE — 83690 ASSAY OF LIPASE: CPT

## 2019-06-19 PROCEDURE — 80069 RENAL FUNCTION PANEL: CPT

## 2019-06-19 PROCEDURE — 85025 COMPLETE CBC W/AUTO DIFF WBC: CPT

## 2019-06-19 RX ORDER — HYDROMORPHONE HYDROCHLORIDE 1 MG/ML
1 INJECTION, SOLUTION INTRAMUSCULAR; INTRAVENOUS; SUBCUTANEOUS EVERY 4 HOURS PRN
Status: DISCONTINUED | OUTPATIENT
Start: 2019-06-19 | End: 2019-06-21 | Stop reason: HOSPADM

## 2019-06-19 RX ADMIN — ONDANSETRON 8 MG: 2 INJECTION INTRAMUSCULAR; INTRAVENOUS at 10:06

## 2019-06-19 RX ADMIN — MORPHINE SULFATE 4 MG: 4 INJECTION, SOLUTION INTRAMUSCULAR; INTRAVENOUS at 08:06

## 2019-06-19 RX ADMIN — ONDANSETRON 8 MG: 2 INJECTION INTRAMUSCULAR; INTRAVENOUS at 03:06

## 2019-06-19 RX ADMIN — SODIUM CHLORIDE, SODIUM LACTATE, POTASSIUM CHLORIDE, AND CALCIUM CHLORIDE: .6; .31; .03; .02 INJECTION, SOLUTION INTRAVENOUS at 06:06

## 2019-06-19 RX ADMIN — HYDROMORPHONE HYDROCHLORIDE 1 MG: 1 INJECTION, SOLUTION INTRAMUSCULAR; INTRAVENOUS; SUBCUTANEOUS at 08:06

## 2019-06-19 RX ADMIN — MORPHINE SULFATE 4 MG: 4 INJECTION, SOLUTION INTRAMUSCULAR; INTRAVENOUS at 10:06

## 2019-06-19 RX ADMIN — HYDROMORPHONE HYDROCHLORIDE 1 MG: 1 INJECTION, SOLUTION INTRAMUSCULAR; INTRAVENOUS; SUBCUTANEOUS at 10:06

## 2019-06-19 RX ADMIN — FAMOTIDINE 20 MG: 20 INJECTION, SOLUTION INTRAVENOUS at 08:06

## 2019-06-19 RX ADMIN — MORPHINE SULFATE 4 MG: 4 INJECTION, SOLUTION INTRAMUSCULAR; INTRAVENOUS at 04:06

## 2019-06-19 RX ADMIN — ONDANSETRON 8 MG: 2 INJECTION INTRAMUSCULAR; INTRAVENOUS at 04:06

## 2019-06-19 RX ADMIN — HYDROMORPHONE HYDROCHLORIDE 1 MG: 1 INJECTION, SOLUTION INTRAMUSCULAR; INTRAVENOUS; SUBCUTANEOUS at 12:06

## 2019-06-19 RX ADMIN — SODIUM CHLORIDE, SODIUM LACTATE, POTASSIUM CHLORIDE, AND CALCIUM CHLORIDE: .6; .31; .03; .02 INJECTION, SOLUTION INTRAVENOUS at 01:06

## 2019-06-19 RX ADMIN — HYDROMORPHONE HYDROCHLORIDE 1 MG: 1 INJECTION, SOLUTION INTRAMUSCULAR; INTRAVENOUS; SUBCUTANEOUS at 03:06

## 2019-06-19 NOTE — PLAN OF CARE
Discussed [facility] follow up with patient. Patient agreed to referral and confirmed correct phone number and address.  Faxed [facility] referral to [phone]. Noted to patients AVS.

## 2019-06-19 NOTE — PROGRESS NOTES
[facility] Hospital [facility]  Progress Note    Patient Name: Priti Obrien  MRN: [id number]  Patient Class: IP- Inpatient   Admission Date: 2019  Length of Stay: 1 days  Attending Physician: Thanh Zavala MD  Primary Care Provider: Dev Lunsford Jr, MD    Subjective:     Principal Problem:Idiopathic acute pancreatitis      HPI:  33F h/o idiopathic pancreatitis presents with epigastric pain x 1 day.  Associated with nausea.  Patient denies vomiting, fever, chills, dysuria, hematuria, hematochezia, constipation, diarrhea, and all other sxs at this time. PSHx includes appendectomy, , cholecystectomy, hysterectomy, and laparoscopy.  Patient was last admitted to [facility] for pancreatitis in 2019.  In ER, lipase>1000, amylase 2858, K 3.4, glucose 225.  Cr 0.8.  Patient was given 1L NS bolus and 6 mg IV morphine in ER.      Overview/Hospital Course:  Priti Obrien is a 34 year old female who was admitted to [facility] for pancreatitis. She was admitted in April for the same. Patient states she has had multiple episodes of pancreatitis since she was a teen. She has undergone ERCP, MRCP, and exploratory lap without an etiology. She denies ETOH use. Triglycerides were elevated, but not drastic. Treatment included bowel rest, IVF's, analgesia, and antiemetics. As of 19 triglycerides are trending down, currently in 600's.     Interval History:pain is now 6/10. Still having nausea.    Review of Systems   Constitutional: Negative for chills, diaphoresis, fatigue, fever and unexpected weight change.   HENT: Negative for congestion, facial swelling, sore throat and trouble swallowing.    Eyes: Negative for photophobia, redness and visual disturbance.   Respiratory: Negative for apnea, cough, chest tightness, shortness of breath and wheezing.    Cardiovascular: Negative for chest pain, palpitations and leg swelling.   Gastrointestinal: Positive for abdominal pain and  nausea. Negative for abdominal distention, blood in stool, constipation, diarrhea and vomiting.   Endocrine: Negative for polydipsia, polyphagia and polyuria.   Genitourinary: Negative for difficulty urinating, dysuria, flank pain, frequency, hematuria and urgency.   Musculoskeletal: Negative for arthralgias, back pain, joint swelling, myalgias and neck stiffness.   Skin: Negative for pallor and rash.   Allergic/Immunologic: Negative for immunocompromised state.   Neurological: Negative for dizziness, tremors, syncope, weakness, light-headedness and headaches.   Psychiatric/Behavioral: Negative for agitation, confusion and suicidal ideas.   All other systems reviewed and are negative.     Objective:     Vital Signs (Most Recent):  Temp: 98.6 °F (37 °C) (06/19/19 1257)  Pulse: 64 (06/19/19 1257)  Resp: 18 (06/19/19 1257)  BP: 121/72 (06/19/19 1257)  SpO2: 96 % (06/19/19 1257) Vital Signs (24h Range):  Temp:  [97.9 °F (36.6 °C)-98.7 °F (37.1 °C)] 98.6 °F (37 °C)  Pulse:  [55-67] 64  Resp:  [17-18] 18  SpO2:  [92 %-98 %] 96 %  BP: (103-154)/(58-95) 121/72     Weight: 102.9 kg (226 lb 13.7 oz)  Body mass index is 41.49 kg/m².    Intake/Output Summary (Last 24 hours) at 6/19/2019 1504  Last data filed at 6/19/2019 0800  Gross per 24 hour   Intake 2657.92 ml   Output --   Net 2657.92 ml      Physical Exam   Constitutional: She is oriented to person, place, and time. She appears well-developed and well-nourished. No distress.   HENT:   Head: Normocephalic and atraumatic.   Mouth/Throat: Oropharynx is clear and moist.   Eyes: Pupils are equal, round, and reactive to light. Conjunctivae and EOM are normal. No scleral icterus.   Neck: Normal range of motion. Neck supple. No JVD present. No thyromegaly present.   Cardiovascular: Normal rate, regular rhythm and intact distal pulses. Exam reveals no gallop and no friction rub.   No murmur heard.  Pulmonary/Chest: Effort normal and breath sounds normal. No respiratory distress.  She has no wheezes. She has no rales. She exhibits no tenderness.   Abdominal: Soft. Bowel sounds are normal. She exhibits no distension and no mass. There is tenderness. There is no rebound and no guarding.   Musculoskeletal: Normal range of motion. She exhibits no tenderness.   Neurological: She is alert and oriented to person, place, and time. No cranial nerve deficit.   Skin: Skin is warm and dry. Capillary refill takes less than 2 seconds. No rash noted. She is not diaphoretic. No erythema.   Psychiatric: She has a normal mood and affect.   Nursing note and vitals reviewed.    Significant Labs:   CBC:   Recent Labs   Lab 06/18/19 0330 06/19/19 0516   WBC 11.07 9.10   HGB 15.0 13.1   HCT 42.1 38.7    216     CMP:   Recent Labs   Lab 06/18/19 0330 06/19/19 0516    135*   K 3.4* 3.4*    102   CO2 19* 25   * 139*   BUN 18 11   CREATININE 0.8 0.7   CALCIUM 9.2 8.5*   PROT 7.1  --    ALBUMIN 3.9 3.5   BILITOT 0.5  --    ALKPHOS 48*  --    AST 25  --    ALT 35  --    ANIONGAP 14 8   EGFRNONAA >60 >60       Significant Imaging: I have reviewed all pertinent imaging results/findings within the past 24 hours.      Assessment/Plan:      * Idiopathic acute pancreatitis  - lipase>1000, amylase>3000  - keep NPO, bowel rest  - repeat lipase tomorrow AM  - aggressive IVF hydration  - dilaudid/morphine IV prn pain  - zofran IV prn nausea    6/19/19  Lipase trending down, still having abdominal pain and nausea  Is not interested in clear liquids. Would like ice chips  Triglycerides, but not drastic  Has undergone ERCP, MRCP, and exploratory lap in the past without definite etiology  Continue plan as above        VTE Risk Mitigation (From admission, onward)        Ordered     enoxaparin injection 40 mg  Daily      06/18/19 0523     Place RA hose  Until discontinued      06/18/19 0523     Place sequential compression device  Until discontinued      06/18/19 0523     IP VTE HIGH RISK PATIENT  Once       06/18/19 0523          Adele Cadena NP  Department of Hospital Medicine   [facility]

## 2019-06-19 NOTE — PLAN OF CARE
Problem: Adult Inpatient Plan of Care  Goal: Plan of Care Review  Outcome: Ongoing (interventions implemented as appropriate)  Remains free from injury.  No adverse reactions.  Pain controlled with prn medications.  IVF's continue.  No ABX. Fall precautions in place.  POC reviewed with patient.  12 hour chart check

## 2019-06-19 NOTE — PLAN OF CARE
Problem: Adult Inpatient Plan of Care  Goal: Plan of Care Review  Outcome: Ongoing (interventions implemented as appropriate)  Remains free from harm, pain controlled via IV pain meds, positions self, no acute distress noted. 24 hour chart check complete.

## 2019-06-19 NOTE — SUBJECTIVE & OBJECTIVE
Interval History:pain is now 6/10. Still having nausea.    Review of Systems   Constitutional: Negative for chills, diaphoresis, fatigue, fever and unexpected weight change.   HENT: Negative for congestion, facial swelling, sore throat and trouble swallowing.    Eyes: Negative for photophobia, redness and visual disturbance.   Respiratory: Negative for apnea, cough, chest tightness, shortness of breath and wheezing.    Cardiovascular: Negative for chest pain, palpitations and leg swelling.   Gastrointestinal: Positive for abdominal pain and nausea. Negative for abdominal distention, blood in stool, constipation, diarrhea and vomiting.   Endocrine: Negative for polydipsia, polyphagia and polyuria.   Genitourinary: Negative for difficulty urinating, dysuria, flank pain, frequency, hematuria and urgency.   Musculoskeletal: Negative for arthralgias, back pain, joint swelling, myalgias and neck stiffness.   Skin: Negative for pallor and rash.   Allergic/Immunologic: Negative for immunocompromised state.   Neurological: Negative for dizziness, tremors, syncope, weakness, light-headedness and headaches.   Psychiatric/Behavioral: Negative for agitation, confusion and suicidal ideas.   All other systems reviewed and are negative.     Objective:     Vital Signs (Most Recent):  Temp: 98.6 °F (37 °C) (06/19/19 1257)  Pulse: 64 (06/19/19 1257)  Resp: 18 (06/19/19 1257)  BP: 121/72 (06/19/19 1257)  SpO2: 96 % (06/19/19 1257) Vital Signs (24h Range):  Temp:  [97.9 °F (36.6 °C)-98.7 °F (37.1 °C)] 98.6 °F (37 °C)  Pulse:  [55-67] 64  Resp:  [17-18] 18  SpO2:  [92 %-98 %] 96 %  BP: (103-154)/(58-95) 121/72     Weight: 102.9 kg (226 lb 13.7 oz)  Body mass index is 41.49 kg/m².    Intake/Output Summary (Last 24 hours) at 6/19/2019 1504  Last data filed at 6/19/2019 0800  Gross per 24 hour   Intake 2657.92 ml   Output --   Net 2657.92 ml      Physical Exam   Constitutional: She is oriented to person, place, and time. She appears  well-developed and well-nourished. No distress.   HENT:   Head: Normocephalic and atraumatic.   Mouth/Throat: Oropharynx is clear and moist.   Eyes: Pupils are equal, round, and reactive to light. Conjunctivae and EOM are normal. No scleral icterus.   Neck: Normal range of motion. Neck supple. No JVD present. No thyromegaly present.   Cardiovascular: Normal rate, regular rhythm and intact distal pulses. Exam reveals no gallop and no friction rub.   No murmur heard.  Pulmonary/Chest: Effort normal and breath sounds normal. No respiratory distress. She has no wheezes. She has no rales. She exhibits no tenderness.   Abdominal: Soft. Bowel sounds are normal. She exhibits no distension and no mass. There is tenderness. There is no rebound and no guarding.   Musculoskeletal: Normal range of motion. She exhibits no tenderness.   Neurological: She is alert and oriented to person, place, and time. No cranial nerve deficit.   Skin: Skin is warm and dry. Capillary refill takes less than 2 seconds. No rash noted. She is not diaphoretic. No erythema.   Psychiatric: She has a normal mood and affect.   Nursing note and vitals reviewed.    Significant Labs:   CBC:   Recent Labs   Lab 06/18/19  0330 06/19/19  0516   WBC 11.07 9.10   HGB 15.0 13.1   HCT 42.1 38.7    216     CMP:   Recent Labs   Lab 06/18/19  0330 06/19/19  0516    135*   K 3.4* 3.4*    102   CO2 19* 25   * 139*   BUN 18 11   CREATININE 0.8 0.7   CALCIUM 9.2 8.5*   PROT 7.1  --    ALBUMIN 3.9 3.5   BILITOT 0.5  --    ALKPHOS 48*  --    AST 25  --    ALT 35  --    ANIONGAP 14 8   EGFRNONAA >60 >60       Significant Imaging: I have reviewed all pertinent imaging results/findings within the past 24 hours.

## 2019-06-19 NOTE — ASSESSMENT & PLAN NOTE
- lipase>1000, amylase>3000  - keep NPO, bowel rest  - repeat lipase tomorrow AM  - aggressive IVF hydration  - dilaudid/morphine IV prn pain  - zofran IV prn nausea    6/19/19  Lipase trending down, still having abdominal pain and nausea  Is not interested in clear liquids. Would like ice chips  Triglycerides, but not drastic  Has undergone ERCP, MRCP, and exploratory lap in the past without definite etiology  Continue plan as above

## 2019-06-19 NOTE — HOSPITAL COURSE
Priti Obrien is a 34 year old female who was admitted to [facility] for pancreatitis. Patient has history of idiopathic pancreatitis She was admitted in April for the same. Patient states she has had multiple episodes of pancreatitis since being a teenager.  She has undergone ERCP, MRCP, and exploratory lap without an etiology. She denies ETOH use. Triglycerides were elevated, but not drastic. Treatment included bowel rest, IVF's, analgesia, and antiemetics. Amylase and lipase elevated on admission, however has continued to trend downward since admission. Today, she reports abdominal pain, has improved. Vital signs and labs stable, she is tolerating diet, as well. Patient was seen, examined and deemed suitable for discharge.

## 2019-06-20 LAB
ALBUMIN SERPL BCP-MCNC: 3.6 G/DL (ref 3.5–5.2)
AMYLASE SERPL-CCNC: 285 U/L (ref 20–110)
ANION GAP SERPL CALC-SCNC: 9 MMOL/L (ref 8–16)
BASOPHILS # BLD AUTO: 0.02 K/UL (ref 0–0.2)
BASOPHILS NFR BLD: 0.2 % (ref 0–1.9)
BUN SERPL-MCNC: 6 MG/DL (ref 6–20)
CALCIUM SERPL-MCNC: 8.9 MG/DL (ref 8.7–10.5)
CHLORIDE SERPL-SCNC: 104 MMOL/L (ref 95–110)
CO2 SERPL-SCNC: 26 MMOL/L (ref 23–29)
CREAT SERPL-MCNC: 0.7 MG/DL (ref 0.5–1.4)
DIFFERENTIAL METHOD: NORMAL
EOSINOPHIL # BLD AUTO: 0.4 K/UL (ref 0–0.5)
EOSINOPHIL NFR BLD: 5.2 % (ref 0–8)
ERYTHROCYTE [DISTWIDTH] IN BLOOD BY AUTOMATED COUNT: 13.8 % (ref 11.5–14.5)
EST. GFR  (AFRICAN AMERICAN): >60 ML/MIN/1.73 M^2
EST. GFR  (NON AFRICAN AMERICAN): >60 ML/MIN/1.73 M^2
GLUCOSE SERPL-MCNC: 114 MG/DL (ref 70–110)
HCT VFR BLD AUTO: 37.3 % (ref 37–48.5)
HGB BLD-MCNC: 13 G/DL (ref 12–16)
LIPASE SERPL-CCNC: 145 U/L (ref 4–60)
LYMPHOCYTES # BLD AUTO: 2 K/UL (ref 1–4.8)
LYMPHOCYTES NFR BLD: 24.7 % (ref 18–48)
MAGNESIUM SERPL-MCNC: 1.9 MG/DL (ref 1.6–2.6)
MCH RBC QN AUTO: 29.5 PG (ref 27–31)
MCHC RBC AUTO-ENTMCNC: 34.9 G/DL (ref 32–36)
MCV RBC AUTO: 85 FL (ref 82–98)
MONOCYTES # BLD AUTO: 0.7 K/UL (ref 0.3–1)
MONOCYTES NFR BLD: 9.1 % (ref 4–15)
NEUTROPHILS # BLD AUTO: 4.9 K/UL (ref 1.8–7.7)
NEUTROPHILS NFR BLD: 60.8 % (ref 38–73)
PHOSPHATE SERPL-MCNC: 3.1 MG/DL (ref 2.7–4.5)
PHOSPHATE SERPL-MCNC: 3.1 MG/DL (ref 2.7–4.5)
PLATELET # BLD AUTO: 227 K/UL (ref 150–350)
PMV BLD AUTO: 9.9 FL (ref 9.2–12.9)
POTASSIUM SERPL-SCNC: 3.5 MMOL/L (ref 3.5–5.1)
RBC # BLD AUTO: 4.41 M/UL (ref 4–5.4)
SODIUM SERPL-SCNC: 139 MMOL/L (ref 136–145)
WBC # BLD AUTO: 8.09 K/UL (ref 3.9–12.7)

## 2019-06-20 PROCEDURE — 82150 ASSAY OF AMYLASE: CPT

## 2019-06-20 PROCEDURE — 11000001 HC ACUTE MED/SURG PRIVATE ROOM

## 2019-06-20 PROCEDURE — 63600175 PHARM REV CODE 636 W HCPCS: Performed by: HOSPITALIST

## 2019-06-20 PROCEDURE — 25000003 PHARM REV CODE 250: Performed by: HOSPITALIST

## 2019-06-20 PROCEDURE — 25000003 PHARM REV CODE 250: Performed by: INTERNAL MEDICINE

## 2019-06-20 PROCEDURE — 83690 ASSAY OF LIPASE: CPT

## 2019-06-20 PROCEDURE — 83735 ASSAY OF MAGNESIUM: CPT

## 2019-06-20 PROCEDURE — 85025 COMPLETE CBC W/AUTO DIFF WBC: CPT

## 2019-06-20 PROCEDURE — 63600175 PHARM REV CODE 636 W HCPCS: Performed by: NURSE PRACTITIONER

## 2019-06-20 PROCEDURE — 80069 RENAL FUNCTION PANEL: CPT

## 2019-06-20 PROCEDURE — S0028 INJECTION, FAMOTIDINE, 20 MG: HCPCS | Performed by: HOSPITALIST

## 2019-06-20 RX ADMIN — MORPHINE SULFATE 4 MG: 4 INJECTION, SOLUTION INTRAMUSCULAR; INTRAVENOUS at 10:06

## 2019-06-20 RX ADMIN — HYDROMORPHONE HYDROCHLORIDE 1 MG: 1 INJECTION, SOLUTION INTRAMUSCULAR; INTRAVENOUS; SUBCUTANEOUS at 07:06

## 2019-06-20 RX ADMIN — SODIUM CHLORIDE, SODIUM LACTATE, POTASSIUM CHLORIDE, AND CALCIUM CHLORIDE: .6; .31; .03; .02 INJECTION, SOLUTION INTRAVENOUS at 09:06

## 2019-06-20 RX ADMIN — ONDANSETRON 8 MG: 2 INJECTION INTRAMUSCULAR; INTRAVENOUS at 05:06

## 2019-06-20 RX ADMIN — ONDANSETRON 8 MG: 2 INJECTION INTRAMUSCULAR; INTRAVENOUS at 11:06

## 2019-06-20 RX ADMIN — FAMOTIDINE 20 MG: 20 INJECTION, SOLUTION INTRAVENOUS at 08:06

## 2019-06-20 RX ADMIN — FAMOTIDINE 20 MG: 20 INJECTION, SOLUTION INTRAVENOUS at 09:06

## 2019-06-20 RX ADMIN — MORPHINE SULFATE 4 MG: 4 INJECTION, SOLUTION INTRAMUSCULAR; INTRAVENOUS at 05:06

## 2019-06-20 RX ADMIN — MORPHINE SULFATE 4 MG: 4 INJECTION, SOLUTION INTRAMUSCULAR; INTRAVENOUS at 01:06

## 2019-06-20 RX ADMIN — HYDROMORPHONE HYDROCHLORIDE 1 MG: 1 INJECTION, SOLUTION INTRAMUSCULAR; INTRAVENOUS; SUBCUTANEOUS at 03:06

## 2019-06-20 RX ADMIN — SODIUM CHLORIDE, SODIUM LACTATE, POTASSIUM CHLORIDE, AND CALCIUM CHLORIDE: .6; .31; .03; .02 INJECTION, SOLUTION INTRAVENOUS at 03:06

## 2019-06-20 RX ADMIN — HYDROMORPHONE HYDROCHLORIDE 1 MG: 1 INJECTION, SOLUTION INTRAMUSCULAR; INTRAVENOUS; SUBCUTANEOUS at 11:06

## 2019-06-20 RX ADMIN — MORPHINE SULFATE 4 MG: 4 INJECTION, SOLUTION INTRAMUSCULAR; INTRAVENOUS at 09:06

## 2019-06-20 RX ADMIN — ONDANSETRON 8 MG: 2 INJECTION INTRAMUSCULAR; INTRAVENOUS at 08:06

## 2019-06-20 RX ADMIN — HYDROMORPHONE HYDROCHLORIDE 1 MG: 1 INJECTION, SOLUTION INTRAMUSCULAR; INTRAVENOUS; SUBCUTANEOUS at 08:06

## 2019-06-20 NOTE — PROGRESS NOTES
[facility] Hospital Medicine  Progress Note    Patient Name: Priti Obrien  MRN: [id number]  Patient Class: IP- Inpatient   Admission Date: 2019  Length of Stay: 2 days  Attending Physician: Thanh Zavala MD  Primary Care Provider: Dev Lunsford Jr, MD    Subjective:     Principal Problem:Idiopathic acute pancreatitis      HPI:  33F h/o idiopathic pancreatitis presents with epigastric pain x 1 day.  Associated with nausea.  Patient denies vomiting, fever, chills, dysuria, hematuria, hematochezia, constipation, diarrhea, and all other sxs at this time. PSHx includes appendectomy, , cholecystectomy, hysterectomy, and laparoscopy.  Patient was last admitted to [facility] for pancreatitis in 2019.  In ER, lipase>1000, amylase 2858, K 3.4, glucose 225.  Cr 0.8.  Patient was given 1L NS bolus and 6 mg IV morphine in ER.      Overview/Hospital Course:  Priti Obrien is a 34 year old female who was admitted to [facility] for pancreatitis. She was admitted in April for the same. Patient states she has had multiple episodes of pancreatitis since she was a teen. She has undergone ERCP, MRCP, and exploratory lap without an etiology. She denies ETOH use. Triglycerides were elevated, but not drastic. Treatment included bowel rest, IVF's, analgesia, and antiemetics. As of 19 triglycerides are trending down, currently in 600's.     As of 19, lipase down to 145. Clear liquid diet ordered. Electrolytes are stable. Plans to discharge in AM if night uneventful.     Interval History: pain is improving. Ready for clear liquid diet.    Review of Systems   Constitutional: Negative for chills, diaphoresis, fatigue, fever and unexpected weight change.   HENT: Negative for congestion, facial swelling, sore throat and trouble swallowing.    Eyes: Negative for photophobia, redness and visual disturbance.   Respiratory: Negative for apnea, cough, chest tightness, shortness of  breath and wheezing.    Cardiovascular: Negative for chest pain, palpitations and leg swelling.   Gastrointestinal: Positive for abdominal pain (improving) and nausea. Negative for abdominal distention, blood in stool, constipation, diarrhea and vomiting.   Endocrine: Negative for polydipsia, polyphagia and polyuria.   Genitourinary: Negative for difficulty urinating, dysuria, flank pain, frequency, hematuria and urgency.   Musculoskeletal: Negative for arthralgias, back pain, joint swelling, myalgias and neck stiffness.   Skin: Negative for pallor and rash.   Allergic/Immunologic: Negative for immunocompromised state.   Neurological: Negative for dizziness, tremors, syncope, weakness, light-headedness and headaches.   Psychiatric/Behavioral: Negative for agitation, confusion and suicidal ideas.   All other systems reviewed and are negative.    Objective:     Vital Signs (Most Recent):  Temp: 98 °F (36.7 °C) (06/20/19 1235)  Pulse: 84 (06/20/19 1235)  Resp: 18 (06/20/19 1235)  BP: 135/82 (06/20/19 1235)  SpO2: 98 % (06/20/19 1235) Vital Signs (24h Range):  Temp:  [97.9 °F (36.6 °C)-98.9 °F (37.2 °C)] 98 °F (36.7 °C)  Pulse:  [66-86] 84  Resp:  [16-18] 18  SpO2:  [93 %-100 %] 98 %  BP: (121-158)/(58-90) 135/82     Weight: 102.9 kg (226 lb 13.7 oz)  Body mass index is 41.49 kg/m².    Intake/Output Summary (Last 24 hours) at 6/20/2019 1353  Last data filed at 6/20/2019 0800  Gross per 24 hour   Intake 4229.58 ml   Output --   Net 4229.58 ml      Physical Exam   Constitutional: She is oriented to person, place, and time. She appears well-developed and well-nourished. No distress.   HENT:   Head: Normocephalic and atraumatic.   Mouth/Throat: Oropharynx is clear and moist.   Eyes: Pupils are equal, round, and reactive to light. Conjunctivae and EOM are normal. No scleral icterus.   Neck: Normal range of motion. Neck supple. No JVD present. No thyromegaly present.   Cardiovascular: Normal rate, regular rhythm and intact  distal pulses. Exam reveals no gallop and no friction rub.   No murmur heard.  Pulmonary/Chest: Effort normal and breath sounds normal. No respiratory distress. She has no wheezes. She has no rales. She exhibits no tenderness.   Abdominal: Soft. Bowel sounds are normal. She exhibits no distension and no mass. There is tenderness. There is no rebound and no guarding.   Musculoskeletal: Normal range of motion. She exhibits no tenderness.   Neurological: She is alert and oriented to person, place, and time. No cranial nerve deficit.   Skin: Skin is warm and dry. Capillary refill takes less than 2 seconds. No rash noted. She is not diaphoretic. No erythema.   Psychiatric: She has a normal mood and affect.   Nursing note and vitals reviewed.    Significant Labs:   CBC:   Recent Labs   Lab 06/19/19 0516 06/20/19 0429   WBC 9.10 8.09   HGB 13.1 13.0   HCT 38.7 37.3    227     CMP:   Recent Labs   Lab 06/19/19 0516 06/20/19 0429   * 139   K 3.4* 3.5    104   CO2 25 26   * 114*   BUN 11 6   CREATININE 0.7 0.7   CALCIUM 8.5* 8.9   ALBUMIN 3.5 3.6   ANIONGAP 8 9   EGFRNONAA >60 >60     Significant Imaging: I have reviewed all pertinent imaging results/findings within the past 24 hours.      Assessment/Plan:      * Idiopathic acute pancreatitis  - lipase>1000, amylase>3000  - keep NPO, bowel rest  - repeat lipase tomorrow AM  - aggressive IVF hydration  - dilaudid/morphine IV prn pain  - zofran IV prn nausea    6/19/19  Lipase trending down, still having abdominal pain and nausea  Is not interested in clear liquids. Would like ice chips  Triglycerides, but not drastic  Has undergone ERCP, MRCP, and exploratory lap in the past without definite etiology  Continue plan as above    6/20/19  Lipase down to 143. Advanced to clear liquid diet      VTE Risk Mitigation (From admission, onward)        Ordered     enoxaparin injection 40 mg  Daily      06/18/19 0523     Place RA hose  Until discontinued       06/18/19 0523     Place sequential compression device  Until discontinued      06/18/19 0523     IP VTE HIGH RISK PATIENT  Once      06/18/19 0523          Adele Cadena NP  Department of Hospital Medicine   [facility]

## 2019-06-20 NOTE — SUBJECTIVE & OBJECTIVE
Interval History: pain is improving. Ready for clear liquid diet.    Review of Systems   Constitutional: Negative for chills, diaphoresis, fatigue, fever and unexpected weight change.   HENT: Negative for congestion, facial swelling, sore throat and trouble swallowing.    Eyes: Negative for photophobia, redness and visual disturbance.   Respiratory: Negative for apnea, cough, chest tightness, shortness of breath and wheezing.    Cardiovascular: Negative for chest pain, palpitations and leg swelling.   Gastrointestinal: Positive for abdominal pain (improving) and nausea. Negative for abdominal distention, blood in stool, constipation, diarrhea and vomiting.   Endocrine: Negative for polydipsia, polyphagia and polyuria.   Genitourinary: Negative for difficulty urinating, dysuria, flank pain, frequency, hematuria and urgency.   Musculoskeletal: Negative for arthralgias, back pain, joint swelling, myalgias and neck stiffness.   Skin: Negative for pallor and rash.   Allergic/Immunologic: Negative for immunocompromised state.   Neurological: Negative for dizziness, tremors, syncope, weakness, light-headedness and headaches.   Psychiatric/Behavioral: Negative for agitation, confusion and suicidal ideas.   All other systems reviewed and are negative.    Objective:     Vital Signs (Most Recent):  Temp: 98 °F (36.7 °C) (06/20/19 1235)  Pulse: 84 (06/20/19 1235)  Resp: 18 (06/20/19 1235)  BP: 135/82 (06/20/19 1235)  SpO2: 98 % (06/20/19 1235) Vital Signs (24h Range):  Temp:  [97.9 °F (36.6 °C)-98.9 °F (37.2 °C)] 98 °F (36.7 °C)  Pulse:  [66-86] 84  Resp:  [16-18] 18  SpO2:  [93 %-100 %] 98 %  BP: (121-158)/(58-90) 135/82     Weight: 102.9 kg (226 lb 13.7 oz)  Body mass index is 41.49 kg/m².    Intake/Output Summary (Last 24 hours) at 6/20/2019 1353  Last data filed at 6/20/2019 0800  Gross per 24 hour   Intake 4229.58 ml   Output --   Net 4229.58 ml      Physical Exam   Constitutional: She is oriented to person, place, and  time. She appears well-developed and well-nourished. No distress.   HENT:   Head: Normocephalic and atraumatic.   Mouth/Throat: Oropharynx is clear and moist.   Eyes: Pupils are equal, round, and reactive to light. Conjunctivae and EOM are normal. No scleral icterus.   Neck: Normal range of motion. Neck supple. No JVD present. No thyromegaly present.   Cardiovascular: Normal rate, regular rhythm and intact distal pulses. Exam reveals no gallop and no friction rub.   No murmur heard.  Pulmonary/Chest: Effort normal and breath sounds normal. No respiratory distress. She has no wheezes. She has no rales. She exhibits no tenderness.   Abdominal: Soft. Bowel sounds are normal. She exhibits no distension and no mass. There is tenderness. There is no rebound and no guarding.   Musculoskeletal: Normal range of motion. She exhibits no tenderness.   Neurological: She is alert and oriented to person, place, and time. No cranial nerve deficit.   Skin: Skin is warm and dry. Capillary refill takes less than 2 seconds. No rash noted. She is not diaphoretic. No erythema.   Psychiatric: She has a normal mood and affect.   Nursing note and vitals reviewed.    Significant Labs:   CBC:   Recent Labs   Lab 06/19/19  0516 06/20/19  0429   WBC 9.10 8.09   HGB 13.1 13.0   HCT 38.7 37.3    227     CMP:   Recent Labs   Lab 06/19/19  0516 06/20/19  0429   * 139   K 3.4* 3.5    104   CO2 25 26   * 114*   BUN 11 6   CREATININE 0.7 0.7   CALCIUM 8.5* 8.9   ALBUMIN 3.5 3.6   ANIONGAP 8 9   EGFRNONAA >60 >60     Significant Imaging: I have reviewed all pertinent imaging results/findings within the past 24 hours.

## 2019-06-20 NOTE — PLAN OF CARE
Problem: Adult Inpatient Plan of Care  Goal: Plan of Care Review  Outcome: Ongoing (interventions implemented as appropriate)  Pt had no adverse events during shift. Pt free of falls. Call light in reach. Side rails x 2. Pain well controlled w/ prn meds. Pt repositions independently. IVF administered as ordered. VSS. Chart reviewed, will continue to monitor.

## 2019-06-20 NOTE — ASSESSMENT & PLAN NOTE
- lipase>1000, amylase>3000  - keep NPO, bowel rest  - repeat lipase tomorrow AM  - aggressive IVF hydration  - dilaudid/morphine IV prn pain  - zofran IV prn nausea    6/19/19  Lipase trending down, still having abdominal pain and nausea  Is not interested in clear liquids. Would like ice chips  Triglycerides, but not drastic  Has undergone ERCP, MRCP, and exploratory lap in the past without definite etiology  Continue plan as above    6/20/19  Lipase down to 143. Advanced to clear liquid diet

## 2019-06-20 NOTE — PLAN OF CARE
Problem: Adult Inpatient Plan of Care  Goal: Plan of Care Review  Outcome: Ongoing (interventions implemented as appropriate)  Fall precautions maintained. Pt free from falls/injuries.  Patient complains of pain. Pain controlled mildly with PRN meds.  Patient shows no signs of distress.   IV fluids  Advanced to Clear liquid diet, mild nausea.  Ambulates and repositions independently.  Plan of care and medications discussed with patient.  Patient verbalized understanding.  Bed locked and low, call bell within reach.  Will continue to monitor.

## 2019-06-21 VITALS
BODY MASS INDEX: 41.75 KG/M2 | OXYGEN SATURATION: 98 % | DIASTOLIC BLOOD PRESSURE: 67 MMHG | HEART RATE: 79 BPM | SYSTOLIC BLOOD PRESSURE: 130 MMHG | HEIGHT: 62 IN | TEMPERATURE: 98 F | RESPIRATION RATE: 15 BRPM | WEIGHT: 226.88 LBS

## 2019-06-21 PROBLEM — K85.00 IDIOPATHIC ACUTE PANCREATITIS: Status: RESOLVED | Noted: 2019-06-18 | Resolved: 2019-06-21

## 2019-06-21 LAB
ALBUMIN SERPL BCP-MCNC: 3.2 G/DL (ref 3.5–5.2)
AMYLASE SERPL-CCNC: 92 U/L (ref 20–110)
ANION GAP SERPL CALC-SCNC: 8 MMOL/L (ref 8–16)
BASOPHILS # BLD AUTO: 0.03 K/UL (ref 0–0.2)
BASOPHILS NFR BLD: 0.4 % (ref 0–1.9)
BUN SERPL-MCNC: 4 MG/DL (ref 6–20)
CALCIUM SERPL-MCNC: 8.8 MG/DL (ref 8.7–10.5)
CHLORIDE SERPL-SCNC: 106 MMOL/L (ref 95–110)
CO2 SERPL-SCNC: 26 MMOL/L (ref 23–29)
CREAT SERPL-MCNC: 0.7 MG/DL (ref 0.5–1.4)
DIFFERENTIAL METHOD: ABNORMAL
EOSINOPHIL # BLD AUTO: 0.4 K/UL (ref 0–0.5)
EOSINOPHIL NFR BLD: 5.1 % (ref 0–8)
ERYTHROCYTE [DISTWIDTH] IN BLOOD BY AUTOMATED COUNT: 13.8 % (ref 11.5–14.5)
EST. GFR  (AFRICAN AMERICAN): >60 ML/MIN/1.73 M^2
EST. GFR  (NON AFRICAN AMERICAN): >60 ML/MIN/1.73 M^2
GLUCOSE SERPL-MCNC: 114 MG/DL (ref 70–110)
HCT VFR BLD AUTO: 36.1 % (ref 37–48.5)
HGB BLD-MCNC: 12.4 G/DL (ref 12–16)
LIPASE SERPL-CCNC: 39 U/L (ref 4–60)
LYMPHOCYTES # BLD AUTO: 1.9 K/UL (ref 1–4.8)
LYMPHOCYTES NFR BLD: 27 % (ref 18–48)
MAGNESIUM SERPL-MCNC: 1.8 MG/DL (ref 1.6–2.6)
MCH RBC QN AUTO: 29 PG (ref 27–31)
MCHC RBC AUTO-ENTMCNC: 34.3 G/DL (ref 32–36)
MCV RBC AUTO: 84 FL (ref 82–98)
MONOCYTES # BLD AUTO: 0.7 K/UL (ref 0.3–1)
MONOCYTES NFR BLD: 9.6 % (ref 4–15)
NEUTROPHILS # BLD AUTO: 4.1 K/UL (ref 1.8–7.7)
NEUTROPHILS NFR BLD: 57.9 % (ref 38–73)
PHOSPHATE SERPL-MCNC: 3.9 MG/DL (ref 2.7–4.5)
PHOSPHATE SERPL-MCNC: 3.9 MG/DL (ref 2.7–4.5)
PLATELET # BLD AUTO: 210 K/UL (ref 150–350)
PMV BLD AUTO: 9.6 FL (ref 9.2–12.9)
POTASSIUM SERPL-SCNC: 3.5 MMOL/L (ref 3.5–5.1)
RBC # BLD AUTO: 4.28 M/UL (ref 4–5.4)
SODIUM SERPL-SCNC: 140 MMOL/L (ref 136–145)
WBC # BLD AUTO: 7.01 K/UL (ref 3.9–12.7)

## 2019-06-21 PROCEDURE — 83735 ASSAY OF MAGNESIUM: CPT

## 2019-06-21 PROCEDURE — 25000003 PHARM REV CODE 250: Performed by: HOSPITALIST

## 2019-06-21 PROCEDURE — 80069 RENAL FUNCTION PANEL: CPT

## 2019-06-21 PROCEDURE — 63600175 PHARM REV CODE 636 W HCPCS: Performed by: NURSE PRACTITIONER

## 2019-06-21 PROCEDURE — S0028 INJECTION, FAMOTIDINE, 20 MG: HCPCS | Performed by: HOSPITALIST

## 2019-06-21 PROCEDURE — 85025 COMPLETE CBC W/AUTO DIFF WBC: CPT

## 2019-06-21 PROCEDURE — 83690 ASSAY OF LIPASE: CPT

## 2019-06-21 PROCEDURE — 82150 ASSAY OF AMYLASE: CPT

## 2019-06-21 PROCEDURE — 63600175 PHARM REV CODE 636 W HCPCS: Performed by: HOSPITALIST

## 2019-06-21 PROCEDURE — 36415 COLL VENOUS BLD VENIPUNCTURE: CPT

## 2019-06-21 RX ORDER — HYDROCODONE BITARTRATE AND ACETAMINOPHEN 5; 325 MG/1; MG/1
1 TABLET ORAL EVERY 6 HOURS PRN
Qty: 15 TABLET | Refills: 0 | Status: SHIPPED | OUTPATIENT
Start: 2019-06-21 | End: 2019-11-16 | Stop reason: CLARIF

## 2019-06-21 RX ORDER — TRAMADOL HYDROCHLORIDE 50 MG/1
50 TABLET ORAL EVERY 6 HOURS PRN
Qty: 15 TABLET | Refills: 0 | Status: SHIPPED | OUTPATIENT
Start: 2019-06-21 | End: 2019-06-21 | Stop reason: HOSPADM

## 2019-06-21 RX ADMIN — FAMOTIDINE 20 MG: 20 INJECTION, SOLUTION INTRAVENOUS at 09:06

## 2019-06-21 RX ADMIN — HYDROMORPHONE HYDROCHLORIDE 1 MG: 1 INJECTION, SOLUTION INTRAMUSCULAR; INTRAVENOUS; SUBCUTANEOUS at 09:06

## 2019-06-21 RX ADMIN — MORPHINE SULFATE 4 MG: 4 INJECTION, SOLUTION INTRAMUSCULAR; INTRAVENOUS at 03:06

## 2019-06-21 RX ADMIN — HYDROMORPHONE HYDROCHLORIDE 1 MG: 1 INJECTION, SOLUTION INTRAMUSCULAR; INTRAVENOUS; SUBCUTANEOUS at 01:06

## 2019-06-21 RX ADMIN — MORPHINE SULFATE 4 MG: 4 INJECTION, SOLUTION INTRAMUSCULAR; INTRAVENOUS at 07:06

## 2019-06-21 RX ADMIN — HYDROMORPHONE HYDROCHLORIDE 1 MG: 1 INJECTION, SOLUTION INTRAMUSCULAR; INTRAVENOUS; SUBCUTANEOUS at 05:06

## 2019-06-21 NOTE — DISCHARGE SUMMARY
[facility]  Hospital Medicine  Discharge Summary      Patient Name: Priti Obrien  MRN: [id number]  Admission Date: 2019  Hospital Length of Stay: 3 days  Discharge Date and Time: 2019 11:12 AM  Attending Physician: Thanh Zavala MD   Discharging Provider: Dougie Yadav NP  Primary Care Provider: Dev Lunsford Jr, MD      HPI:   33F h/o idiopathic pancreatitis presents with epigastric pain x 1 day.  Associated with nausea.  Patient denies vomiting, fever, chills, dysuria, hematuria, hematochezia, constipation, diarrhea, and all other sxs at this time. PSHx includes appendectomy, , cholecystectomy, hysterectomy, and laparoscopy.  Patient was last admitted to [facility] for pancreatitis in 2019.  In ER, lipase>1000, amylase 2858, K 3.4, glucose 225.  Cr 0.8.  Patient was given 1L NS bolus and 6 mg IV morphine in ER.      * No surgery found *      Hospital Course:   Priti Obrien is a 34 year old female who was admitted to [facility] for pancreatitis. Patient has history of idiopathic pancreatitis She was admitted in April for the same. Patient states she has had multiple episodes of pancreatitis since being a teenager.  She has undergone ERCP, MRCP, and exploratory lap without an etiology. She denies ETOH use. Triglycerides were elevated, but not drastic. Treatment included bowel rest, IVF's, analgesia, and antiemetics. Amylase and lipase elevated on admission, however has continued to trend downward since admission. Today, she reports abdominal pain, has improved. Vital signs and labs stable, she is tolerating diet, as well. Patient was seen, examined and deemed suitable for discharge.      Consults:     No new Assessment & Plan notes have been filed under this hospital service since the last note was generated.  Service: Hospital Medicine    Final Active Diagnoses:      Problems Resolved During this Admission:    Diagnosis Date Noted Date Resolved POA     PRINCIPAL PROBLEM:  Idiopathic acute pancreatitis [K85.00] 06/18/2019 06/21/2019 Yes       Discharged Condition: stable    Disposition: Home or Self Care    Follow Up:  Follow-up Information     [facility].    Why:  GI appointment-[facility] will contact patient directly within 1-2 weeks to schedule appointment  Contact information:  [address]  [address]  [place]   [phone]           Dev Lunsford Jr, MD. Schedule an appointment as soon as possible for a visit in 1 week.    Specialty:  Family Medicine  Why:  hospital follow up  Contact information:  [address]  [address]  [place] [address]                 Patient Instructions:      Diet full liquid   Scheduling Instructions: Advance to regular diet as tolerated     Notify your health care provider if you experience any of the following:  persistent nausea and vomiting or diarrhea     Activity as tolerated       Significant Diagnostic Studies: Labs:   CMP   Recent Labs   Lab 06/20/19  0429 06/21/19  0509    140   K 3.5 3.5    106   CO2 26 26   * 114*   BUN 6 4*   CREATININE 0.7 0.7   CALCIUM 8.9 8.8   ALBUMIN 3.6 3.2*   ANIONGAP 9 8   ESTGFRAFRICA >60 >60   EGFRNONAA >60 >60   , CBC   Recent Labs   Lab 06/20/19  0429 06/21/19  0509   WBC 8.09 7.01   HGB 13.0 12.4   HCT 37.3 36.1*    210    and All labs within the past 24 hours have been reviewed    Pending Diagnostic Studies:     None         Medications:  Reconciled Home Medications:      Medication List      START taking these medications    HYDROcodone-acetaminophen 5-325 mg per tablet  Commonly known as:  NORCO  Take 1 tablet by mouth every 6 (six) hours as needed for Pain.        CONTINUE taking these medications    busPIRone 7.5 MG tablet  Commonly known as:  BUSPAR  Take 7.5 mg by mouth 3 (three) times daily.     ondansetron 8 MG Tbdl  Commonly known as:  ZOFRAN-ODT  Take 1 tablet (8 mg total) by mouth every 8 (eight) hours as needed.             Indwelling Lines/Drains at time of discharge:   Lines/Drains/Airways          None          Time spent on the discharge of patient: 40 minutes  Patient was seen and examined on the date of discharge and determined to be suitable for discharge.         Dougie Yadav NP  Department of Hospital Medicine  [facility]

## 2019-06-21 NOTE — PLAN OF CARE
Problem: Adult Inpatient Plan of Care  Goal: Plan of Care Review  Outcome: Ongoing (interventions implemented as appropriate)  Pt had no adverse events during shift. Pt free of falls. Call light in reach. Side rails x 2. Pain well controlled w/ prn meds. Pt complains of mild nausea, no emesis. Tolerating CL diet. Pt repositions independently. IVF administered as ordered. VSS. Chart reviewed, will continue to monitor.

## 2019-06-21 NOTE — PLAN OF CARE
Problem: Adult Inpatient Plan of Care  Goal: Plan of Care Review  Outcome: Ongoing (interventions implemented as appropriate)  Pt is ready for discharge. Pain managed with med-effective. Fall precautions maintained. AVS explained, all questions and concerns addressed. Teach back done. IV removed.

## 2019-10-21 ENCOUNTER — HOSPITAL ENCOUNTER (EMERGENCY)
Facility: HOSPITAL | Age: 34
Discharge: HOME | End: 2019-10-21
Attending: EMERGENCY MEDICINE
Payer: MEDICAID

## 2019-10-21 VITALS
RESPIRATION RATE: 18 BRPM | BODY MASS INDEX: 41.17 KG/M2 | HEART RATE: 76 BPM | OXYGEN SATURATION: 98 % | TEMPERATURE: 99 F | SYSTOLIC BLOOD PRESSURE: 154 MMHG | WEIGHT: 223.75 LBS | HEIGHT: 62 IN | DIASTOLIC BLOOD PRESSURE: 84 MMHG

## 2019-10-21 DIAGNOSIS — R10.13 EPIGASTRIC PAIN: Primary | ICD-10-CM

## 2019-10-21 LAB
ALBUMIN SERPL BCP-MCNC: 4.2 G/DL (ref 3.5–5.2)
ALP SERPL-CCNC: 58 U/L (ref 55–135)
ALT SERPL W/O P-5'-P-CCNC: 43 U/L (ref 10–44)
ANION GAP SERPL CALC-SCNC: 13 MMOL/L (ref 8–16)
AST SERPL-CCNC: 28 U/L (ref 10–40)
BASOPHILS # BLD AUTO: 0.05 K/UL (ref 0–0.2)
BASOPHILS NFR BLD: 0.6 % (ref 0–1.9)
BILIRUB SERPL-MCNC: 0.5 MG/DL (ref 0.1–1)
BILIRUB UR QL STRIP: NEGATIVE
BUN SERPL-MCNC: 9 MG/DL (ref 6–20)
CALCIUM SERPL-MCNC: 9.8 MG/DL (ref 8.7–10.5)
CHLORIDE SERPL-SCNC: 101 MMOL/L (ref 95–110)
CLARITY UR: CLEAR
CO2 SERPL-SCNC: 24 MMOL/L (ref 23–29)
COLOR UR: YELLOW
CREAT SERPL-MCNC: 0.8 MG/DL (ref 0.5–1.4)
DIFFERENTIAL METHOD: NORMAL
EOSINOPHIL # BLD AUTO: 0.2 K/UL (ref 0–0.5)
EOSINOPHIL NFR BLD: 2.8 % (ref 0–8)
ERYTHROCYTE [DISTWIDTH] IN BLOOD BY AUTOMATED COUNT: 12.9 % (ref 11.5–14.5)
EST. GFR  (AFRICAN AMERICAN): >60 ML/MIN/1.73 M^2
EST. GFR  (NON AFRICAN AMERICAN): >60 ML/MIN/1.73 M^2
GLUCOSE SERPL-MCNC: 249 MG/DL (ref 70–110)
GLUCOSE UR QL STRIP: ABNORMAL
HCT VFR BLD AUTO: 44.3 % (ref 37–48.5)
HGB BLD-MCNC: 15.1 G/DL (ref 12–16)
HGB UR QL STRIP: NEGATIVE
IMM GRANULOCYTES # BLD AUTO: 0.02 K/UL (ref 0–0.04)
IMM GRANULOCYTES NFR BLD AUTO: 0.2 % (ref 0–0.5)
KETONES UR QL STRIP: NEGATIVE
LEUKOCYTE ESTERASE UR QL STRIP: NEGATIVE
LIPASE SERPL-CCNC: 20 U/L (ref 4–60)
LYMPHOCYTES # BLD AUTO: 2.7 K/UL (ref 1–4.8)
LYMPHOCYTES NFR BLD: 32.5 % (ref 18–48)
MCH RBC QN AUTO: 28.8 PG (ref 27–31)
MCHC RBC AUTO-ENTMCNC: 34.1 G/DL (ref 32–36)
MCV RBC AUTO: 85 FL (ref 82–98)
MONOCYTES # BLD AUTO: 0.5 K/UL (ref 0.3–1)
MONOCYTES NFR BLD: 5.6 % (ref 4–15)
NEUTROPHILS # BLD AUTO: 4.9 K/UL (ref 1.8–7.7)
NEUTROPHILS NFR BLD: 58.3 % (ref 38–73)
NITRITE UR QL STRIP: NEGATIVE
NRBC BLD-RTO: 0 /100 WBC
PH UR STRIP: 6 [PH] (ref 5–8)
PLATELET # BLD AUTO: 330 K/UL (ref 150–350)
PMV BLD AUTO: 10 FL (ref 9.2–12.9)
POTASSIUM SERPL-SCNC: 3.5 MMOL/L (ref 3.5–5.1)
PROT SERPL-MCNC: 8.1 G/DL (ref 6–8.4)
PROT UR QL STRIP: NEGATIVE
RBC # BLD AUTO: 5.24 M/UL (ref 4–5.4)
SODIUM SERPL-SCNC: 138 MMOL/L (ref 136–145)
SP GR UR STRIP: 1.02 (ref 1–1.03)
URN SPEC COLLECT METH UR: ABNORMAL
UROBILINOGEN UR STRIP-ACNC: NEGATIVE EU/DL
WBC # BLD AUTO: 8.32 K/UL (ref 3.9–12.7)

## 2019-10-21 PROCEDURE — 81003 URINALYSIS AUTO W/O SCOPE: CPT

## 2019-10-21 PROCEDURE — 83690 ASSAY OF LIPASE: CPT

## 2019-10-21 PROCEDURE — 80053 COMPREHEN METABOLIC PANEL: CPT

## 2019-10-21 PROCEDURE — 25000003 PHARM REV CODE 250: Performed by: EMERGENCY MEDICINE

## 2019-10-21 PROCEDURE — 99283 EMERGENCY DEPT VISIT LOW MDM: CPT

## 2019-10-21 PROCEDURE — 85025 COMPLETE CBC W/AUTO DIFF WBC: CPT

## 2019-10-21 RX ORDER — SUCRALFATE 1 G/10ML
1 SUSPENSION ORAL
Status: COMPLETED | OUTPATIENT
Start: 2019-10-21 | End: 2019-10-21

## 2019-10-21 RX ADMIN — SUCRALFATE 1 G: 1 SUSPENSION ORAL at 08:10

## 2019-10-22 NOTE — ED NOTES
Pt unable to urinate at this time. Instructed on need for urine sample. Pt verbalized understanding

## 2019-10-22 NOTE — ED PROVIDER NOTES
10/21/2019, 7:21 PM   History obtained from the patient      History of Present Illness: Priti Obrien is a 34 y.o. female patient with PMHx of pancreatitis who presents to the Emergency Department for abdominal pain which onset yesterday.    7:22 PM: Pt was placed in TL. I explained to pt that due to lack of available rooms pt was seen by myself to begin the workup. Pt understands they will be seen and dispositioned by the next available physician. Pt voices understanding and agrees with plan. Pt also understands the longer than normal wait time. I am removing myself from the care of pt and pt will now be assigned to the next available physician.       SCRIBE #1 NOTE: I, Rosaura Hutchins, am scribing for, and in the presence of, Kenneth Glass MD. I have scribed the entire note.      History      Chief Complaint   Patient presents with    Abdominal Pain     since yesterday morning, hx pancreatitis       Review of patient's allergies indicates:   Allergen Reactions    Demerol (pf) [meperidine (pf)] Hives    Medrol [methylprednisolone] Anaphylaxis     Any cortisone    Adhesive      Other reaction(s): Unknown    Amoxicillin-pot clavulanate      Other reaction(s): Unknown    Atarax [hydroxyzine hcl] Hives    Hydralazine analogues Hives    Iodine      Other reaction(s): Unknown    Penicillins Hives and Nausea And Vomiting    Phenergan [promethazine] Hives and Nausea And Vomiting    Risperidone analogues Hives    Ativan [lorazepam] Hives and Anxiety        HPI   HPI    10/21/2019, 8:23 PM   History obtained from the patient      History of Present Illness: Priti Obrien is a 34 y.o. female patient with PMHx of bipolar disorder, depression, GERD, HTN, and pancreatitis who presents to the Emergency Department for generalized abdominal pain which onset gradually yesterday AM. Pt reports sxs are consistent with her typical pancreatitis flare-up. Symptoms are constant and moderate in severity.  No  mitigating or exacerbating factors reported. Associated sxs include N/V/D. Patient denies any CP, SOB, HA, fever, chills, cough, constipation, dysuria, hematuria, frequency, flank pain, vaginal discharge, vaginal bleeding, and all other sxs at this time. No further complaints or concerns at this time.         Arrival mode: Personal vehicle      PCP: Dev Lunsford Jr, MD       Past Medical History:  Past Medical History:   Diagnosis Date    Allergy     Asthma     Childhood only    Bipolar 1 disorder     Borderline personality disorder     Depression     GERD (gastroesophageal reflux disease)     History of psychiatric hospitalization     Hx of psychiatric care     Hypertension     pt denied    Pancreatitis     Psychiatric problem     PTSD (post-traumatic stress disorder)     S/P partial hysterectomy 2011    Substance abuse     Suicide attempt        Past Surgical History:  Past Surgical History:   Procedure Laterality Date    APPENDECTOMY       SECTION      CHOLECYSTECTOMY      HYSTERECTOMY      LASER LAPAROSCOPY           Family History:  Family History   Problem Relation Age of Onset    Diabetes Mother     Hypertension Mother     Atrial fibrillation Maternal Grandfather        Social History:  Social History     Tobacco Use    Smoking status: Former Smoker     Packs/day: 0.25     Types: Cigarettes     Last attempt to quit: 2013     Years since quittin.3    Smokeless tobacco: Never Used   Substance and Sexual Activity    Alcohol use: No     Comment: occasional     Drug use: Yes     Frequency: 1.0 times per week     Types: Marijuana    Sexual activity: Yes     Partners: Male     Birth control/protection: None       ROS   Review of Systems   Constitutional: Negative for chills and fever.   HENT: Negative for sore throat.    Respiratory: Negative for cough and shortness of breath.    Cardiovascular: Negative for chest pain.   Gastrointestinal: Positive for  "abdominal pain, diarrhea, nausea and vomiting. Negative for blood in stool and constipation.   Genitourinary: Negative for dysuria, flank pain, hematuria, urgency, vaginal bleeding and vaginal discharge.   Musculoskeletal: Negative for back pain.   Skin: Negative for rash.   Neurological: Negative for weakness and headaches.   Hematological: Does not bruise/bleed easily.   All other systems reviewed and are negative.      Physical Exam      Initial Vitals [10/21/19 1920]   BP Pulse Resp Temp SpO2   (!) 176/107 89 18 98.7 °F (37.1 °C) 95 %      MAP       --          Physical Exam  Nursing Notes and Vital Signs Reviewed.  Constitutional: Patient is in mild distress. Well-developed and well-nourished.  Head: Atraumatic. Normocephalic.  Eyes: PERRL. EOM intact. Conjunctivae are not pale. No scleral icterus.  ENT: Mucous membranes are moist. Oropharynx is clear and symmetric.    Neck: Supple. Full ROM. No lymphadenopathy.  Cardiovascular: Regular rate. Regular rhythm. No murmurs, rubs, or gallops. Distal pulses are 2+ and symmetric.  Pulmonary/Chest: No respiratory distress. Clear to auscultation bilaterally. No wheezing or rales.  Abdominal: Soft and non-distended.  There is epigastric tenderness.  No rebound, guarding, or rigidity. Good bowel sounds.  Genitourinary: No CVA tenderness  Musculoskeletal: Moves all extremities. No obvious deformities. No edema. No calf tenderness.  Skin: Warm and dry.  Neurological:  Alert, awake, and appropriate.  Normal speech.  No acute focal neurological deficits are appreciated.  Psychiatric: Normal affect. Good eye contact. Appropriate in content.    ED Course    Procedures  ED Vital Signs:  Vitals:    10/21/19 1920 10/21/19 2103   BP: (!) 176/107 (!) 154/84   Pulse: 89 76   Resp: 18 18   Temp: 98.7 °F (37.1 °C)    TempSrc: Oral    SpO2: 95% 98%   Weight: 101.5 kg (223 lb 12.3 oz)    Height: 5' 2" (1.575 m)        Abnormal Lab Results:  Labs Reviewed   COMPREHENSIVE METABOLIC PANEL - " Abnormal; Notable for the following components:       Result Value    Glucose 249 (*)     All other components within normal limits   URINALYSIS, REFLEX TO URINE CULTURE - Abnormal; Notable for the following components:    Glucose, UA 2+ (*)     All other components within normal limits    Narrative:     Preferred Collection Type->Urine, Clean Catch   CBC W/ AUTO DIFFERENTIAL   LIPASE        All Lab Results:  Results for orders placed or performed during the hospital encounter of 10/21/19   CBC auto differential   Result Value Ref Range    WBC 8.32 3.90 - 12.70 K/uL    RBC 5.24 4.00 - 5.40 M/uL    Hemoglobin 15.1 12.0 - 16.0 g/dL    Hematocrit 44.3 37.0 - 48.5 %    Mean Corpuscular Volume 85 82 - 98 fL    Mean Corpuscular Hemoglobin 28.8 27.0 - 31.0 pg    Mean Corpuscular Hemoglobin Conc 34.1 32.0 - 36.0 g/dL    RDW 12.9 11.5 - 14.5 %    Platelets 330 150 - 350 K/uL    MPV 10.0 9.2 - 12.9 fL    Immature Granulocytes 0.2 0.0 - 0.5 %    Gran # (ANC) 4.9 1.8 - 7.7 K/uL    Immature Grans (Abs) 0.02 0.00 - 0.04 K/uL    Lymph # 2.7 1.0 - 4.8 K/uL    Mono # 0.5 0.3 - 1.0 K/uL    Eos # 0.2 0.0 - 0.5 K/uL    Baso # 0.05 0.00 - 0.20 K/uL    nRBC 0 0 /100 WBC    Gran% 58.3 38.0 - 73.0 %    Lymph% 32.5 18.0 - 48.0 %    Mono% 5.6 4.0 - 15.0 %    Eosinophil% 2.8 0.0 - 8.0 %    Basophil% 0.6 0.0 - 1.9 %    Differential Method Automated    Comprehensive metabolic panel   Result Value Ref Range    Sodium 138 136 - 145 mmol/L    Potassium 3.5 3.5 - 5.1 mmol/L    Chloride 101 95 - 110 mmol/L    CO2 24 23 - 29 mmol/L    Glucose 249 (H) 70 - 110 mg/dL    BUN, Bld 9 6 - 20 mg/dL    Creatinine 0.8 0.5 - 1.4 mg/dL    Calcium 9.8 8.7 - 10.5 mg/dL    Total Protein 8.1 6.0 - 8.4 g/dL    Albumin 4.2 3.5 - 5.2 g/dL    Total Bilirubin 0.5 0.1 - 1.0 mg/dL    Alkaline Phosphatase 58 55 - 135 U/L    AST 28 10 - 40 U/L    ALT 43 10 - 44 U/L    Anion Gap 13 8 - 16 mmol/L    eGFR if African American >60 >60 mL/min/1.73 m^2    eGFR if non African  American >60 >60 mL/min/1.73 m^2   Lipase   Result Value Ref Range    Lipase 20 4 - 60 U/L   Urinalysis, Reflex to Urine Culture Urine, Clean Catch   Result Value Ref Range    Specimen UA Urine, Clean Catch     Color, UA Yellow Yellow, Straw, [place]    Appearance, UA Clear Clear    pH, UA 6.0 5.0 - 8.0    Specific Gravity, UA 1.020 1.005 - 1.030    Protein, UA Negative Negative    Glucose, UA 2+ (A) Negative    Ketones, UA Negative Negative    Bilirubin (UA) Negative Negative    Occult Blood UA Negative Negative    Nitrite, UA Negative Negative    Urobilinogen, UA Negative <2.0 EU/dL    Leukocytes, UA Negative Negative         Imaging Results:  Imaging Results    None                 The Emergency Provider reviewed the vital signs and test results, which are outlined above.    ED Discussion     8:49 PM: Reassessed pt at this time.  Pt states her condition has improved at this time. Discussed with pt all pertinent ED information and results. Discussed pt dx and plan of tx. Gave pt all f/u and return to the ED instructions. All questions and concerns were addressed at this time. Pt expresses understanding of information and instructions, and is comfortable with plan to discharge. Pt is stable for discharge.    I discussed with patient and/or family/caretaker that evaluation in the ED does not suggest any emergent or life threatening medical conditions requiring immediate intervention beyond what was provided in the ED, and I believe patient is safe for discharge.  Regardless, an unremarkable evaluation in the ED does not preclude the development or presence of a serious of life threatening condition. As such, patient was instructed to return immediately for any worsening or change in current symptoms.        ED Medication(s):  Medications   sucralfate 100 mg/mL suspension 1 g (1 g Oral Given 10/21/19 2056)       Discharge Medication List as of 10/21/2019  8:51 PM          Follow-up Information     Dev Lunsford Jr, MD  In 2 days.    Specialty:  Family Medicine  Contact information:  [address]  [address]  [facility] [address]             [facility]    Specialty:  Emergency Medicine  Why:  As needed, If symptoms worsen  Contact information:  [address]  [facility] [address] [phone]                   Medical Decision Making    Medical Decision Making:   Clinical Tests:   Lab Tests: Ordered and Reviewed           Scribe Attestation:   Scribe #1: I performed the above scribed service and the documentation accurately describes the services I performed. I attest to the accuracy of the note.    Attending:   Physician Attestation Statement for Scribe #1: I, Kenneth Glass MD, personally performed the services described in this documentation, as scribed by Rosaura Hutchins, in my presence, and it is both accurate and complete.          Clinical Impression       ICD-10-CM ICD-9-CM   1. Epigastric pain R10.13 789.06       Disposition:   Disposition: Discharged  Condition: Stable                 Kenneth Glass MD  10/22/19 0553

## 2019-11-16 ENCOUNTER — HOSPITAL ENCOUNTER (EMERGENCY)
Facility: HOSPITAL | Age: 34
Discharge: HOME | End: 2019-11-16
Attending: FAMILY MEDICINE
Payer: MEDICAID

## 2019-11-16 VITALS
DIASTOLIC BLOOD PRESSURE: 92 MMHG | RESPIRATION RATE: 16 BRPM | BODY MASS INDEX: 40.59 KG/M2 | HEIGHT: 62 IN | SYSTOLIC BLOOD PRESSURE: 140 MMHG | WEIGHT: 220.56 LBS | TEMPERATURE: 99 F | OXYGEN SATURATION: 97 % | HEART RATE: 86 BPM

## 2019-11-16 DIAGNOSIS — J06.9 VIRAL URI WITH COUGH: Primary | ICD-10-CM

## 2019-11-16 DIAGNOSIS — R07.9 CHEST PAIN: ICD-10-CM

## 2019-11-16 DIAGNOSIS — R05.9 COUGH: ICD-10-CM

## 2019-11-16 LAB — DEPRECATED S PYO AG THROAT QL EIA: NEGATIVE

## 2019-11-16 PROCEDURE — 87081 CULTURE SCREEN ONLY: CPT

## 2019-11-16 PROCEDURE — 87880 STREP A ASSAY W/OPTIC: CPT

## 2019-11-16 PROCEDURE — 99285 EMERGENCY DEPT VISIT HI MDM: CPT | Mod: 25

## 2019-11-16 PROCEDURE — 93010 ELECTROCARDIOGRAM REPORT: CPT | Mod: ,,, | Performed by: INTERNAL MEDICINE

## 2019-11-16 PROCEDURE — 93010 EKG 12-LEAD: ICD-10-PCS | Mod: ,,, | Performed by: INTERNAL MEDICINE

## 2019-11-16 PROCEDURE — 93005 ELECTROCARDIOGRAM TRACING: CPT

## 2019-11-16 PROCEDURE — 87147 CULTURE TYPE IMMUNOLOGIC: CPT

## 2019-11-16 RX ORDER — ONDANSETRON 4 MG/1
4 TABLET, ORALLY DISINTEGRATING ORAL EVERY 8 HOURS PRN
Qty: 10 TABLET | Refills: 0 | OUTPATIENT
Start: 2019-11-16 | End: 2020-05-20

## 2019-11-16 RX ORDER — BENZONATATE 200 MG/1
200 CAPSULE ORAL 3 TIMES DAILY PRN
Qty: 30 CAPSULE | Refills: 0 | Status: SHIPPED | OUTPATIENT
Start: 2019-11-16 | End: 2019-11-26

## 2019-11-16 NOTE — ED PROVIDER NOTES
SCRIBE #1 NOTE: IVika, am scribing for, and in the presence of, Nevaeh Schaffer PA-C. I have scribed the entire note.       History     Chief Complaint   Patient presents with    Cough     Pt reports unproductive cough with chest pain this a.m. Chest pain worse with coughing. Denies fevers.      Review of patient's allergies indicates:   Allergen Reactions    Demerol (pf) [meperidine (pf)] Hives    Medrol [methylprednisolone] Anaphylaxis     Any cortisone    Adhesive      Other reaction(s): Unknown    Amoxicillin-pot clavulanate      Other reaction(s): Unknown    Atarax [hydroxyzine hcl] Hives    Hydralazine analogues Hives    Iodine      Other reaction(s): Unknown    Penicillins Hives and Nausea And Vomiting    Phenergan [promethazine] Hives and Nausea And Vomiting    Risperidone analogues Hives    Ativan [lorazepam] Hives and Anxiety         History of Present Illness     HPI    11/16/2019, 2:50 PM  History obtained from the patient      History of Present Illness: Priti Obrien is a 34 y.o. female patient with a PMHx of HTN who presents to the Emergency Department for evaluation of an unproductive cough which onset gradually 1.5 weeks PTA. Symptoms are constant and moderate in severity. No mitigating or exacerbating factors reported. Associated sxs include nasal congestion, rhinnorhea, emesis, chest wall pain and sore throat. Patient denies any fever, chills, SOB, HA, dizziness and all other sxs at this time. Prior Tx includes Theraflu with no relief of sxs. No further complaints or concerns at this time.         Arrival mode: Personal vehicle      PCP: Dev Lunsford Jr, MD        Past Medical History:  Past Medical History:   Diagnosis Date    Allergy     Asthma     Childhood only    Bipolar 1 disorder     Borderline personality disorder     Depression     GERD (gastroesophageal reflux disease)     History of psychiatric hospitalization     Hx of psychiatric care      Hypertension     pt denied    Pancreatitis     Psychiatric problem     PTSD (post-traumatic stress disorder)     S/P partial hysterectomy 2011    Substance abuse     Suicide attempt        Past Surgical History:  Past Surgical History:   Procedure Laterality Date    APPENDECTOMY       SECTION      CHOLECYSTECTOMY      HYSTERECTOMY      LASER LAPAROSCOPY           Family History:  Family History   Problem Relation Age of Onset    Diabetes Mother     Hypertension Mother     Atrial fibrillation Maternal Grandfather        Social History:  Social History     Tobacco Use    Smoking status: Former Smoker     Packs/day: 0.25     Types: Cigarettes     Last attempt to quit: 2013     Years since quittin.4    Smokeless tobacco: Never Used   Substance and Sexual Activity    Alcohol use: Yes     Comment: occasional     Drug use: Yes     Frequency: 1.0 times per week     Types: Marijuana    Sexual activity: Yes     Partners: Male     Birth control/protection: None        Review of Systems     Review of Systems   Constitutional: Negative for activity change, appetite change, chills, diaphoresis and fever.   HENT: Positive for congestion (nasal), rhinorrhea and sore throat. Negative for drooling, ear pain, mouth sores, sinus pain and trouble swallowing.    Eyes: Negative for pain and discharge.   Respiratory: Positive for cough (unproductive). Negative for chest tightness, shortness of breath, wheezing and stridor.    Cardiovascular: Positive for chest pain (chest wall). Negative for palpitations and leg swelling.   Gastrointestinal: Positive for vomiting. Negative for abdominal distention, abdominal pain, blood in stool, constipation, diarrhea and nausea.   Genitourinary: Negative for difficulty urinating, dysuria, flank pain, frequency, hematuria and urgency.   Musculoskeletal: Negative for arthralgias, back pain and myalgias.   Skin: Negative for pallor, rash and wound.  "  Neurological: Negative for dizziness, syncope, weakness, light-headedness, numbness and headaches.   All other systems reviewed and are negative.       Physical Exam     Initial Vitals [11/16/19 1309]   BP Pulse Resp Temp SpO2   (!) 140/92 86 16 98.9 °F (37.2 °C) 97 %      MAP       --          Physical Exam  Nursing Notes and Vital Signs Reviewed.  Constitutional: Patient is in no acute distress. Well-developed and well-nourished.  Head: Atraumatic. Normocephalic.  Eyes: PERRL. EOM intact. Conjunctivae are not pale. No scleral icterus.  ENT: Mucous membranes are moist. Oropharynx is erythematous without exudates.   Neck: Supple. Full ROM. No lymphadenopathy.  Cardiovascular: Regular rate. Regular rhythm. No murmurs, rubs, or gallops. Distal pulses are 2+ and symmetric.  Pulmonary/Chest: No respiratory distress. Clear to auscultation bilaterally. No wheezing or rales.  Cough noted.   Abdominal: Soft and non-distended.  There is no tenderness.  No rebound, guarding, or rigidity. Good bowel sounds.  Genitourinary: No CVA tenderness  Musculoskeletal: Moves all extremities. No obvious deformities. No edema. No calf tenderness.  Skin: Warm and dry.  Neurological:  Alert, awake, and appropriate.  Normal speech.  No acute focal neurological deficits are appreciated.  Psychiatric: Normal affect. Good eye contact. Appropriate in content.     ED Course   Procedures  ED Vital Signs:  Vitals:    11/16/19 1309   BP: (!) 140/92   Pulse: 86   Resp: 16   Temp: 98.9 °F (37.2 °C)   TempSrc: Oral   SpO2: 97%   Weight: 100 kg (220 lb 9.1 oz)   Height: 5' 2" (1.575 m)       Abnormal Lab Results:  Labs Reviewed   CULTURE, STREP A,  THROAT - Abnormal; Notable for the following components:       Result Value    Strep A Culture   (*)     Value: STREPTOCOCCUS PYOGENES (GROUP A)  Beta-hemolytic streptococci are routinely susceptible to   penicillins,cephalosporins and carbapenems.      All other components within normal limits   THROAT " SCREEN, RAPID        All Lab Results:  Results for orders placed or performed during the hospital encounter of 11/16/19   Rapid strep screen   Result Value Ref Range    Rapid Strep A Screen Negative Negative   Strep A culture, throat   Result Value Ref Range    Strep A Culture (A)      STREPTOCOCCUS PYOGENES (GROUP A)  Beta-hemolytic streptococci are routinely susceptible to   penicillins,cephalosporins and carbapenems.           Imaging Results:  Imaging Results          X-Ray Chest PA And Lateral (Final result)  Result time 11/16/19 13:28:33    Final result by Dewey Khoury MD (11/16/19 13:28:33)                 Impression:      Negative chest x-ray.      Electronically signed by: Dewey Khoury MD  Date:    11/16/2019  Time:    13:28             Narrative:    EXAMINATION:  XR CHEST PA AND LATERAL    CLINICAL HISTORY:  Cough    FINDINGS:  Comparison study 02/09/2019.  No change.  Right chest wall MediPort with catheter tip proximal SVC via the subclavian vein.  Normal size heart.  No congestion.  Lungs are clear.                                 The EKG was ordered, reviewed, and independently interpreted by the ED provider.  Interpretation time: 13:14  Rate: 95 BPM  Rhythm: normal sinus rhythm  Interpretation: No acute ST wave changes. No STEMI.             The Emergency Provider reviewed the vital signs and test results, which are outlined above.     ED Discussion       2:55 PM: Reassessed pt at this time.  Pt states her condition has improved at this time. Discussed with pt all pertinent ED information and results. Discussed pt dx and plan of tx. Gave pt all f/u and return to the ED instructions. All questions and concerns were addressed at this time. Pt expresses understanding of information and instructions, and is comfortable with plan to discharge. Pt is stable for discharge.    I discussed with patient and/or family/caretaker that evaluation in the ED does not suggest any emergent or life threatening  medical conditions requiring immediate intervention beyond what was provided in the ED, and I believe patient is safe for discharge.  Regardless, an unremarkable evaluation in the ED does not preclude the development or presence of a serious of life threatening condition. As such, patient was instructed to return immediately for any worsening or change in current symptoms.    Pre-hypertension/Hypertension: The pt has been informed that they may have pre-hypertension or hypertension based on a blood pressure reading in the ED. I recommend that the pt call the PCP listed on their discharge instructions or a physician of their choice this week to arrange f/u for further evaluation of possible pre-hypertension or hypertension.            Medical Decision Making:   Clinical Tests:   Lab Tests: Ordered and Reviewed  Radiological Study: Ordered and Reviewed  Medical Tests: Ordered and Reviewed           ED Medication(s):  Medications - No data to display    Discharge Medication List as of 11/16/2019  2:51 PM      START taking these medications    Details   benzonatate (TESSALON) 200 MG capsule Take 1 capsule (200 mg total) by mouth 3 (three) times daily as needed for Cough., Starting Sat 11/16/2019, Until Tue 11/26/2019, Print      ondansetron (ZOFRAN-ODT) 4 MG TbDL Take 1 tablet (4 mg total) by mouth every 8 (eight) hours as needed (nausea)., Starting Sat 11/16/2019, Print             Follow-up Information     Dev Lunsford Jr, MD.    Specialty:  Family Medicine  Contact information:  [address] [address]                       Scribe Attestation:   Scribe #1: I performed the above scribed service and the documentation accurately describes the services I performed. I attest to the accuracy of the note.     Attending:   Physician Attestation Statement for Scribe #1: I, Nevaeh Schaffer PA-C, personally performed the services described in this documentation, as scribed by Vika Diallo in  my presence, and it is both accurate and complete.           Clinical Impression       ICD-10-CM ICD-9-CM   1. Viral URI with cough J06.9 465.9    B97.89    2. Chest pain R07.9 786.50   3. Cough R05 786.2       Disposition:   Disposition: Discharged  Condition: Stable         Nevaeh Bryson PA-C  11/17/19 8426

## 2019-11-17 ENCOUNTER — TELEPHONE (OUTPATIENT)
Dept: EMERGENCY MEDICINE | Facility: HOSPITAL | Age: 34
End: 2019-11-17

## 2019-11-17 LAB — BACTERIA THROAT CULT: ABNORMAL

## 2019-11-17 NOTE — TELEPHONE ENCOUNTER
Patient notified that strep was positive and need for antibiotics per Nevaeh MARCIAL. Keflex  500 mg po BID for 10 days. Called to [facility] per patient's request.

## 2019-11-18 ENCOUNTER — TELEPHONE (OUTPATIENT)
Dept: EMERGENCY MEDICINE | Facility: HOSPITAL | Age: 34
End: 2019-11-18

## 2020-05-19 ENCOUNTER — NURSE TRIAGE (OUTPATIENT)
Dept: ADMINISTRATIVE | Facility: CLINIC | Age: 35
End: 2020-05-19

## 2020-05-20 ENCOUNTER — HOSPITAL ENCOUNTER (EMERGENCY)
Facility: HOSPITAL | Age: 35
Discharge: HOME | End: 2020-05-20
Attending: EMERGENCY MEDICINE
Payer: MEDICAID

## 2020-05-20 VITALS
BODY MASS INDEX: 39.86 KG/M2 | HEART RATE: 80 BPM | DIASTOLIC BLOOD PRESSURE: 106 MMHG | HEIGHT: 62 IN | WEIGHT: 216.63 LBS | SYSTOLIC BLOOD PRESSURE: 174 MMHG | TEMPERATURE: 98 F | RESPIRATION RATE: 13 BRPM | OXYGEN SATURATION: 99 %

## 2020-05-20 DIAGNOSIS — R07.9 CHEST PAIN: ICD-10-CM

## 2020-05-20 DIAGNOSIS — U07.1 COVID-19 VIRUS INFECTION: Primary | ICD-10-CM

## 2020-05-20 DIAGNOSIS — R03.0 ELEVATED BLOOD PRESSURE READING: ICD-10-CM

## 2020-05-20 DIAGNOSIS — R73.9 ELEVATED BLOOD SUGAR: ICD-10-CM

## 2020-05-20 LAB
ALBUMIN SERPL BCP-MCNC: 3.7 G/DL (ref 3.5–5.2)
ALP SERPL-CCNC: 56 U/L (ref 55–135)
ALT SERPL W/O P-5'-P-CCNC: 70 U/L (ref 10–44)
ANION GAP SERPL CALC-SCNC: 11 MMOL/L (ref 8–16)
AST SERPL-CCNC: 67 U/L (ref 10–40)
BASOPHILS # BLD AUTO: 0.02 K/UL (ref 0–0.2)
BASOPHILS NFR BLD: 0.4 % (ref 0–1.9)
BILIRUB SERPL-MCNC: 0.5 MG/DL (ref 0.1–1)
BUN SERPL-MCNC: 7 MG/DL (ref 6–20)
CALCIUM SERPL-MCNC: 8.6 MG/DL (ref 8.7–10.5)
CHLORIDE SERPL-SCNC: 103 MMOL/L (ref 95–110)
CK SERPL-CCNC: 81 U/L (ref 20–180)
CO2 SERPL-SCNC: 22 MMOL/L (ref 23–29)
CREAT SERPL-MCNC: 0.7 MG/DL (ref 0.5–1.4)
CRP SERPL-MCNC: 13.1 MG/L (ref 0–8.2)
DIFFERENTIAL METHOD: NORMAL
EOSINOPHIL # BLD AUTO: 0.1 K/UL (ref 0–0.5)
EOSINOPHIL NFR BLD: 2.8 % (ref 0–8)
ERYTHROCYTE [DISTWIDTH] IN BLOOD BY AUTOMATED COUNT: 12.4 % (ref 11.5–14.5)
EST. GFR  (AFRICAN AMERICAN): >60 ML/MIN/1.73 M^2
EST. GFR  (NON AFRICAN AMERICAN): >60 ML/MIN/1.73 M^2
FERRITIN SERPL-MCNC: 288 NG/ML (ref 20–300)
GLUCOSE SERPL-MCNC: 215 MG/DL (ref 70–110)
HCT VFR BLD AUTO: 42.7 % (ref 37–48.5)
HGB BLD-MCNC: 14.8 G/DL (ref 12–16)
IMM GRANULOCYTES # BLD AUTO: 0.01 K/UL (ref 0–0.04)
IMM GRANULOCYTES NFR BLD AUTO: 0.2 % (ref 0–0.5)
LDH SERPL L TO P-CCNC: 272 U/L (ref 110–260)
LYMPHOCYTES # BLD AUTO: 2 K/UL (ref 1–4.8)
LYMPHOCYTES NFR BLD: 39.4 % (ref 18–48)
MCH RBC QN AUTO: 28.8 PG (ref 27–31)
MCHC RBC AUTO-ENTMCNC: 34.7 G/DL (ref 32–36)
MCV RBC AUTO: 83 FL (ref 82–98)
MONOCYTES # BLD AUTO: 0.3 K/UL (ref 0.3–1)
MONOCYTES NFR BLD: 6.2 % (ref 4–15)
NEUTROPHILS # BLD AUTO: 2.5 K/UL (ref 1.8–7.7)
NEUTROPHILS NFR BLD: 51 % (ref 38–73)
NRBC BLD-RTO: 0 /100 WBC
PLATELET # BLD AUTO: 247 K/UL (ref 150–350)
PMV BLD AUTO: 10.6 FL (ref 9.2–12.9)
POTASSIUM SERPL-SCNC: 3.6 MMOL/L (ref 3.5–5.1)
PROT SERPL-MCNC: 7.4 G/DL (ref 6–8.4)
RBC # BLD AUTO: 5.14 M/UL (ref 4–5.4)
SARS-COV-2 RDRP RESP QL NAA+PROBE: POSITIVE
SODIUM SERPL-SCNC: 136 MMOL/L (ref 136–145)
TROPONIN I SERPL DL<=0.01 NG/ML-MCNC: 0.01 NG/ML (ref 0–0.03)
WBC # BLD AUTO: 4.98 K/UL (ref 3.9–12.7)

## 2020-05-20 PROCEDURE — 36000 PLACE NEEDLE IN VEIN: CPT

## 2020-05-20 PROCEDURE — 82728 ASSAY OF FERRITIN: CPT

## 2020-05-20 PROCEDURE — 99285 EMERGENCY DEPT VISIT HI MDM: CPT | Mod: 25

## 2020-05-20 PROCEDURE — 93010 ELECTROCARDIOGRAM REPORT: CPT | Mod: ,,, | Performed by: INTERNAL MEDICINE

## 2020-05-20 PROCEDURE — 80053 COMPREHEN METABOLIC PANEL: CPT

## 2020-05-20 PROCEDURE — 84484 ASSAY OF TROPONIN QUANT: CPT

## 2020-05-20 PROCEDURE — 83615 LACTATE (LD) (LDH) ENZYME: CPT

## 2020-05-20 PROCEDURE — 93005 ELECTROCARDIOGRAM TRACING: CPT

## 2020-05-20 PROCEDURE — 93010 EKG 12-LEAD: ICD-10-PCS | Mod: ,,, | Performed by: INTERNAL MEDICINE

## 2020-05-20 PROCEDURE — 86140 C-REACTIVE PROTEIN: CPT

## 2020-05-20 PROCEDURE — 82550 ASSAY OF CK (CPK): CPT

## 2020-05-20 PROCEDURE — U0002 COVID-19 LAB TEST NON-CDC: HCPCS

## 2020-05-20 PROCEDURE — 85025 COMPLETE CBC W/AUTO DIFF WBC: CPT

## 2020-05-20 RX ORDER — NORTRIPTYLINE HYDROCHLORIDE 10 MG/1
10 CAPSULE ORAL
COMMUNITY
End: 2020-05-20

## 2020-05-20 RX ORDER — AZITHROMYCIN 250 MG/1
250 TABLET, FILM COATED ORAL DAILY
Qty: 6 TABLET | Refills: 0 | Status: ON HOLD | OUTPATIENT
Start: 2020-05-20 | End: 2021-01-24 | Stop reason: HOSPADM

## 2020-05-20 RX ORDER — MELOXICAM 15 MG/1
15 TABLET ORAL
COMMUNITY
End: 2020-05-20

## 2020-05-20 RX ORDER — METHOCARBAMOL 500 MG/1
500 TABLET, FILM COATED ORAL
COMMUNITY
End: 2020-05-20

## 2020-05-20 NOTE — TELEPHONE ENCOUNTER
Pt exposed at work - and experiencing cough , HA, loss smell and taste, diarrhea, sob. Lives with 82 y/o grandmother - so concerned - wants to be tested - anywhere care option given -   Reason for Disposition   [1] Mild body aches, chills, diarrhea, headache, runny nose, or sore throat AND [2] within 14 days of COVID-19 EXPOSURE    Additional Information   Negative: SEVERE difficulty breathing (e.g., struggling for each breath, speaks in single words)   Negative: Bluish (or gray) lips or face now   Negative: Sounds like a life-threatening emergency to the triager   Negative: [1] Adult has symptoms of COVID-19 (fever, cough, or SOB) AND [2] lab test positive   Negative: [1] Adult has symptoms of COVID-19 (fever, cough or SOB) AND [2] major community spread where patient lives AND [3] testing not being done for mild symptoms   Negative: [1] Difficulty breathing (shortness of breath) occurs AND [2] onset > 14 days after COVID-19 EXPOSURE (Close Contact) AND [3] no major community spread   Negative: [1] Dry cough occurs AND [2] onset > 14 days after COVID-19 EXPOSURE AND [3] no major community spread   Negative: [1] Wet cough (i.e., white-yellow, yellow, green, or yu colored sputum) AND [2] onset > 14 days after COVID-19 EXPOSURE AND [3] no major community spread   Negative: [1] Common cold symptoms AND [2] onset > 14 days after COVID-19 EXPOSURE AND [3] no major community spread   Negative: [1] Difficulty breathing occurs AND [2] within 14 days of COVID-19 EXPOSURE (Close Contact)   Negative: Patient sounds very sick or weak to the triager   Negative: [1] Fever (or feeling feverish) OR cough AND [2] within 14 Days of COVID-19 EXPOSURE (Close Contact)   Negative: [1] Fever (or feeling feverish) OR cough occurs AND [2] within 14 days of travel from another country (international travel)   Negative: [1] Fever (or feeling feverish) OR cough occurs AND [2] within 14 days of travel from a city or area  with major community spread   Negative: [1] Fever (or feeling feverish) OR cough occurs AND [2] living in area with major community spread AND [3] testing being done in the community for symptoms   Negative: [1] COVID-19 EXPOSURE within last 14 days AND [2] NO cough, fever, or breathing difficulty AND [3] exposed person is a healthcare worker who was NOT using all recommended personal protective equipment (i.e., a respirator-N95 mask, eye protection, gloves, and gown)    Protocols used: CORONAVIRUS (COVID-19) EXPOSURE-A-AH

## 2020-05-20 NOTE — DISCHARGE INSTRUCTIONS
Your test was POSITIVE for COVID-19 (coronavirus).       Prevention steps for patients with confirmed COVID-19      Stay home and stay away from family members and friends. The CDC says, you can leave home after these three things have happened: 1) You have had no fever for at least 72 hours (that is three full days of no fever without the use of medicine that reduces fevers) 2) AND other symptoms have improved (for example, when your cough or shortness of breath have improved) 3) AND at least 7 days have passed since your symptoms first appeared.  Separate yourself from other people and animals in your home.  Call ahead before visiting your doctor.  Wear a facemask.  Cover your coughs and sneezes.  Wash your hands often with soap and water; hand  can be used, too.  Avoid sharing personal household items.  Wipe down surfaces used daily.  Monitor your symptoms. Seek prompt medical attention if your illness is worsening (e.g., difficulty breathing).   Before seeking care, call your healthcare provider.  If you have a medical emergency and need to call 911, notify the dispatch personnel that you have, or are being evaluated for COVID-19. If possible, put on a facemask before emergency medical services arrive.        Recommended precautions for household members, intimate partners, and caregivers in a home setting of a patient with symptomatic laboratory-confirmed COVID-19 or a patient under investigation.  Household members, intimate partners, and caregivers in the home setting awaiting tests results have close contact with a person with symptomatic, laboratory-confirmed COVID-19 or a person under investigation. Close contacts should monitor their health; they should call their provider right away if they develop symptoms suggestive of COVID-19 (e.g., fever, cough, shortness of breath).    Close contacts should also follow these recommendations:  Make sure that you understand and can help the patient follow  their provider's instructions for medication(s) and care. You should help the patient with basic needs in the home and provide support for getting groceries, prescriptions, and other personal needs.  Monitor the patient's symptoms. If the patient is getting sicker, call his or her healthcare provider and tell them that the patient has laboratory-confirmed COVID-19. If the patient has a medical emergency and you need to call 911, notify the dispatch personnel that the patient has, or is being evaluated for COVID-19.  Household members should stay in another room or be  from the patient. Household members should use a separate bedroom and bathroom, if available.  Prohibit visitors.  Household members should care for any pets in the home.  Make sure that shared spaces in the home have good air flow, such as by an air conditioner or an opened window, weather permitting.  Perform hand hygiene frequently. Wash your hands often with soap and water for at least 20 seconds or use an alcohol-based hand  (that contains > 60% alcohol) covering all surfaces of your hands and rubbing them together until they feel dry. Soap and water should be used preferentially.  Avoid touching your eyes, nose, and mouth.  The patient should wear a facemask. If the patient is not able to wear a facemask (for example, because it causes trouble breathing), caregivers should wear a mask when they are in the same room as the patient.  Wear a disposable facemask and gloves when you touch or have contact with the patient's blood, stool, or body fluids, such as saliva, sputum, nasal mucus, vomit, urine.  Throw out disposable facemasks and gloves after using them. Do not reuse.  When removing personal protective equipment, first remove and dispose of gloves. Then, immediately clean your hands with soap and water or alcohol-based hand . Next, remove and dispose of facemask, and immediately clean your hands again with soap and  water or alcohol-based hand .  You should not share dishes, drinking glasses, cups, eating utensils, towels, bedding, or other items with the patient. After the patient uses these items, you should wash them thoroughly (see below Wash laundry thoroughly).  Clean all high-touch surfaces, such as counters, tabletops, doorknobs, bathroom fixtures, toilets, phones, keyboards, tablets, and bedside tables, every day. Also, clean any surfaces that may have blood, stool, or body fluids on them.  Use a household cleaning spray or wipe, according to the label instructions. Labels contain instructions for safe and effective use of the cleaning product including precautions you should take when applying the product, such as wearing gloves and making sure you have good ventilation during use of the product.  Wash laundry thoroughly.  Immediately remove and wash clothes or bedding that have blood, stool, or body fluids on them.  Wear disposable gloves while handling soiled items and keep soiled items away from your body. Clean your hands (with soap and water or an alcohol-based hand ) immediately after removing your gloves.  Read and follow directions on labels of laundry or clothing items and detergent. In general, using a normal laundry detergent according to washing machine instructions and dry thoroughly using the warmest temperatures recommended on the clothing label.  Place all used disposable gloves, facemasks, and other contaminated items in a lined container before disposing of them with other household waste. Clean your hands (with soap and water or an alcohol-based hand ) immediately after handling these items. Soap and water should be used preferentially if hands are visibly dirty.  Discuss any additional questions with your state or local health department or healthcare provider. Check available hours when contacting your local health department.    For more information see CDC link  below.      https://www.cdc.gov/coronavirus/2019-ncov/hcp/guidance-prevent-spread.html#precautions        Sources:  [facility], [place] Department of [facility] and [facility]     Sincerely,     Jumana Parnell DO

## 2020-05-20 NOTE — ED PROVIDER NOTES
"SCRIBE #1 NOTE: I, Hair Sanchez, am scribing for, and in the presence of, Jumana Parnell DO. I have scribed the entire note.       History     Chief Complaint   Patient presents with    Shortness of Breath     PT states, "I have a cough, chest pain and shortness of breath."     Review of patient's allergies indicates:   Allergen Reactions    Demerol (pf) [meperidine (pf)] Hives    Medrol [methylprednisolone] Anaphylaxis     Any cortisone    Adhesive      Other reaction(s): Unknown    Amoxicillin-pot clavulanate      Other reaction(s): Unknown    Atarax [hydroxyzine hcl] Hives    Hydralazine analogues Hives    Iodine      Other reaction(s): Unknown    Penicillins Hives and Nausea And Vomiting    Phenergan [promethazine] Hives and Nausea And Vomiting    Risperidone analogues Hives    Ativan [lorazepam] Hives and Anxiety         History of Present Illness     HPI    5/20/2020, 12:04 PM  History obtained from the patient     The patient is a 34-year-old female who presents to the emergency room complaining of shortness breath, nonproductive cough, loss taste and smell, nausea when she coughs.  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  Onset of symptoms were today.  Patient reports she has chest pressure that has been constant today.  She called her primary care physician was referred here.  Patient states that she feels like something is sitting on chest.  Nothing makes better, nothing makes worse.  Patient denies any known history cardiac disease, congestive heart failure, hypertension (although chart shows in PMHx), diabetes, prior history of DVT or PE.  Pain is rated 9 of 10.      Arrival mode: Personal vehicle    PCP: Dev Lunsford Jr, MD        Past Medical History:  Past Medical History:   Diagnosis Date    Allergy     Asthma     Childhood only    Bipolar 1 disorder     Borderline personality disorder     Depression     GERD (gastroesophageal reflux " "disease)     History of psychiatric hospitalization     Hx of psychiatric care     Hypertension     pt denied    Pancreatitis     Psychiatric problem     PTSD (post-traumatic stress disorder)     S/P partial hysterectomy 2011    Substance abuse     Suicide attempt        Past Surgical History:  Past Surgical History:   Procedure Laterality Date    APPENDECTOMY       SECTION      CHOLECYSTECTOMY      HYSTERECTOMY      LASER LAPAROSCOPY           Family History:  Family History   Problem Relation Age of Onset    Diabetes Mother     Hypertension Mother     Atrial fibrillation Maternal Grandfather        Social History:  Social History     Tobacco Use    Smoking status: Former Smoker     Packs/day: 0.25     Types: Cigarettes     Last attempt to quit: 2013     Years since quittin.9    Smokeless tobacco: Never Used   Substance and Sexual Activity    Alcohol use: Yes     Comment: occasional     Drug use: Yes     Frequency: 1.0 times per week     Types: Marijuana    Sexual activity: Yes     Partners: Male     Birth control/protection: None        Review of Systems     Review of Systems   Constitutional: Negative for chills and fever.   HENT: Negative for sore throat.         (+) Loss of taste and smell   Respiratory: Positive for cough (Non-productive) and shortness of breath.    Cardiovascular: Positive for chest pain ("Pressure"). Negative for leg swelling.   Gastrointestinal: Positive for nausea. Negative for abdominal pain, diarrhea and vomiting.   Genitourinary: Negative for dysuria.   Musculoskeletal: Negative for back pain, neck pain and neck stiffness.   Skin: Negative for rash and wound.   Neurological: Negative for dizziness, weakness, light-headedness, numbness and headaches.   Hematological: Does not bruise/bleed easily.   All other systems reviewed and are negative.     Physical Exam     Initial Vitals [20 1134]   BP Pulse Resp Temp SpO2   (!) 169/113 91 20 " "98.3 °F (36.8 °C) 100 %      MAP       --          Physical Exam  Nursing Notes and Vital Signs Reviewed.  Constitutional: Patient is in no acute distress. Well-developed and well-nourished. Non-toxic appearing.  Head: Atraumatic. Normocephalic.  Eyes: PERRL. EOM intact. Conjunctivae are not pale. No scleral icterus.  ENT: Mucous membranes are moist. Oropharynx is clear and symmetric.    Neck: Supple. Full ROM.  Cardiovascular: Regular rate. Regular rhythm. No murmurs, rubs, or gallops. Distal pulses are 2+ and symmetric.  Pulmonary/Chest: No respiratory distress. Clear to auscultation bilaterally. No wheezing or rales.  Abdominal: Soft and non-distended. There is no tenderness to palpation. No rebound or guarding.  Genitourinary: No CVA tenderness  Musculoskeletal: Moves all extremities. No obvious deformities. No edema. No calf pain or calf tenderness.  Skin: Warm and dry.  Neurological:  Alert, awake, and appropriate.  Normal speech.  No acute focal neurological deficits are appreciated.  Psychiatric: Normal affect. Good eye contact. Appropriate in content.     ED Course   Procedures  ED Vital Signs:  Vitals:    05/20/20 1134 05/20/20 1232 05/20/20 1235 05/20/20 1247   BP: (!) 169/113 (!) 185/123  (!) 174/106   Pulse: 91 80 78 80   Resp: 20 12  13   Temp: 98.3 °F (36.8 °C)      TempSrc: Oral      SpO2: 100% 99%  99%   Weight: 98.2 kg (216 lb 9.6 oz)      Height: 5' 2" (1.575 m)          Abnormal Lab Results:  Labs Reviewed   SARS-COV-2 RNA AMPLIFICATION, QUAL - Abnormal; Notable for the following components:       Result Value    SARS-CoV-2 RNA, Amplification, Qual Positive (*)     All other components within normal limits    Narrative:     What symptom criteria does the patient meet?->Cough  What symptom criteria does the patient meet?->Shortness of  breath or difficulty breathing  What symptom criteria does the patient meet?->Other (specify)  Please specify:->chest pain   COMPREHENSIVE METABOLIC PANEL - " Abnormal; Notable for the following components:    CO2 22 (*)     Glucose 215 (*)     Calcium 8.6 (*)     AST 67 (*)     ALT 70 (*)     All other components within normal limits   C-REACTIVE PROTEIN - Abnormal; Notable for the following components:    CRP 13.1 (*)     All other components within normal limits   LACTATE DEHYDROGENASE - Abnormal; Notable for the following components:     (*)     All other components within normal limits   TROPONIN I   CBC W/ AUTO DIFFERENTIAL   CK   FERRITIN        All Lab Results:  Results for orders placed or performed during the hospital encounter of 05/20/20   COVID-19 Rapid Screening   Result Value Ref Range    SARS-CoV-2 RNA, Amplification, Qual Positive (A) Negative   Troponin I   Result Value Ref Range    Troponin I 0.012 0.000 - 0.026 ng/mL   CBC auto differential   Result Value Ref Range    WBC 4.98 3.90 - 12.70 K/uL    RBC 5.14 4.00 - 5.40 M/uL    Hemoglobin 14.8 12.0 - 16.0 g/dL    Hematocrit 42.7 37.0 - 48.5 %    Mean Corpuscular Volume 83 82 - 98 fL    Mean Corpuscular Hemoglobin 28.8 27.0 - 31.0 pg    Mean Corpuscular Hemoglobin Conc 34.7 32.0 - 36.0 g/dL    RDW 12.4 11.5 - 14.5 %    Platelets 247 150 - 350 K/uL    MPV 10.6 9.2 - 12.9 fL    Immature Granulocytes 0.2 0.0 - 0.5 %    Gran # (ANC) 2.5 1.8 - 7.7 K/uL    Immature Grans (Abs) 0.01 0.00 - 0.04 K/uL    Lymph # 2.0 1.0 - 4.8 K/uL    Mono # 0.3 0.3 - 1.0 K/uL    Eos # 0.1 0.0 - 0.5 K/uL    Baso # 0.02 0.00 - 0.20 K/uL    nRBC 0 0 /100 WBC    Gran% 51.0 38.0 - 73.0 %    Lymph% 39.4 18.0 - 48.0 %    Mono% 6.2 4.0 - 15.0 %    Eosinophil% 2.8 0.0 - 8.0 %    Basophil% 0.4 0.0 - 1.9 %    Differential Method Automated    Comprehensive metabolic panel   Result Value Ref Range    Sodium 136 136 - 145 mmol/L    Potassium 3.6 3.5 - 5.1 mmol/L    Chloride 103 95 - 110 mmol/L    CO2 22 (L) 23 - 29 mmol/L    Glucose 215 (H) 70 - 110 mg/dL    BUN, Bld 7 6 - 20 mg/dL    Creatinine 0.7 0.5 - 1.4 mg/dL    Calcium 8.6 (L) 8.7  - 10.5 mg/dL    Total Protein 7.4 6.0 - 8.4 g/dL    Albumin 3.7 3.5 - 5.2 g/dL    Total Bilirubin 0.5 0.1 - 1.0 mg/dL    Alkaline Phosphatase 56 55 - 135 U/L    AST 67 (H) 10 - 40 U/L    ALT 70 (H) 10 - 44 U/L    Anion Gap 11 8 - 16 mmol/L    eGFR if African American >60 >60 mL/min/1.73 m^2    eGFR if non African American >60 >60 mL/min/1.73 m^2   C-Reactive Protein   Result Value Ref Range    CRP 13.1 (H) 0.0 - 8.2 mg/L   Lactate Dehydrogenase   Result Value Ref Range     (H) 110 - 260 U/L   CK   Result Value Ref Range    CPK 81 20 - 180 U/L       Imaging Results:  Imaging Results          X-Ray Chest AP Portable (Final result)  Result time 05/20/20 13:27:51    Final result by Lux Osman MD (05/20/20 13:27:51)                 Impression:      No acute process seen.      Electronically signed by: Lux Osman MD  Date:    05/20/2020  Time:    13:27             Narrative:    EXAMINATION:  XR CHEST AP PORTABLE    CLINICAL HISTORY:  Suspected Covid-19 Virus Infection;    FINDINGS:  Single view of the chest.  Comparison 11/16/2019    Cardiac silhouette is normal.  The lungs demonstrate no evidence of active disease.  No evidence of pleural effusion or pneumothorax.  Bones appear intact.                                 The EKG was ordered, reviewed, and independently interpreted by the ED provider.  Interpretation time: 1138  Rate: 87 BPM  Rhythm: normal sinus rhythm  Interpretation: Possible anterior infarct, age undetermined. No STEMI.           The Emergency Provider reviewed the vital signs and test results, which are outlined above.     ED Discussion     1:53 PM: Reassessed pt at this time. Discussed with pt all pertinent ED information and results. Discussed pt dx and plan of tx. Gave pt all f/u and return to the ED instructions. All questions and concerns were addressed at this time. Pt expresses understanding of information and instructions, and is comfortable with plan to discharge. Pt is stable for  discharge.    I discussed with patient and/or family/caretaker that evaluation in the ED does not suggest any emergent or life threatening medical conditions requiring immediate intervention beyond what was provided in the ED, and I believe patient is safe for discharge.  Regardless, an unremarkable evaluation in the ED does not preclude the development or presence of a serious of life threatening condition. As such, patient was instructed to return immediately for any worsening or change in current symptoms.    Pre-hypertension/Hypertension: The pt has been informed that they may have pre-hypertension or hypertension based on a blood pressure reading in the ED. I recommend that the pt call the PCP listed on their discharge instructions or a physician of their choice this week to arrange f/u for further evaluation of possible pre-hypertension or hypertension.       Medical Decision Making:   Clinical Tests:   Lab Tests: Ordered and Reviewed  Radiological Study: Ordered and Reviewed  Medical Tests: Ordered and Reviewed     Additional MDM:   Smoking Cessation: The patient is a smoker. The patient was counseled on smoking cessation for: 3 minutes. The patient was counseled on tobacco related  health complications. The patient was counseled on the adverse effects of second hand smoke.        ED Medication(s):  Medications - No data to display    Discharge Medication List as of 5/20/2020  2:01 PM      START taking these medications    Details   azithromycin (Z-OLMAN) 250 MG tablet Take 1 tablet (250 mg total) by mouth once daily. Take first 2 tablets together, then 1 every day until finished., Starting Wed 5/20/2020, Print             Follow-up Information     Dev Lunsford Jr, MD In 2 days.    Specialty:  Family Medicine  Why:  Return to emergency for:  Difficulty breathing, shortness breath, worsening worsening condition  Contact information:  [address] [facility]  [facility] [place] [facility] [address]                        Scribe Attestation:   Scribe #1: I performed the above scribed service and the documentation accurately describes the services I performed. I attest to the accuracy of the note.     Attending:   Physician Attestation Statement for Scribe #1: I, Jumana Parnell DO, personally performed the services described in this documentation, as scribed by Hair Sanchez, in my presence, and it is both accurate and complete.           Clinical Impression       ICD-10-CM ICD-9-CM   1. COVID-19 virus infection U07.1    2. Chest pain R07.9 786.50   3. Elevated blood pressure reading R03.0 796.2   4. Elevated blood sugar R73.9 790.29       Disposition:   Disposition: Discharged  Condition: Stable       Jumana Parnell DO  05/20/20 1834

## 2021-01-22 ENCOUNTER — HOSPITAL ENCOUNTER (INPATIENT)
Facility: HOSPITAL | Age: 36
LOS: 2 days | Discharge: HOME | DRG: 439 | End: 2021-01-24
Attending: INTERNAL MEDICINE | Admitting: INTERNAL MEDICINE
Payer: MEDICAID

## 2021-01-22 DIAGNOSIS — K85.90 PANCREATITIS: ICD-10-CM

## 2021-01-22 DIAGNOSIS — R07.9 CHEST PAIN: ICD-10-CM

## 2021-01-22 PROCEDURE — 99285 EMERGENCY DEPT VISIT HI MDM: CPT | Mod: 25

## 2021-01-22 PROCEDURE — 11000001 HC ACUTE MED/SURG PRIVATE ROOM

## 2021-01-23 PROBLEM — J69.0 ASPIRATION PNEUMONIA OF RIGHT UPPER LOBE: Status: RESOLVED | Noted: 2018-02-12 | Resolved: 2021-01-23

## 2021-01-23 LAB
ALBUMIN SERPL BCP-MCNC: 4.6 G/DL (ref 3.5–5.2)
ALP SERPL-CCNC: 73 U/L (ref 55–135)
ALT SERPL W/O P-5'-P-CCNC: 63 U/L (ref 10–44)
AMPHET+METHAMPHET UR QL: NEGATIVE
ANION GAP SERPL CALC-SCNC: 14 MMOL/L (ref 8–16)
ANISOCYTOSIS BLD QL SMEAR: SLIGHT
APTT BLDCRRT: 22.4 SEC (ref 21–32)
AST SERPL-CCNC: 35 U/L (ref 10–40)
B-HCG UR QL: NEGATIVE
BACTERIA #/AREA URNS HPF: NORMAL /HPF
BARBITURATES UR QL SCN>200 NG/ML: NEGATIVE
BASOPHILS # BLD AUTO: 0.05 K/UL (ref 0–0.2)
BASOPHILS # BLD AUTO: 0.06 K/UL (ref 0–0.2)
BASOPHILS NFR BLD: 0.4 % (ref 0–1.9)
BASOPHILS NFR BLD: 0.5 % (ref 0–1.9)
BENZODIAZ UR QL SCN>200 NG/ML: NEGATIVE
BILIRUB SERPL-MCNC: 0.9 MG/DL (ref 0.1–1)
BILIRUB UR QL STRIP: NEGATIVE
BUN SERPL-MCNC: 12 MG/DL (ref 6–20)
BZE UR QL SCN: NEGATIVE
CALCIUM SERPL-MCNC: 9.1 MG/DL (ref 8.7–10.5)
CANNABINOIDS UR QL SCN: NEGATIVE
CHLORIDE SERPL-SCNC: 100 MMOL/L (ref 95–110)
CHOLEST SERPL-MCNC: 169 MG/DL (ref 120–199)
CHOLEST/HDLC SERPL: 4.7 {RATIO} (ref 2–5)
CLARITY UR: CLEAR
CO2 SERPL-SCNC: 23 MMOL/L (ref 23–29)
COLOR UR: YELLOW
CREAT SERPL-MCNC: 0.9 MG/DL (ref 0.5–1.4)
CREAT UR-MCNC: 34.8 MG/DL (ref 15–325)
CTP QC/QA: YES
DACRYOCYTES BLD QL SMEAR: ABNORMAL
DIFFERENTIAL METHOD: ABNORMAL
DIFFERENTIAL METHOD: NORMAL
EOSINOPHIL # BLD AUTO: 0 K/UL (ref 0–0.5)
EOSINOPHIL # BLD AUTO: 0.2 K/UL (ref 0–0.5)
EOSINOPHIL NFR BLD: 0.2 % (ref 0–8)
EOSINOPHIL NFR BLD: 2 % (ref 0–8)
ERYTHROCYTE [DISTWIDTH] IN BLOOD BY AUTOMATED COUNT: 12.6 % (ref 11.5–14.5)
ERYTHROCYTE [DISTWIDTH] IN BLOOD BY AUTOMATED COUNT: 12.7 % (ref 11.5–14.5)
EST. GFR  (AFRICAN AMERICAN): >60 ML/MIN/1.73 M^2
EST. GFR  (NON AFRICAN AMERICAN): >60 ML/MIN/1.73 M^2
ESTIMATED AVG GLUCOSE: 200 MG/DL (ref 68–131)
GLUCOSE SERPL-MCNC: 216 MG/DL (ref 70–110)
GLUCOSE SERPL-MCNC: 360 MG/DL (ref 70–110)
GLUCOSE UR QL STRIP: ABNORMAL
HBA1C MFR BLD HPLC: 8.6 % (ref 4–5.6)
HCT VFR BLD AUTO: 38.8 % (ref 37–48.5)
HCT VFR BLD AUTO: 47.1 % (ref 37–48.5)
HDLC SERPL-MCNC: 36 MG/DL (ref 40–75)
HDLC SERPL: 21.3 % (ref 20–50)
HGB BLD-MCNC: 13.2 G/DL (ref 12–16)
HGB BLD-MCNC: 16.4 G/DL (ref 12–16)
HGB UR QL STRIP: ABNORMAL
IMM GRANULOCYTES # BLD AUTO: 0.03 K/UL (ref 0–0.04)
IMM GRANULOCYTES # BLD AUTO: 0.1 K/UL (ref 0–0.04)
IMM GRANULOCYTES NFR BLD AUTO: 0.3 % (ref 0–0.5)
IMM GRANULOCYTES NFR BLD AUTO: 0.7 % (ref 0–0.5)
KETONES UR QL STRIP: ABNORMAL
LDLC SERPL CALC-MCNC: 93.8 MG/DL (ref 63–159)
LEUKOCYTE ESTERASE UR QL STRIP: NEGATIVE
LIPASE SERPL-CCNC: >1000 U/L (ref 4–60)
LYMPHOCYTES # BLD AUTO: 1.1 K/UL (ref 1–4.8)
LYMPHOCYTES # BLD AUTO: 2.9 K/UL (ref 1–4.8)
LYMPHOCYTES NFR BLD: 31.4 % (ref 18–48)
LYMPHOCYTES NFR BLD: 8.3 % (ref 18–48)
MAGNESIUM SERPL-MCNC: 1.9 MG/DL (ref 1.6–2.6)
MCH RBC QN AUTO: 29.2 PG (ref 27–31)
MCH RBC QN AUTO: 29.7 PG (ref 27–31)
MCHC RBC AUTO-ENTMCNC: 34 G/DL (ref 32–36)
MCHC RBC AUTO-ENTMCNC: 34.8 G/DL (ref 32–36)
MCV RBC AUTO: 84 FL (ref 82–98)
MCV RBC AUTO: 87 FL (ref 82–98)
METHADONE UR QL SCN>300 NG/ML: NEGATIVE
MICROSCOPIC COMMENT: NORMAL
MONOCYTES # BLD AUTO: 0.4 K/UL (ref 0.3–1)
MONOCYTES # BLD AUTO: 0.6 K/UL (ref 0.3–1)
MONOCYTES NFR BLD: 2.9 % (ref 4–15)
MONOCYTES NFR BLD: 6.2 % (ref 4–15)
NEUTROPHILS # BLD AUTO: 12 K/UL (ref 1.8–7.7)
NEUTROPHILS # BLD AUTO: 5.5 K/UL (ref 1.8–7.7)
NEUTROPHILS NFR BLD: 59.6 % (ref 38–73)
NEUTROPHILS NFR BLD: 87.5 % (ref 38–73)
NITRITE UR QL STRIP: NEGATIVE
NONHDLC SERPL-MCNC: 133 MG/DL
NRBC BLD-RTO: 0 /100 WBC
NRBC BLD-RTO: 0 /100 WBC
OPIATES UR QL SCN: NORMAL
OVALOCYTES BLD QL SMEAR: ABNORMAL
PCP UR QL SCN>25 NG/ML: NEGATIVE
PH UR STRIP: 6 [PH] (ref 5–8)
PLATELET # BLD AUTO: 230 K/UL (ref 150–350)
PLATELET # BLD AUTO: 253 K/UL (ref 150–350)
PLATELET BLD QL SMEAR: ABNORMAL
PMV BLD AUTO: 10.5 FL (ref 9.2–12.9)
PMV BLD AUTO: 11 FL (ref 9.2–12.9)
POCT GLUCOSE: 164 MG/DL (ref 70–110)
POCT GLUCOSE: 318 MG/DL (ref 70–110)
POIKILOCYTOSIS BLD QL SMEAR: SLIGHT
POTASSIUM SERPL-SCNC: 4.4 MMOL/L (ref 3.5–5.1)
PROT SERPL-MCNC: 8.2 G/DL (ref 6–8.4)
PROT UR QL STRIP: NEGATIVE
RBC # BLD AUTO: 4.44 M/UL (ref 4–5.4)
RBC # BLD AUTO: 5.62 M/UL (ref 4–5.4)
RBC #/AREA URNS HPF: 0 /HPF (ref 0–4)
SARS-COV-2 RDRP RESP QL NAA+PROBE: NEGATIVE
SODIUM SERPL-SCNC: 137 MMOL/L (ref 136–145)
SP GR UR STRIP: 1.01 (ref 1–1.03)
SQUAMOUS #/AREA URNS HPF: 2 /HPF
TOXICOLOGY INFORMATION: NORMAL
TRIGL SERPL-MCNC: 196 MG/DL (ref 30–150)
TROPONIN I SERPL DL<=0.01 NG/ML-MCNC: <0.006 NG/ML (ref 0–0.03)
URN SPEC COLLECT METH UR: ABNORMAL
UROBILINOGEN UR STRIP-ACNC: NEGATIVE EU/DL
WBC # BLD AUTO: 13.67 K/UL (ref 3.9–12.7)
WBC # BLD AUTO: 9.23 K/UL (ref 3.9–12.7)
YEAST URNS QL MICRO: NORMAL

## 2021-01-23 PROCEDURE — 63600175 PHARM REV CODE 636 W HCPCS: Performed by: STUDENT IN AN ORGANIZED HEALTH CARE EDUCATION/TRAINING PROGRAM

## 2021-01-23 PROCEDURE — 83036 HEMOGLOBIN GLYCOSYLATED A1C: CPT

## 2021-01-23 PROCEDURE — 96361 HYDRATE IV INFUSION ADD-ON: CPT

## 2021-01-23 PROCEDURE — 99223 1ST HOSP IP/OBS HIGH 75: CPT | Mod: ,,, | Performed by: INTERNAL MEDICINE

## 2021-01-23 PROCEDURE — 25500020 PHARM REV CODE 255: Performed by: STUDENT IN AN ORGANIZED HEALTH CARE EDUCATION/TRAINING PROGRAM

## 2021-01-23 PROCEDURE — 84484 ASSAY OF TROPONIN QUANT: CPT

## 2021-01-23 PROCEDURE — 25000003 PHARM REV CODE 250: Performed by: NURSE PRACTITIONER

## 2021-01-23 PROCEDURE — 11000001 HC ACUTE MED/SURG PRIVATE ROOM

## 2021-01-23 PROCEDURE — 82962 GLUCOSE BLOOD TEST: CPT

## 2021-01-23 PROCEDURE — 82947 ASSAY GLUCOSE BLOOD QUANT: CPT

## 2021-01-23 PROCEDURE — 99223 PR INITIAL HOSPITAL CARE,LEVL III: ICD-10-PCS | Mod: ,,, | Performed by: INTERNAL MEDICINE

## 2021-01-23 PROCEDURE — 80061 LIPID PANEL: CPT

## 2021-01-23 PROCEDURE — 85730 THROMBOPLASTIN TIME PARTIAL: CPT

## 2021-01-23 PROCEDURE — 80307 DRUG TEST PRSMV CHEM ANLYZR: CPT

## 2021-01-23 PROCEDURE — 96376 TX/PRO/DX INJ SAME DRUG ADON: CPT

## 2021-01-23 PROCEDURE — 25000003 PHARM REV CODE 250: Performed by: INTERNAL MEDICINE

## 2021-01-23 PROCEDURE — 83690 ASSAY OF LIPASE: CPT

## 2021-01-23 PROCEDURE — 36415 COLL VENOUS BLD VENIPUNCTURE: CPT

## 2021-01-23 PROCEDURE — 63600175 PHARM REV CODE 636 W HCPCS: Performed by: INTERNAL MEDICINE

## 2021-01-23 PROCEDURE — 80053 COMPREHEN METABOLIC PANEL: CPT

## 2021-01-23 PROCEDURE — 96372 THER/PROPH/DIAG INJ SC/IM: CPT | Mod: 59

## 2021-01-23 PROCEDURE — 96375 TX/PRO/DX INJ NEW DRUG ADDON: CPT

## 2021-01-23 PROCEDURE — 81025 URINE PREGNANCY TEST: CPT

## 2021-01-23 PROCEDURE — 96374 THER/PROPH/DIAG INJ IV PUSH: CPT

## 2021-01-23 PROCEDURE — 81000 URINALYSIS NONAUTO W/SCOPE: CPT

## 2021-01-23 PROCEDURE — U0002 COVID-19 LAB TEST NON-CDC: HCPCS | Performed by: INTERNAL MEDICINE

## 2021-01-23 PROCEDURE — 84484 ASSAY OF TROPONIN QUANT: CPT | Mod: 91

## 2021-01-23 PROCEDURE — 85025 COMPLETE CBC W/AUTO DIFF WBC: CPT

## 2021-01-23 PROCEDURE — 63600175 PHARM REV CODE 636 W HCPCS: Performed by: NURSE PRACTITIONER

## 2021-01-23 PROCEDURE — 83735 ASSAY OF MAGNESIUM: CPT

## 2021-01-23 RX ORDER — MORPHINE SULFATE 4 MG/ML
4 INJECTION, SOLUTION INTRAMUSCULAR; INTRAVENOUS
Status: DISPENSED | OUTPATIENT
Start: 2021-01-23 | End: 2021-01-23

## 2021-01-23 RX ORDER — ONDANSETRON 2 MG/ML
4 INJECTION INTRAMUSCULAR; INTRAVENOUS EVERY 8 HOURS PRN
Status: DISCONTINUED | OUTPATIENT
Start: 2021-01-23 | End: 2021-01-23

## 2021-01-23 RX ORDER — IBUPROFEN 200 MG
24 TABLET ORAL
Status: DISCONTINUED | OUTPATIENT
Start: 2021-01-23 | End: 2021-01-24 | Stop reason: HOSPADM

## 2021-01-23 RX ORDER — ONDANSETRON 2 MG/ML
4 INJECTION INTRAMUSCULAR; INTRAVENOUS EVERY 6 HOURS
Status: DISCONTINUED | OUTPATIENT
Start: 2021-01-23 | End: 2021-01-24 | Stop reason: HOSPADM

## 2021-01-23 RX ORDER — SODIUM CHLORIDE 0.9 % (FLUSH) 0.9 %
10 SYRINGE (ML) INJECTION
Status: DISCONTINUED | OUTPATIENT
Start: 2021-01-23 | End: 2021-01-24 | Stop reason: HOSPADM

## 2021-01-23 RX ORDER — DIPHENHYDRAMINE HYDROCHLORIDE 50 MG/ML
12.5 INJECTION INTRAMUSCULAR; INTRAVENOUS
Status: COMPLETED | OUTPATIENT
Start: 2021-01-23 | End: 2021-01-23

## 2021-01-23 RX ORDER — INSULIN ASPART 100 [IU]/ML
0-5 INJECTION, SOLUTION INTRAVENOUS; SUBCUTANEOUS EVERY 6 HOURS PRN
Status: DISCONTINUED | OUTPATIENT
Start: 2021-01-23 | End: 2021-01-24 | Stop reason: HOSPADM

## 2021-01-23 RX ORDER — ACYCLOVIR 200 MG/1
CAPSULE ORAL
Status: ON HOLD | COMMUNITY
End: 2021-05-28 | Stop reason: HOSPADM

## 2021-01-23 RX ORDER — MORPHINE SULFATE 4 MG/ML
4 INJECTION, SOLUTION INTRAMUSCULAR; INTRAVENOUS
Status: COMPLETED | OUTPATIENT
Start: 2021-01-23 | End: 2021-01-23

## 2021-01-23 RX ORDER — MORPHINE SULFATE 4 MG/ML
4 INJECTION, SOLUTION INTRAMUSCULAR; INTRAVENOUS EVERY 4 HOURS PRN
Status: DISCONTINUED | OUTPATIENT
Start: 2021-01-23 | End: 2021-01-24

## 2021-01-23 RX ORDER — GLUCAGON 1 MG
1 KIT INJECTION
Status: DISCONTINUED | OUTPATIENT
Start: 2021-01-23 | End: 2021-01-23 | Stop reason: SDUPTHER

## 2021-01-23 RX ORDER — GLUCAGON 1 MG
1 KIT INJECTION
Status: DISCONTINUED | OUTPATIENT
Start: 2021-01-23 | End: 2021-01-24 | Stop reason: HOSPADM

## 2021-01-23 RX ORDER — SODIUM CHLORIDE 9 MG/ML
INJECTION, SOLUTION INTRAVENOUS CONTINUOUS
Status: ACTIVE | OUTPATIENT
Start: 2021-01-23 | End: 2021-01-24

## 2021-01-23 RX ORDER — FENOFIBRATE 145 MG/1
145 TABLET, FILM COATED ORAL DAILY
Status: DISCONTINUED | OUTPATIENT
Start: 2021-01-23 | End: 2021-01-24 | Stop reason: HOSPADM

## 2021-01-23 RX ORDER — ONDANSETRON 2 MG/ML
4 INJECTION INTRAMUSCULAR; INTRAVENOUS
Status: DISCONTINUED | OUTPATIENT
Start: 2021-01-23 | End: 2021-01-23

## 2021-01-23 RX ORDER — IBUPROFEN 200 MG
16 TABLET ORAL
Status: DISCONTINUED | OUTPATIENT
Start: 2021-01-23 | End: 2021-01-24 | Stop reason: HOSPADM

## 2021-01-23 RX ADMIN — ONDANSETRON 4 MG: 2 INJECTION, SOLUTION INTRAMUSCULAR; INTRAVENOUS at 08:01

## 2021-01-23 RX ADMIN — FENOFIBRATE 145 MG: 145 TABLET ORAL at 05:01

## 2021-01-23 RX ADMIN — ONDANSETRON 4 MG: 2 INJECTION, SOLUTION INTRAMUSCULAR; INTRAVENOUS at 05:01

## 2021-01-23 RX ADMIN — MORPHINE SULFATE 4 MG: 4 INJECTION INTRAVENOUS at 03:01

## 2021-01-23 RX ADMIN — MORPHINE SULFATE 4 MG: 4 INJECTION, SOLUTION INTRAMUSCULAR; INTRAVENOUS at 08:01

## 2021-01-23 RX ADMIN — SODIUM CHLORIDE: 0.9 INJECTION, SOLUTION INTRAVENOUS at 11:01

## 2021-01-23 RX ADMIN — DIPHENHYDRAMINE HYDROCHLORIDE 12.5 MG: 50 INJECTION, SOLUTION INTRAMUSCULAR; INTRAVENOUS at 03:01

## 2021-01-23 RX ADMIN — IOHEXOL 100 ML: 350 INJECTION, SOLUTION INTRAVENOUS at 05:01

## 2021-01-23 RX ADMIN — SODIUM CHLORIDE: 0.9 INJECTION, SOLUTION INTRAVENOUS at 06:01

## 2021-01-23 RX ADMIN — MORPHINE SULFATE 4 MG: 4 INJECTION, SOLUTION INTRAMUSCULAR; INTRAVENOUS at 04:01

## 2021-01-23 RX ADMIN — MORPHINE SULFATE 4 MG: 4 INJECTION, SOLUTION INTRAMUSCULAR; INTRAVENOUS at 12:01

## 2021-01-23 RX ADMIN — ONDANSETRON 4 MG: 2 INJECTION, SOLUTION INTRAMUSCULAR; INTRAVENOUS at 11:01

## 2021-01-24 VITALS
SYSTOLIC BLOOD PRESSURE: 139 MMHG | BODY MASS INDEX: 38.22 KG/M2 | RESPIRATION RATE: 20 BRPM | HEIGHT: 62 IN | DIASTOLIC BLOOD PRESSURE: 79 MMHG | WEIGHT: 207.69 LBS | OXYGEN SATURATION: 99 % | TEMPERATURE: 98 F | HEART RATE: 60 BPM

## 2021-01-24 LAB
ANION GAP SERPL CALC-SCNC: 11 MMOL/L (ref 8–16)
BASOPHILS # BLD AUTO: 0.03 K/UL (ref 0–0.2)
BASOPHILS NFR BLD: 0.5 % (ref 0–1.9)
BUN SERPL-MCNC: 5 MG/DL (ref 6–20)
CALCIUM SERPL-MCNC: 7.8 MG/DL (ref 8.7–10.5)
CHLORIDE SERPL-SCNC: 104 MMOL/L (ref 95–110)
CO2 SERPL-SCNC: 22 MMOL/L (ref 23–29)
CREAT SERPL-MCNC: 0.7 MG/DL (ref 0.5–1.4)
DIFFERENTIAL METHOD: NORMAL
EOSINOPHIL # BLD AUTO: 0.2 K/UL (ref 0–0.5)
EOSINOPHIL NFR BLD: 2.9 % (ref 0–8)
ERYTHROCYTE [DISTWIDTH] IN BLOOD BY AUTOMATED COUNT: 12.8 % (ref 11.5–14.5)
EST. GFR  (AFRICAN AMERICAN): >60 ML/MIN/1.73 M^2
EST. GFR  (NON AFRICAN AMERICAN): >60 ML/MIN/1.73 M^2
GLUCOSE SERPL-MCNC: 197 MG/DL (ref 70–110)
HCT VFR BLD AUTO: 40.4 % (ref 37–48.5)
HGB BLD-MCNC: 13.5 G/DL (ref 12–16)
IMM GRANULOCYTES # BLD AUTO: 0.03 K/UL (ref 0–0.04)
IMM GRANULOCYTES NFR BLD AUTO: 0.5 % (ref 0–0.5)
LIPASE SERPL-CCNC: 88 U/L (ref 4–60)
LYMPHOCYTES # BLD AUTO: 2 K/UL (ref 1–4.8)
LYMPHOCYTES NFR BLD: 30.2 % (ref 18–48)
MCH RBC QN AUTO: 29.2 PG (ref 27–31)
MCHC RBC AUTO-ENTMCNC: 33.4 G/DL (ref 32–36)
MCV RBC AUTO: 87 FL (ref 82–98)
MONOCYTES # BLD AUTO: 0.4 K/UL (ref 0.3–1)
MONOCYTES NFR BLD: 6.7 % (ref 4–15)
NEUTROPHILS # BLD AUTO: 3.9 K/UL (ref 1.8–7.7)
NEUTROPHILS NFR BLD: 59.2 % (ref 38–73)
NRBC BLD-RTO: 0 /100 WBC
PLATELET # BLD AUTO: 248 K/UL (ref 150–350)
PMV BLD AUTO: 10.4 FL (ref 9.2–12.9)
POCT GLUCOSE: 147 MG/DL (ref 70–110)
POCT GLUCOSE: 171 MG/DL (ref 70–110)
POCT GLUCOSE: 189 MG/DL (ref 70–110)
POCT GLUCOSE: 192 MG/DL (ref 70–110)
POTASSIUM SERPL-SCNC: 3.8 MMOL/L (ref 3.5–5.1)
RBC # BLD AUTO: 4.62 M/UL (ref 4–5.4)
SODIUM SERPL-SCNC: 137 MMOL/L (ref 136–145)
WBC # BLD AUTO: 6.58 K/UL (ref 3.9–12.7)

## 2021-01-24 PROCEDURE — 99233 SBSQ HOSP IP/OBS HIGH 50: CPT | Mod: ,,, | Performed by: INTERNAL MEDICINE

## 2021-01-24 PROCEDURE — 63600175 PHARM REV CODE 636 W HCPCS: Performed by: NURSE PRACTITIONER

## 2021-01-24 PROCEDURE — 85025 COMPLETE CBC W/AUTO DIFF WBC: CPT

## 2021-01-24 PROCEDURE — 82330 ASSAY OF CALCIUM: CPT

## 2021-01-24 PROCEDURE — 25000003 PHARM REV CODE 250: Performed by: INTERNAL MEDICINE

## 2021-01-24 PROCEDURE — 82787 IGG 1 2 3 OR 4 EACH: CPT

## 2021-01-24 PROCEDURE — 81223 CFTR GENE FULL SEQUENCE: CPT

## 2021-01-24 PROCEDURE — 80048 BASIC METABOLIC PNL TOTAL CA: CPT

## 2021-01-24 PROCEDURE — 36415 COLL VENOUS BLD VENIPUNCTURE: CPT

## 2021-01-24 PROCEDURE — 99233 PR SUBSEQUENT HOSPITAL CARE,LEVL III: ICD-10-PCS | Mod: ,,, | Performed by: INTERNAL MEDICINE

## 2021-01-24 PROCEDURE — 96372 THER/PROPH/DIAG INJ SC/IM: CPT

## 2021-01-24 PROCEDURE — 25000003 PHARM REV CODE 250: Performed by: NURSE PRACTITIONER

## 2021-01-24 PROCEDURE — 83690 ASSAY OF LIPASE: CPT

## 2021-01-24 PROCEDURE — 63600175 PHARM REV CODE 636 W HCPCS: Performed by: INTERNAL MEDICINE

## 2021-01-24 PROCEDURE — 82657 ENZYME CELL ACTIVITY: CPT

## 2021-01-24 RX ORDER — METFORMIN HYDROCHLORIDE 1000 MG/1
1000 TABLET ORAL 2 TIMES DAILY WITH MEALS
Qty: 180 TABLET | Refills: 1 | Status: ON HOLD | OUTPATIENT
Start: 2021-01-24 | End: 2021-05-27

## 2021-01-24 RX ORDER — FENOFIBRATE 145 MG/1
145 TABLET, FILM COATED ORAL DAILY
Qty: 30 TABLET | Refills: 1 | Status: ON HOLD | OUTPATIENT
Start: 2021-01-25 | End: 2021-05-27

## 2021-01-24 RX ORDER — HYDROCODONE BITARTRATE AND ACETAMINOPHEN 7.5; 325 MG/1; MG/1
1 TABLET ORAL EVERY 6 HOURS PRN
Qty: 12 TABLET | Refills: 0 | Status: ON HOLD | OUTPATIENT
Start: 2021-01-24 | End: 2021-05-27

## 2021-01-24 RX ORDER — ONDANSETRON 4 MG/1
4 TABLET, ORALLY DISINTEGRATING ORAL EVERY 6 HOURS PRN
Qty: 28 TABLET | Refills: 0 | Status: SHIPPED | OUTPATIENT
Start: 2021-01-24 | End: 2021-05-27 | Stop reason: SDUPTHER

## 2021-01-24 RX ORDER — METFORMIN HYDROCHLORIDE 1000 MG/1
1000 TABLET ORAL 2 TIMES DAILY WITH MEALS
Qty: 180 TABLET | Refills: 1 | Status: SHIPPED | OUTPATIENT
Start: 2021-01-24 | End: 2021-01-24 | Stop reason: SDUPTHER

## 2021-01-24 RX ORDER — MORPHINE SULFATE 2 MG/ML
1 INJECTION, SOLUTION INTRAMUSCULAR; INTRAVENOUS EVERY 6 HOURS PRN
Status: DISCONTINUED | OUTPATIENT
Start: 2021-01-24 | End: 2021-01-24 | Stop reason: HOSPADM

## 2021-01-24 RX ORDER — POLYETHYLENE GLYCOL 3350 17 G/17G
17 POWDER, FOR SOLUTION ORAL ONCE
Status: DISCONTINUED | OUTPATIENT
Start: 2021-01-24 | End: 2021-01-24 | Stop reason: HOSPADM

## 2021-01-24 RX ADMIN — MORPHINE SULFATE 4 MG: 4 INJECTION, SOLUTION INTRAMUSCULAR; INTRAVENOUS at 12:01

## 2021-01-24 RX ADMIN — MORPHINE SULFATE 4 MG: 4 INJECTION, SOLUTION INTRAMUSCULAR; INTRAVENOUS at 05:01

## 2021-01-24 RX ADMIN — MORPHINE SULFATE 4 MG: 4 INJECTION, SOLUTION INTRAMUSCULAR; INTRAVENOUS at 09:01

## 2021-01-24 RX ADMIN — MORPHINE SULFATE 1 MG: 2 INJECTION, SOLUTION INTRAMUSCULAR; INTRAVENOUS at 03:01

## 2021-01-24 RX ADMIN — ONDANSETRON 4 MG: 2 INJECTION, SOLUTION INTRAMUSCULAR; INTRAVENOUS at 05:01

## 2021-01-24 RX ADMIN — ONDANSETRON 4 MG: 2 INJECTION, SOLUTION INTRAMUSCULAR; INTRAVENOUS at 12:01

## 2021-01-24 RX ADMIN — FENOFIBRATE 145 MG: 145 TABLET ORAL at 09:01

## 2021-01-24 RX ADMIN — SODIUM CHLORIDE: 0.9 INJECTION, SOLUTION INTRAVENOUS at 12:01

## 2021-01-24 RX ADMIN — SODIUM CHLORIDE: 0.9 INJECTION, SOLUTION INTRAVENOUS at 05:01

## 2021-01-25 LAB — CA-I BLDV-SCNC: 1.12 MMOL/L (ref 1.06–1.42)

## 2021-01-26 ENCOUNTER — PATIENT OUTREACH (OUTPATIENT)
Dept: ADMINISTRATIVE | Facility: CLINIC | Age: 36
End: 2021-01-26

## 2021-01-27 LAB — IGG4 SER-MCNC: 26 MG/DL (ref 4–86)

## 2021-01-28 LAB
CLINICAL BIOCHEMIST REVIEW: NORMAL
PBG DEAMINASE RBC-CCNC: 16.7 NMOL/L/SEC

## 2021-02-24 LAB
CFTR MUT ANL BLD/T: ABNORMAL
GENE XXX MUT ANL BLD/T: ABNORMAL
NARRATIVE DIAGNOSTIC REPORT-IMP: ABNORMAL
PRSS1 GENE MUT ANL BLD/T: ABNORMAL
SPECIMEN SOURCE: ABNORMAL
SPINK1 GENE MUT ANL BLD/T: ABNORMAL

## 2021-05-27 ENCOUNTER — HOSPITAL ENCOUNTER (INPATIENT)
Facility: HOSPITAL | Age: 36
LOS: 2 days | Discharge: HOME | DRG: 439 | End: 2021-05-29
Attending: EMERGENCY MEDICINE | Admitting: FAMILY MEDICINE
Payer: MEDICAID

## 2021-05-27 DIAGNOSIS — R11.2 NON-INTRACTABLE VOMITING WITH NAUSEA, UNSPECIFIED VOMITING TYPE: ICD-10-CM

## 2021-05-27 DIAGNOSIS — K85.00 IDIOPATHIC ACUTE PANCREATITIS, UNSPECIFIED COMPLICATION STATUS: Primary | ICD-10-CM

## 2021-05-27 LAB
ALBUMIN SERPL BCP-MCNC: 4.2 G/DL (ref 3.5–5.2)
ALP SERPL-CCNC: 59 U/L (ref 55–135)
ALT SERPL W/O P-5'-P-CCNC: 56 U/L (ref 10–44)
AMPHET+METHAMPHET UR QL: NEGATIVE
AMYLASE SERPL-CCNC: 225 U/L (ref 20–110)
ANION GAP SERPL CALC-SCNC: 14 MMOL/L (ref 8–16)
AST SERPL-CCNC: 50 U/L (ref 10–40)
BARBITURATES UR QL SCN>200 NG/ML: NEGATIVE
BASOPHILS # BLD AUTO: 0.08 K/UL (ref 0–0.2)
BASOPHILS NFR BLD: 1 % (ref 0–1.9)
BENZODIAZ UR QL SCN>200 NG/ML: NEGATIVE
BILIRUB SERPL-MCNC: 0.5 MG/DL (ref 0.1–1)
BUN SERPL-MCNC: 19 MG/DL (ref 6–20)
BZE UR QL SCN: NEGATIVE
CALCIUM SERPL-MCNC: 9.4 MG/DL (ref 8.7–10.5)
CANNABINOIDS UR QL SCN: NEGATIVE
CHLORIDE SERPL-SCNC: 102 MMOL/L (ref 95–110)
CO2 SERPL-SCNC: 19 MMOL/L (ref 23–29)
CREAT SERPL-MCNC: 0.9 MG/DL (ref 0.5–1.4)
CREAT UR-MCNC: 56.2 MG/DL (ref 15–325)
CTP QC/QA: YES
DIFFERENTIAL METHOD: ABNORMAL
EOSINOPHIL # BLD AUTO: 0.2 K/UL (ref 0–0.5)
EOSINOPHIL NFR BLD: 2.8 % (ref 0–8)
ERYTHROCYTE [DISTWIDTH] IN BLOOD BY AUTOMATED COUNT: 12.6 % (ref 11.5–14.5)
EST. GFR  (AFRICAN AMERICAN): >60 ML/MIN/1.73 M^2
EST. GFR  (NON AFRICAN AMERICAN): >60 ML/MIN/1.73 M^2
GLUCOSE SERPL-MCNC: 356 MG/DL (ref 70–110)
HCT VFR BLD AUTO: 45.4 % (ref 37–48.5)
HGB BLD-MCNC: 15.9 G/DL (ref 12–16)
IMM GRANULOCYTES # BLD AUTO: 0.05 K/UL (ref 0–0.04)
IMM GRANULOCYTES NFR BLD AUTO: 0.6 % (ref 0–0.5)
LIPASE SERPL-CCNC: 482 U/L (ref 4–60)
LYMPHOCYTES # BLD AUTO: 2.5 K/UL (ref 1–4.8)
LYMPHOCYTES NFR BLD: 31 % (ref 18–48)
MCH RBC QN AUTO: 29.4 PG (ref 27–31)
MCHC RBC AUTO-ENTMCNC: 35 G/DL (ref 32–36)
MCV RBC AUTO: 84 FL (ref 82–98)
METHADONE UR QL SCN>300 NG/ML: NEGATIVE
MONOCYTES # BLD AUTO: 0.5 K/UL (ref 0.3–1)
MONOCYTES NFR BLD: 6.4 % (ref 4–15)
NEUTROPHILS # BLD AUTO: 4.7 K/UL (ref 1.8–7.7)
NEUTROPHILS NFR BLD: 58.2 % (ref 38–73)
NRBC BLD-RTO: 0 /100 WBC
OPIATES UR QL SCN: NORMAL
PCP UR QL SCN>25 NG/ML: NEGATIVE
PLATELET # BLD AUTO: 275 K/UL (ref 150–450)
PMV BLD AUTO: 10.6 FL (ref 9.2–12.9)
POTASSIUM SERPL-SCNC: 4.5 MMOL/L (ref 3.5–5.1)
PROT SERPL-MCNC: 7.9 G/DL (ref 6–8.4)
RBC # BLD AUTO: 5.41 M/UL (ref 4–5.4)
SARS-COV-2 RDRP RESP QL NAA+PROBE: NEGATIVE
SODIUM SERPL-SCNC: 135 MMOL/L (ref 136–145)
TOXICOLOGY INFORMATION: NORMAL
WBC # BLD AUTO: 8.13 K/UL (ref 3.9–12.7)

## 2021-05-27 PROCEDURE — 25000003 PHARM REV CODE 250: Performed by: HOSPITALIST

## 2021-05-27 PROCEDURE — 63600175 PHARM REV CODE 636 W HCPCS: Performed by: HOSPITALIST

## 2021-05-27 PROCEDURE — 83690 ASSAY OF LIPASE: CPT | Performed by: NURSE PRACTITIONER

## 2021-05-27 PROCEDURE — 82150 ASSAY OF AMYLASE: CPT | Performed by: NURSE PRACTITIONER

## 2021-05-27 PROCEDURE — G0378 HOSPITAL OBSERVATION PER HR: HCPCS

## 2021-05-27 PROCEDURE — 82962 GLUCOSE BLOOD TEST: CPT

## 2021-05-27 PROCEDURE — U0002 COVID-19 LAB TEST NON-CDC: HCPCS | Performed by: NURSE PRACTITIONER

## 2021-05-27 PROCEDURE — 96376 TX/PRO/DX INJ SAME DRUG ADON: CPT

## 2021-05-27 PROCEDURE — 36415 COLL VENOUS BLD VENIPUNCTURE: CPT | Performed by: NURSE PRACTITIONER

## 2021-05-27 PROCEDURE — 11000001 HC ACUTE MED/SURG PRIVATE ROOM

## 2021-05-27 PROCEDURE — 96361 HYDRATE IV INFUSION ADD-ON: CPT

## 2021-05-27 PROCEDURE — 99285 EMERGENCY DEPT VISIT HI MDM: CPT | Mod: 25

## 2021-05-27 PROCEDURE — 96375 TX/PRO/DX INJ NEW DRUG ADDON: CPT

## 2021-05-27 PROCEDURE — 80053 COMPREHEN METABOLIC PANEL: CPT | Performed by: NURSE PRACTITIONER

## 2021-05-27 PROCEDURE — 63600175 PHARM REV CODE 636 W HCPCS: Performed by: NURSE PRACTITIONER

## 2021-05-27 PROCEDURE — 80307 DRUG TEST PRSMV CHEM ANLYZR: CPT | Performed by: HOSPITALIST

## 2021-05-27 PROCEDURE — 80061 LIPID PANEL: CPT | Performed by: HOSPITALIST

## 2021-05-27 PROCEDURE — 25000003 PHARM REV CODE 250: Performed by: NURSE PRACTITIONER

## 2021-05-27 PROCEDURE — 85025 COMPLETE CBC W/AUTO DIFF WBC: CPT | Performed by: NURSE PRACTITIONER

## 2021-05-27 PROCEDURE — 96374 THER/PROPH/DIAG INJ IV PUSH: CPT

## 2021-05-27 PROCEDURE — 83036 HEMOGLOBIN GLYCOSYLATED A1C: CPT | Performed by: NURSE PRACTITIONER

## 2021-05-27 RX ORDER — SODIUM CHLORIDE 9 MG/ML
INJECTION, SOLUTION INTRAVENOUS
Status: COMPLETED | OUTPATIENT
Start: 2021-05-27 | End: 2021-05-27

## 2021-05-27 RX ORDER — TALC
6 POWDER (GRAM) TOPICAL NIGHTLY PRN
Status: DISCONTINUED | OUTPATIENT
Start: 2021-05-27 | End: 2021-05-29 | Stop reason: HOSPADM

## 2021-05-27 RX ORDER — FAMOTIDINE 10 MG/ML
20 INJECTION INTRAVENOUS EVERY 12 HOURS
Status: DISCONTINUED | OUTPATIENT
Start: 2021-05-27 | End: 2021-05-28

## 2021-05-27 RX ORDER — PIOGLITAZONEHYDROCHLORIDE 15 MG/1
15 TABLET ORAL DAILY
COMMUNITY
Start: 2021-05-17 | End: 2021-07-30

## 2021-05-27 RX ORDER — HYDROMORPHONE HYDROCHLORIDE 1 MG/ML
1 INJECTION, SOLUTION INTRAMUSCULAR; INTRAVENOUS; SUBCUTANEOUS EVERY 4 HOURS
Status: DISCONTINUED | OUTPATIENT
Start: 2021-05-27 | End: 2021-05-29

## 2021-05-27 RX ORDER — SODIUM CHLORIDE 0.9 % (FLUSH) 0.9 %
10 SYRINGE (ML) INJECTION
Status: DISCONTINUED | OUTPATIENT
Start: 2021-05-27 | End: 2021-05-29 | Stop reason: HOSPADM

## 2021-05-27 RX ORDER — OXYCODONE AND ACETAMINOPHEN 10; 325 MG/1; MG/1
1 TABLET ORAL EVERY 6 HOURS PRN
Qty: 12 TABLET | Refills: 0 | Status: SHIPPED | OUTPATIENT
Start: 2021-05-27 | End: 2021-05-29 | Stop reason: HOSPADM

## 2021-05-27 RX ORDER — METOCLOPRAMIDE HYDROCHLORIDE 5 MG/ML
10 INJECTION INTRAMUSCULAR; INTRAVENOUS
Status: COMPLETED | OUTPATIENT
Start: 2021-05-27 | End: 2021-05-28

## 2021-05-27 RX ORDER — MORPHINE SULFATE 2 MG/ML
2 INJECTION, SOLUTION INTRAMUSCULAR; INTRAVENOUS EVERY 4 HOURS PRN
Status: DISCONTINUED | OUTPATIENT
Start: 2021-05-27 | End: 2021-05-27

## 2021-05-27 RX ORDER — FAMOTIDINE 10 MG/ML
20 INJECTION INTRAVENOUS EVERY 12 HOURS
Status: DISCONTINUED | OUTPATIENT
Start: 2021-05-27 | End: 2021-05-27

## 2021-05-27 RX ORDER — INSULIN ASPART 100 [IU]/ML
1-10 INJECTION, SOLUTION INTRAVENOUS; SUBCUTANEOUS EVERY 6 HOURS PRN
Status: DISCONTINUED | OUTPATIENT
Start: 2021-05-27 | End: 2021-05-29 | Stop reason: HOSPADM

## 2021-05-27 RX ORDER — HEPARIN SODIUM 5000 [USP'U]/ML
5000 INJECTION, SOLUTION INTRAVENOUS; SUBCUTANEOUS EVERY 8 HOURS
Status: DISCONTINUED | OUTPATIENT
Start: 2021-05-27 | End: 2021-05-29 | Stop reason: HOSPADM

## 2021-05-27 RX ORDER — ACETAMINOPHEN 325 MG/1
650 TABLET ORAL EVERY 8 HOURS PRN
Status: DISCONTINUED | OUTPATIENT
Start: 2021-05-27 | End: 2021-05-29 | Stop reason: HOSPADM

## 2021-05-27 RX ORDER — HYDROMORPHONE HYDROCHLORIDE 1 MG/ML
1 INJECTION, SOLUTION INTRAMUSCULAR; INTRAVENOUS; SUBCUTANEOUS EVERY 4 HOURS
Status: DISCONTINUED | OUTPATIENT
Start: 2021-05-27 | End: 2021-05-27

## 2021-05-27 RX ORDER — FENOFIBRATE 145 MG/1
145 TABLET, FILM COATED ORAL DAILY
Status: DISCONTINUED | OUTPATIENT
Start: 2021-05-28 | End: 2021-05-29 | Stop reason: HOSPADM

## 2021-05-27 RX ORDER — MORPHINE SULFATE 2 MG/ML
1 INJECTION, SOLUTION INTRAMUSCULAR; INTRAVENOUS EVERY 4 HOURS PRN
Status: DISCONTINUED | OUTPATIENT
Start: 2021-05-27 | End: 2021-05-27

## 2021-05-27 RX ORDER — ONDANSETRON 2 MG/ML
8 INJECTION INTRAMUSCULAR; INTRAVENOUS
Status: COMPLETED | OUTPATIENT
Start: 2021-05-27 | End: 2021-05-27

## 2021-05-27 RX ORDER — MORPHINE SULFATE 4 MG/ML
4 INJECTION, SOLUTION INTRAMUSCULAR; INTRAVENOUS
Status: COMPLETED | OUTPATIENT
Start: 2021-05-27 | End: 2021-05-27

## 2021-05-27 RX ORDER — SODIUM CHLORIDE 9 MG/ML
INJECTION, SOLUTION INTRAVENOUS CONTINUOUS
Status: DISCONTINUED | OUTPATIENT
Start: 2021-05-27 | End: 2021-05-29 | Stop reason: HOSPADM

## 2021-05-27 RX ORDER — ONDANSETRON 8 MG/1
8 TABLET, ORALLY DISINTEGRATING ORAL 3 TIMES DAILY PRN
Qty: 20 TABLET | Refills: 0 | Status: SHIPPED | OUTPATIENT
Start: 2021-05-27 | End: 2021-05-29 | Stop reason: SDUPTHER

## 2021-05-27 RX ORDER — GLUCAGON 1 MG
1 KIT INJECTION
Status: DISCONTINUED | OUTPATIENT
Start: 2021-05-27 | End: 2021-05-29 | Stop reason: HOSPADM

## 2021-05-27 RX ORDER — ONDANSETRON 2 MG/ML
4 INJECTION INTRAMUSCULAR; INTRAVENOUS EVERY 8 HOURS PRN
Status: DISCONTINUED | OUTPATIENT
Start: 2021-05-27 | End: 2021-05-28

## 2021-05-27 RX ORDER — DIPHENHYDRAMINE HYDROCHLORIDE 50 MG/ML
12.5 INJECTION INTRAMUSCULAR; INTRAVENOUS
Status: COMPLETED | OUTPATIENT
Start: 2021-05-27 | End: 2021-05-28

## 2021-05-27 RX ADMIN — ONDANSETRON 8 MG: 2 INJECTION INTRAMUSCULAR; INTRAVENOUS at 04:05

## 2021-05-27 RX ADMIN — SODIUM CHLORIDE: 0.9 INJECTION, SOLUTION INTRAVENOUS at 04:05

## 2021-05-27 RX ADMIN — SODIUM CHLORIDE: 0.9 INJECTION, SOLUTION INTRAVENOUS at 06:05

## 2021-05-27 RX ADMIN — FAMOTIDINE 20 MG: 10 INJECTION INTRAVENOUS at 06:05

## 2021-05-27 RX ADMIN — MORPHINE SULFATE 4 MG: 4 INJECTION, SOLUTION INTRAMUSCULAR; INTRAVENOUS at 05:05

## 2021-05-27 RX ADMIN — HYDROMORPHONE HYDROCHLORIDE 1 MG: 1 INJECTION, SOLUTION INTRAMUSCULAR; INTRAVENOUS; SUBCUTANEOUS at 06:05

## 2021-05-27 RX ADMIN — ONDANSETRON 4 MG: 2 INJECTION INTRAMUSCULAR; INTRAVENOUS at 06:05

## 2021-05-27 RX ADMIN — MORPHINE SULFATE 4 MG: 4 INJECTION, SOLUTION INTRAMUSCULAR; INTRAVENOUS at 04:05

## 2021-05-27 RX ADMIN — HYDROMORPHONE HYDROCHLORIDE 1 MG: 1 INJECTION, SOLUTION INTRAMUSCULAR; INTRAVENOUS; SUBCUTANEOUS at 10:05

## 2021-05-27 RX ADMIN — SODIUM CHLORIDE 1000 ML: 0.9 INJECTION, SOLUTION INTRAVENOUS at 06:05

## 2021-05-28 LAB
ALBUMIN SERPL BCP-MCNC: 3.4 G/DL (ref 3.5–5.2)
ALP SERPL-CCNC: 44 U/L (ref 55–135)
ALT SERPL W/O P-5'-P-CCNC: 43 U/L (ref 10–44)
ANION GAP SERPL CALC-SCNC: 8 MMOL/L (ref 8–16)
AST SERPL-CCNC: 27 U/L (ref 10–40)
BASOPHILS # BLD AUTO: 0.05 K/UL (ref 0–0.2)
BASOPHILS NFR BLD: 0.6 % (ref 0–1.9)
BILIRUB SERPL-MCNC: 0.5 MG/DL (ref 0.1–1)
BUN SERPL-MCNC: 15 MG/DL (ref 6–20)
CALCIUM SERPL-MCNC: 7.7 MG/DL (ref 8.7–10.5)
CHLORIDE SERPL-SCNC: 106 MMOL/L (ref 95–110)
CHOLEST SERPL-MCNC: 221 MG/DL (ref 120–199)
CHOLEST/HDLC SERPL: 6.3 {RATIO} (ref 2–5)
CO2 SERPL-SCNC: 22 MMOL/L (ref 23–29)
CREAT SERPL-MCNC: 0.7 MG/DL (ref 0.5–1.4)
DIFFERENTIAL METHOD: NORMAL
EOSINOPHIL # BLD AUTO: 0.3 K/UL (ref 0–0.5)
EOSINOPHIL NFR BLD: 3.3 % (ref 0–8)
ERYTHROCYTE [DISTWIDTH] IN BLOOD BY AUTOMATED COUNT: 12.8 % (ref 11.5–14.5)
EST. GFR  (AFRICAN AMERICAN): >60 ML/MIN/1.73 M^2
EST. GFR  (NON AFRICAN AMERICAN): >60 ML/MIN/1.73 M^2
ESTIMATED AVG GLUCOSE: 237 MG/DL (ref 68–131)
GLUCOSE SERPL-MCNC: 228 MG/DL (ref 70–110)
HBA1C MFR BLD: 9.9 % (ref 4–5.6)
HCT VFR BLD AUTO: 39.6 % (ref 37–48.5)
HDLC SERPL-MCNC: 35 MG/DL (ref 40–75)
HDLC SERPL: 15.8 % (ref 20–50)
HGB BLD-MCNC: 13.6 G/DL (ref 12–16)
IMM GRANULOCYTES # BLD AUTO: 0.04 K/UL (ref 0–0.04)
IMM GRANULOCYTES NFR BLD AUTO: 0.5 % (ref 0–0.5)
LDLC SERPL CALC-MCNC: ABNORMAL MG/DL (ref 63–159)
LIPASE SERPL-CCNC: 53 U/L (ref 4–60)
LYMPHOCYTES # BLD AUTO: 2.9 K/UL (ref 1–4.8)
LYMPHOCYTES NFR BLD: 35.9 % (ref 18–48)
MCH RBC QN AUTO: 29.5 PG (ref 27–31)
MCHC RBC AUTO-ENTMCNC: 34.3 G/DL (ref 32–36)
MCV RBC AUTO: 86 FL (ref 82–98)
MONOCYTES # BLD AUTO: 0.6 K/UL (ref 0.3–1)
MONOCYTES NFR BLD: 7.1 % (ref 4–15)
NEUTROPHILS # BLD AUTO: 4.2 K/UL (ref 1.8–7.7)
NEUTROPHILS NFR BLD: 52.6 % (ref 38–73)
NONHDLC SERPL-MCNC: 186 MG/DL
NRBC BLD-RTO: 0 /100 WBC
PLATELET # BLD AUTO: 228 K/UL (ref 150–450)
PMV BLD AUTO: 10.3 FL (ref 9.2–12.9)
POCT GLUCOSE: 219 MG/DL (ref 70–110)
POCT GLUCOSE: 231 MG/DL (ref 70–110)
POCT GLUCOSE: 237 MG/DL (ref 70–110)
POCT GLUCOSE: 258 MG/DL (ref 70–110)
POTASSIUM SERPL-SCNC: 3.5 MMOL/L (ref 3.5–5.1)
PROT SERPL-MCNC: 6.1 G/DL (ref 6–8.4)
RBC # BLD AUTO: 4.61 M/UL (ref 4–5.4)
SODIUM SERPL-SCNC: 136 MMOL/L (ref 136–145)
TRIGL SERPL-MCNC: 650 MG/DL (ref 30–150)
WBC # BLD AUTO: 7.93 K/UL (ref 3.9–12.7)

## 2021-05-28 PROCEDURE — 11000001 HC ACUTE MED/SURG PRIVATE ROOM

## 2021-05-28 PROCEDURE — 80053 COMPREHEN METABOLIC PANEL: CPT | Performed by: HOSPITALIST

## 2021-05-28 PROCEDURE — 96375 TX/PRO/DX INJ NEW DRUG ADDON: CPT

## 2021-05-28 PROCEDURE — 96376 TX/PRO/DX INJ SAME DRUG ADON: CPT

## 2021-05-28 PROCEDURE — 83690 ASSAY OF LIPASE: CPT | Performed by: HOSPITALIST

## 2021-05-28 PROCEDURE — 85025 COMPLETE CBC W/AUTO DIFF WBC: CPT | Performed by: HOSPITALIST

## 2021-05-28 PROCEDURE — 63600175 PHARM REV CODE 636 W HCPCS: Performed by: HOSPITALIST

## 2021-05-28 PROCEDURE — 36415 COLL VENOUS BLD VENIPUNCTURE: CPT | Performed by: HOSPITALIST

## 2021-05-28 PROCEDURE — S0028 INJECTION, FAMOTIDINE, 20 MG: HCPCS | Performed by: FAMILY MEDICINE

## 2021-05-28 PROCEDURE — 96361 HYDRATE IV INFUSION ADD-ON: CPT

## 2021-05-28 PROCEDURE — 25000003 PHARM REV CODE 250: Performed by: FAMILY MEDICINE

## 2021-05-28 PROCEDURE — 63600175 PHARM REV CODE 636 W HCPCS: Performed by: NURSE PRACTITIONER

## 2021-05-28 PROCEDURE — 25000003 PHARM REV CODE 250: Performed by: HOSPITALIST

## 2021-05-28 RX ORDER — FENOFIBRATE 145 MG/1
145 TABLET, FILM COATED ORAL DAILY
Qty: 30 TABLET | Refills: 1 | Status: SHIPPED | OUTPATIENT
Start: 2021-05-28 | End: 2022-02-23

## 2021-05-28 RX ORDER — FAMOTIDINE 20 MG/50ML
20 INJECTION, SOLUTION INTRAVENOUS 2 TIMES DAILY
Status: DISCONTINUED | OUTPATIENT
Start: 2021-05-28 | End: 2021-05-29 | Stop reason: HOSPADM

## 2021-05-28 RX ORDER — ONDANSETRON 2 MG/ML
4 INJECTION INTRAMUSCULAR; INTRAVENOUS EVERY 4 HOURS PRN
Status: DISCONTINUED | OUTPATIENT
Start: 2021-05-28 | End: 2021-05-29 | Stop reason: HOSPADM

## 2021-05-28 RX ADMIN — HYDROMORPHONE HYDROCHLORIDE 1 MG: 1 INJECTION, SOLUTION INTRAMUSCULAR; INTRAVENOUS; SUBCUTANEOUS at 02:05

## 2021-05-28 RX ADMIN — FENOFIBRATE 145 MG: 145 TABLET, FILM COATED ORAL at 08:05

## 2021-05-28 RX ADMIN — ONDANSETRON 4 MG: 2 INJECTION INTRAMUSCULAR; INTRAVENOUS at 10:05

## 2021-05-28 RX ADMIN — SODIUM CHLORIDE: 0.9 INJECTION, SOLUTION INTRAVENOUS at 05:05

## 2021-05-28 RX ADMIN — ONDANSETRON 4 MG: 2 INJECTION INTRAMUSCULAR; INTRAVENOUS at 12:05

## 2021-05-28 RX ADMIN — HYDROMORPHONE HYDROCHLORIDE 1 MG: 1 INJECTION, SOLUTION INTRAMUSCULAR; INTRAVENOUS; SUBCUTANEOUS at 06:05

## 2021-05-28 RX ADMIN — HYDROMORPHONE HYDROCHLORIDE 1 MG: 1 INJECTION, SOLUTION INTRAMUSCULAR; INTRAVENOUS; SUBCUTANEOUS at 10:05

## 2021-05-28 RX ADMIN — DIPHENHYDRAMINE HYDROCHLORIDE 12.5 MG: 50 INJECTION INTRAMUSCULAR; INTRAVENOUS at 01:05

## 2021-05-28 RX ADMIN — METOCLOPRAMIDE 10 MG: 5 INJECTION, SOLUTION INTRAMUSCULAR; INTRAVENOUS at 01:05

## 2021-05-28 RX ADMIN — FAMOTIDINE 20 MG: 20 INJECTION, SOLUTION INTRAVENOUS at 08:05

## 2021-05-28 RX ADMIN — ONDANSETRON 4 MG: 2 INJECTION INTRAMUSCULAR; INTRAVENOUS at 02:05

## 2021-05-29 VITALS
RESPIRATION RATE: 15 BRPM | OXYGEN SATURATION: 98 % | HEIGHT: 62 IN | BODY MASS INDEX: 39.02 KG/M2 | WEIGHT: 212.06 LBS | DIASTOLIC BLOOD PRESSURE: 65 MMHG | SYSTOLIC BLOOD PRESSURE: 142 MMHG | TEMPERATURE: 98 F | HEART RATE: 70 BPM

## 2021-05-29 LAB
ALBUMIN SERPL BCP-MCNC: 3.5 G/DL (ref 3.5–5.2)
ALP SERPL-CCNC: 45 U/L (ref 55–135)
ALT SERPL W/O P-5'-P-CCNC: 56 U/L (ref 10–44)
ANION GAP SERPL CALC-SCNC: 7 MMOL/L (ref 8–16)
AST SERPL-CCNC: 36 U/L (ref 10–40)
BASOPHILS # BLD AUTO: 0.03 K/UL (ref 0–0.2)
BASOPHILS NFR BLD: 0.4 % (ref 0–1.9)
BILIRUB SERPL-MCNC: 0.7 MG/DL (ref 0.1–1)
BUN SERPL-MCNC: 8 MG/DL (ref 6–20)
CALCIUM SERPL-MCNC: 7.8 MG/DL (ref 8.7–10.5)
CHLORIDE SERPL-SCNC: 105 MMOL/L (ref 95–110)
CO2 SERPL-SCNC: 26 MMOL/L (ref 23–29)
CREAT SERPL-MCNC: 0.7 MG/DL (ref 0.5–1.4)
DIFFERENTIAL METHOD: NORMAL
EOSINOPHIL # BLD AUTO: 0.3 K/UL (ref 0–0.5)
EOSINOPHIL NFR BLD: 4.6 % (ref 0–8)
ERYTHROCYTE [DISTWIDTH] IN BLOOD BY AUTOMATED COUNT: 12.7 % (ref 11.5–14.5)
EST. GFR  (AFRICAN AMERICAN): >60 ML/MIN/1.73 M^2
EST. GFR  (NON AFRICAN AMERICAN): >60 ML/MIN/1.73 M^2
GLUCOSE SERPL-MCNC: 185 MG/DL (ref 70–110)
HCT VFR BLD AUTO: 40.4 % (ref 37–48.5)
HGB BLD-MCNC: 13.5 G/DL (ref 12–16)
IMM GRANULOCYTES # BLD AUTO: 0.03 K/UL (ref 0–0.04)
IMM GRANULOCYTES NFR BLD AUTO: 0.4 % (ref 0–0.5)
LYMPHOCYTES # BLD AUTO: 2.2 K/UL (ref 1–4.8)
LYMPHOCYTES NFR BLD: 31.4 % (ref 18–48)
MCH RBC QN AUTO: 28.9 PG (ref 27–31)
MCHC RBC AUTO-ENTMCNC: 33.4 G/DL (ref 32–36)
MCV RBC AUTO: 87 FL (ref 82–98)
MONOCYTES # BLD AUTO: 0.5 K/UL (ref 0.3–1)
MONOCYTES NFR BLD: 7.3 % (ref 4–15)
NEUTROPHILS # BLD AUTO: 4 K/UL (ref 1.8–7.7)
NEUTROPHILS NFR BLD: 55.9 % (ref 38–73)
NRBC BLD-RTO: 0 /100 WBC
PLATELET # BLD AUTO: 221 K/UL (ref 150–450)
PMV BLD AUTO: 10.4 FL (ref 9.2–12.9)
POCT GLUCOSE: 194 MG/DL (ref 70–110)
POCT GLUCOSE: 243 MG/DL (ref 70–110)
POTASSIUM SERPL-SCNC: 3.5 MMOL/L (ref 3.5–5.1)
PROT SERPL-MCNC: 6.2 G/DL (ref 6–8.4)
RBC # BLD AUTO: 4.67 M/UL (ref 4–5.4)
SODIUM SERPL-SCNC: 138 MMOL/L (ref 136–145)
WBC # BLD AUTO: 7.1 K/UL (ref 3.9–12.7)

## 2021-05-29 PROCEDURE — 63600175 PHARM REV CODE 636 W HCPCS: Performed by: HOSPITALIST

## 2021-05-29 PROCEDURE — S0028 INJECTION, FAMOTIDINE, 20 MG: HCPCS | Performed by: FAMILY MEDICINE

## 2021-05-29 PROCEDURE — 63600175 PHARM REV CODE 636 W HCPCS: Performed by: NURSE PRACTITIONER

## 2021-05-29 PROCEDURE — 36415 COLL VENOUS BLD VENIPUNCTURE: CPT | Performed by: HOSPITALIST

## 2021-05-29 PROCEDURE — 25000003 PHARM REV CODE 250: Performed by: HOSPITALIST

## 2021-05-29 PROCEDURE — 85025 COMPLETE CBC W/AUTO DIFF WBC: CPT | Performed by: HOSPITALIST

## 2021-05-29 PROCEDURE — 25000003 PHARM REV CODE 250: Performed by: FAMILY MEDICINE

## 2021-05-29 PROCEDURE — 80053 COMPREHEN METABOLIC PANEL: CPT | Performed by: HOSPITALIST

## 2021-05-29 RX ORDER — PIOGLITAZONEHYDROCHLORIDE 15 MG/1
15 TABLET ORAL DAILY
Status: DISCONTINUED | OUTPATIENT
Start: 2021-05-29 | End: 2021-05-29 | Stop reason: HOSPADM

## 2021-05-29 RX ORDER — HYDROMORPHONE HYDROCHLORIDE 1 MG/ML
1 INJECTION, SOLUTION INTRAMUSCULAR; INTRAVENOUS; SUBCUTANEOUS EVERY 4 HOURS PRN
Status: DISCONTINUED | OUTPATIENT
Start: 2021-05-29 | End: 2021-05-29 | Stop reason: HOSPADM

## 2021-05-29 RX ORDER — ONDANSETRON 8 MG/1
8 TABLET, ORALLY DISINTEGRATING ORAL 3 TIMES DAILY PRN
Qty: 20 TABLET | Refills: 0 | Status: SHIPPED | OUTPATIENT
Start: 2021-05-29 | End: 2021-08-04

## 2021-05-29 RX ADMIN — PIOGLITAZONE 15 MG: 15 TABLET ORAL at 10:05

## 2021-05-29 RX ADMIN — FAMOTIDINE 20 MG: 20 INJECTION, SOLUTION INTRAVENOUS at 08:05

## 2021-05-29 RX ADMIN — HYDROMORPHONE HYDROCHLORIDE 1 MG: 1 INJECTION, SOLUTION INTRAMUSCULAR; INTRAVENOUS; SUBCUTANEOUS at 03:05

## 2021-05-29 RX ADMIN — HYDROMORPHONE HYDROCHLORIDE 1 MG: 1 INJECTION, SOLUTION INTRAMUSCULAR; INTRAVENOUS; SUBCUTANEOUS at 08:05

## 2021-05-29 RX ADMIN — SODIUM CHLORIDE: 0.9 INJECTION, SOLUTION INTRAVENOUS at 03:05

## 2021-05-29 RX ADMIN — FENOFIBRATE 145 MG: 145 TABLET, FILM COATED ORAL at 08:05

## 2021-07-22 ENCOUNTER — TELEPHONE (OUTPATIENT)
Dept: DIABETES | Facility: CLINIC | Age: 36
End: 2021-07-22

## 2021-07-30 ENCOUNTER — OFFICE VISIT (OUTPATIENT)
Dept: DIABETES | Facility: CLINIC | Age: 36
End: 2021-07-30
Payer: MEDICAID

## 2021-07-30 VITALS
HEART RATE: 76 BPM | DIASTOLIC BLOOD PRESSURE: 83 MMHG | WEIGHT: 202.81 LBS | BODY MASS INDEX: 37.32 KG/M2 | HEIGHT: 62 IN | SYSTOLIC BLOOD PRESSURE: 139 MMHG

## 2021-07-30 DIAGNOSIS — E11.9 TYPE 2 DIABETES MELLITUS WITHOUT COMPLICATION, UNSPECIFIED WHETHER LONG TERM INSULIN USE: Primary | ICD-10-CM

## 2021-07-30 DIAGNOSIS — I10 ESSENTIAL HYPERTENSION: ICD-10-CM

## 2021-07-30 PROCEDURE — 99999 PR PBB SHADOW E&M-EST. PATIENT-LVL IV: CPT | Mod: PBBFAC,,, | Performed by: NURSE PRACTITIONER

## 2021-07-30 PROCEDURE — 99999 PR PBB SHADOW E&M-EST. PATIENT-LVL IV: ICD-10-PCS | Mod: PBBFAC,,, | Performed by: NURSE PRACTITIONER

## 2021-07-30 PROCEDURE — 99205 PR OFFICE/OUTPT VISIT, NEW, LEVL V, 60-74 MIN: ICD-10-PCS | Mod: S$GLB,,, | Performed by: NURSE PRACTITIONER

## 2021-07-30 PROCEDURE — 99214 OFFICE O/P EST MOD 30 MIN: CPT | Mod: PBBFAC | Performed by: NURSE PRACTITIONER

## 2021-07-30 PROCEDURE — 99205 OFFICE O/P NEW HI 60 MIN: CPT | Mod: S$GLB,,, | Performed by: NURSE PRACTITIONER

## 2021-07-30 RX ORDER — METFORMIN HYDROCHLORIDE 500 MG/1
500 TABLET, EXTENDED RELEASE ORAL 2 TIMES DAILY WITH MEALS
Qty: 30 TABLET | Refills: 0 | Status: SHIPPED | OUTPATIENT
Start: 2021-07-30 | End: 2021-08-06 | Stop reason: SDUPTHER

## 2021-07-30 RX ORDER — ACYCLOVIR 400 MG/1
400 TABLET ORAL 2 TIMES DAILY
COMMUNITY
Start: 2021-07-27 | End: 2022-02-23

## 2021-08-02 ENCOUNTER — TELEPHONE (OUTPATIENT)
Dept: DIABETES | Facility: CLINIC | Age: 36
End: 2021-08-02

## 2021-08-02 ENCOUNTER — CLINICAL SUPPORT (OUTPATIENT)
Dept: DIABETES | Facility: CLINIC | Age: 36
End: 2021-08-02
Payer: COMMERCIAL

## 2021-08-02 VITALS — WEIGHT: 205.25 LBS | HEIGHT: 62 IN | BODY MASS INDEX: 37.77 KG/M2

## 2021-08-02 DIAGNOSIS — E11.9 TYPE 2 DIABETES MELLITUS WITHOUT COMPLICATION, UNSPECIFIED WHETHER LONG TERM INSULIN USE: ICD-10-CM

## 2021-08-02 DIAGNOSIS — E11.9 TYPE 2 DIABETES MELLITUS WITHOUT COMPLICATION, UNSPECIFIED WHETHER LONG TERM INSULIN USE: Primary | ICD-10-CM

## 2021-08-02 PROCEDURE — G0108 DIAB MANAGE TRN  PER INDIV: HCPCS | Mod: PBBFAC | Performed by: DIETITIAN, REGISTERED

## 2021-08-02 PROCEDURE — 99999 PR PBB SHADOW E&M-EST. PATIENT-LVL III: ICD-10-PCS | Mod: PBBFAC,,, | Performed by: DIETITIAN, REGISTERED

## 2021-08-02 PROCEDURE — 99213 OFFICE O/P EST LOW 20 MIN: CPT | Mod: PBBFAC | Performed by: DIETITIAN, REGISTERED

## 2021-08-02 PROCEDURE — 99999 PR PBB SHADOW E&M-EST. PATIENT-LVL III: CPT | Mod: PBBFAC,,, | Performed by: DIETITIAN, REGISTERED

## 2021-08-02 RX ORDER — LANCETS
1 EACH MISCELLANEOUS 3 TIMES DAILY
Qty: 100 EACH | Refills: 11 | Status: SHIPPED | OUTPATIENT
Start: 2021-08-02

## 2021-08-02 RX ORDER — INSULIN PUMP SYRINGE, 3 ML
EACH MISCELLANEOUS
Qty: 1 EACH | Refills: 0 | Status: SHIPPED | OUTPATIENT
Start: 2021-08-02 | End: 2022-02-23 | Stop reason: ALTCHOICE

## 2021-08-03 ENCOUNTER — TELEPHONE (OUTPATIENT)
Dept: OBSTETRICS AND GYNECOLOGY | Facility: CLINIC | Age: 36
End: 2021-08-03

## 2021-08-04 ENCOUNTER — OFFICE VISIT (OUTPATIENT)
Dept: OBSTETRICS AND GYNECOLOGY | Facility: CLINIC | Age: 36
End: 2021-08-04
Payer: COMMERCIAL

## 2021-08-04 VITALS
DIASTOLIC BLOOD PRESSURE: 82 MMHG | WEIGHT: 205.5 LBS | BODY MASS INDEX: 37.81 KG/M2 | SYSTOLIC BLOOD PRESSURE: 128 MMHG | HEIGHT: 62 IN

## 2021-08-04 DIAGNOSIS — Z01.419 WELL WOMAN EXAM WITH ROUTINE GYNECOLOGICAL EXAM: Primary | ICD-10-CM

## 2021-08-04 DIAGNOSIS — N89.8 VAGINAL IRRITATION: ICD-10-CM

## 2021-08-04 DIAGNOSIS — N89.8 VAGINAL DISCHARGE: ICD-10-CM

## 2021-08-04 DIAGNOSIS — E11.9 TYPE 2 DIABETES MELLITUS WITHOUT COMPLICATION, UNSPECIFIED WHETHER LONG TERM INSULIN USE: ICD-10-CM

## 2021-08-04 PROCEDURE — 99999 PR PBB SHADOW E&M-EST. PATIENT-LVL III: CPT | Mod: PBBFAC,,, | Performed by: NURSE PRACTITIONER

## 2021-08-04 PROCEDURE — 99385 PREV VISIT NEW AGE 18-39: CPT | Mod: S$PBB,,, | Performed by: NURSE PRACTITIONER

## 2021-08-04 PROCEDURE — 87491 CHLMYD TRACH DNA AMP PROBE: CPT | Performed by: NURSE PRACTITIONER

## 2021-08-04 PROCEDURE — 87591 N.GONORRHOEAE DNA AMP PROB: CPT | Mod: 59 | Performed by: NURSE PRACTITIONER

## 2021-08-04 PROCEDURE — 99213 OFFICE O/P EST LOW 20 MIN: CPT | Mod: PBBFAC | Performed by: NURSE PRACTITIONER

## 2021-08-04 PROCEDURE — 99385 PR PREVENTIVE VISIT,NEW,18-39: ICD-10-PCS | Mod: S$PBB,,, | Performed by: NURSE PRACTITIONER

## 2021-08-04 PROCEDURE — 87481 CANDIDA DNA AMP PROBE: CPT | Mod: 59 | Performed by: NURSE PRACTITIONER

## 2021-08-04 PROCEDURE — 99999 PR PBB SHADOW E&M-EST. PATIENT-LVL III: ICD-10-PCS | Mod: PBBFAC,,, | Performed by: NURSE PRACTITIONER

## 2021-08-04 RX ORDER — NYSTATIN 100000 U/G
OINTMENT TOPICAL 2 TIMES DAILY
Qty: 30 G | Refills: 1 | Status: SHIPPED | OUTPATIENT
Start: 2021-08-04 | End: 2022-02-23

## 2021-08-05 ENCOUNTER — TELEPHONE (OUTPATIENT)
Dept: OBSTETRICS AND GYNECOLOGY | Facility: CLINIC | Age: 36
End: 2021-08-05

## 2021-08-06 ENCOUNTER — OFFICE VISIT (OUTPATIENT)
Dept: DIABETES | Facility: CLINIC | Age: 36
End: 2021-08-06
Payer: MEDICAID

## 2021-08-06 DIAGNOSIS — E11.9 TYPE 2 DIABETES MELLITUS WITHOUT COMPLICATION, UNSPECIFIED WHETHER LONG TERM INSULIN USE: Primary | ICD-10-CM

## 2021-08-06 DIAGNOSIS — I10 ESSENTIAL HYPERTENSION: ICD-10-CM

## 2021-08-06 LAB
BACTERIAL VAGINOSIS DNA: POSITIVE
C TRACH DNA SPEC QL NAA+PROBE: NOT DETECTED
CANDIDA GLABRATA DNA: NEGATIVE
CANDIDA KRUSEI DNA: NEGATIVE
CANDIDA RRNA VAG QL PROBE: NEGATIVE
N GONORRHOEA DNA SPEC QL NAA+PROBE: NOT DETECTED
T VAGINALIS RRNA GENITAL QL PROBE: NEGATIVE

## 2021-08-06 PROCEDURE — 99213 PR OFFICE/OUTPT VISIT, EST, LEVL III, 20-29 MIN: ICD-10-PCS | Mod: 95,,, | Performed by: NURSE PRACTITIONER

## 2021-08-06 PROCEDURE — 99213 OFFICE O/P EST LOW 20 MIN: CPT | Mod: 95,,, | Performed by: NURSE PRACTITIONER

## 2021-08-06 RX ORDER — METFORMIN HYDROCHLORIDE 500 MG/1
500 TABLET, EXTENDED RELEASE ORAL 2 TIMES DAILY WITH MEALS
Qty: 60 TABLET | Refills: 5 | Status: ON HOLD | OUTPATIENT
Start: 2021-08-06 | End: 2022-02-25 | Stop reason: SDUPTHER

## 2021-08-09 DIAGNOSIS — B96.89 BACTERIAL VAGINOSIS: Primary | ICD-10-CM

## 2021-08-09 DIAGNOSIS — N76.0 BACTERIAL VAGINOSIS: Primary | ICD-10-CM

## 2021-08-09 RX ORDER — METRONIDAZOLE 500 MG/1
500 TABLET ORAL 2 TIMES DAILY
Qty: 14 TABLET | Refills: 0 | Status: SHIPPED | OUTPATIENT
Start: 2021-08-09 | End: 2021-08-16

## 2021-08-31 ENCOUNTER — TELEPHONE (OUTPATIENT)
Dept: DIABETES | Facility: CLINIC | Age: 36
End: 2021-08-31

## 2021-09-01 DIAGNOSIS — E11.9 TYPE 2 DIABETES MELLITUS WITHOUT COMPLICATION, UNSPECIFIED WHETHER LONG TERM INSULIN USE: Primary | ICD-10-CM

## 2021-09-25 ENCOUNTER — HOSPITAL ENCOUNTER (EMERGENCY)
Facility: HOSPITAL | Age: 36
Discharge: HOME | End: 2021-09-25
Attending: EMERGENCY MEDICINE
Payer: MEDICAID

## 2021-09-25 VITALS
SYSTOLIC BLOOD PRESSURE: 135 MMHG | RESPIRATION RATE: 18 BRPM | BODY MASS INDEX: 37.13 KG/M2 | HEIGHT: 62 IN | TEMPERATURE: 98 F | WEIGHT: 201.75 LBS | HEART RATE: 62 BPM | DIASTOLIC BLOOD PRESSURE: 97 MMHG | OXYGEN SATURATION: 95 %

## 2021-09-25 DIAGNOSIS — R11.2 NAUSEA & VOMITING: ICD-10-CM

## 2021-09-25 DIAGNOSIS — R10.84 GENERALIZED ABDOMINAL PAIN: ICD-10-CM

## 2021-09-25 DIAGNOSIS — R11.2 NON-INTRACTABLE VOMITING WITH NAUSEA, UNSPECIFIED VOMITING TYPE: Primary | ICD-10-CM

## 2021-09-25 LAB
ALBUMIN SERPL BCP-MCNC: 4 G/DL (ref 3.5–5.2)
ALP SERPL-CCNC: 54 U/L (ref 55–135)
ALT SERPL W/O P-5'-P-CCNC: 69 U/L (ref 10–44)
AMPHET+METHAMPHET UR QL: NEGATIVE
ANION GAP SERPL CALC-SCNC: 12 MMOL/L (ref 8–16)
AST SERPL-CCNC: 64 U/L (ref 10–40)
BARBITURATES UR QL SCN>200 NG/ML: NEGATIVE
BASOPHILS # BLD AUTO: 0.05 K/UL (ref 0–0.2)
BASOPHILS NFR BLD: 0.7 % (ref 0–1.9)
BENZODIAZ UR QL SCN>200 NG/ML: NEGATIVE
BILIRUB SERPL-MCNC: 0.7 MG/DL (ref 0.1–1)
BILIRUB UR QL STRIP: NEGATIVE
BUN SERPL-MCNC: 14 MG/DL (ref 6–20)
BZE UR QL SCN: NEGATIVE
CALCIUM SERPL-MCNC: 9.2 MG/DL (ref 8.7–10.5)
CANNABINOIDS UR QL SCN: NEGATIVE
CHLORIDE SERPL-SCNC: 107 MMOL/L (ref 95–110)
CLARITY UR: CLEAR
CO2 SERPL-SCNC: 19 MMOL/L (ref 23–29)
COLOR UR: YELLOW
CREAT SERPL-MCNC: 0.7 MG/DL (ref 0.5–1.4)
CREAT UR-MCNC: 309.7 MG/DL (ref 15–325)
DIFFERENTIAL METHOD: NORMAL
EOSINOPHIL # BLD AUTO: 0.3 K/UL (ref 0–0.5)
EOSINOPHIL NFR BLD: 4.1 % (ref 0–8)
ERYTHROCYTE [DISTWIDTH] IN BLOOD BY AUTOMATED COUNT: 12.6 % (ref 11.5–14.5)
EST. GFR  (AFRICAN AMERICAN): >60 ML/MIN/1.73 M^2
EST. GFR  (NON AFRICAN AMERICAN): >60 ML/MIN/1.73 M^2
GLUCOSE SERPL-MCNC: 207 MG/DL (ref 70–110)
GLUCOSE UR QL STRIP: ABNORMAL
HCT VFR BLD AUTO: 43.4 % (ref 37–48.5)
HGB BLD-MCNC: 15.2 G/DL (ref 12–16)
HGB UR QL STRIP: NEGATIVE
IMM GRANULOCYTES # BLD AUTO: 0.02 K/UL (ref 0–0.04)
IMM GRANULOCYTES NFR BLD AUTO: 0.3 % (ref 0–0.5)
KETONES UR QL STRIP: NEGATIVE
LEUKOCYTE ESTERASE UR QL STRIP: NEGATIVE
LIPASE SERPL-CCNC: 50 U/L (ref 4–60)
LYMPHOCYTES # BLD AUTO: 2.4 K/UL (ref 1–4.8)
LYMPHOCYTES NFR BLD: 34.4 % (ref 18–48)
MCH RBC QN AUTO: 29.5 PG (ref 27–31)
MCHC RBC AUTO-ENTMCNC: 35 G/DL (ref 32–36)
MCV RBC AUTO: 84 FL (ref 82–98)
METHADONE UR QL SCN>300 NG/ML: NEGATIVE
MONOCYTES # BLD AUTO: 0.4 K/UL (ref 0.3–1)
MONOCYTES NFR BLD: 6 % (ref 4–15)
NEUTROPHILS # BLD AUTO: 3.8 K/UL (ref 1.8–7.7)
NEUTROPHILS NFR BLD: 54.5 % (ref 38–73)
NITRITE UR QL STRIP: NEGATIVE
NRBC BLD-RTO: 0 /100 WBC
OPIATES UR QL SCN: ABNORMAL
PCP UR QL SCN>25 NG/ML: NEGATIVE
PH UR STRIP: 6 [PH] (ref 5–8)
PLATELET # BLD AUTO: 293 K/UL (ref 150–450)
PMV BLD AUTO: 9.8 FL (ref 9.2–12.9)
POTASSIUM SERPL-SCNC: 3.8 MMOL/L (ref 3.5–5.1)
PROT SERPL-MCNC: 7.6 G/DL (ref 6–8.4)
PROT UR QL STRIP: NEGATIVE
RBC # BLD AUTO: 5.15 M/UL (ref 4–5.4)
SODIUM SERPL-SCNC: 138 MMOL/L (ref 136–145)
SP GR UR STRIP: >=1.03 (ref 1–1.03)
TOXICOLOGY INFORMATION: ABNORMAL
URN SPEC COLLECT METH UR: ABNORMAL
UROBILINOGEN UR STRIP-ACNC: NEGATIVE EU/DL
WBC # BLD AUTO: 7 K/UL (ref 3.9–12.7)

## 2021-09-25 PROCEDURE — 80053 COMPREHEN METABOLIC PANEL: CPT | Performed by: EMERGENCY MEDICINE

## 2021-09-25 PROCEDURE — 96374 THER/PROPH/DIAG INJ IV PUSH: CPT

## 2021-09-25 PROCEDURE — 83690 ASSAY OF LIPASE: CPT | Performed by: EMERGENCY MEDICINE

## 2021-09-25 PROCEDURE — 96376 TX/PRO/DX INJ SAME DRUG ADON: CPT

## 2021-09-25 PROCEDURE — 96375 TX/PRO/DX INJ NEW DRUG ADDON: CPT

## 2021-09-25 PROCEDURE — 96361 HYDRATE IV INFUSION ADD-ON: CPT

## 2021-09-25 PROCEDURE — 25000003 PHARM REV CODE 250: Performed by: EMERGENCY MEDICINE

## 2021-09-25 PROCEDURE — 63600175 PHARM REV CODE 636 W HCPCS: Performed by: EMERGENCY MEDICINE

## 2021-09-25 PROCEDURE — 85025 COMPLETE CBC W/AUTO DIFF WBC: CPT | Performed by: EMERGENCY MEDICINE

## 2021-09-25 PROCEDURE — 99284 EMERGENCY DEPT VISIT MOD MDM: CPT | Mod: 25

## 2021-09-25 PROCEDURE — 81003 URINALYSIS AUTO W/O SCOPE: CPT | Mod: 59 | Performed by: EMERGENCY MEDICINE

## 2021-09-25 PROCEDURE — 80307 DRUG TEST PRSMV CHEM ANLYZR: CPT | Performed by: EMERGENCY MEDICINE

## 2021-09-25 RX ORDER — MORPHINE SULFATE 2 MG/ML
6 INJECTION, SOLUTION INTRAMUSCULAR; INTRAVENOUS
Status: COMPLETED | OUTPATIENT
Start: 2021-09-25 | End: 2021-09-25

## 2021-09-25 RX ORDER — ONDANSETRON 4 MG/1
4 TABLET, FILM COATED ORAL EVERY 6 HOURS PRN
Qty: 12 TABLET | Refills: 0 | Status: SHIPPED | OUTPATIENT
Start: 2021-09-25 | End: 2022-02-23

## 2021-09-25 RX ORDER — METOCLOPRAMIDE 10 MG/1
10 TABLET ORAL EVERY 6 HOURS
Qty: 30 TABLET | Refills: 0 | Status: SHIPPED | OUTPATIENT
Start: 2021-09-25 | End: 2022-02-23

## 2021-09-25 RX ORDER — METOCLOPRAMIDE HYDROCHLORIDE 5 MG/ML
10 INJECTION INTRAMUSCULAR; INTRAVENOUS
Status: COMPLETED | OUTPATIENT
Start: 2021-09-25 | End: 2021-09-25

## 2021-09-25 RX ORDER — HYDROCODONE BITARTRATE AND ACETAMINOPHEN 5; 325 MG/1; MG/1
1 TABLET ORAL EVERY 6 HOURS PRN
Qty: 9 TABLET | Refills: 0 | OUTPATIENT
Start: 2021-09-25 | End: 2022-01-21

## 2021-09-25 RX ORDER — ONDANSETRON 2 MG/ML
4 INJECTION INTRAMUSCULAR; INTRAVENOUS
Status: COMPLETED | OUTPATIENT
Start: 2021-09-25 | End: 2021-09-25

## 2021-09-25 RX ORDER — FAMOTIDINE 10 MG/ML
20 INJECTION INTRAVENOUS
Status: COMPLETED | OUTPATIENT
Start: 2021-09-25 | End: 2021-09-25

## 2021-09-25 RX ADMIN — FAMOTIDINE 20 MG: 10 INJECTION INTRAVENOUS at 03:09

## 2021-09-25 RX ADMIN — MORPHINE SULFATE 6 MG: 2 INJECTION, SOLUTION INTRAMUSCULAR; INTRAVENOUS at 02:09

## 2021-09-25 RX ADMIN — ONDANSETRON HYDROCHLORIDE 4 MG: 2 SOLUTION INTRAMUSCULAR; INTRAVENOUS at 02:09

## 2021-09-25 RX ADMIN — METOCLOPRAMIDE 10 MG: 5 INJECTION, SOLUTION INTRAMUSCULAR; INTRAVENOUS at 03:09

## 2021-09-25 RX ADMIN — SODIUM CHLORIDE 1000 ML: 0.9 INJECTION, SOLUTION INTRAVENOUS at 02:09

## 2021-10-04 ENCOUNTER — TELEPHONE (OUTPATIENT)
Dept: ADMINISTRATIVE | Facility: HOSPITAL | Age: 36
End: 2021-10-04

## 2021-11-30 ENCOUNTER — TELEPHONE (OUTPATIENT)
Dept: ADMINISTRATIVE | Facility: HOSPITAL | Age: 36
End: 2021-11-30
Payer: COMMERCIAL

## 2021-12-16 VITALS
HEIGHT: 62 IN | TEMPERATURE: 100 F | RESPIRATION RATE: 18 BRPM | OXYGEN SATURATION: 97 % | BODY MASS INDEX: 36.52 KG/M2 | WEIGHT: 198.44 LBS | DIASTOLIC BLOOD PRESSURE: 97 MMHG | SYSTOLIC BLOOD PRESSURE: 153 MMHG | HEART RATE: 112 BPM

## 2021-12-16 LAB
ALBUMIN SERPL BCP-MCNC: 4.2 G/DL (ref 3.5–5.2)
ALP SERPL-CCNC: 57 U/L (ref 55–135)
ALT SERPL W/O P-5'-P-CCNC: 57 U/L (ref 10–44)
AMYLASE SERPL-CCNC: 43 U/L (ref 20–110)
ANION GAP SERPL CALC-SCNC: 11 MMOL/L (ref 8–16)
AST SERPL-CCNC: 33 U/L (ref 10–40)
BASOPHILS # BLD AUTO: 0.03 K/UL (ref 0–0.2)
BASOPHILS NFR BLD: 0.3 % (ref 0–1.9)
BILIRUB SERPL-MCNC: 1.1 MG/DL (ref 0.1–1)
BUN SERPL-MCNC: 19 MG/DL (ref 6–20)
CALCIUM SERPL-MCNC: 9 MG/DL (ref 8.7–10.5)
CHLORIDE SERPL-SCNC: 103 MMOL/L (ref 95–110)
CO2 SERPL-SCNC: 22 MMOL/L (ref 23–29)
CREAT SERPL-MCNC: 0.8 MG/DL (ref 0.5–1.4)
DIFFERENTIAL METHOD: ABNORMAL
EOSINOPHIL # BLD AUTO: 0.1 K/UL (ref 0–0.5)
EOSINOPHIL NFR BLD: 1.1 % (ref 0–8)
ERYTHROCYTE [DISTWIDTH] IN BLOOD BY AUTOMATED COUNT: 12.3 % (ref 11.5–14.5)
EST. GFR  (AFRICAN AMERICAN): >60 ML/MIN/1.73 M^2
EST. GFR  (NON AFRICAN AMERICAN): >60 ML/MIN/1.73 M^2
GLUCOSE SERPL-MCNC: 270 MG/DL (ref 70–110)
HCT VFR BLD AUTO: 47.6 % (ref 37–48.5)
HGB BLD-MCNC: 16.9 G/DL (ref 12–16)
IMM GRANULOCYTES # BLD AUTO: 0.05 K/UL (ref 0–0.04)
IMM GRANULOCYTES NFR BLD AUTO: 0.4 % (ref 0–0.5)
LIPASE SERPL-CCNC: 14 U/L (ref 4–60)
LYMPHOCYTES # BLD AUTO: 0.6 K/UL (ref 1–4.8)
LYMPHOCYTES NFR BLD: 4.8 % (ref 18–48)
MCH RBC QN AUTO: 29.5 PG (ref 27–31)
MCHC RBC AUTO-ENTMCNC: 35.5 G/DL (ref 32–36)
MCV RBC AUTO: 83 FL (ref 82–98)
MONOCYTES # BLD AUTO: 0.5 K/UL (ref 0.3–1)
MONOCYTES NFR BLD: 4.1 % (ref 4–15)
NEUTROPHILS # BLD AUTO: 10.4 K/UL (ref 1.8–7.7)
NEUTROPHILS NFR BLD: 89.3 % (ref 38–73)
NRBC BLD-RTO: 0 /100 WBC
PLATELET # BLD AUTO: 263 K/UL (ref 150–450)
PMV BLD AUTO: 9.9 FL (ref 9.2–12.9)
POTASSIUM SERPL-SCNC: 4.4 MMOL/L (ref 3.5–5.1)
PROT SERPL-MCNC: 7.7 G/DL (ref 6–8.4)
RBC # BLD AUTO: 5.73 M/UL (ref 4–5.4)
SODIUM SERPL-SCNC: 136 MMOL/L (ref 136–145)
WBC # BLD AUTO: 11.62 K/UL (ref 3.9–12.7)

## 2021-12-16 PROCEDURE — 85025 COMPLETE CBC W/AUTO DIFF WBC: CPT | Performed by: NURSE PRACTITIONER

## 2021-12-16 PROCEDURE — 99283 EMERGENCY DEPT VISIT LOW MDM: CPT | Mod: 25

## 2021-12-16 PROCEDURE — 80053 COMPREHEN METABOLIC PANEL: CPT | Performed by: NURSE PRACTITIONER

## 2021-12-16 PROCEDURE — 82150 ASSAY OF AMYLASE: CPT | Performed by: NURSE PRACTITIONER

## 2021-12-16 PROCEDURE — 83690 ASSAY OF LIPASE: CPT | Performed by: NURSE PRACTITIONER

## 2021-12-17 ENCOUNTER — HOSPITAL ENCOUNTER (EMERGENCY)
Facility: HOSPITAL | Age: 36
Discharge: LEFT BEFORE DISCHARGE | End: 2021-12-17
Payer: COMMERCIAL

## 2022-01-21 ENCOUNTER — HOSPITAL ENCOUNTER (EMERGENCY)
Facility: HOSPITAL | Age: 37
Discharge: HOME | End: 2022-01-21
Attending: EMERGENCY MEDICINE
Payer: COMMERCIAL

## 2022-01-21 VITALS
TEMPERATURE: 99 F | HEART RATE: 85 BPM | SYSTOLIC BLOOD PRESSURE: 184 MMHG | DIASTOLIC BLOOD PRESSURE: 99 MMHG | RESPIRATION RATE: 18 BRPM | WEIGHT: 193 LBS | OXYGEN SATURATION: 99 % | BODY MASS INDEX: 35.51 KG/M2 | HEIGHT: 62 IN

## 2022-01-21 DIAGNOSIS — R52 PAIN: ICD-10-CM

## 2022-01-21 DIAGNOSIS — K76.0 FATTY LIVER: ICD-10-CM

## 2022-01-21 DIAGNOSIS — S60.512A ABRASION OF LEFT HAND, INITIAL ENCOUNTER: ICD-10-CM

## 2022-01-21 DIAGNOSIS — S09.93XA FACIAL TRAUMA, INITIAL ENCOUNTER: ICD-10-CM

## 2022-01-21 DIAGNOSIS — M79.642 HAND PAIN, LEFT: ICD-10-CM

## 2022-01-21 DIAGNOSIS — R03.0 ELEVATED BLOOD PRESSURE READING: ICD-10-CM

## 2022-01-21 DIAGNOSIS — M79.644 PAIN OF RIGHT MIDDLE FINGER: Primary | ICD-10-CM
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
## 2022-01-21 DIAGNOSIS — S00.33XA CONTUSION OF NOSE, INITIAL ENCOUNTER: ICD-10-CM

## 2022-01-21 DIAGNOSIS — M25.561 ACUTE PAIN OF RIGHT KNEE: ICD-10-CM

## 2022-01-21 LAB
ALBUMIN SERPL BCP-MCNC: 4.3 G/DL (ref 3.5–5.2)
ALP SERPL-CCNC: 57 U/L (ref 55–135)
ALT SERPL W/O P-5'-P-CCNC: 72 U/L (ref 10–44)
ANION GAP SERPL CALC-SCNC: 15 MMOL/L (ref 8–16)
AST SERPL-CCNC: 47 U/L (ref 10–40)
BACTERIA #/AREA URNS HPF: NORMAL /HPF
BASOPHILS # BLD AUTO: 0.04 K/UL (ref 0–0.2)
BASOPHILS NFR BLD: 0.3 % (ref 0–1.9)
BILIRUB SERPL-MCNC: 0.7 MG/DL (ref 0.1–1)
BILIRUB UR QL STRIP: NEGATIVE
BUN SERPL-MCNC: 10 MG/DL (ref 6–20)
CALCIUM SERPL-MCNC: 8.9 MG/DL (ref 8.7–10.5)
CHLORIDE SERPL-SCNC: 103 MMOL/L (ref 95–110)
CLARITY UR: CLEAR
CO2 SERPL-SCNC: 17 MMOL/L (ref 23–29)
COLOR UR: YELLOW
CREAT SERPL-MCNC: 0.8 MG/DL (ref 0.5–1.4)
DIFFERENTIAL METHOD: ABNORMAL
EOSINOPHIL # BLD AUTO: 0.1 K/UL (ref 0–0.5)
EOSINOPHIL NFR BLD: 1 % (ref 0–8)
ERYTHROCYTE [DISTWIDTH] IN BLOOD BY AUTOMATED COUNT: 12.7 % (ref 11.5–14.5)
EST. GFR  (AFRICAN AMERICAN): >60 ML/MIN/1.73 M^2
EST. GFR  (NON AFRICAN AMERICAN): >60 ML/MIN/1.73 M^2
GLUCOSE SERPL-MCNC: 260 MG/DL (ref 70–110)
GLUCOSE UR QL STRIP: ABNORMAL
HCT VFR BLD AUTO: 44.1 % (ref 37–48.5)
HGB BLD-MCNC: 15.7 G/DL (ref 12–16)
HGB UR QL STRIP: ABNORMAL
HYALINE CASTS #/AREA URNS LPF: 1 /LPF
IMM GRANULOCYTES # BLD AUTO: 0.04 K/UL (ref 0–0.04)
IMM GRANULOCYTES NFR BLD AUTO: 0.3 % (ref 0–0.5)
KETONES UR QL STRIP: ABNORMAL
LEUKOCYTE ESTERASE UR QL STRIP: NEGATIVE
LIPASE SERPL-CCNC: 13 U/L (ref 4–60)
LYMPHOCYTES # BLD AUTO: 2 K/UL (ref 1–4.8)
LYMPHOCYTES NFR BLD: 16.1 % (ref 18–48)
MCH RBC QN AUTO: 29.2 PG (ref 27–31)
MCHC RBC AUTO-ENTMCNC: 35.6 G/DL (ref 32–36)
MCV RBC AUTO: 82 FL (ref 82–98)
MICROSCOPIC COMMENT: NORMAL
MONOCYTES # BLD AUTO: 0.6 K/UL (ref 0.3–1)
MONOCYTES NFR BLD: 4.8 % (ref 4–15)
NEUTROPHILS # BLD AUTO: 9.4 K/UL (ref 1.8–7.7)
NEUTROPHILS NFR BLD: 77.5 % (ref 38–73)
NITRITE UR QL STRIP: NEGATIVE
NRBC BLD-RTO: 0 /100 WBC
PH UR STRIP: 6 [PH] (ref 5–8)
PLATELET # BLD AUTO: 292 K/UL (ref 150–450)
PMV BLD AUTO: 10.3 FL (ref 9.2–12.9)
POTASSIUM SERPL-SCNC: 4.1 MMOL/L (ref 3.5–5.1)
PROT SERPL-MCNC: 7.6 G/DL (ref 6–8.4)
PROT UR QL STRIP: ABNORMAL
RBC # BLD AUTO: 5.38 M/UL (ref 4–5.4)
RBC #/AREA URNS HPF: 1 /HPF (ref 0–4)
SODIUM SERPL-SCNC: 135 MMOL/L (ref 136–145)
SP GR UR STRIP: 1.02 (ref 1–1.03)
SQUAMOUS #/AREA URNS HPF: 1 /HPF
URN SPEC COLLECT METH UR: ABNORMAL
UROBILINOGEN UR STRIP-ACNC: NEGATIVE EU/DL
WBC # BLD AUTO: 12.09 K/UL (ref 3.9–12.7)
WBC #/AREA URNS HPF: 1 /HPF (ref 0–5)
YEAST URNS QL MICRO: NORMAL

## 2022-01-21 PROCEDURE — 81000 URINALYSIS NONAUTO W/SCOPE: CPT | Performed by: EMERGENCY MEDICINE

## 2022-01-21 PROCEDURE — 63600175 PHARM REV CODE 636 W HCPCS: Performed by: EMERGENCY MEDICINE

## 2022-01-21 PROCEDURE — 96374 THER/PROPH/DIAG INJ IV PUSH: CPT

## 2022-01-21 PROCEDURE — 93010 ELECTROCARDIOGRAM REPORT: CPT | Mod: ,,, | Performed by: STUDENT IN AN ORGANIZED HEALTH CARE EDUCATION/TRAINING PROGRAM

## 2022-01-21 PROCEDURE — 93010 EKG 12-LEAD: ICD-10-PCS | Mod: ,,, | Performed by: STUDENT IN AN ORGANIZED HEALTH CARE EDUCATION/TRAINING PROGRAM

## 2022-01-21 PROCEDURE — 80053 COMPREHEN METABOLIC PANEL: CPT | Performed by: EMERGENCY MEDICINE

## 2022-01-21 PROCEDURE — 93005 ELECTROCARDIOGRAM TRACING: CPT

## 2022-01-21 PROCEDURE — 85025 COMPLETE CBC W/AUTO DIFF WBC: CPT | Performed by: EMERGENCY MEDICINE

## 2022-01-21 PROCEDURE — 99285 EMERGENCY DEPT VISIT HI MDM: CPT | Mod: 25

## 2022-01-21 PROCEDURE — 96375 TX/PRO/DX INJ NEW DRUG ADDON: CPT

## 2022-01-21 PROCEDURE — 25500020 PHARM REV CODE 255: Performed by: EMERGENCY MEDICINE

## 2022-01-21 PROCEDURE — 83690 ASSAY OF LIPASE: CPT | Performed by: EMERGENCY MEDICINE

## 2022-01-21 RX ORDER — CYCLOBENZAPRINE HCL 10 MG
10 TABLET ORAL 3 TIMES DAILY PRN
Qty: 21 TABLET | Refills: 0 | Status: SHIPPED | OUTPATIENT
Start: 2022-01-21 | End: 2022-01-28

## 2022-01-21 RX ORDER — HYDROCODONE BITARTRATE AND ACETAMINOPHEN 5; 325 MG/1; MG/1
1 TABLET ORAL EVERY 6 HOURS PRN
Qty: 14 TABLET | Refills: 0 | Status: SHIPPED | OUTPATIENT
Start: 2022-01-21 | End: 2022-02-23 | Stop reason: ALTCHOICE

## 2022-01-21 RX ORDER — MORPHINE SULFATE 4 MG/ML
4 INJECTION, SOLUTION INTRAMUSCULAR; INTRAVENOUS
Status: COMPLETED | OUTPATIENT
Start: 2022-01-21 | End: 2022-01-21

## 2022-01-21 RX ORDER — DIPHENHYDRAMINE HYDROCHLORIDE 50 MG/ML
50 INJECTION INTRAMUSCULAR; INTRAVENOUS
Status: COMPLETED | OUTPATIENT
Start: 2022-01-21 | End: 2022-01-21

## 2022-01-21 RX ORDER — HYDROMORPHONE HYDROCHLORIDE 1 MG/ML
0.5 INJECTION, SOLUTION INTRAMUSCULAR; INTRAVENOUS; SUBCUTANEOUS
Status: COMPLETED | OUTPATIENT
Start: 2022-01-21 | End: 2022-01-21

## 2022-01-21 RX ORDER — MELOXICAM 15 MG/1
15 TABLET ORAL DAILY PRN
Qty: 30 TABLET | Refills: 0 | Status: SHIPPED | OUTPATIENT
Start: 2022-01-21 | End: 2022-02-20

## 2022-01-21 RX ORDER — ONDANSETRON 2 MG/ML
4 INJECTION INTRAMUSCULAR; INTRAVENOUS
Status: COMPLETED | OUTPATIENT
Start: 2022-01-21 | End: 2022-01-21

## 2022-01-21 RX ORDER — KETOROLAC TROMETHAMINE 30 MG/ML
30 INJECTION, SOLUTION INTRAMUSCULAR; INTRAVENOUS
Status: COMPLETED | OUTPATIENT
Start: 2022-01-21 | End: 2022-01-21

## 2022-01-21 RX ADMIN — HYDROMORPHONE HYDROCHLORIDE 0.5 MG: 1 INJECTION, SOLUTION INTRAMUSCULAR; INTRAVENOUS; SUBCUTANEOUS at 08:01

## 2022-01-21 RX ADMIN — ONDANSETRON 4 MG: 2 INJECTION INTRAMUSCULAR; INTRAVENOUS at 06:01

## 2022-01-21 RX ADMIN — IOHEXOL 100 ML: 350 INJECTION, SOLUTION INTRAVENOUS at 07:01

## 2022-01-21 RX ADMIN — MORPHINE SULFATE 4 MG: 4 INJECTION INTRAVENOUS at 07:01

## 2022-01-21 RX ADMIN — DIPHENHYDRAMINE HYDROCHLORIDE 50 MG: 50 INJECTION, SOLUTION INTRAMUSCULAR; INTRAVENOUS at 06:01

## 2022-01-21 RX ADMIN — KETOROLAC TROMETHAMINE 30 MG: 30 INJECTION, SOLUTION INTRAMUSCULAR at 08:01
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
## 2022-01-21 NOTE — Clinical Note
"Priti Burgess" Micah was seen and treated in our emergency department on 1/21/2022.  She may return to school on 01/27/2022.      If you have any questions or concerns, please don't hesitate to call.      Jumana Parnell, DO"

## 2022-01-22 NOTE — DISCHARGE INSTRUCTIONS
You should not take your Metformin (Glucophage) for 48 hours as you had a CT scan with IV contrast.    I discussed with patient and/or family/caretaker that negative X-ray does not rule out occult fracture or other soft tissue injury.  Persistent pain greater than 7-10 days or increased pain requires follow up, specifically with orthopedics.       Trauma precautions were discussed with patient and/or family/caretaker; I do not specifically detect any abdominal, thoracic, CNS, orthopedic, or other emergent or life threatening condition and that patient is safe to be discharged.  It was also discussed that despite an unrevealing examination and negative radiographic examination for serious or life threatening injury, these conditions may still exist.  As such, patient should return to ED immediately should they experience, severe or worsening pain, shortness of breath, abdominal pain, headache, vomiting, or any other concern.  It was also discussed that not infrequently, injuries may not be diagnosed during the initial ED visit (such as fractures) and that if the patient discovers a new area of concern, a new area of injury that was not evaluated in the ED, they should return for evaluation as they may have an injury that requires treatment.

## 2022-02-23 ENCOUNTER — HOSPITAL ENCOUNTER (OUTPATIENT)
Facility: HOSPITAL | Age: 37
Discharge: HOME | End: 2022-02-25
Attending: EMERGENCY MEDICINE | Admitting: STUDENT IN AN ORGANIZED HEALTH CARE EDUCATION/TRAINING PROGRAM
Payer: MEDICAID

## 2022-02-23 DIAGNOSIS — R07.9 CHEST PAIN: ICD-10-CM

## 2022-02-23 DIAGNOSIS — R11.2 NON-INTRACTABLE VOMITING WITH NAUSEA, UNSPECIFIED VOMITING TYPE: ICD-10-CM

## 2022-02-23 DIAGNOSIS — K85.00 IDIOPATHIC ACUTE PANCREATITIS, UNSPECIFIED COMPLICATION STATUS: Primary | ICD-10-CM

## 2022-02-23 DIAGNOSIS — R10.13 EPIGASTRIC PAIN: ICD-10-CM

## 2022-02-23 DIAGNOSIS — E11.9 TYPE 2 DIABETES MELLITUS WITHOUT COMPLICATION, UNSPECIFIED WHETHER LONG TERM INSULIN USE: ICD-10-CM

## 2022-02-23 LAB
ALBUMIN SERPL BCP-MCNC: 4.3 G/DL (ref 3.5–5.2)
ALP SERPL-CCNC: 55 U/L (ref 55–135)
ALT SERPL W/O P-5'-P-CCNC: 63 U/L (ref 10–44)
ANION GAP SERPL CALC-SCNC: 13 MMOL/L (ref 8–16)
AST SERPL-CCNC: 40 U/L (ref 10–40)
B-HCG UR QL: NEGATIVE
BACTERIA #/AREA URNS HPF: NORMAL /HPF
BASOPHILS # BLD AUTO: 0.06 K/UL (ref 0–0.2)
BASOPHILS NFR BLD: 0.5 % (ref 0–1.9)
BILIRUB SERPL-MCNC: 0.6 MG/DL (ref 0.1–1)
BILIRUB UR QL STRIP: NEGATIVE
BUN SERPL-MCNC: 12 MG/DL (ref 6–20)
CALCIUM SERPL-MCNC: 8.7 MG/DL (ref 8.7–10.5)
CHLORIDE SERPL-SCNC: 105 MMOL/L (ref 95–110)
CLARITY UR: CLEAR
CO2 SERPL-SCNC: 16 MMOL/L (ref 23–29)
COLOR UR: YELLOW
CREAT SERPL-MCNC: 0.8 MG/DL (ref 0.5–1.4)
CTP QC/QA: YES
DIFFERENTIAL METHOD: ABNORMAL
EOSINOPHIL # BLD AUTO: 0.2 K/UL (ref 0–0.5)
EOSINOPHIL NFR BLD: 1.9 % (ref 0–8)
ERYTHROCYTE [DISTWIDTH] IN BLOOD BY AUTOMATED COUNT: 12.9 % (ref 11.5–14.5)
EST. GFR  (AFRICAN AMERICAN): >60 ML/MIN/1.73 M^2
EST. GFR  (NON AFRICAN AMERICAN): >60 ML/MIN/1.73 M^2
GLUCOSE SERPL-MCNC: 359 MG/DL (ref 70–110)
GLUCOSE UR QL STRIP: ABNORMAL
HCT VFR BLD AUTO: 46.8 % (ref 37–48.5)
HCV AB SERPL QL IA: NEGATIVE
HEP C VIRUS HOLD SPECIMEN: NORMAL
HGB BLD-MCNC: 16.7 G/DL (ref 12–16)
HGB UR QL STRIP: NEGATIVE
HIV 1+2 AB+HIV1 P24 AG SERPL QL IA: NEGATIVE
IMM GRANULOCYTES # BLD AUTO: 0.04 K/UL (ref 0–0.04)
IMM GRANULOCYTES NFR BLD AUTO: 0.3 % (ref 0–0.5)
KETONES UR QL STRIP: ABNORMAL
LEUKOCYTE ESTERASE UR QL STRIP: NEGATIVE
LIPASE SERPL-CCNC: >1000 U/L (ref 4–60)
LYMPHOCYTES # BLD AUTO: 2.2 K/UL (ref 1–4.8)
LYMPHOCYTES NFR BLD: 18.5 % (ref 18–48)
MCH RBC QN AUTO: 29.7 PG (ref 27–31)
MCHC RBC AUTO-ENTMCNC: 35.7 G/DL (ref 32–36)
MCV RBC AUTO: 83 FL (ref 82–98)
MICROSCOPIC COMMENT: NORMAL
MONOCYTES # BLD AUTO: 0.7 K/UL (ref 0.3–1)
MONOCYTES NFR BLD: 6 % (ref 4–15)
NEUTROPHILS # BLD AUTO: 8.7 K/UL (ref 1.8–7.7)
NEUTROPHILS NFR BLD: 72.8 % (ref 38–73)
NITRITE UR QL STRIP: NEGATIVE
NRBC BLD-RTO: 0 /100 WBC
PH UR STRIP: 6 [PH] (ref 5–8)
PLATELET # BLD AUTO: 262 K/UL (ref 150–450)
PMV BLD AUTO: 10.2 FL (ref 9.2–12.9)
POTASSIUM SERPL-SCNC: 3.8 MMOL/L (ref 3.5–5.1)
PROT SERPL-MCNC: 7.8 G/DL (ref 6–8.4)
PROT UR QL STRIP: NEGATIVE
RBC # BLD AUTO: 5.63 M/UL (ref 4–5.4)
RBC #/AREA URNS HPF: 1 /HPF (ref 0–4)
SARS-COV-2 RDRP RESP QL NAA+PROBE: NEGATIVE
SODIUM SERPL-SCNC: 134 MMOL/L (ref 136–145)
SP GR UR STRIP: 1.01 (ref 1–1.03)
URN SPEC COLLECT METH UR: ABNORMAL
UROBILINOGEN UR STRIP-ACNC: NEGATIVE EU/DL
WBC # BLD AUTO: 11.92 K/UL (ref 3.9–12.7)
YEAST URNS QL MICRO: NORMAL

## 2022-02-23 PROCEDURE — 86803 HEPATITIS C AB TEST: CPT | Performed by: EMERGENCY MEDICINE

## 2022-02-23 PROCEDURE — 96375 TX/PRO/DX INJ NEW DRUG ADDON: CPT

## 2022-02-23 PROCEDURE — 96374 THER/PROPH/DIAG INJ IV PUSH: CPT

## 2022-02-23 PROCEDURE — 81025 URINE PREGNANCY TEST: CPT | Performed by: EMERGENCY MEDICINE

## 2022-02-23 PROCEDURE — 96376 TX/PRO/DX INJ SAME DRUG ADON: CPT

## 2022-02-23 PROCEDURE — G0378 HOSPITAL OBSERVATION PER HR: HCPCS

## 2022-02-23 PROCEDURE — 63600175 PHARM REV CODE 636 W HCPCS: Performed by: STUDENT IN AN ORGANIZED HEALTH CARE EDUCATION/TRAINING PROGRAM

## 2022-02-23 PROCEDURE — 80053 COMPREHEN METABOLIC PANEL: CPT | Performed by: EMERGENCY MEDICINE

## 2022-02-23 PROCEDURE — 96361 HYDRATE IV INFUSION ADD-ON: CPT

## 2022-02-23 PROCEDURE — 96374 THER/PROPH/DIAG INJ IV PUSH: CPT | Mod: 59

## 2022-02-23 PROCEDURE — 81000 URINALYSIS NONAUTO W/SCOPE: CPT | Performed by: EMERGENCY MEDICINE

## 2022-02-23 PROCEDURE — U0002 COVID-19 LAB TEST NON-CDC: HCPCS | Performed by: EMERGENCY MEDICINE

## 2022-02-23 PROCEDURE — 63600175 PHARM REV CODE 636 W HCPCS: Performed by: EMERGENCY MEDICINE

## 2022-02-23 PROCEDURE — 87389 HIV-1 AG W/HIV-1&-2 AB AG IA: CPT | Performed by: EMERGENCY MEDICINE

## 2022-02-23 PROCEDURE — 25000003 PHARM REV CODE 250: Performed by: EMERGENCY MEDICINE

## 2022-02-23 PROCEDURE — 83690 ASSAY OF LIPASE: CPT | Performed by: EMERGENCY MEDICINE

## 2022-02-23 PROCEDURE — 99285 EMERGENCY DEPT VISIT HI MDM: CPT | Mod: 25

## 2022-02-23 PROCEDURE — 85025 COMPLETE CBC W/AUTO DIFF WBC: CPT | Performed by: EMERGENCY MEDICINE

## 2022-02-23 RX ORDER — MORPHINE SULFATE 4 MG/ML
2 INJECTION, SOLUTION INTRAMUSCULAR; INTRAVENOUS EVERY 6 HOURS PRN
Status: DISCONTINUED | OUTPATIENT
Start: 2022-02-23 | End: 2022-02-25 | Stop reason: HOSPADM

## 2022-02-23 RX ORDER — MELOXICAM 15 MG/1
15 TABLET ORAL DAILY PRN
Status: ON HOLD | COMMUNITY
End: 2022-02-25 | Stop reason: HOSPADM

## 2022-02-23 RX ORDER — MORPHINE SULFATE 4 MG/ML
4 INJECTION, SOLUTION INTRAMUSCULAR; INTRAVENOUS
Status: COMPLETED | OUTPATIENT
Start: 2022-02-23 | End: 2022-02-23

## 2022-02-23 RX ORDER — ONDANSETRON 2 MG/ML
4 INJECTION INTRAMUSCULAR; INTRAVENOUS
Status: COMPLETED | OUTPATIENT
Start: 2022-02-23 | End: 2022-02-23

## 2022-02-23 RX ORDER — ONDANSETRON 2 MG/ML
4 INJECTION INTRAMUSCULAR; INTRAVENOUS EVERY 6 HOURS PRN
Status: DISCONTINUED | OUTPATIENT
Start: 2022-02-23 | End: 2022-02-25 | Stop reason: HOSPADM

## 2022-02-23 RX ADMIN — SODIUM CHLORIDE 1000 ML: 0.9 INJECTION, SOLUTION INTRAVENOUS at 07:02

## 2022-02-23 RX ADMIN — ONDANSETRON 4 MG: 2 INJECTION INTRAMUSCULAR; INTRAVENOUS at 07:02

## 2022-02-23 RX ADMIN — ONDANSETRON 4 MG: 2 INJECTION INTRAMUSCULAR; INTRAVENOUS at 10:02

## 2022-02-23 RX ADMIN — ONDANSETRON 4 MG: 2 INJECTION INTRAMUSCULAR; INTRAVENOUS at 09:02

## 2022-02-23 RX ADMIN — MORPHINE SULFATE 2 MG: 4 INJECTION INTRAVENOUS at 03:02

## 2022-02-23 RX ADMIN — ONDANSETRON 4 MG: 2 INJECTION INTRAMUSCULAR; INTRAVENOUS at 03:02

## 2022-02-23 RX ADMIN — MORPHINE SULFATE 4 MG: 4 INJECTION INTRAVENOUS at 07:02

## 2022-02-23 RX ADMIN — MORPHINE SULFATE 4 MG: 4 INJECTION INTRAVENOUS at 10:02

## 2022-02-23 RX ADMIN — MORPHINE SULFATE 2 MG: 4 INJECTION INTRAVENOUS at 09:02

## 2022-02-23 NOTE — ED PROVIDER NOTES
SCRIBE #1 NOTE: I, Afshin Daly, am scribing for, and in the presence of, Geovanny Roca MD. I have scribed the entire note.      History      Chief Complaint   Patient presents with    Abdominal Pain     PT reports pain in right upper quadrant, Hx of pancreatitis       Review of patient's allergies indicates:   Allergen Reactions    Demerol (pf) [meperidine (pf)] Hives    Medrol [methylprednisolone] Anaphylaxis     Any cortisone    Adhesive      Other reaction(s): Unknown    Amoxicillin-pot clavulanate      Other reaction(s): Unknown    Atarax [hydroxyzine hcl] Hives    Hydralazine analogues Hives    Iodine      Other reaction(s): Unknown    Omnicef [cefdinir]     Penicillins Hives and Nausea And Vomiting    Phenergan [promethazine] Hives and Nausea And Vomiting    Risperidone analogues Hives    Ativan [lorazepam] Hives and Anxiety        HPI   HPI    2/23/2022, 7:12 AM   History obtained from the patient      History of Present Illness: Priti Obrien is a 36 y.o. female patient with a PMHx of pancreatitis who presents to the Emergency Department for generalized abdominal pain, onset last night. Symptoms are constant and moderate in severity. No mitigating or exacerbating factors reported. Associated sxs include n/v. Patient denies any fever, chills, SOB, CP, weakness, numbness, dizziness, headache, and all other sxs at this time. No prior Tx reported. No further complaints or concerns at this time.     Arrival mode: Personal vehicle    PCP: [facility] Youngblodo       Past Medical History:  Past Medical History:   Diagnosis Date    Allergy     Asthma     Childhood only    Bipolar 1 disorder     Borderline personality disorder     Depression     GERD (gastroesophageal reflux disease)     History of psychiatric hospitalization     Hx of psychiatric care     Hypertension     pt denied    Pancreatitis     Psychiatric problem     PTSD (post-traumatic stress disorder)     S/P partial  hysterectomy 2011    Substance abuse     Suicide attempt        Past Surgical History:  Past Surgical History:   Procedure Laterality Date    APPENDECTOMY       SECTION      CHOLECYSTECTOMY      HYSTERECTOMY  2010    endometrial hyperplasia    LASER LAPAROSCOPY           Family History:  Family History   Problem Relation Age of Onset    Diabetes Mother     Hypertension Mother     Atrial fibrillation Maternal Grandfather     Breast cancer Maternal Aunt        Social History:  Social History     Tobacco Use    Smoking status: Former Smoker     Packs/day: 0.25     Types: Cigarettes     Quit date: 2013     Years since quittin.7    Smokeless tobacco: Never Used   Substance and Sexual Activity    Alcohol use: Yes     Comment: occasional     Drug use: Not Currently    Sexual activity: Yes     Partners: Male, Female       ROS   Review of Systems   Constitutional: Negative for chills and fever.   HENT: Negative for sore throat.    Respiratory: Negative for shortness of breath.    Cardiovascular: Negative for chest pain.   Gastrointestinal: Positive for abdominal pain (generalized), nausea and vomiting. Negative for diarrhea.   Genitourinary: Negative for dysuria.   Musculoskeletal: Negative for back pain.   Skin: Negative for rash.   Neurological: Negative for dizziness, weakness, light-headedness, numbness and headaches.   Hematological: Does not bruise/bleed easily.   All other systems reviewed and are negative.    Physical Exam      Initial Vitals [22 0707]   BP Pulse Resp Temp SpO2   (!) 188/114 90 20 98 °F (36.7 °C) 97 %      MAP       --          Physical Exam  Nursing Notes and Vital Signs Reviewed.  Constitutional: Patient is in no acute distress. Well-developed and well-nourished.  Head: Atraumatic. Normocephalic.  Eyes: PERRL. EOM intact. Conjunctivae are not pale. No scleral icterus.  ENT: Mucous membranes are moist. Oropharynx is clear and symmetric.    Neck:  "Supple. Full ROM.  Cardiovascular: Regular rate. Regular rhythm. No murmurs, rubs, or gallops. Distal pulses are 2+ and symmetric.  Pulmonary/Chest: No respiratory distress. Clear to auscultation bilaterally. No wheezing or rales.  Abdominal: Soft and non-distended.  There is no tenderness.  No rebound, guarding, or rigidity.   Musculoskeletal: Moves all extremities. No obvious deformities. No edema.  Skin: Warm and dry.  Neurological:  Alert, awake, and appropriate.  Normal speech.  No acute focal neurological deficits are appreciated.  Psychiatric: Normal affect. Good eye contact. Appropriate in content.    ED Course    Procedures  ED Vital Signs:  Vitals:    02/23/22 0707 02/23/22 0729 02/23/22 0758 02/23/22 0828   BP: (!) 188/114 (!) 145/82  135/81   Pulse: 90 78  73   Resp: 20 20 18 18   Temp: 98 °F (36.7 °C)      TempSrc: Oral      SpO2: 97% 98%  97%   Weight: 90.5 kg (199 lb 8.3 oz)      Height: 5' 2" (1.575 m)       02/23/22 1000 02/23/22 1003 02/23/22 1005   BP:  (!) 163/96    Pulse:   79   Resp: 18     Temp:      TempSrc:      SpO2:   98%   Weight:      Height:          Abnormal Lab Results:  Labs Reviewed   CBC W/ AUTO DIFFERENTIAL - Abnormal; Notable for the following components:       Result Value    RBC 5.63 (*)     Hemoglobin 16.7 (*)     Gran # (ANC) 8.7 (*)     All other components within normal limits    Narrative:     Release to patient->Immediate   COMPREHENSIVE METABOLIC PANEL - Abnormal; Notable for the following components:    Sodium 134 (*)     CO2 16 (*)     Glucose 359 (*)     ALT 63 (*)     All other components within normal limits    Narrative:     Release to patient->Immediate   URINALYSIS, REFLEX TO URINE CULTURE - Abnormal; Notable for the following components:    Glucose, UA 3+ (*)     Ketones, UA 1+ (*)     All other components within normal limits    Narrative:     Specimen Source->Urine   LIPASE - Abnormal; Notable for the following components:    Lipase >1000 (*)     All other " components within normal limits    Narrative:     Release to patient->Immediate   HIV 1 / 2 ANTIBODY    Narrative:     Release to patient->Immediate   HEPATITIS C ANTIBODY    Narrative:     Release to patient->Immediate   HEP C VIRUS HOLD SPECIMEN    Narrative:     Release to patient->Immediate   PREGNANCY TEST, URINE RAPID   URINALYSIS MICROSCOPIC    Narrative:     Specimen Source->Urine   SARS-COV-2 RDRP GENE    Narrative:     This test utilizes isothermal nucleic acid amplification   technology to detect the SARS-CoV-2 RdRp nucleic acid segment.   The analytical sensitivity (limit of detection) is 125 genome   equivalents/mL.   A POSITIVE result implies infection with the SARS-CoV-2 virus;   the patient is presumed to be contagious.     A NEGATIVE result means that SARS-CoV-2 nucleic acids are not   present above the limit of detection. A NEGATIVE result should be   treated as presumptive. It does not rule out the possibility of   COVID-19 and should not be the sole basis for treatment decisions.   If COVID-19 is strongly suspected based on clinical and exposure   history, re-testing using an alternate molecular assay should be   considered.   This test is only for use under the Food and Drug   Administration s Emergency Use Authorization (EUA).   Commercial kits are provided by [employer].   Performance characteristics of the EUA have been independently   verified by [facility] Department of   Pathology and Laboratory Medicine.   _________________________________________________________________   The authorized Fact Sheet for Healthcare Providers and the authorized Fact   Sheet for Patients of the ID NOW COVID-19 are available on the FDA   website:     https://www.fda.gov/media/047121/download  https://www.fda.gov/media/808776/download                All Lab Results:  Results for orders placed or performed during the hospital encounter of 02/23/22   HIV 1/2 Ag/Ab (4th Gen)   Result Value Ref  Range    HIV 1/2 Ag/Ab Negative Negative   Hepatitis C Antibody   Result Value Ref Range    Hepatitis C Ab Negative Negative   HCV Virus Hold Specimen   Result Value Ref Range    HEP C Virus Hold Specimen Hold for HCV sendout    CBC Auto Differential   Result Value Ref Range    WBC 11.92 3.90 - 12.70 K/uL    RBC 5.63 (H) 4.00 - 5.40 M/uL    Hemoglobin 16.7 (H) 12.0 - 16.0 g/dL    Hematocrit 46.8 37.0 - 48.5 %    MCV 83 82 - 98 fL    MCH 29.7 27.0 - 31.0 pg    MCHC 35.7 32.0 - 36.0 g/dL    RDW 12.9 11.5 - 14.5 %    Platelets 262 150 - 450 K/uL    MPV 10.2 9.2 - 12.9 fL    Immature Granulocytes 0.3 0.0 - 0.5 %    Gran # (ANC) 8.7 (H) 1.8 - 7.7 K/uL    Immature Grans (Abs) 0.04 0.00 - 0.04 K/uL    Lymph # 2.2 1.0 - 4.8 K/uL    Mono # 0.7 0.3 - 1.0 K/uL    Eos # 0.2 0.0 - 0.5 K/uL    Baso # 0.06 0.00 - 0.20 K/uL    nRBC 0 0 /100 WBC    Gran % 72.8 38.0 - 73.0 %    Lymph % 18.5 18.0 - 48.0 %    Mono % 6.0 4.0 - 15.0 %    Eosinophil % 1.9 0.0 - 8.0 %    Basophil % 0.5 0.0 - 1.9 %    Differential Method Automated    Comprehensive Metabolic Panel   Result Value Ref Range    Sodium 134 (L) 136 - 145 mmol/L    Potassium 3.8 3.5 - 5.1 mmol/L    Chloride 105 95 - 110 mmol/L    CO2 16 (L) 23 - 29 mmol/L    Glucose 359 (H) 70 - 110 mg/dL    BUN 12 6 - 20 mg/dL    Creatinine 0.8 0.5 - 1.4 mg/dL    Calcium 8.7 8.7 - 10.5 mg/dL    Total Protein 7.8 6.0 - 8.4 g/dL    Albumin 4.3 3.5 - 5.2 g/dL    Total Bilirubin 0.6 0.1 - 1.0 mg/dL    Alkaline Phosphatase 55 55 - 135 U/L    AST 40 10 - 40 U/L    ALT 63 (H) 10 - 44 U/L    Anion Gap 13 8 - 16 mmol/L    eGFR if African American >60 >60 mL/min/1.73 m^2    eGFR if non African American >60 >60 mL/min/1.73 m^2   Urinalysis, Reflex to Urine Culture Urine, Clean Catch    Specimen: Urine   Result Value Ref Range    Specimen UA Urine, Clean Catch     Color, UA Yellow Yellow, Straw, [place]    Appearance, UA Clear Clear    pH, UA 6.0 5.0 - 8.0    Specific Gravity, UA 1.015 1.005 - 1.030     Protein, UA Negative Negative    Glucose, UA 3+ (A) Negative    Ketones, UA 1+ (A) Negative    Bilirubin (UA) Negative Negative    Occult Blood UA Negative Negative    Nitrite, UA Negative Negative    Urobilinogen, UA Negative <2.0 EU/dL    Leukocytes, UA Negative Negative   Pregnancy, urine rapid   Result Value Ref Range    Preg Test, Ur Negative    Lipase   Result Value Ref Range    Lipase >1000 (H) 4 - 60 U/L   Urinalysis Microscopic   Result Value Ref Range    RBC, UA 1 0 - 4 /hpf    Bacteria Rare None-Occ /hpf    Yeast, UA None None    Microscopic Comment SEE COMMENT    POCT COVID-19 Rapid Screening   Result Value Ref Range    POC Rapid COVID Negative Negative     Acceptable Yes      Imaging Results:  Imaging Results    None                 The Emergency Provider reviewed the vital signs and test results, which are outlined above.    ED Discussion     10:29 AM: Discussed case with Naomy Diallo NP ([facility]). Dr. Juarez agrees with current care and management of pt and accepts admission.   Admitting Service: Hospital Medicine  Admitting Physician: Dr. Juarez  Admit to: Obs Med Surg    10:30 AM: Re-evaluated pt. I have discussed test results, shared treatment plan, and the need for admission with patient and family at bedside. Pt and family express understanding at this time and agree with all information. All questions answered. Pt and family have no further questions or concerns at this time. Pt is ready for admit.         ED Medication(s):  Medications   sodium chloride 0.9% bolus 1,000 mL (0 mLs Intravenous Stopped 2/23/22 0912)   ondansetron injection 4 mg (4 mg Intravenous Given 2/23/22 0723)   morphine injection 4 mg (4 mg Intravenous Given 2/23/22 0758)   morphine injection 4 mg (4 mg Intravenous Given 2/23/22 1000)   ondansetron injection 4 mg (4 mg Intravenous Given 2/23/22 1000)     New Prescriptions    No medications on file           Medical Decision Making    Medical  Decision Making:   Clinical Tests:   Lab Tests: Ordered and Reviewed           Scribe Attestation:   Scribe #1: I performed the above scribed service and the documentation accurately describes the services I performed. I attest to the accuracy of the note.    Attending:   Physician Attestation Statement for Scribe #1: I, Geovanny Roca MD, personally performed the services described in this documentation, as scribed by Afshin Daly, in my presence, and it is both accurate and complete.          Clinical Impression       ICD-10-CM ICD-9-CM   1. Idiopathic acute pancreatitis, unspecified complication status  K85.00 577.0   2. Epigastric pain  R10.13 789.06   3. Non-intractable vomiting with nausea, unspecified vomiting type  R11.2 787.01       Disposition:   Disposition: Placed in Observation  Condition: Fair         Geovanny Roca MD  02/23/22 1112

## 2022-02-23 NOTE — PHARMACY MED REC
"Admission Medication History     The home medication history was taken by Luis Angel Sinclair.    You may go to "Admission" then "Reconcile Home Medications" tabs to review and/or act upon these items.      The home medication list has been updated by the Pharmacy department.    Please read ALL comments highlighted in yellow.    Please address this information as you see fit.     Feel free to contact us if you have any questions or require assistance.      The medications listed below were removed from the home medication list. Please reorder if appropriate:  Patient reports no longer taking the following medication(s):   ACYCLOVIR 400 MG TABLET   FENOFIBRATE 145 MG   HYDROCODONE-ACETAMINOPHEN 5-325 MG TABLET   METOCLOPRAMIDE HCl 10 MG TABLET   NYSTATIN OINTMENT   ONDANSETRON 4 MG TABLET    Medications listed below were obtained from: Patient/family, Analytic software- [employer] and Medical records  (Not in a hospital admission)      Potential issues to be addressed PRIOR TO DISCHARGE: NONE      Luis Angel Sinclair, [facility]  Spectralink [phone]  Please secure chat me! =)      Current Outpatient Medications on File Prior to Encounter   Medication Sig Dispense Refill Last Dose    meloxicam (MOBIC) 15 MG tablet Take 15 mg by mouth daily as needed for Pain.   2/22/2022 at Unknown time    metFORMIN (GLUCOPHAGE-XR) 500 MG ER 24hr tablet Take 1 tablet (500 mg total) by mouth 2 (two) times daily with meals. 60 tablet 5 2/22/2022 at Unknown time    blood sugar diagnostic Strp 1 each by Misc.(Non-Drug; Combo Route) route 3 (three) times daily. 100 strip 11     lancets Misc 1 each by Misc.(Non-Drug; Combo Route) route 3 (three) times daily. 100 each 11     [DISCONTINUED] acyclovir (ZOVIRAX) 400 MG tablet Take 400 mg by mouth 2 (two) times daily.       [DISCONTINUED] blood-glucose meter kit Use as instructed 1 each 0     [DISCONTINUED] fenofibrate (TRICOR) 145 MG tablet Take 1 tablet (145 mg total) by mouth once daily. 30 " tablet 1     [DISCONTINUED] HYDROcodone-acetaminophen (NORCO) 5-325 mg per tablet Take 1 tablet by mouth every 6 (six) hours as needed for Pain. 14 tablet 0     [DISCONTINUED] metoclopramide HCl (REGLAN) 10 MG tablet Take 1 tablet (10 mg total) by mouth every 6 (six) hours. 30 tablet 0     [DISCONTINUED] nystatin (MYCOSTATIN) ointment Apply topically 2 (two) times daily. 30 g 1     [DISCONTINUED] ondansetron (ZOFRAN) 4 MG tablet Take 1 tablet (4 mg total) by mouth every 6 (six) hours as needed for Nausea. 12 tablet 0                            .

## 2022-02-23 NOTE — NURSING
Pt. received as ED transfer, x4, in no acute distress or discomfort. Vitals present within stable limits. Mediations tolerated well;prn meds utilized. Safety educated to pt. With no questions or concerns. Care and comfort provided for by staff. Will continue to monitor progress.

## 2022-02-24 LAB
ANION GAP SERPL CALC-SCNC: 14 MMOL/L (ref 8–16)
BUN SERPL-MCNC: 9 MG/DL (ref 6–20)
CALCIUM SERPL-MCNC: 8.1 MG/DL (ref 8.7–10.5)
CHLORIDE SERPL-SCNC: 104 MMOL/L (ref 95–110)
CHOLEST SERPL-MCNC: 157 MG/DL (ref 120–199)
CHOLEST/HDLC SERPL: 5.4 {RATIO} (ref 2–5)
CO2 SERPL-SCNC: 17 MMOL/L (ref 23–29)
CREAT SERPL-MCNC: 0.7 MG/DL (ref 0.5–1.4)
EST. GFR  (AFRICAN AMERICAN): >60 ML/MIN/1.73 M^2
EST. GFR  (NON AFRICAN AMERICAN): >60 ML/MIN/1.73 M^2
ESTIMATED AVG GLUCOSE: 209 MG/DL (ref 68–131)
GLUCOSE SERPL-MCNC: 179 MG/DL (ref 70–110)
HBA1C MFR BLD: 8.9 % (ref 4–5.6)
HDLC SERPL-MCNC: 29 MG/DL (ref 40–75)
HDLC SERPL: 18.5 % (ref 20–50)
LDLC SERPL CALC-MCNC: 88.2 MG/DL (ref 63–159)
LIPASE SERPL-CCNC: 136 U/L (ref 4–60)
NONHDLC SERPL-MCNC: 128 MG/DL
POCT GLUCOSE: 143 MG/DL (ref 70–110)
POTASSIUM SERPL-SCNC: 3.4 MMOL/L (ref 3.5–5.1)
SODIUM SERPL-SCNC: 135 MMOL/L (ref 136–145)
TRIGL SERPL-MCNC: 199 MG/DL (ref 30–150)

## 2022-02-24 PROCEDURE — 96361 HYDRATE IV INFUSION ADD-ON: CPT

## 2022-02-24 PROCEDURE — G0378 HOSPITAL OBSERVATION PER HR: HCPCS

## 2022-02-24 PROCEDURE — 83690 ASSAY OF LIPASE: CPT | Performed by: NURSE PRACTITIONER

## 2022-02-24 PROCEDURE — 80048 BASIC METABOLIC PNL TOTAL CA: CPT | Performed by: NURSE PRACTITIONER

## 2022-02-24 PROCEDURE — 83036 HEMOGLOBIN GLYCOSYLATED A1C: CPT | Performed by: NURSE PRACTITIONER

## 2022-02-24 PROCEDURE — 36415 COLL VENOUS BLD VENIPUNCTURE: CPT | Performed by: NURSE PRACTITIONER

## 2022-02-24 PROCEDURE — 63600175 PHARM REV CODE 636 W HCPCS: Performed by: STUDENT IN AN ORGANIZED HEALTH CARE EDUCATION/TRAINING PROGRAM

## 2022-02-24 PROCEDURE — 80061 LIPID PANEL: CPT | Performed by: NURSE PRACTITIONER

## 2022-02-24 PROCEDURE — 25000003 PHARM REV CODE 250: Performed by: NURSE PRACTITIONER

## 2022-02-24 PROCEDURE — 96376 TX/PRO/DX INJ SAME DRUG ADON: CPT

## 2022-02-24 RX ORDER — AMOXICILLIN 250 MG
1 CAPSULE ORAL 2 TIMES DAILY PRN
Status: DISCONTINUED | OUTPATIENT
Start: 2022-02-24 | End: 2022-02-25 | Stop reason: HOSPADM

## 2022-02-24 RX ORDER — NALOXONE HCL 0.4 MG/ML
0.02 VIAL (ML) INJECTION
Status: DISCONTINUED | OUTPATIENT
Start: 2022-02-24 | End: 2022-02-25 | Stop reason: HOSPADM

## 2022-02-24 RX ORDER — ENOXAPARIN SODIUM 100 MG/ML
40 INJECTION SUBCUTANEOUS EVERY 24 HOURS
Status: DISCONTINUED | OUTPATIENT
Start: 2022-02-24 | End: 2022-02-25

## 2022-02-24 RX ORDER — ACETAMINOPHEN 325 MG/1
650 TABLET ORAL EVERY 8 HOURS PRN
Status: DISCONTINUED | OUTPATIENT
Start: 2022-02-24 | End: 2022-02-25 | Stop reason: HOSPADM

## 2022-02-24 RX ORDER — SODIUM CHLORIDE 9 MG/ML
INJECTION, SOLUTION INTRAVENOUS CONTINUOUS
Status: DISCONTINUED | OUTPATIENT
Start: 2022-02-24 | End: 2022-02-25

## 2022-02-24 RX ORDER — INSULIN ASPART 100 [IU]/ML
1-10 INJECTION, SOLUTION INTRAVENOUS; SUBCUTANEOUS
Status: DISCONTINUED | OUTPATIENT
Start: 2022-02-24 | End: 2022-02-25 | Stop reason: HOSPADM

## 2022-02-24 RX ORDER — IBUPROFEN 200 MG
24 TABLET ORAL
Status: DISCONTINUED | OUTPATIENT
Start: 2022-02-24 | End: 2022-02-25 | Stop reason: HOSPADM

## 2022-02-24 RX ORDER — TALC
6 POWDER (GRAM) TOPICAL NIGHTLY PRN
Status: DISCONTINUED | OUTPATIENT
Start: 2022-02-24 | End: 2022-02-25 | Stop reason: HOSPADM

## 2022-02-24 RX ORDER — GLUCAGON 1 MG
1 KIT INJECTION
Status: DISCONTINUED | OUTPATIENT
Start: 2022-02-24 | End: 2022-02-25 | Stop reason: HOSPADM

## 2022-02-24 RX ORDER — IBUPROFEN 200 MG
16 TABLET ORAL
Status: DISCONTINUED | OUTPATIENT
Start: 2022-02-24 | End: 2022-02-25 | Stop reason: HOSPADM

## 2022-02-24 RX ADMIN — SODIUM CHLORIDE: 0.9 INJECTION, SOLUTION INTRAVENOUS at 02:02

## 2022-02-24 RX ADMIN — ONDANSETRON 4 MG: 2 INJECTION INTRAMUSCULAR; INTRAVENOUS at 04:02

## 2022-02-24 RX ADMIN — ONDANSETRON 4 MG: 2 INJECTION INTRAMUSCULAR; INTRAVENOUS at 05:02

## 2022-02-24 RX ADMIN — ONDANSETRON 4 MG: 2 INJECTION INTRAMUSCULAR; INTRAVENOUS at 11:02

## 2022-02-24 RX ADMIN — MORPHINE SULFATE 2 MG: 4 INJECTION INTRAVENOUS at 11:02

## 2022-02-24 RX ADMIN — SODIUM CHLORIDE: 0.9 INJECTION, SOLUTION INTRAVENOUS at 06:02

## 2022-02-24 RX ADMIN — MORPHINE SULFATE 2 MG: 4 INJECTION INTRAVENOUS at 05:02

## 2022-02-24 RX ADMIN — MORPHINE SULFATE 2 MG: 4 INJECTION INTRAVENOUS at 04:02

## 2022-02-24 NOTE — PLAN OF CARE
[facility] - Med Surg  Discharge Assessment    Primary Care Provider: R[facility] Youngblood     Discharge Assessment (most recent)     BRIEF DISCHARGE ASSESSMENT - 02/24/22 1411        Discharge Planning    Assessment Type Discharge Planning Brief Assessment     Resource/Environmental Concerns none     Support Systems Parent     Equipment Currently Used at Home none     Current Living Arrangements home/apartment/condo     Patient/Family Anticipates Transition to home     Patient/Family Anticipated Services at Transition none     DME Needed Upon Discharge  none     Discharge Plan A Home                   Pt is independent at baseline and has no anticipated discharge needs from case management. CM will continue to follow.

## 2022-02-24 NOTE — ASSESSMENT & PLAN NOTE
Will keep NPO , this is recurrent  Analgesia and antiemetics prn   IVFs  Repeat lipase       Check fasting lipid

## 2022-02-24 NOTE — H&P
[facility] Medicine  History & Physical    Patient Name: Priti Obrien  MRN: [id number]  Patient Class: OP- Observation  Admission Date: 2022  Attending Physician: Raven Juarez MD   Primary Care Provider: RICHI Youngblood         Patient information was obtained from patient and ER records.     Subjective:     Principal Problem:Pancreatitis    Chief Complaint:   Chief Complaint   Patient presents with    Abdominal Pain     PT reports pain in right upper quadrant, Hx of pancreatitis        HPI: 36 year old woman with history of recurrent  pancreatitis, HTN, PTSD, and substance abuse who presents to the Emergency Department for evaluation of RUQ abdominal pain which onset gradually several hours ago at 7 PM. Pain is described as sharp, 10/10 in intensity and similar to her prior episodes of pancreatitis . There is associated history of nausea/vomiting. In the Ed, lipase was more than 1000, CO2 16, glucose of 359. Patient placed in observation for acute pancreatitis.       Past Medical History:   Diagnosis Date    Allergy     Asthma     Childhood only    Bipolar 1 disorder     Borderline personality disorder     Depression     GERD (gastroesophageal reflux disease)     History of psychiatric hospitalization     Hx of psychiatric care     Hypertension     pt denied    Pancreatitis     Psychiatric problem     PTSD (post-traumatic stress disorder)     S/P partial hysterectomy 2011    Substance abuse     Suicide attempt        Past Surgical History:   Procedure Laterality Date    APPENDECTOMY       SECTION      CHOLECYSTECTOMY      HYSTERECTOMY      endometrial hyperplasia    LASER LAPAROSCOPY         Review of patient's allergies indicates:   Allergen Reactions    Demerol (pf) [meperidine (pf)] Hives    Medrol [methylprednisolone] Anaphylaxis     Any cortisone    Adhesive      Other reaction(s): Unknown    Amoxicillin-pot clavulanate      Other  reaction(s): Unknown    Atarax [hydroxyzine hcl] Hives    Hydralazine analogues Hives    Iodine      Other reaction(s): Unknown    Omnicef [cefdinir]     Penicillins Hives and Nausea And Vomiting    Phenergan [promethazine] Hives and Nausea And Vomiting    Risperidone analogues Hives    Ativan [lorazepam] Hives and Anxiety       No current facility-administered medications on file prior to encounter.     Current Outpatient Medications on File Prior to Encounter   Medication Sig    meloxicam (MOBIC) 15 MG tablet Take 15 mg by mouth daily as needed for Pain.    metFORMIN (GLUCOPHAGE-XR) 500 MG ER 24hr tablet Take 1 tablet (500 mg total) by mouth 2 (two) times daily with meals.    blood sugar diagnostic Strp 1 each by Misc.(Non-Drug; Combo Route) route 3 (three) times daily.    lancets Misc 1 each by Misc.(Non-Drug; Combo Route) route 3 (three) times daily.     Family History       Problem Relation (Age of Onset)    Atrial fibrillation Maternal Grandfather    Breast cancer Maternal Aunt    Diabetes Mother    Hypertension Mother          Tobacco Use    Smoking status: Former Smoker     Packs/day: 0.25     Types: Cigarettes     Quit date: 2013     Years since quittin.7    Smokeless tobacco: Never Used   Substance and Sexual Activity    Alcohol use: Yes     Comment: occasional     Drug use: Not Currently    Sexual activity: Yes     Partners: Male, Female     Review of Systems   Constitutional: Negative.    HENT: Negative.     Eyes: Negative.    Respiratory: Negative.     Cardiovascular: Negative.    Gastrointestinal:  Positive for abdominal pain, nausea and vomiting.   Endocrine: Negative.    Genitourinary: Negative.    Musculoskeletal: Negative.    Skin: Negative.    Allergic/Immunologic: Negative.    Neurological: Negative.    Hematological: Negative.    Psychiatric/Behavioral: Negative.     Objective:     Vital Signs (Most Recent):  Temp: 98.9 °F (37.2 °C) (22 0741)  Pulse: 62 (22  0741)  Resp: 17 (02/24/22 1107)  BP: 119/74 (02/24/22 0741)  SpO2: 96 % (02/24/22 0741) Vital Signs (24h Range):  Temp:  [98.1 °F (36.7 °C)-98.9 °F (37.2 °C)] 98.9 °F (37.2 °C)  Pulse:  [57-66] 62  Resp:  [15-20] 17  SpO2:  [96 %-99 %] 96 %  BP: (119-155)/(74-95) 119/74     Weight: 97.3 kg (214 lb 8.1 oz)  Body mass index is 39.23 kg/m².    Physical Exam  Vitals and nursing note reviewed.   Constitutional:       Appearance: She is well-developed.   HENT:      Head: Normocephalic and atraumatic.   Eyes:      Conjunctiva/sclera: Conjunctivae normal.      Pupils: Pupils are equal, round, and reactive to light.   Cardiovascular:      Rate and Rhythm: Normal rate and regular rhythm.      Heart sounds: Normal heart sounds.   Pulmonary:      Effort: Pulmonary effort is normal.      Breath sounds: Normal breath sounds.   Abdominal:      General: Bowel sounds are normal.      Palpations: Abdomen is soft.      Tenderness: There is abdominal tenderness.   Musculoskeletal:         General: Normal range of motion.      Cervical back: Normal range of motion and neck supple.   Skin:     General: Skin is warm and dry.   Neurological:      Mental Status: She is alert and oriented to person, place, and time.      Deep Tendon Reflexes: Reflexes are normal and symmetric.   Psychiatric:         Behavior: Behavior normal.         Thought Content: Thought content normal.         Judgment: Judgment normal.        Significant Labs:  Results for orders placed or performed during the hospital encounter of 02/23/22   HIV 1/2 Ag/Ab (4th Gen)   Result Value Ref Range    HIV 1/2 Ag/Ab Negative Negative   Hepatitis C Antibody   Result Value Ref Range    Hepatitis C Ab Negative Negative   HCV Virus Hold Specimen   Result Value Ref Range    HEP C Virus Hold Specimen Hold for HCV sendout    CBC Auto Differential   Result Value Ref Range    WBC 11.92 3.90 - 12.70 K/uL    RBC 5.63 (H) 4.00 - 5.40 M/uL    Hemoglobin 16.7 (H) 12.0 - 16.0 g/dL     Hematocrit 46.8 37.0 - 48.5 %    MCV 83 82 - 98 fL    MCH 29.7 27.0 - 31.0 pg    MCHC 35.7 32.0 - 36.0 g/dL    RDW 12.9 11.5 - 14.5 %    Platelets 262 150 - 450 K/uL    MPV 10.2 9.2 - 12.9 fL    Immature Granulocytes 0.3 0.0 - 0.5 %    Gran # (ANC) 8.7 (H) 1.8 - 7.7 K/uL    Immature Grans (Abs) 0.04 0.00 - 0.04 K/uL    Lymph # 2.2 1.0 - 4.8 K/uL    Mono # 0.7 0.3 - 1.0 K/uL    Eos # 0.2 0.0 - 0.5 K/uL    Baso # 0.06 0.00 - 0.20 K/uL    nRBC 0 0 /100 WBC    Gran % 72.8 38.0 - 73.0 %    Lymph % 18.5 18.0 - 48.0 %    Mono % 6.0 4.0 - 15.0 %    Eosinophil % 1.9 0.0 - 8.0 %    Basophil % 0.5 0.0 - 1.9 %    Differential Method Automated    Comprehensive Metabolic Panel   Result Value Ref Range    Sodium 134 (L) 136 - 145 mmol/L    Potassium 3.8 3.5 - 5.1 mmol/L    Chloride 105 95 - 110 mmol/L    CO2 16 (L) 23 - 29 mmol/L    Glucose 359 (H) 70 - 110 mg/dL    BUN 12 6 - 20 mg/dL    Creatinine 0.8 0.5 - 1.4 mg/dL    Calcium 8.7 8.7 - 10.5 mg/dL    Total Protein 7.8 6.0 - 8.4 g/dL    Albumin 4.3 3.5 - 5.2 g/dL    Total Bilirubin 0.6 0.1 - 1.0 mg/dL    Alkaline Phosphatase 55 55 - 135 U/L    AST 40 10 - 40 U/L    ALT 63 (H) 10 - 44 U/L    Anion Gap 13 8 - 16 mmol/L    eGFR if African American >60 >60 mL/min/1.73 m^2    eGFR if non African American >60 >60 mL/min/1.73 m^2   Urinalysis, Reflex to Urine Culture Urine, Clean Catch    Specimen: Urine   Result Value Ref Range    Specimen UA Urine, Clean Catch     Color, UA Yellow Yellow, Straw, Humaira    Appearance, UA Clear Clear    pH, UA 6.0 5.0 - 8.0    Specific Gravity, UA 1.015 1.005 - 1.030    Protein, UA Negative Negative    Glucose, UA 3+ (A) Negative    Ketones, UA 1+ (A) Negative    Bilirubin (UA) Negative Negative    Occult Blood UA Negative Negative    Nitrite, UA Negative Negative    Urobilinogen, UA Negative <2.0 EU/dL    Leukocytes, UA Negative Negative   Pregnancy, urine rapid   Result Value Ref Range    Preg Test, Ur Negative    Lipase   Result Value Ref Range     Lipase >1000 (H) 4 - 60 U/L   Urinalysis Microscopic   Result Value Ref Range    RBC, UA 1 0 - 4 /hpf    Bacteria Rare None-Occ /hpf    Yeast, UA None None    Microscopic Comment SEE COMMENT    POCT COVID-19 Rapid Screening   Result Value Ref Range    POC Rapid COVID Negative Negative     Acceptable Yes      All pertinent labs within the past 24 hours have been reviewed.    Significant Imaging:   Imaging Results    None      I have reviewed all pertinent imaging results/findings within the past 24 hours.    Assessment/Plan:     * Pancreatitis  Will keep NPO , this is recurrent  Analgesia and antiemetics prn   IVFs  Repeat lipase       Check fasting lipid     Diabetes mellitus type 2 in obese  Accuchecks   SSI-moderate dose   Hemoglobin A1c 9.9 on 5/21  Repeat HA1C   May need long acting insulin      Bipolar 1 disorder          VTE Risk Mitigation (From admission, onward)         Ordered     enoxaparin injection 40 mg  Daily         02/24/22 1129     IP VTE HIGH RISK PATIENT  Once         02/24/22 1129     Place sequential compression device  Until discontinued         02/24/22 1129                   Naomy Diallo NP  Department of Hospital Medicine   [facility] - Med Surg

## 2022-02-24 NOTE — PLAN OF CARE
Pt remains free of falls/injury this shift. Safety precautions maintained. Pain and nausea managed with PRN medications.NaCl infusing at 125 ml/hr. VSS. No signs and symptoms of acute distress noted at this time. 12 hour chart check completed. Will continue to monitor.

## 2022-02-24 NOTE — HPI
36 year old woman with history of recurrent  pancreatitis, HTN, PTSD, and substance abuse who presents to the Emergency Department for evaluation of RUQ abdominal pain which onset gradually several hours ago at 7 PM. Pain is described as sharp, 10/10 in intensity and similar to her prior episodes of pancreatitis . There is associated history of nausea/vomiting. In the Ed, lipase was more than 1000, CO2 16, glucose of 359. Patient placed in observation for acute pancreatitis.

## 2022-02-24 NOTE — NURSING
Received pt resting in bed AAOx4 in stable condition. No apparent distress noted. No complaint of any pain thus far. Respiration unlabored. Safety and comfort measures in place. Call bell in reach. Continue POC

## 2022-02-24 NOTE — ASSESSMENT & PLAN NOTE
Accuchecks   SSI-moderate dose   Hemoglobin A1c 9.9 on 5/21  Repeat HA1C   May need long acting insulin

## 2022-02-24 NOTE — SUBJECTIVE & OBJECTIVE
Past Medical History:   Diagnosis Date    Allergy     Asthma     Childhood only    Bipolar 1 disorder     Borderline personality disorder     Depression     GERD (gastroesophageal reflux disease)     History of psychiatric hospitalization     Hx of psychiatric care     Hypertension     pt denied    Pancreatitis     Psychiatric problem     PTSD (post-traumatic stress disorder)     S/P partial hysterectomy 2011    Substance abuse     Suicide attempt        Past Surgical History:   Procedure Laterality Date    APPENDECTOMY       SECTION      CHOLECYSTECTOMY      HYSTERECTOMY      endometrial hyperplasia    LASER LAPAROSCOPY         Review of patient's allergies indicates:   Allergen Reactions    Demerol (pf) [meperidine (pf)] Hives    Medrol [methylprednisolone] Anaphylaxis     Any cortisone    Adhesive      Other reaction(s): Unknown    Amoxicillin-pot clavulanate      Other reaction(s): Unknown    Atarax [hydroxyzine hcl] Hives    Hydralazine analogues Hives    Iodine      Other reaction(s): Unknown    Omnicef [cefdinir]     Penicillins Hives and Nausea And Vomiting    Phenergan [promethazine] Hives and Nausea And Vomiting    Risperidone analogues Hives    Ativan [lorazepam] Hives and Anxiety       No current facility-administered medications on file prior to encounter.     Current Outpatient Medications on File Prior to Encounter   Medication Sig    meloxicam (MOBIC) 15 MG tablet Take 15 mg by mouth daily as needed for Pain.    metFORMIN (GLUCOPHAGE-XR) 500 MG ER 24hr tablet Take 1 tablet (500 mg total) by mouth 2 (two) times daily with meals.    blood sugar diagnostic Strp 1 each by Misc.(Non-Drug; Combo Route) route 3 (three) times daily.    lancets Misc 1 each by Misc.(Non-Drug; Combo Route) route 3 (three) times daily.     Family History       Problem Relation (Age of Onset)    Atrial fibrillation Maternal Grandfather    Breast cancer Maternal Aunt    Diabetes Mother    Hypertension Mother           Tobacco Use    Smoking status: Former Smoker     Packs/day: 0.25     Types: Cigarettes     Quit date: 2013     Years since quittin.7    Smokeless tobacco: Never Used   Substance and Sexual Activity    Alcohol use: Yes     Comment: occasional     Drug use: Not Currently    Sexual activity: Yes     Partners: Male, Female     Review of Systems   Constitutional: Negative.    HENT: Negative.     Eyes: Negative.    Respiratory: Negative.     Cardiovascular: Negative.    Gastrointestinal:  Positive for abdominal pain, nausea and vomiting.   Endocrine: Negative.    Genitourinary: Negative.    Musculoskeletal: Negative.    Skin: Negative.    Allergic/Immunologic: Negative.    Neurological: Negative.    Hematological: Negative.    Psychiatric/Behavioral: Negative.     Objective:     Vital Signs (Most Recent):  Temp: 98.9 °F (37.2 °C) (22 0741)  Pulse: 62 (22 07)  Resp: 17 (22 1107)  BP: 119/74 (22 07)  SpO2: 96 % (22 07) Vital Signs (24h Range):  Temp:  [98.1 °F (36.7 °C)-98.9 °F (37.2 °C)] 98.9 °F (37.2 °C)  Pulse:  [57-66] 62  Resp:  [15-20] 17  SpO2:  [96 %-99 %] 96 %  BP: (119-155)/(74-95) 119/74     Weight: 97.3 kg (214 lb 8.1 oz)  Body mass index is 39.23 kg/m².    Physical Exam  Vitals and nursing note reviewed.   Constitutional:       Appearance: She is well-developed.   HENT:      Head: Normocephalic and atraumatic.   Eyes:      Conjunctiva/sclera: Conjunctivae normal.      Pupils: Pupils are equal, round, and reactive to light.   Cardiovascular:      Rate and Rhythm: Normal rate and regular rhythm.      Heart sounds: Normal heart sounds.   Pulmonary:      Effort: Pulmonary effort is normal.      Breath sounds: Normal breath sounds.   Abdominal:      General: Bowel sounds are normal.      Palpations: Abdomen is soft.      Tenderness: There is abdominal tenderness.   Musculoskeletal:         General: Normal range of motion.      Cervical back: Normal range of motion  and neck supple.   Skin:     General: Skin is warm and dry.   Neurological:      Mental Status: She is alert and oriented to person, place, and time.      Deep Tendon Reflexes: Reflexes are normal and symmetric.   Psychiatric:         Behavior: Behavior normal.         Thought Content: Thought content normal.         Judgment: Judgment normal.        Significant Labs:  Results for orders placed or performed during the hospital encounter of 02/23/22   HIV 1/2 Ag/Ab (4th Gen)   Result Value Ref Range    HIV 1/2 Ag/Ab Negative Negative   Hepatitis C Antibody   Result Value Ref Range    Hepatitis C Ab Negative Negative   HCV Virus Hold Specimen   Result Value Ref Range    HEP C Virus Hold Specimen Hold for HCV sendout    CBC Auto Differential   Result Value Ref Range    WBC 11.92 3.90 - 12.70 K/uL    RBC 5.63 (H) 4.00 - 5.40 M/uL    Hemoglobin 16.7 (H) 12.0 - 16.0 g/dL    Hematocrit 46.8 37.0 - 48.5 %    MCV 83 82 - 98 fL    MCH 29.7 27.0 - 31.0 pg    MCHC 35.7 32.0 - 36.0 g/dL    RDW 12.9 11.5 - 14.5 %    Platelets 262 150 - 450 K/uL    MPV 10.2 9.2 - 12.9 fL    Immature Granulocytes 0.3 0.0 - 0.5 %    Gran # (ANC) 8.7 (H) 1.8 - 7.7 K/uL    Immature Grans (Abs) 0.04 0.00 - 0.04 K/uL    Lymph # 2.2 1.0 - 4.8 K/uL    Mono # 0.7 0.3 - 1.0 K/uL    Eos # 0.2 0.0 - 0.5 K/uL    Baso # 0.06 0.00 - 0.20 K/uL    nRBC 0 0 /100 WBC    Gran % 72.8 38.0 - 73.0 %    Lymph % 18.5 18.0 - 48.0 %    Mono % 6.0 4.0 - 15.0 %    Eosinophil % 1.9 0.0 - 8.0 %    Basophil % 0.5 0.0 - 1.9 %    Differential Method Automated    Comprehensive Metabolic Panel   Result Value Ref Range    Sodium 134 (L) 136 - 145 mmol/L    Potassium 3.8 3.5 - 5.1 mmol/L    Chloride 105 95 - 110 mmol/L    CO2 16 (L) 23 - 29 mmol/L    Glucose 359 (H) 70 - 110 mg/dL    BUN 12 6 - 20 mg/dL    Creatinine 0.8 0.5 - 1.4 mg/dL    Calcium 8.7 8.7 - 10.5 mg/dL    Total Protein 7.8 6.0 - 8.4 g/dL    Albumin 4.3 3.5 - 5.2 g/dL    Total Bilirubin 0.6 0.1 - 1.0 mg/dL    Alkaline  Phosphatase 55 55 - 135 U/L    AST 40 10 - 40 U/L    ALT 63 (H) 10 - 44 U/L    Anion Gap 13 8 - 16 mmol/L    eGFR if African American >60 >60 mL/min/1.73 m^2    eGFR if non African American >60 >60 mL/min/1.73 m^2   Urinalysis, Reflex to Urine Culture Urine, Clean Catch    Specimen: Urine   Result Value Ref Range    Specimen UA Urine, Clean Catch     Color, UA Yellow Yellow, Straw, Humaira    Appearance, UA Clear Clear    pH, UA 6.0 5.0 - 8.0    Specific Gravity, UA 1.015 1.005 - 1.030    Protein, UA Negative Negative    Glucose, UA 3+ (A) Negative    Ketones, UA 1+ (A) Negative    Bilirubin (UA) Negative Negative    Occult Blood UA Negative Negative    Nitrite, UA Negative Negative    Urobilinogen, UA Negative <2.0 EU/dL    Leukocytes, UA Negative Negative   Pregnancy, urine rapid   Result Value Ref Range    Preg Test, Ur Negative    Lipase   Result Value Ref Range    Lipase >1000 (H) 4 - 60 U/L   Urinalysis Microscopic   Result Value Ref Range    RBC, UA 1 0 - 4 /hpf    Bacteria Rare None-Occ /hpf    Yeast, UA None None    Microscopic Comment SEE COMMENT    POCT COVID-19 Rapid Screening   Result Value Ref Range    POC Rapid COVID Negative Negative     Acceptable Yes      All pertinent labs within the past 24 hours have been reviewed.    Significant Imaging:   Imaging Results    None      I have reviewed all pertinent imaging results/findings within the past 24 hours.

## 2022-02-25 VITALS
TEMPERATURE: 98 F | HEIGHT: 62 IN | HEART RATE: 62 BPM | OXYGEN SATURATION: 96 % | WEIGHT: 219.56 LBS | BODY MASS INDEX: 40.4 KG/M2 | DIASTOLIC BLOOD PRESSURE: 74 MMHG | RESPIRATION RATE: 15 BRPM | SYSTOLIC BLOOD PRESSURE: 124 MMHG

## 2022-02-25 LAB
ALBUMIN SERPL BCP-MCNC: 3.5 G/DL (ref 3.5–5.2)
ALP SERPL-CCNC: 43 U/L (ref 55–135)
ALT SERPL W/O P-5'-P-CCNC: 48 U/L (ref 10–44)
ANION GAP SERPL CALC-SCNC: 9 MMOL/L (ref 8–16)
AST SERPL-CCNC: 28 U/L (ref 10–40)
BASOPHILS # BLD AUTO: 0.04 K/UL (ref 0–0.2)
BASOPHILS NFR BLD: 0.6 % (ref 0–1.9)
BILIRUB SERPL-MCNC: 0.5 MG/DL (ref 0.1–1)
BUN SERPL-MCNC: 7 MG/DL (ref 6–20)
CALCIUM SERPL-MCNC: 8.4 MG/DL (ref 8.7–10.5)
CHLORIDE SERPL-SCNC: 105 MMOL/L (ref 95–110)
CO2 SERPL-SCNC: 22 MMOL/L (ref 23–29)
CREAT SERPL-MCNC: 0.8 MG/DL (ref 0.5–1.4)
DIFFERENTIAL METHOD: NORMAL
EOSINOPHIL # BLD AUTO: 0.2 K/UL (ref 0–0.5)
EOSINOPHIL NFR BLD: 3.6 % (ref 0–8)
ERYTHROCYTE [DISTWIDTH] IN BLOOD BY AUTOMATED COUNT: 12.8 % (ref 11.5–14.5)
EST. GFR  (AFRICAN AMERICAN): >60 ML/MIN/1.73 M^2
EST. GFR  (NON AFRICAN AMERICAN): >60 ML/MIN/1.73 M^2
GLUCOSE SERPL-MCNC: 379 MG/DL (ref 70–110)
HCT VFR BLD AUTO: 39.8 % (ref 37–48.5)
HGB BLD-MCNC: 14 G/DL (ref 12–16)
IMM GRANULOCYTES # BLD AUTO: 0.02 K/UL (ref 0–0.04)
IMM GRANULOCYTES NFR BLD AUTO: 0.3 % (ref 0–0.5)
LYMPHOCYTES # BLD AUTO: 2 K/UL (ref 1–4.8)
LYMPHOCYTES NFR BLD: 30.1 % (ref 18–48)
MCH RBC QN AUTO: 29.9 PG (ref 27–31)
MCHC RBC AUTO-ENTMCNC: 35.2 G/DL (ref 32–36)
MCV RBC AUTO: 85 FL (ref 82–98)
MONOCYTES # BLD AUTO: 0.5 K/UL (ref 0.3–1)
MONOCYTES NFR BLD: 6.8 % (ref 4–15)
NEUTROPHILS # BLD AUTO: 3.9 K/UL (ref 1.8–7.7)
NEUTROPHILS NFR BLD: 58.6 % (ref 38–73)
NRBC BLD-RTO: 0 /100 WBC
PLATELET # BLD AUTO: 225 K/UL (ref 150–450)
PMV BLD AUTO: 10.2 FL (ref 9.2–12.9)
POCT GLUCOSE: 234 MG/DL (ref 70–110)
POCT GLUCOSE: 420 MG/DL (ref 70–110)
POTASSIUM SERPL-SCNC: 4 MMOL/L (ref 3.5–5.1)
PROT SERPL-MCNC: 6.3 G/DL (ref 6–8.4)
RBC # BLD AUTO: 4.69 M/UL (ref 4–5.4)
SODIUM SERPL-SCNC: 136 MMOL/L (ref 136–145)
WBC # BLD AUTO: 6.61 K/UL (ref 3.9–12.7)

## 2022-02-25 PROCEDURE — 97161 PT EVAL LOW COMPLEX 20 MIN: CPT

## 2022-02-25 PROCEDURE — G0378 HOSPITAL OBSERVATION PER HR: HCPCS

## 2022-02-25 PROCEDURE — 96376 TX/PRO/DX INJ SAME DRUG ADON: CPT

## 2022-02-25 PROCEDURE — 97165 OT EVAL LOW COMPLEX 30 MIN: CPT

## 2022-02-25 PROCEDURE — 96361 HYDRATE IV INFUSION ADD-ON: CPT

## 2022-02-25 PROCEDURE — 36415 COLL VENOUS BLD VENIPUNCTURE: CPT | Performed by: INTERNAL MEDICINE

## 2022-02-25 PROCEDURE — 80053 COMPREHEN METABOLIC PANEL: CPT | Performed by: INTERNAL MEDICINE

## 2022-02-25 PROCEDURE — 63600175 PHARM REV CODE 636 W HCPCS: Performed by: STUDENT IN AN ORGANIZED HEALTH CARE EDUCATION/TRAINING PROGRAM

## 2022-02-25 PROCEDURE — 25000003 PHARM REV CODE 250: Performed by: INTERNAL MEDICINE

## 2022-02-25 PROCEDURE — C9399 UNCLASSIFIED DRUGS OR BIOLOG: HCPCS | Performed by: INTERNAL MEDICINE

## 2022-02-25 PROCEDURE — 96372 THER/PROPH/DIAG INJ SC/IM: CPT | Performed by: INTERNAL MEDICINE

## 2022-02-25 PROCEDURE — 85025 COMPLETE CBC W/AUTO DIFF WBC: CPT | Performed by: INTERNAL MEDICINE

## 2022-02-25 RX ORDER — ENOXAPARIN SODIUM 100 MG/ML
40 INJECTION SUBCUTANEOUS EVERY 12 HOURS
Status: DISCONTINUED | OUTPATIENT
Start: 2022-02-25 | End: 2022-02-25 | Stop reason: HOSPADM

## 2022-02-25 RX ORDER — ONDANSETRON 4 MG/1
4 TABLET, ORALLY DISINTEGRATING ORAL EVERY 8 HOURS PRN
Qty: 21 TABLET | Refills: 0 | Status: SHIPPED | OUTPATIENT
Start: 2022-02-25 | End: 2022-03-04

## 2022-02-25 RX ORDER — METFORMIN HYDROCHLORIDE 500 MG/1
1000 TABLET, EXTENDED RELEASE ORAL 2 TIMES DAILY WITH MEALS
Qty: 120 TABLET | Refills: 0 | Status: SHIPPED | OUTPATIENT
Start: 2022-02-25 | End: 2022-03-27

## 2022-02-25 RX ORDER — HYDROCODONE BITARTRATE AND ACETAMINOPHEN 7.5; 325 MG/1; MG/1
1 TABLET ORAL EVERY 8 HOURS PRN
Qty: 6 TABLET | Refills: 0 | Status: SHIPPED | OUTPATIENT
Start: 2022-02-25 | End: 2022-02-28

## 2022-02-25 RX ADMIN — ONDANSETRON 4 MG: 2 INJECTION INTRAMUSCULAR; INTRAVENOUS at 11:02

## 2022-02-25 RX ADMIN — ONDANSETRON 4 MG: 2 INJECTION INTRAMUSCULAR; INTRAVENOUS at 05:02

## 2022-02-25 RX ADMIN — MORPHINE SULFATE 2 MG: 4 INJECTION INTRAVENOUS at 11:02

## 2022-02-25 RX ADMIN — MORPHINE SULFATE 2 MG: 4 INJECTION INTRAVENOUS at 05:02

## 2022-02-25 RX ADMIN — INSULIN DETEMIR 10 UNITS: 100 INJECTION, SOLUTION SUBCUTANEOUS at 11:02

## 2022-02-25 NOTE — PT/OT/SLP EVAL
Occupational Therapy   Evaluation and Discharge Note    Name: Priti Obrien  MRN: [id number]  Admitting Diagnosis:  Pancreatitis   Recent Surgery: * No surgery found *      Recommendations:     Discharge Recommendations: home  Discharge Equipment Recommendations:  none  Barriers to discharge:       Assessment:     Priti Obrien is a 36 y.o. female with a medical diagnosis of Pancreatitis. At this time, patient is functioning at their prior level of function and does not require further acute OT services.     Plan:     During this hospitalization, patient does not require further acute OT services.  Please re-consult if situation changes.    · Plan of Care Reviewed with: patient    Subjective     Chief Complaint:   Patient/Family Comments/goals:     Occupational Profile:  Living Environment: LIVES  WITH GRANDMOTHER IN 1 STORY HOUSE WITH NO STEPS  Previous level of function: (I) WITH ADL'S AND FUNCTIONAL MOBILITY. PT REPORTS STILL DRIVES AN  Roles and Routines: OCCUPATIONAL THERAPY  Equipment Used at home:  none  Assistance upon Discharge:   Pain/Comfort:  · Pain Rating 1: 0/10    Patients cultural, spiritual, Orthodoxy conflicts given the current situation:     Objective:     Communicated with: NURSE AND Epic CHART REVIEW prior to session.  Patient found LONG SITTING IN BED with telemetry, peripheral IV upon OT entry to room.    General Precautions: Standard,     Orthopedic Precautions:    Braces: N/A  Respiratory Status: Room air     Occupational Performance:    Bed Mobility:    · Patient completed Rolling/Turning to Left with  independence  · Patient completed Rolling/Turning to Right with independence  · Patient completed Scooting/Bridging with independence  · Patient completed Supine to Sit with independence  · Patient completed Sit to Supine with independence    Functional Mobility/Transfers:  · Patient completed Sit <> Stand Transfer with independence  with  no assistive device   · Functional  Mobility:  (I) WITHIN ROOM WITH NO AE    Activities of Daily Living:  · Upper Body Dressing: independence .  · Lower Body Dressing: independence .    Cognitive/Visual Perceptual:  Cognitive/Psychosocial Skills:     -       Oriented to: Person, Place, Time and Situation   -       Follows Commands/attention:Follows multistep  commands  -       Communication: clear/fluent  -       Memory: No Deficits noted  -       Safety awareness/insight to disability: intact     Physical Exam:  Upper Extremity Range of Motion:     -       Right Upper Extremity: WFL  -       Left Upper Extremity: WFL  Upper Extremity Strength:    -       Right Upper Extremity: WFL  -       Left Upper Extremity: WFL   Strength:    -       Right Upper Extremity: WFL  -       Left Upper Extremity: WFL     AMPAC 6 Click ADL:  AMPAC Total Score: 24    Treatment & Education:  O.T. EVAL COMPLETED. PT EDUCATED ON O.T. POC. PT DISPLAYS (I) WITH ALL ADL'S AND FUNCTIONAL MOBILITY. PT DISCHARGED FROM SKILLED O.T.. RECOMMEND :HOME  Education:    Patient left LONG SITTING IN BED with all lines intact, call button in reach and NURSE notified    GOALS:   Multidisciplinary Problems     Occupational Therapy Goals     Not on file                History:     Past Medical History:   Diagnosis Date    Allergy     Asthma     Childhood only    Bipolar 1 disorder     Borderline personality disorder     Depression     GERD (gastroesophageal reflux disease)     History of psychiatric hospitalization     Hx of psychiatric care     Hypertension     pt denied    Pancreatitis     Psychiatric problem     PTSD (post-traumatic stress disorder)     S/P partial hysterectomy 2011    Substance abuse     Suicide attempt        Past Surgical History:   Procedure Laterality Date    APPENDECTOMY       SECTION      CHOLECYSTECTOMY      HYSTERECTOMY      endometrial hyperplasia    LASER LAPAROSCOPY         Time Tracking:     OT Date of Treatment:  02/25/22  OT Start Time: 0941  OT Stop Time: 0955  OT Total Time (min): 14 min    Billable Minutes:Evaluation 14 MINUTES    2/25/2022

## 2022-02-25 NOTE — NURSING
Pt AAOx4 in stable condition. Pt blood glucose 234 prn insulin required. Pt states she is not insulin dependant. She do not take insulin due to being sensitive. Refused insulin coverage

## 2022-02-25 NOTE — PROGRESS NOTES
Pharmacist Renal Dose Adjustment Note    Priti Obrien is a 36 y.o. female being treated with the medication lovenox     Patient Data:    Vital Signs (Most Recent):  Temp: 98.2 °F (36.8 °C) (02/25/22 0722)  Pulse: 62 (02/25/22 0722)  Resp: 16 (02/25/22 0722)  BP: 124/74 (02/25/22 0722)  SpO2: 96 % (02/25/22 0722) Vital Signs (72h Range):  Temp:  [97.9 °F (36.6 °C)-99 °F (37.2 °C)]   Pulse:  [57-90]   Resp:  [15-20]   BP: (118-188)/()   SpO2:  [96 %-99 %]      Recent Labs   Lab 02/23/22  0723 02/24/22  1137   CREATININE 0.8 0.7     Serum creatinine: 0.7 mg/dL 02/24/22 1137  Estimated creatinine clearance: 122.6 mL/min    Medication: lovenox 40mg daily will be changed to lovenox 40mg BID for BMI>40     Pharmacist's Name: Rebeca Nova  Pharmacist's Extension: [phone]

## 2022-02-25 NOTE — PT/OT/SLP EVAL
Physical Therapy Evaluation and Discharge Note    Patient Name:  Priti Obrien   MRN:  [id number]    Recommendations:     Discharge Recommendations:  home   Discharge Equipment Recommendations: none   Barriers to discharge: None    Assessment:     Priti Obrien is a 36 y.o. female admitted with a medical diagnosis of Pancreatitis. .  At this time, patient is functioning at their prior level of function and does not require further acute PT services.     Recent Surgery: * No surgery found *    Plan:     During this hospitalization, patient does not require further acute PT services.  Please re-consult if situation changes.      Subjective     Chief Complaint: PT WANTS TO BE DISCONNECTED FROM IV SO SHE CAN WALK THE HALLS  Patient/Family Comments/goals:   Pain/Comfort:  · Pain Rating 1: 5/10  · Location 1: abdomen  · Pain Addressed 1: Reposition, Distraction    Patients cultural, spiritual, Jehovah's witness conflicts given the current situation:      Living Environment:  PT LIVES WITH GRANDMOTHER 1 STORY HOUSE NO STEPS, AMB INDEP COMMUNITY DISTANCES, DRIVES AND WORKS, INDEP WITH ADL'S  Prior to admission, patients level of function was INDEP.  Equipment used at home: none.  DME owned (not currently used): none.  Upon discharge, patient will have assistance from ?.    Objective:     Communicated with NURSE DUQUE prior to session.  Patient found supine with peripheral IV upon PT entry to room.    General Precautions: Standard   Orthopedic Precautions:N/A   Braces: N/A   Respiratory Status: Room air    Exams:  · Cognitive Exam:  Patient is oriented to Person, Place, Time and Situation  · Postural Exam:  Patient presented with the following abnormalities:    · -       No postural abnormalities identified  · Sensation:    · -       Intact  · RLE ROM: WNL  · RLE Strength: WNL  · LLE ROM: WNL  · LLE Strength: WNL    Functional Mobility:  · Bed Mobility:     · Rolling Left:  independence  · Rolling Right:  independence  · Scooting: independence  · Bridging: independence  · Supine to Sit: independence  · Sit to Supine: independence  · Transfers:     · Sit to Stand:  independence with no AD  · Gait: PT AMB 80' NO AD INDEP  · Balance: GOOD    AM-PAC 6 CLICK MOBILITY  Total Score:21     Therapeutic Activities and Exercises:   PT EDUCATED IN ROLE OF P.T., PT ENCOURAGED TO INCREASE TIME OOB IN CHAIR    AM-PAC 6 CLICK MOBILITY  Total Score:21     Patient left HOB elevated with all lines intact, call button in reach and NURSE notified.    GOALS:   Multidisciplinary Problems     Physical Therapy Goals     Not on file                History:     Past Medical History:   Diagnosis Date    Allergy     Asthma     Childhood only    Bipolar 1 disorder     Borderline personality disorder     Depression     GERD (gastroesophageal reflux disease)     History of psychiatric hospitalization     Hx of psychiatric care     Hypertension     pt denied    Pancreatitis     Psychiatric problem     PTSD (post-traumatic stress disorder)     S/P partial hysterectomy 2011    Substance abuse     Suicide attempt        Past Surgical History:   Procedure Laterality Date    APPENDECTOMY       SECTION      CHOLECYSTECTOMY      HYSTERECTOMY  2010    endometrial hyperplasia    LASER LAPAROSCOPY         Time Tracking:     PT Received On: 22  PT Start Time: 30     PT Stop Time: 0740  PT Total Time (min): 10 min     Billable Minutes: Evaluation 10    2022

## 2022-02-25 NOTE — PLAN OF CARE
Problem: Adult Inpatient Plan of Care  Goal: Plan of Care Review  2/25/2022 0013 by Tanika Yan RN  Outcome: Ongoing, Progressing  2/25/2022 0012 by Tanika Yan RN  Outcome: Ongoing, Progressing  Goal: Patient-Specific Goal (Individualized)  2/25/2022 0013 by Tanika Yan RN  Outcome: Ongoing, Progressing  2/25/2022 0012 by Tanika Yan RN  Outcome: Ongoing, Progressing  Goal: Absence of Hospital-Acquired Illness or Injury  2/25/2022 0013 by Tanika Yan RN  Outcome: Ongoing, Progressing  2/25/2022 0012 by Tanika Yan RN  Outcome: Ongoing, Progressing  Goal: Optimal Comfort and Wellbeing  2/25/2022 0013 by Tanika Yan RN  Outcome: Ongoing, Progressing  2/25/2022 0012 by Tanika Yan RN  Outcome: Ongoing, Progressing  Goal: Readiness for Transition of Care  2/25/2022 0013 by Tanika Yan RN  Outcome: Ongoing, Progressing  2/25/2022 0012 by Tanika Yan RN  Outcome: Ongoing, Progressing     Problem: Diabetes Comorbidity  Goal: Blood Glucose Level Within Targeted Range  2/25/2022 0013 by Tanika Yan RN  Outcome: Ongoing, Progressing  2/25/2022 0012 by Tanika Yan RN  Outcome: Ongoing, Progressing

## 2022-02-25 NOTE — PLAN OF CARE
[facility] - Med Surg  Discharge Final Note    Primary Care Provider: RICHI Youngblood    Expected Discharge Date: 2/25/2022    Final Discharge Note (most recent)     Final Note - 02/25/22 1146        Final Note    Assessment Type Final Discharge Note     Anticipated Discharge Disposition Home or Self Care     Hospital Resources/Appts/Education Provided Provided patient/caregiver with written discharge plan information                 Important Message from Medicare             Contact Info     RICHI Youngblood   Specialty: Family Medicine   Relationship: PCP - General    [address] [place] [id number]   Phone: [phone]       Next Steps: Schedule an appointment as soon as possible for a visit in 3 day(s)    Instructions: Discharge folllow up        Pts PCP does not accept appointments, it is a walk in clinic.

## 2022-02-27 NOTE — DISCHARGE SUMMARY
[facility] Medicine  Discharge Summary      Patient Name: Priti Obrien  MRN: [id number]  Patient Class: OP- Observation  Admission Date: 2/23/2022  Hospital Length of Stay: 0 days  Discharge Date and Time: 2/25/2022  6:20 PM  Attending Physician: No att. providers found   Discharging Provider: Livia Pickett MD  Primary Care Provider: RICHI Youngblood      HPI:   36 year old woman with history of recurrent  pancreatitis, HTN, PTSD, and substance abuse who presents to the Emergency Department for evaluation of RUQ abdominal pain which onset gradually several hours ago at 7 PM. Pain is described as sharp, 10/10 in intensity and similar to her prior episodes of pancreatitis . There is associated history of nausea/vomiting. In the Ed, lipase was more than 1000, CO2 16, glucose of 359. Patient placed in observation for acute pancreatitis.       * No surgery found *      Hospital Course:   Pt with h/o Idiopathic recurrent acute pancreatitis and previous GI evaluation and extensive work-up without any clear etiology of pancreatitis. Per pt she  previously had an ERCP, MRCP, exploratory laparotomy, cholecystectomy, and supplementation for pancreatic enzymes yet went through numerous acute episodes. No prior h/o gallstones. On few occasions , she did have high TG. Denies regular  alcohol consumption.  Symptoms usually resolve quickly with IV fluid , NPO status and pain control .     Pt presented this time with symptoms similar to her prior pancreatitis and knew she was having an acute episode. Lipase was elevated at >1000 on admission. She does not recall any specific trigger . Additionally her glucose was 359 and CO2 was 16 with normal AG on admission .AST and t.bili was normal. ALT was slightly elevated , TG was 199.    Pt was started on aggressive IVF treatment and initially placed NPO. Lipase trended down to 136 within 24h with marked  subjective improvement of symptoms . She was started on clear  liquid advance to full liquid with tolerance. HgA1c was 8.9 suggestive of poor outpatient control. We discussed about improving glycemic control with proper diet , exercise, weight control and increased Metformin XR to 1000 mg bid.     Pt is examined and deemed suitable for discharge to home with a prescription for analgesic Norco 7.5 mg and Zofran ODT until follow up with PCP.           Goals of Care Treatment Preferences:  Code Status: Full Code      Consults:     No new Assessment & Plan notes have been filed under this hospital service since the last note was generated.  Service: Hospital Medicine    Final Active Diagnoses:    Diagnosis Date Noted POA    PRINCIPAL PROBLEM:  Pancreatitis [K85.90] 01/24/2015 Yes    Diabetes mellitus type 2 in obese [E11.69, E66.9] 05/22/2014 Yes      Problems Resolved During this Admission:       Discharged Condition: stable    Disposition: Home or Self Care    Follow Up:   Follow-up Information     RICHI Youngblood. Schedule an appointment as soon as possible for a visit in 3 day(s).    Specialty: Family Medicine  Why: Discharge folllow up  Contact information:  [address] [place] [id number] [phone]                       Patient Instructions:      Diet diabetic   Order Comments: Start soft and advance as tolerated     Activity as tolerated       Significant Diagnostic Studies: Labs:   BMP:   Recent Labs   Lab 02/25/22  0948   *      K 4.0      CO2 22*   BUN 7   CREATININE 0.8   CALCIUM 8.4*   , CMP   Recent Labs   Lab 02/25/22  0948      K 4.0      CO2 22*   *   BUN 7   CREATININE 0.8   CALCIUM 8.4*   PROT 6.3   ALBUMIN 3.5   BILITOT 0.5   ALKPHOS 43*   AST 28   ALT 48*   ANIONGAP 9   ESTGFRAFRICA >60   EGFRNONAA >60    and CBC   Recent Labs   Lab 02/25/22  0948   WBC 6.61   HGB 14.0   HCT 39.8          Pending Diagnostic Studies:     None         Medications:  Reconciled Home Medications:      Medication List       START taking these medications    HYDROcodone-acetaminophen 7.5-325 mg per tablet  Commonly known as: NORCO  Take 1 tablet by mouth every 8 (eight) hours as needed for Pain.     ondansetron 4 MG Tbdl  Commonly known as: ZOFRAN-ODT  Dissolve 1 tablet (4 mg total) by mouth every 8 (eight) hours as needed (Nausea and vomiting).        CHANGE how you take these medications    metFORMIN 500 MG ER 24hr tablet  Commonly known as: GLUCOPHAGE-XR  Take 2 tablets (1,000 mg total) by mouth 2 (two) times daily with meals.  What changed: how much to take        CONTINUE taking these medications    blood sugar diagnostic Strp  1 each by Misc.(Non-Drug; Combo Route) route 3 (three) times daily.     lancets Misc  1 each by Misc.(Non-Drug; Combo Route) route 3 (three) times daily.        STOP taking these medications    meloxicam 15 MG tablet  Commonly known as: MOBIC            Indwelling Lines/Drains at time of discharge:   Lines/Drains/Airways     None                 Time spent on the discharge of patient: 30  minutes         Livia Pickett MD  Department of Hospital Medicine  [facility]

## 2022-02-27 NOTE — HOSPITAL COURSE
Pt with h/o Idiopathic recurrent acute pancreatitis and previous GI evaluation and extensive work-up without any clear etiology of pancreatitis. Per pt she  previously had an ERCP, MRCP, exploratory laparotomy, cholecystectomy, and supplementation for pancreatic enzymes yet went through numerous acute episodes. No prior h/o gallstones. On few occasions , she did have high TG. Denies regular  alcohol consumption.  Symptoms usually resolve quickly with IV fluid , NPO status and pain control .     Pt presented this time with symptoms similar to her prior pancreatitis and knew she was having an acute episode. Lipase was elevated at >1000 on admission. She does not recall any specific trigger . Additionally her glucose was 359 and CO2 was 16 with normal AG on admission .AST and t.bili was normal. ALT was slightly elevated , TG was 199.    Pt was started on aggressive IVF treatment and initially placed NPO. Lipase trended down to 136 within 24h with marked  subjective improvement of symptoms . She was started on clear liquid advance to full liquid with tolerance. HgA1c was 8.9 suggestive of poor outpatient control. We discussed about improving glycemic control with proper diet , exercise, weight control and increased Metformin XR to 1000 mg bid.     Pt is examined and deemed suitable for discharge to home with a prescription for analgesic Norco 7.5 mg and Zofran ODT until follow up with PCP.

## 2022-06-06 ENCOUNTER — NURSE TRIAGE (OUTPATIENT)
Dept: ADMINISTRATIVE | Facility: CLINIC | Age: 37
End: 2022-06-06
Payer: COMMERCIAL

## 2022-06-06 ENCOUNTER — TELEPHONE (OUTPATIENT)
Dept: DIABETES | Facility: CLINIC | Age: 37
End: 2022-06-06
Payer: COMMERCIAL

## 2022-06-06 NOTE — TELEPHONE ENCOUNTER
Bs has been running high last couple of days. It was 418 about 30 minutes. Ate 2 hours ago. She had a headache. Fbs was 390 this am. Care advice recommend pt receives a call within 1 hour. Pt verbalized understanding. Pt awaiting a return call. Pt is requesting to see Md today.     Reason for Disposition   Blood glucose > 400 mg/dL (22.2 mmol/L)    Additional Information   Negative: Unconscious or difficult to awaken   Negative: Acting confused (e.g., disoriented, slurred speech)   Negative: Very weak (can't stand)   Negative: Sounds like a life-threatening emergency to the triager   Negative: Vomiting and signs of dehydration (e.g., very dry mouth, lightheaded, dark urine)   Negative: Blood glucose > 240 mg/dL (13.3 mmol/L) and rapid breathing   Negative: Blood glucose > 500 mg/dL (27.8 mmol/L)   Negative: Blood glucose > 240 mg/dL (13.3 mmol/L) AND urine ketones moderate-large (or more than 1+)   Negative: Blood glucose > 240 mg/dL (13.3 mmol/L) and blood ketones > 1.4 mmol/L   Negative: Blood glucose > 240 mg/dL (13.3 mmol/L) AND vomiting AND unable to check for ketones (in blood or urine)   Negative: Patient sounds very sick or weak to the triager   Negative: Vomiting lasting > 4 hours   Negative: Fever > 100.4 F (38.0 C)   Negative: Caller has URGENT medication or insulin pump question and triager unable to answer question    Protocols used: DIABETES - HIGH BLOOD SUGAR-A-OH

## 2022-06-06 NOTE — TELEPHONE ENCOUNTER
Spoke with patient regarding message about hyperglycemia. Last visit with me 11/30/2021. Pt stated had a clinic visit with PCP this afternoon and will be started on Lantus at 10 units daily. BG reading 280 this afternoon. Will start Lantus this evening. Pt informed to schedule ff up appt with me to review current diabetes regimen.

## 2022-06-08 ENCOUNTER — PATIENT MESSAGE (OUTPATIENT)
Dept: RESEARCH | Facility: HOSPITAL | Age: 37
End: 2022-06-08
Payer: COMMERCIAL

## 2023-04-11 ENCOUNTER — HOSPITAL ENCOUNTER (EMERGENCY)
Facility: HOSPITAL | Age: 38
Discharge: HOME | End: 2023-04-11
Attending: EMERGENCY MEDICINE
Payer: COMMERCIAL

## 2023-04-11 VITALS
TEMPERATURE: 99 F | BODY MASS INDEX: 34.46 KG/M2 | HEART RATE: 82 BPM | OXYGEN SATURATION: 98 % | DIASTOLIC BLOOD PRESSURE: 80 MMHG | RESPIRATION RATE: 18 BRPM | WEIGHT: 188.38 LBS | SYSTOLIC BLOOD PRESSURE: 126 MMHG

## 2023-04-11 DIAGNOSIS — R10.10 PAIN OF UPPER ABDOMEN: Primary | ICD-10-CM

## 2023-04-11 LAB
ALBUMIN SERPL BCP-MCNC: 4.3 G/DL (ref 3.5–5.2)
ALP SERPL-CCNC: 63 U/L (ref 55–135)
ALT SERPL W/O P-5'-P-CCNC: 26 U/L (ref 10–44)
ANION GAP SERPL CALC-SCNC: 12 MMOL/L (ref 8–16)
AST SERPL-CCNC: 19 U/L (ref 10–40)
BACTERIA #/AREA URNS HPF: NORMAL /HPF
BASOPHILS # BLD AUTO: 0.05 K/UL (ref 0–0.2)
BASOPHILS NFR BLD: 0.5 % (ref 0–1.9)
BILIRUB SERPL-MCNC: 0.7 MG/DL (ref 0.1–1)
BILIRUB UR QL STRIP: NEGATIVE
BUN SERPL-MCNC: 14 MG/DL (ref 6–20)
CALCIUM SERPL-MCNC: 8.7 MG/DL (ref 8.7–10.5)
CHLORIDE SERPL-SCNC: 105 MMOL/L (ref 95–110)
CLARITY UR: CLEAR
CO2 SERPL-SCNC: 19 MMOL/L (ref 23–29)
COLOR UR: YELLOW
CREAT SERPL-MCNC: 0.8 MG/DL (ref 0.5–1.4)
DIFFERENTIAL METHOD: ABNORMAL
EOSINOPHIL # BLD AUTO: 0.3 K/UL (ref 0–0.5)
EOSINOPHIL NFR BLD: 2.7 % (ref 0–8)
ERYTHROCYTE [DISTWIDTH] IN BLOOD BY AUTOMATED COUNT: 12.6 % (ref 11.5–14.5)
EST. GFR  (NO RACE VARIABLE): >60 ML/MIN/1.73 M^2
GLUCOSE SERPL-MCNC: 311 MG/DL (ref 70–110)
GLUCOSE UR QL STRIP: ABNORMAL
HCT VFR BLD AUTO: 45.8 % (ref 37–48.5)
HGB BLD-MCNC: 16.6 G/DL (ref 12–16)
HGB UR QL STRIP: NEGATIVE
IMM GRANULOCYTES # BLD AUTO: 0.03 K/UL (ref 0–0.04)
IMM GRANULOCYTES NFR BLD AUTO: 0.3 % (ref 0–0.5)
KETONES UR QL STRIP: ABNORMAL
LEUKOCYTE ESTERASE UR QL STRIP: NEGATIVE
LIPASE SERPL-CCNC: 13 U/L (ref 4–60)
LYMPHOCYTES # BLD AUTO: 2 K/UL (ref 1–4.8)
LYMPHOCYTES NFR BLD: 18.5 % (ref 18–48)
MCH RBC QN AUTO: 29.9 PG (ref 27–31)
MCHC RBC AUTO-ENTMCNC: 36.2 G/DL (ref 32–36)
MCV RBC AUTO: 83 FL (ref 82–98)
MICROSCOPIC COMMENT: NORMAL
MONOCYTES # BLD AUTO: 0.5 K/UL (ref 0.3–1)
MONOCYTES NFR BLD: 4.7 % (ref 4–15)
NEUTROPHILS # BLD AUTO: 8.1 K/UL (ref 1.8–7.7)
NEUTROPHILS NFR BLD: 73.3 % (ref 38–73)
NITRITE UR QL STRIP: NEGATIVE
NRBC BLD-RTO: 0 /100 WBC
PH UR STRIP: 6 [PH] (ref 5–8)
PLATELET # BLD AUTO: 232 K/UL (ref 150–450)
PMV BLD AUTO: 10.4 FL (ref 9.2–12.9)
POTASSIUM SERPL-SCNC: 3.7 MMOL/L (ref 3.5–5.1)
PROT SERPL-MCNC: 7.8 G/DL (ref 6–8.4)
PROT UR QL STRIP: ABNORMAL
RBC # BLD AUTO: 5.55 M/UL (ref 4–5.4)
SODIUM SERPL-SCNC: 136 MMOL/L (ref 136–145)
SP GR UR STRIP: >1.03 (ref 1–1.03)
URN SPEC COLLECT METH UR: ABNORMAL
UROBILINOGEN UR STRIP-ACNC: NEGATIVE EU/DL
WBC # BLD AUTO: 11 K/UL (ref 3.9–12.7)
YEAST URNS QL MICRO: NORMAL

## 2023-04-11 PROCEDURE — 85025 COMPLETE CBC W/AUTO DIFF WBC: CPT | Performed by: NURSE PRACTITIONER

## 2023-04-11 PROCEDURE — 96375 TX/PRO/DX INJ NEW DRUG ADDON: CPT

## 2023-04-11 PROCEDURE — 99284 EMERGENCY DEPT VISIT MOD MDM: CPT | Mod: 25

## 2023-04-11 PROCEDURE — 81000 URINALYSIS NONAUTO W/SCOPE: CPT | Performed by: NURSE PRACTITIONER

## 2023-04-11 PROCEDURE — 63600175 PHARM REV CODE 636 W HCPCS: Performed by: NURSE PRACTITIONER

## 2023-04-11 PROCEDURE — 83690 ASSAY OF LIPASE: CPT | Performed by: NURSE PRACTITIONER

## 2023-04-11 PROCEDURE — 80053 COMPREHEN METABOLIC PANEL: CPT | Performed by: NURSE PRACTITIONER

## 2023-04-11 PROCEDURE — 96374 THER/PROPH/DIAG INJ IV PUSH: CPT

## 2023-04-11 RX ORDER — ONDANSETRON 4 MG/1
4 TABLET, FILM COATED ORAL EVERY 6 HOURS
Qty: 20 TABLET | Refills: 0 | Status: SHIPPED | OUTPATIENT
Start: 2023-04-11 | End: 2023-04-11 | Stop reason: SDUPTHER

## 2023-04-11 RX ORDER — MORPHINE SULFATE 4 MG/ML
4 INJECTION, SOLUTION INTRAMUSCULAR; INTRAVENOUS
Status: COMPLETED | OUTPATIENT
Start: 2023-04-11 | End: 2023-04-11

## 2023-04-11 RX ORDER — HYDROCODONE BITARTRATE AND ACETAMINOPHEN 5; 325 MG/1; MG/1
1 TABLET ORAL EVERY 6 HOURS PRN
Qty: 18 TABLET | Refills: 0 | Status: SHIPPED | OUTPATIENT
Start: 2023-04-11 | End: 2023-04-11 | Stop reason: SDUPTHER

## 2023-04-11 RX ORDER — DIPHENHYDRAMINE HYDROCHLORIDE 50 MG/ML
12.5 INJECTION INTRAMUSCULAR; INTRAVENOUS
Status: COMPLETED | OUTPATIENT
Start: 2023-04-11 | End: 2023-04-11

## 2023-04-11 RX ORDER — METOCLOPRAMIDE HYDROCHLORIDE 5 MG/ML
10 INJECTION INTRAMUSCULAR; INTRAVENOUS
Status: COMPLETED | OUTPATIENT
Start: 2023-04-11 | End: 2023-04-11

## 2023-04-11 RX ORDER — HYDROCODONE BITARTRATE AND ACETAMINOPHEN 5; 325 MG/1; MG/1
1 TABLET ORAL EVERY 6 HOURS PRN
Qty: 18 TABLET | Refills: 0 | Status: ON HOLD | OUTPATIENT
Start: 2023-04-11 | End: 2023-05-20 | Stop reason: HOSPADM

## 2023-04-11 RX ORDER — ONDANSETRON 2 MG/ML
4 INJECTION INTRAMUSCULAR; INTRAVENOUS
Status: COMPLETED | OUTPATIENT
Start: 2023-04-11 | End: 2023-04-11

## 2023-04-11 RX ORDER — ONDANSETRON 4 MG/1
4 TABLET, FILM COATED ORAL EVERY 6 HOURS
Qty: 20 TABLET | Refills: 0 | Status: ON HOLD | OUTPATIENT
Start: 2023-04-11 | End: 2023-10-19

## 2023-04-11 RX ADMIN — ONDANSETRON 4 MG: 2 INJECTION INTRAMUSCULAR; INTRAVENOUS at 03:04

## 2023-04-11 RX ADMIN — DIPHENHYDRAMINE HYDROCHLORIDE 12.5 MG: 50 INJECTION, SOLUTION INTRAMUSCULAR; INTRAVENOUS at 04:04

## 2023-04-11 RX ADMIN — METOCLOPRAMIDE 10 MG: 5 INJECTION, SOLUTION INTRAMUSCULAR; INTRAVENOUS at 04:04

## 2023-04-11 RX ADMIN — MORPHINE SULFATE 4 MG: 4 INJECTION INTRAVENOUS at 03:04

## 2023-04-11 NOTE — Clinical Note
"Priti Christianaileen Obrien was seen and treated in our emergency department on 4/11/2023.  She may return to work on 04/13/2023.       If you have any questions or concerns, please don't hesitate to call.      Colt Urbina MD    "

## 2023-04-11 NOTE — ED PROVIDER NOTES
SCRIBE #1 NOTE: I, Afshin Daly, am scribing for, and in the presence of, Stormy Daley Do, MD. I have scribed the entire note.      History      Chief Complaint   Patient presents with    Abdominal Pain     RUQ pain and vomiting since 630 this morning. Hx of pancreatitis        Review of patient's allergies indicates:   Allergen Reactions    Demerol (pf) [meperidine (pf)] Hives    Medrol [methylprednisolone] Anaphylaxis     Any cortisone    Adhesive      Other reaction(s): Unknown    Amoxicillin-pot clavulanate      Other reaction(s): Unknown    Atarax [hydroxyzine hcl] Hives    Hydralazine analogues Hives    Iodine      Other reaction(s): Unknown    Omnicef [cefdinir]     Penicillins Hives and Nausea And Vomiting    Phenergan [promethazine] Hives and Nausea And Vomiting    Risperidone analogues Hives    Ativan [lorazepam] Hives and Anxiety        HPI   HPI    4/11/2023, 6:54 PM   History obtained from the patient      History of Present Illness: Priti Obrien is a 37 y.o. female patient with a PMHx of chronic pancreatitis and a SHx of cholecystectomy, appendectomy who presents to the Emergency Department for RUQ abdominal pain, onset this morning. Pt states that her current sxs are similar to previous pancreatitis flare ups. Symptoms are constant and moderate in severity. No mitigating or exacerbating factors reported. Associated sxs include n/v. Patient denies any fever, chills, SOB, CP, weakness, numbness, dizziness, headache, and all other sxs at this time. Pt does not currently follow with Gastroenterology. No prior Tx reported. No further complaints or concerns at this time.     Arrival mode: Personal vehicle     PCP: RICHI Youngblood       Past Medical History:  Past Medical History:   Diagnosis Date    Allergy     Asthma     Childhood only    Bipolar 1 disorder     Borderline personality disorder     Depression     GERD (gastroesophageal reflux disease)     History of psychiatric hospitalization      Hx of psychiatric care     Hypertension     pt denied    Pancreatitis     Psychiatric problem     PTSD (post-traumatic stress disorder)     S/P partial hysterectomy 2011    Substance abuse     Suicide attempt        Past Surgical History:  Past Surgical History:   Procedure Laterality Date    APPENDECTOMY       SECTION      CHOLECYSTECTOMY      HYSTERECTOMY  2010    endometrial hyperplasia    LASER LAPAROSCOPY           Family History:  Family History   Problem Relation Age of Onset    Diabetes Mother     Hypertension Mother     Atrial fibrillation Maternal Grandfather     Breast cancer Maternal Aunt        Social History:  Social History     Tobacco Use    Smoking status: Former     Packs/day: 0.25     Types: Cigarettes     Quit date: 2013     Years since quittin.8    Smokeless tobacco: Never   Substance and Sexual Activity    Alcohol use: Yes     Comment: occasional     Drug use: Not Currently    Sexual activity: Yes     Partners: Male, Female       ROS   Review of Systems   Constitutional:  Negative for chills and fever.   HENT:  Negative for sore throat.    Respiratory:  Negative for shortness of breath.    Cardiovascular:  Negative for chest pain.   Gastrointestinal:  Positive for abdominal pain (RUQ), nausea and vomiting. Negative for diarrhea.   Genitourinary:  Negative for dysuria.   Musculoskeletal:  Negative for back pain.   Skin:  Negative for rash.   Neurological:  Negative for dizziness, weakness, light-headedness, numbness and headaches.   Hematological:  Does not bruise/bleed easily.   All other systems reviewed and are negative.    Physical Exam      Initial Vitals [23 1434]   BP Pulse Resp Temp SpO2   (!) 187/114 107 18 98.9 °F (37.2 °C) 99 %      MAP       --          Physical Exam  Nursing Notes and Vital Signs Reviewed.  Constitutional: Patient is in no acute distress. Well-developed and well-nourished.  Head: Atraumatic. Normocephalic.  Eyes: PERRL. EOM intact.  Conjunctivae are not pale. No scleral icterus.  ENT: Mucous membranes are moist. Oropharynx is clear and symmetric.    Neck: Supple. Full ROM. No lymphadenopathy.  Cardiovascular: Regular rate. Regular rhythm. No murmurs, rubs, or gallops. Distal pulses are 2+ and symmetric.  Pulmonary/Chest: No respiratory distress. Clear to auscultation bilaterally. No wheezing or rales.  Abdominal: Soft and non-distended.  There is mild RUQ tenderness.  No rebound, guarding, or rigidity.   Musculoskeletal: Moves all extremities. No obvious deformities. No edema.  Skin: Warm and dry.  Neurological:  Alert, awake, and appropriate.  Normal speech.  No acute focal neurological deficits are appreciated.  Psychiatric: Normal affect. Good eye contact. Appropriate in content.    ED Course    Procedures  ED Vital Signs:  Vitals:    04/11/23 1434 04/11/23 1513 04/11/23 1659 04/11/23 1956   BP: (!) 187/114  128/82 126/80   Pulse: 107  80 82   Resp: 18 18 18 18   Temp: 98.9 °F (37.2 °C)   98.9 °F (37.2 °C)   TempSrc: Oral   Oral   SpO2: 99%  98% 98%   Weight: 85.5 kg (188 lb 6.1 oz)          Abnormal Lab Results:  Labs Reviewed   CBC W/ AUTO DIFFERENTIAL - Abnormal; Notable for the following components:       Result Value    RBC 5.55 (*)     Hemoglobin 16.6 (*)     MCHC 36.2 (*)     Gran # (ANC) 8.1 (*)     Gran % 73.3 (*)     All other components within normal limits   COMPREHENSIVE METABOLIC PANEL - Abnormal; Notable for the following components:    CO2 19 (*)     Glucose 311 (*)     All other components within normal limits   URINALYSIS, REFLEX TO URINE CULTURE - Abnormal; Notable for the following components:    Specific Gravity, UA >1.030 (*)     Protein, UA Trace (*)     Glucose, UA 4+ (*)     Ketones, UA 2+ (*)     All other components within normal limits    Narrative:     Specimen Source->Urine   LIPASE   URINALYSIS MICROSCOPIC    Narrative:     Specimen Source->Urine        All Lab Results:  Results for orders placed or performed  during the hospital encounter of 04/11/23   CBC auto differential   Result Value Ref Range    WBC 11.00 3.90 - 12.70 K/uL    RBC 5.55 (H) 4.00 - 5.40 M/uL    Hemoglobin 16.6 (H) 12.0 - 16.0 g/dL    Hematocrit 45.8 37.0 - 48.5 %    MCV 83 82 - 98 fL    MCH 29.9 27.0 - 31.0 pg    MCHC 36.2 (H) 32.0 - 36.0 g/dL    RDW 12.6 11.5 - 14.5 %    Platelets 232 150 - 450 K/uL    MPV 10.4 9.2 - 12.9 fL    Immature Granulocytes 0.3 0.0 - 0.5 %    Gran # (ANC) 8.1 (H) 1.8 - 7.7 K/uL    Immature Grans (Abs) 0.03 0.00 - 0.04 K/uL    Lymph # 2.0 1.0 - 4.8 K/uL    Mono # 0.5 0.3 - 1.0 K/uL    Eos # 0.3 0.0 - 0.5 K/uL    Baso # 0.05 0.00 - 0.20 K/uL    nRBC 0 0 /100 WBC    Gran % 73.3 (H) 38.0 - 73.0 %    Lymph % 18.5 18.0 - 48.0 %    Mono % 4.7 4.0 - 15.0 %    Eosinophil % 2.7 0.0 - 8.0 %    Basophil % 0.5 0.0 - 1.9 %    Differential Method Automated    Comprehensive metabolic panel   Result Value Ref Range    Sodium 136 136 - 145 mmol/L    Potassium 3.7 3.5 - 5.1 mmol/L    Chloride 105 95 - 110 mmol/L    CO2 19 (L) 23 - 29 mmol/L    Glucose 311 (H) 70 - 110 mg/dL    BUN 14 6 - 20 mg/dL    Creatinine 0.8 0.5 - 1.4 mg/dL    Calcium 8.7 8.7 - 10.5 mg/dL    Total Protein 7.8 6.0 - 8.4 g/dL    Albumin 4.3 3.5 - 5.2 g/dL    Total Bilirubin 0.7 0.1 - 1.0 mg/dL    Alkaline Phosphatase 63 55 - 135 U/L    AST 19 10 - 40 U/L    ALT 26 10 - 44 U/L    Anion Gap 12 8 - 16 mmol/L    eGFR >60 >60 mL/min/1.73 m^2   Lipase   Result Value Ref Range    Lipase 13 4 - 60 U/L   Urinalysis, Reflex to Urine Culture Urine, Clean Catch    Specimen: Urine   Result Value Ref Range    Specimen UA Urine, Clean Catch     Color, UA Yellow Yellow, Straw, [place]    Appearance, UA Clear Clear    pH, UA 6.0 5.0 - 8.0    Specific Gravity, UA >1.030 (A) 1.005 - 1.030    Protein, UA Trace (A) Negative    Glucose, UA 4+ (A) Negative    Ketones, UA 2+ (A) Negative    Bilirubin (UA) Negative Negative    Occult Blood UA Negative Negative    Nitrite, UA Negative Negative     Urobilinogen, UA Negative <2.0 EU/dL    Leukocytes, UA Negative Negative   Urinalysis Microscopic   Result Value Ref Range    Bacteria None None-Occ /hpf    Yeast, UA None None    Microscopic Comment SEE COMMENT      Imaging Results:  Imaging Results    None                 The Emergency Provider reviewed the vital signs and test results, which are outlined above.    ED Discussion     6:56 PM: Reassessed pt at this time. Discussed with pt all pertinent ED information and results. Discussed pt dx and plan of tx. Gave pt all f/u and return to the ED instructions. All questions and concerns were addressed at this time. Pt expresses understanding of information and instructions, and is comfortable with plan to discharge. Pt is stable for discharge.    I discussed with patient and/or family/caretaker that evaluation in the ED does not suggest any emergent or life threatening medical conditions requiring immediate intervention beyond what was provided in the ED, and I believe patient is safe for discharge.  Regardless, an unremarkable evaluation in the ED does not preclude the development or presence of a serious of life threatening condition. As such, patient was instructed to return immediately for any worsening or change in current symptoms.         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  She is already had a cholecystectomy.  She does have a history of pancreatitis in the past.  Differential  Diagnosis:   Gastroenteritis, bowel obstruction, colitis, diverticulitis, cholecystitis, appendicitis, perforated bowel, herniation, infectious etiology, UTI, pyelonephritis    Clinical Tests:   Lab Tests: Ordered and Reviewed  ED Management:  Patient with upper abdominal pain normal labs her lipase was normal liver enzymes were normal.  She was given pain medication in the emergency room.  She did not want any more pain medication.  Since her lipase was normal we decided to not do a CT scan, also liver enzymes were normal and she does not have a gallbladder.  Would treat her symptoms symptomatically for now.    Patient was given morphine Reglan, Benadryl Zofran in the emergency room.  She was discharged home with a few Norco for pain control         Scribe Attestation:   Scribe #1: I performed the above scribed service and the documentation accurately describes the services I performed. I attest to the accuracy of the note.    Attending:   Physician Attestation Statement for Scribe #1: I, Stormy Daley Do, MD, personally performed the services described in this documentation, as scribed by Afshin Daly, in my presence, and it is both accurate and complete.          Clinical Impression       ICD-10-CM ICD-9-CM   1. Pain of upper abdomen  R10.10 789.09       Disposition:   Disposition: Discharged  Condition: Stable       Stormy Daley Do, MD  04/12/23 1041       Stormy Daley Do, MD  04/12/23 1041

## 2023-04-11 NOTE — Clinical Note
"Priti Burgess" Micah was seen and treated in our emergency department on 4/11/2023.  She may return to work on 04/13/2023.       If you have any questions or concerns, please don't hesitate to call.      Colt Urbina RN    "

## 2023-04-11 NOTE — FIRST PROVIDER EVALUATION
Medical screening examination initiated.  I have conducted a focused provider triage encounter, findings are as follows:    Brief history of present illness:  37-year-old female with past medical history of pancreatitis presents the ER for right upper quadrant pain.    There were no vitals filed for this visit.    Pertinent physical exam:  Uncomfortable    Brief workup plan:  Labs, meds, further eval    Preliminary workup initiated; this workup will be continued and followed by the physician or advanced practice provider that is assigned to the patient when roomed.

## 2023-05-19 ENCOUNTER — HOSPITAL ENCOUNTER (OUTPATIENT)
Facility: HOSPITAL | Age: 38
Discharge: HOME | End: 2023-05-20
Attending: EMERGENCY MEDICINE | Admitting: INTERNAL MEDICINE
Payer: COMMERCIAL

## 2023-05-19 DIAGNOSIS — K65.4 MESENTERIC PANNICULITIS: Primary | ICD-10-CM

## 2023-05-19 DIAGNOSIS — R07.9 CHEST PAIN: ICD-10-CM

## 2023-05-19 DIAGNOSIS — R10.13 EPIGASTRIC PAIN: ICD-10-CM

## 2023-05-19 DIAGNOSIS — K85.90 PANCREATITIS: ICD-10-CM

## 2023-05-19 LAB
ALBUMIN SERPL BCP-MCNC: 4.2 G/DL (ref 3.5–5.2)
ALP SERPL-CCNC: 65 U/L (ref 55–135)
ALT SERPL W/O P-5'-P-CCNC: 21 U/L (ref 10–44)
ANION GAP SERPL CALC-SCNC: 11 MMOL/L (ref 8–16)
AST SERPL-CCNC: 16 U/L (ref 10–40)
BACTERIA #/AREA URNS HPF: NORMAL /HPF
BASOPHILS # BLD AUTO: 0.07 K/UL (ref 0–0.2)
BASOPHILS NFR BLD: 0.7 % (ref 0–1.9)
BILIRUB SERPL-MCNC: 0.6 MG/DL (ref 0.1–1)
BILIRUB UR QL STRIP: NEGATIVE
BUN SERPL-MCNC: 11 MG/DL (ref 6–20)
CALCIUM SERPL-MCNC: 8.9 MG/DL (ref 8.7–10.5)
CHLORIDE SERPL-SCNC: 104 MMOL/L (ref 95–110)
CLARITY UR: CLEAR
CO2 SERPL-SCNC: 19 MMOL/L (ref 23–29)
COLOR UR: YELLOW
CREAT SERPL-MCNC: 0.8 MG/DL (ref 0.5–1.4)
DIFFERENTIAL METHOD: ABNORMAL
EOSINOPHIL # BLD AUTO: 0.1 K/UL (ref 0–0.5)
EOSINOPHIL NFR BLD: 1.4 % (ref 0–8)
ERYTHROCYTE [DISTWIDTH] IN BLOOD BY AUTOMATED COUNT: 12.3 % (ref 11.5–14.5)
EST. GFR  (NO RACE VARIABLE): >60 ML/MIN/1.73 M^2
ESTIMATED AVG GLUCOSE: 229 MG/DL (ref 68–131)
GLUCOSE SERPL-MCNC: 388 MG/DL (ref 70–110)
GLUCOSE UR QL STRIP: ABNORMAL
HBA1C MFR BLD: 9.6 % (ref 4–5.6)
HCT VFR BLD AUTO: 44.8 % (ref 37–48.5)
HCV AB SERPL QL IA: NEGATIVE
HEP C VIRUS HOLD SPECIMEN: NORMAL
HGB BLD-MCNC: 15.9 G/DL (ref 12–16)
HGB UR QL STRIP: NEGATIVE
HIV 1+2 AB+HIV1 P24 AG SERPL QL IA: NEGATIVE
IMM GRANULOCYTES # BLD AUTO: 0.05 K/UL (ref 0–0.04)
IMM GRANULOCYTES NFR BLD AUTO: 0.5 % (ref 0–0.5)
KETONES UR QL STRIP: ABNORMAL
LACTATE SERPL-SCNC: 1.5 MMOL/L (ref 0.5–2.2)
LEUKOCYTE ESTERASE UR QL STRIP: NEGATIVE
LIPASE SERPL-CCNC: >1000 U/L (ref 4–60)
LYMPHOCYTES # BLD AUTO: 1.8 K/UL (ref 1–4.8)
LYMPHOCYTES NFR BLD: 17.4 % (ref 18–48)
MCH RBC QN AUTO: 29.3 PG (ref 27–31)
MCHC RBC AUTO-ENTMCNC: 35.5 G/DL (ref 32–36)
MCV RBC AUTO: 83 FL (ref 82–98)
MICROSCOPIC COMMENT: NORMAL
MONOCYTES # BLD AUTO: 0.6 K/UL (ref 0.3–1)
MONOCYTES NFR BLD: 6.1 % (ref 4–15)
NEUTROPHILS # BLD AUTO: 7.6 K/UL (ref 1.8–7.7)
NEUTROPHILS NFR BLD: 73.9 % (ref 38–73)
NITRITE UR QL STRIP: NEGATIVE
NRBC BLD-RTO: 0 /100 WBC
PH UR STRIP: 6 [PH] (ref 5–8)
PLATELET # BLD AUTO: 289 K/UL (ref 150–450)
PMV BLD AUTO: 10 FL (ref 9.2–12.9)
POCT GLUCOSE: 258 MG/DL (ref 70–110)
POCT GLUCOSE: 333 MG/DL (ref 70–110)
POTASSIUM SERPL-SCNC: 3.6 MMOL/L (ref 3.5–5.1)
PROT SERPL-MCNC: 7.6 G/DL (ref 6–8.4)
PROT UR QL STRIP: NEGATIVE
RBC # BLD AUTO: 5.43 M/UL (ref 4–5.4)
RBC #/AREA URNS HPF: 1 /HPF (ref 0–4)
SODIUM SERPL-SCNC: 134 MMOL/L (ref 136–145)
SP GR UR STRIP: >1.03 (ref 1–1.03)
SQUAMOUS #/AREA URNS HPF: 1 /HPF
TROPONIN I SERPL DL<=0.01 NG/ML-MCNC: <0.006 NG/ML (ref 0–0.03)
URN SPEC COLLECT METH UR: ABNORMAL
UROBILINOGEN UR STRIP-ACNC: NEGATIVE EU/DL
WBC # BLD AUTO: 10.23 K/UL (ref 3.9–12.7)
WBC #/AREA URNS HPF: 1 /HPF (ref 0–5)
YEAST URNS QL MICRO: NORMAL

## 2023-05-19 PROCEDURE — 25000003 PHARM REV CODE 250: Performed by: INTERNAL MEDICINE

## 2023-05-19 PROCEDURE — 83605 ASSAY OF LACTIC ACID: CPT | Performed by: EMERGENCY MEDICINE

## 2023-05-19 PROCEDURE — 96361 HYDRATE IV INFUSION ADD-ON: CPT

## 2023-05-19 PROCEDURE — 86803 HEPATITIS C AB TEST: CPT | Performed by: EMERGENCY MEDICINE

## 2023-05-19 PROCEDURE — 87389 HIV-1 AG W/HIV-1&-2 AB AG IA: CPT | Performed by: EMERGENCY MEDICINE

## 2023-05-19 PROCEDURE — 93005 ELECTROCARDIOGRAM TRACING: CPT

## 2023-05-19 PROCEDURE — 96374 THER/PROPH/DIAG INJ IV PUSH: CPT

## 2023-05-19 PROCEDURE — 93010 ELECTROCARDIOGRAM REPORT: CPT | Mod: ,,, | Performed by: STUDENT IN AN ORGANIZED HEALTH CARE EDUCATION/TRAINING PROGRAM

## 2023-05-19 PROCEDURE — 96376 TX/PRO/DX INJ SAME DRUG ADON: CPT

## 2023-05-19 PROCEDURE — 99285 EMERGENCY DEPT VISIT HI MDM: CPT | Mod: 25

## 2023-05-19 PROCEDURE — 82962 GLUCOSE BLOOD TEST: CPT

## 2023-05-19 PROCEDURE — 93010 EKG 12-LEAD: ICD-10-PCS | Mod: ,,, | Performed by: STUDENT IN AN ORGANIZED HEALTH CARE EDUCATION/TRAINING PROGRAM

## 2023-05-19 PROCEDURE — 83690 ASSAY OF LIPASE: CPT | Performed by: EMERGENCY MEDICINE

## 2023-05-19 PROCEDURE — G0378 HOSPITAL OBSERVATION PER HR: HCPCS

## 2023-05-19 PROCEDURE — 63600175 PHARM REV CODE 636 W HCPCS: Performed by: INTERNAL MEDICINE

## 2023-05-19 PROCEDURE — 85025 COMPLETE CBC W/AUTO DIFF WBC: CPT | Performed by: EMERGENCY MEDICINE

## 2023-05-19 PROCEDURE — 84484 ASSAY OF TROPONIN QUANT: CPT | Performed by: EMERGENCY MEDICINE

## 2023-05-19 PROCEDURE — 83036 HEMOGLOBIN GLYCOSYLATED A1C: CPT | Performed by: EMERGENCY MEDICINE

## 2023-05-19 PROCEDURE — 36415 COLL VENOUS BLD VENIPUNCTURE: CPT | Performed by: EMERGENCY MEDICINE

## 2023-05-19 PROCEDURE — 63600175 PHARM REV CODE 636 W HCPCS: Performed by: EMERGENCY MEDICINE

## 2023-05-19 PROCEDURE — 96375 TX/PRO/DX INJ NEW DRUG ADDON: CPT

## 2023-05-19 PROCEDURE — 80053 COMPREHEN METABOLIC PANEL: CPT | Performed by: EMERGENCY MEDICINE

## 2023-05-19 PROCEDURE — 96372 THER/PROPH/DIAG INJ SC/IM: CPT | Mod: 59 | Performed by: INTERNAL MEDICINE

## 2023-05-19 PROCEDURE — 81000 URINALYSIS NONAUTO W/SCOPE: CPT | Performed by: EMERGENCY MEDICINE

## 2023-05-19 RX ORDER — HYDROMORPHONE HYDROCHLORIDE 1 MG/ML
1 INJECTION, SOLUTION INTRAMUSCULAR; INTRAVENOUS; SUBCUTANEOUS EVERY 4 HOURS PRN
Status: DISCONTINUED | OUTPATIENT
Start: 2023-05-19 | End: 2023-05-20 | Stop reason: HOSPADM

## 2023-05-19 RX ORDER — ONDANSETRON 4 MG/1
4 TABLET, ORALLY DISINTEGRATING ORAL
Status: DISCONTINUED | OUTPATIENT
Start: 2023-05-19 | End: 2023-05-19

## 2023-05-19 RX ORDER — SODIUM CHLORIDE 0.9 % (FLUSH) 0.9 %
10 SYRINGE (ML) INJECTION EVERY 12 HOURS PRN
Status: DISCONTINUED | OUTPATIENT
Start: 2023-05-19 | End: 2023-05-20 | Stop reason: HOSPADM

## 2023-05-19 RX ORDER — MORPHINE SULFATE 4 MG/ML
6 INJECTION, SOLUTION INTRAMUSCULAR; INTRAVENOUS
Status: COMPLETED | OUTPATIENT
Start: 2023-05-19 | End: 2023-05-19

## 2023-05-19 RX ORDER — MORPHINE SULFATE 4 MG/ML
4 INJECTION, SOLUTION INTRAMUSCULAR; INTRAVENOUS
Status: COMPLETED | OUTPATIENT
Start: 2023-05-19 | End: 2023-05-19

## 2023-05-19 RX ORDER — NALOXONE HCL 0.4 MG/ML
0.02 VIAL (ML) INJECTION
Status: DISCONTINUED | OUTPATIENT
Start: 2023-05-19 | End: 2023-05-20 | Stop reason: HOSPADM

## 2023-05-19 RX ORDER — MAG HYDROX/ALUMINUM HYD/SIMETH 200-200-20
30 SUSPENSION, ORAL (FINAL DOSE FORM) ORAL 4 TIMES DAILY PRN
Status: DISCONTINUED | OUTPATIENT
Start: 2023-05-19 | End: 2023-05-20 | Stop reason: HOSPADM

## 2023-05-19 RX ORDER — PANTOPRAZOLE SODIUM 40 MG/1
40 TABLET, DELAYED RELEASE ORAL
Status: ACTIVE | OUTPATIENT
Start: 2023-05-19 | End: 2023-05-19

## 2023-05-19 RX ORDER — ONDANSETRON 2 MG/ML
4 INJECTION INTRAMUSCULAR; INTRAVENOUS
Status: COMPLETED | OUTPATIENT
Start: 2023-05-19 | End: 2023-05-19

## 2023-05-19 RX ORDER — HALOPERIDOL 5 MG/ML
5 INJECTION INTRAMUSCULAR ONCE
Status: DISCONTINUED | OUTPATIENT
Start: 2023-05-19 | End: 2023-05-20 | Stop reason: HOSPADM

## 2023-05-19 RX ORDER — LABETALOL HYDROCHLORIDE 5 MG/ML
10 INJECTION, SOLUTION INTRAVENOUS EVERY 6 HOURS PRN
Status: DISCONTINUED | OUTPATIENT
Start: 2023-05-19 | End: 2023-05-20 | Stop reason: HOSPADM

## 2023-05-19 RX ORDER — SCOLOPAMINE TRANSDERMAL SYSTEM 1 MG/1
1 PATCH, EXTENDED RELEASE TRANSDERMAL
Status: DISCONTINUED | OUTPATIENT
Start: 2023-05-19 | End: 2023-05-20 | Stop reason: HOSPADM

## 2023-05-19 RX ORDER — GLUCAGON 1 MG
1 KIT INJECTION
Status: DISCONTINUED | OUTPATIENT
Start: 2023-05-19 | End: 2023-05-20 | Stop reason: HOSPADM

## 2023-05-19 RX ORDER — ONDANSETRON 2 MG/ML
4 INJECTION INTRAMUSCULAR; INTRAVENOUS EVERY 6 HOURS PRN
Status: DISCONTINUED | OUTPATIENT
Start: 2023-05-19 | End: 2023-05-20 | Stop reason: HOSPADM

## 2023-05-19 RX ORDER — SODIUM CHLORIDE, SODIUM LACTATE, POTASSIUM CHLORIDE, CALCIUM CHLORIDE 600; 310; 30; 20 MG/100ML; MG/100ML; MG/100ML; MG/100ML
INJECTION, SOLUTION INTRAVENOUS CONTINUOUS
Status: DISCONTINUED | OUTPATIENT
Start: 2023-05-19 | End: 2023-05-20 | Stop reason: HOSPADM

## 2023-05-19 RX ORDER — ENOXAPARIN SODIUM 100 MG/ML
40 INJECTION SUBCUTANEOUS EVERY 24 HOURS
Status: DISCONTINUED | OUTPATIENT
Start: 2023-05-19 | End: 2023-05-20 | Stop reason: HOSPADM

## 2023-05-19 RX ORDER — ONDANSETRON 2 MG/ML
4 INJECTION INTRAMUSCULAR; INTRAVENOUS EVERY 8 HOURS PRN
Status: DISCONTINUED | OUTPATIENT
Start: 2023-05-19 | End: 2023-05-19

## 2023-05-19 RX ORDER — HYDROMORPHONE HYDROCHLORIDE 2 MG/ML
1 INJECTION, SOLUTION INTRAMUSCULAR; INTRAVENOUS; SUBCUTANEOUS EVERY 4 HOURS PRN
Status: DISCONTINUED | OUTPATIENT
Start: 2023-05-19 | End: 2023-05-19

## 2023-05-19 RX ORDER — INSULIN ASPART 100 [IU]/ML
1-10 INJECTION, SOLUTION INTRAVENOUS; SUBCUTANEOUS EVERY 6 HOURS PRN
Status: DISCONTINUED | OUTPATIENT
Start: 2023-05-19 | End: 2023-05-20 | Stop reason: HOSPADM

## 2023-05-19 RX ORDER — SIMETHICONE 80 MG
1 TABLET,CHEWABLE ORAL 4 TIMES DAILY PRN
Status: DISCONTINUED | OUTPATIENT
Start: 2023-05-19 | End: 2023-05-20 | Stop reason: HOSPADM

## 2023-05-19 RX ADMIN — SCOPOLAMINE 1 PATCH: 1.5 PATCH, EXTENDED RELEASE TRANSDERMAL at 06:05

## 2023-05-19 RX ADMIN — ONDANSETRON 4 MG: 2 INJECTION INTRAMUSCULAR; INTRAVENOUS at 09:05

## 2023-05-19 RX ADMIN — INSULIN ASPART 6 UNITS: 100 INJECTION, SOLUTION INTRAVENOUS; SUBCUTANEOUS at 11:05

## 2023-05-19 RX ADMIN — SODIUM CHLORIDE, POTASSIUM CHLORIDE, SODIUM LACTATE AND CALCIUM CHLORIDE 1000 ML: 600; 310; 30; 20 INJECTION, SOLUTION INTRAVENOUS at 09:05

## 2023-05-19 RX ADMIN — SODIUM CHLORIDE, POTASSIUM CHLORIDE, SODIUM LACTATE AND CALCIUM CHLORIDE: 600; 310; 30; 20 INJECTION, SOLUTION INTRAVENOUS at 11:05

## 2023-05-19 RX ADMIN — HYDROMORPHONE HYDROCHLORIDE 1 MG: 2 INJECTION INTRAMUSCULAR; INTRAVENOUS; SUBCUTANEOUS at 11:05

## 2023-05-19 RX ADMIN — ONDANSETRON 4 MG: 2 INJECTION INTRAMUSCULAR; INTRAVENOUS at 01:05

## 2023-05-19 RX ADMIN — HYDROMORPHONE HYDROCHLORIDE 1 MG: 2 INJECTION INTRAMUSCULAR; INTRAVENOUS; SUBCUTANEOUS at 04:05

## 2023-05-19 RX ADMIN — MORPHINE SULFATE 6 MG: 4 INJECTION INTRAVENOUS at 06:05

## 2023-05-19 RX ADMIN — INSULIN HUMAN 6 UNITS: 100 INJECTION, SOLUTION PARENTERAL at 09:05

## 2023-05-19 RX ADMIN — LABETALOL HYDROCHLORIDE 10 MG: 5 INJECTION INTRAVENOUS at 02:05

## 2023-05-19 RX ADMIN — MORPHINE SULFATE 4 MG: 4 INJECTION INTRAVENOUS at 09:05

## 2023-05-19 RX ADMIN — HYDROMORPHONE HYDROCHLORIDE 1 MG: 1 INJECTION, SOLUTION INTRAMUSCULAR; INTRAVENOUS; SUBCUTANEOUS at 08:05

## 2023-05-19 RX ADMIN — SODIUM CHLORIDE, POTASSIUM CHLORIDE, SODIUM LACTATE AND CALCIUM CHLORIDE: 600; 310; 30; 20 INJECTION, SOLUTION INTRAVENOUS at 08:05

## 2023-05-19 RX ADMIN — ONDANSETRON 4 MG: 2 INJECTION INTRAMUSCULAR; INTRAVENOUS at 06:05

## 2023-05-19 NOTE — HPI
The patient is a 36 yo female with past medical history of recurrent pancreatitis, GERD, PTSD and diabetes who presented to the ED with epigastric abdominal pain and vomiting that started this morning. Patient reports this pain is similar to her previous episodes of pancreatitis. She had a shot of tequila last night with dinner. The pain is constant. Workup revealed elevated lipase. Hospital medicine consulted. Patient placed in observation.

## 2023-05-19 NOTE — SUBJECTIVE & OBJECTIVE
Past Medical History:   Diagnosis Date    Allergy     Asthma     Childhood only    Bipolar 1 disorder     Borderline personality disorder     Depression     GERD (gastroesophageal reflux disease)     History of psychiatric hospitalization     Hx of psychiatric care     Hypertension     pt denied    Pancreatitis     Psychiatric problem     PTSD (post-traumatic stress disorder)     S/P partial hysterectomy 2011    Substance abuse     Suicide attempt        Past Surgical History:   Procedure Laterality Date    APPENDECTOMY       SECTION      CHOLECYSTECTOMY      HYSTERECTOMY      endometrial hyperplasia    LASER LAPAROSCOPY         Review of patient's allergies indicates:   Allergen Reactions    Demerol (pf) [meperidine (pf)] Hives    Medrol [methylprednisolone] Anaphylaxis     Any cortisone    Adhesive      Other reaction(s): Unknown    Amoxicillin-pot clavulanate      Other reaction(s): Unknown    Atarax [hydroxyzine hcl] Hives    Hydralazine analogues Hives    Iodine      Other reaction(s): Unknown    Omnicef [cefdinir]     Penicillins Hives and Nausea And Vomiting    Phenergan [promethazine] Hives and Nausea And Vomiting    Risperidone analogues Hives    Ativan [lorazepam] Hives and Anxiety       No current facility-administered medications on file prior to encounter.     Current Outpatient Medications on File Prior to Encounter   Medication Sig    blood sugar diagnostic Strp 1 each by Misc.(Non-Drug; Combo Route) route 3 (three) times daily.    HYDROcodone-acetaminophen (NORCO) 5-325 mg per tablet Take 1 tablet by mouth every 6 (six) hours as needed for Pain.    lancets Misc 1 each by Misc.(Non-Drug; Combo Route) route 3 (three) times daily.    metFORMIN (GLUCOPHAGE-XR) 500 MG ER 24hr tablet Take 2 tablets (1,000 mg total) by mouth 2 (two) times daily with meals.    ondansetron (ZOFRAN) 4 MG tablet Take 1 tablet (4 mg total) by mouth every 6 (six) hours.     Family History       Problem  Relation (Age of Onset)    Atrial fibrillation Maternal Grandfather    Breast cancer Maternal Aunt    Diabetes Mother    Hypertension Mother          Tobacco Use    Smoking status: Former     Packs/day: 0.25     Types: Cigarettes     Quit date: 2013     Years since quittin.9    Smokeless tobacco: Never   Substance and Sexual Activity    Alcohol use: Yes     Comment: occasional     Drug use: Not Currently    Sexual activity: Yes     Partners: Male, Female     Review of Systems   Gastrointestinal:  Positive for abdominal pain, nausea and vomiting.   All other systems reviewed and are negative.  Objective:     Vital Signs (Most Recent):  Temp: 98 °F (36.7 °C) (23)  Pulse: 77 (23 0737)  Resp: 20 (23 0907)  BP: (!) 140/75 (23)  SpO2: 98 % (23) Vital Signs (24h Range):  Temp:  [98 °F (36.7 °C)-98.3 °F (36.8 °C)] 98 °F (36.7 °C)  Pulse:  [] 77  Resp:  [15-22] 20  SpO2:  [98 %-99 %] 98 %  BP: (140-213)/() 140/75     Weight: 83.1 kg (183 lb 3.2 oz)  Body mass index is 33.51 kg/m².     Physical Exam  HENT:      Head: Normocephalic and atraumatic.   Cardiovascular:      Rate and Rhythm: Normal rate and regular rhythm.      Heart sounds: No murmur heard.  Pulmonary:      Effort: Pulmonary effort is normal. No respiratory distress.      Breath sounds: Normal breath sounds. No wheezing.   Abdominal:      General: Bowel sounds are decreased. There is no distension.      Palpations: Abdomen is soft.      Tenderness: There is abdominal tenderness.   Musculoskeletal:         General: No swelling.   Skin:     General: Skin is warm and dry.   Neurological:      Mental Status: She is alert and oriented to person, place, and time. Mental status is at baseline.              Significant Labs: All pertinent labs within the past 24 hours have been reviewed.  CBC:   Recent Labs   Lab 23  0641   WBC 10.23   HGB 15.9   HCT 44.8        CMP:   Recent Labs   Lab  05/19/23  0641   *   K 3.6      CO2 19*   *   BUN 11   CREATININE 0.8   CALCIUM 8.9   PROT 7.6   ALBUMIN 4.2   BILITOT 0.6   ALKPHOS 65   AST 16   ALT 21   ANIONGAP 11     Lipase:   Recent Labs   Lab 05/19/23  0641   LIPASE >1000*       Significant Imaging: I have reviewed all pertinent imaging results/findings within the past 24 hours.

## 2023-05-19 NOTE — H&P
[facility] - Emergency Dept.  Hospital Medicine  History & Physical    Patient Name: Priti Obrien  MRN: [id number]  Patient Class: OP- Observation  Admission Date: 2023  Attending Physician: Alejandra Diallo MD   Primary Care Provider: RICHI Youngblood         Patient information was obtained from patient, past medical records and ER records.     Subjective:     Principal Problem:Pancreatitis    Chief Complaint:   Chief Complaint   Patient presents with    Abdominal Pain     Pt complaining of abdominal pain and vomiting that started an hr ago. History of pancreatitis         HPI: The patient is a 36 yo female with past medical history of recurrent pancreatitis, GERD, PTSD and diabetes who presented to the ED with epigastric abdominal pain and vomiting that started this morning. Patient reports this pain is similar to her previous episodes of pancreatitis. She had a shot of tequila last night with dinner. The pain is constant. Workup revealed elevated lipase. Hospital medicine consulted. Patient placed in observation.    Past Medical History:   Diagnosis Date    Allergy     Asthma     Childhood only    Bipolar 1 disorder     Borderline personality disorder     Depression     GERD (gastroesophageal reflux disease)     History of psychiatric hospitalization     Hx of psychiatric care     Hypertension     pt denied    Pancreatitis     Psychiatric problem     PTSD (post-traumatic stress disorder)     S/P partial hysterectomy 2011    Substance abuse     Suicide attempt        Past Surgical History:   Procedure Laterality Date    APPENDECTOMY       SECTION      CHOLECYSTECTOMY      HYSTERECTOMY      endometrial hyperplasia    LASER LAPAROSCOPY         Review of patient's allergies indicates:   Allergen Reactions    Demerol (pf) [meperidine (pf)] Hives    Medrol [methylprednisolone] Anaphylaxis     Any cortisone    Adhesive      Other reaction(s): Unknown    Amoxicillin-pot clavulanate       Other reaction(s): Unknown    Atarax [hydroxyzine hcl] Hives    Hydralazine analogues Hives    Iodine      Other reaction(s): Unknown    Omnicef [cefdinir]     Penicillins Hives and Nausea And Vomiting    Phenergan [promethazine] Hives and Nausea And Vomiting    Risperidone analogues Hives    Ativan [lorazepam] Hives and Anxiety       No current facility-administered medications on file prior to encounter.     Current Outpatient Medications on File Prior to Encounter   Medication Sig    blood sugar diagnostic Strp 1 each by Misc.(Non-Drug; Combo Route) route 3 (three) times daily.    HYDROcodone-acetaminophen (NORCO) 5-325 mg per tablet Take 1 tablet by mouth every 6 (six) hours as needed for Pain.    lancets Misc 1 each by Misc.(Non-Drug; Combo Route) route 3 (three) times daily.    metFORMIN (GLUCOPHAGE-XR) 500 MG ER 24hr tablet Take 2 tablets (1,000 mg total) by mouth 2 (two) times daily with meals.    ondansetron (ZOFRAN) 4 MG tablet Take 1 tablet (4 mg total) by mouth every 6 (six) hours.     Family History       Problem Relation (Age of Onset)    Atrial fibrillation Maternal Grandfather    Breast cancer Maternal Aunt    Diabetes Mother    Hypertension Mother          Tobacco Use    Smoking status: Former     Packs/day: 0.25     Types: Cigarettes     Quit date: 2013     Years since quittin.9    Smokeless tobacco: Never   Substance and Sexual Activity    Alcohol use: Yes     Comment: occasional     Drug use: Not Currently    Sexual activity: Yes     Partners: Male, Female     Review of Systems   Gastrointestinal:  Positive for abdominal pain, nausea and vomiting.   All other systems reviewed and are negative.  Objective:     Vital Signs (Most Recent):  Temp: 98 °F (36.7 °C) (23)  Pulse: 77 (23 0737)  Resp: 20 (23 0907)  BP: (!) 140/75 (23)  SpO2: 98 % (23) Vital Signs (24h Range):  Temp:  [98 °F (36.7 °C)-98.3 °F (36.8 °C)] 98 °F (36.7 °C)  Pulse:  []  77  Resp:  [15-22] 20  SpO2:  [98 %-99 %] 98 %  BP: (140-213)/() 140/75     Weight: 83.1 kg (183 lb 3.2 oz)  Body mass index is 33.51 kg/m².     Physical Exam  HENT:      Head: Normocephalic and atraumatic.   Cardiovascular:      Rate and Rhythm: Normal rate and regular rhythm.      Heart sounds: No murmur heard.  Pulmonary:      Effort: Pulmonary effort is normal. No respiratory distress.      Breath sounds: Normal breath sounds. No wheezing.   Abdominal:      General: Bowel sounds are decreased. There is no distension.      Palpations: Abdomen is soft.      Tenderness: There is abdominal tenderness.   Musculoskeletal:         General: No swelling.   Skin:     General: Skin is warm and dry.   Neurological:      Mental Status: She is alert and oriented to person, place, and time. Mental status is at baseline.              Significant Labs: All pertinent labs within the past 24 hours have been reviewed.  CBC:   Recent Labs   Lab 05/19/23  0641   WBC 10.23   HGB 15.9   HCT 44.8        CMP:   Recent Labs   Lab 05/19/23  0641   *   K 3.6      CO2 19*   *   BUN 11   CREATININE 0.8   CALCIUM 8.9   PROT 7.6   ALBUMIN 4.2   BILITOT 0.6   ALKPHOS 65   AST 16   ALT 21   ANIONGAP 11     Lipase:   Recent Labs   Lab 05/19/23  0641   LIPASE >1000*       Significant Imaging: I have reviewed all pertinent imaging results/findings within the past 24 hours.    Assessment/Plan:     * Pancreatitis    Likely from recent alcohol ingestion  Bowel rest  IVFs  Pain control  Prn antiemetics  Monitor for improvement in pain    Essential hypertension  No medications on home med list  Prn IV labetalol as allergic to hydralazine  Monitor blood pressire      Diabetes mellitus type 2 in obese  Patient's FSGs are uncontrolled due to hyperglycemia on current medication regimen.  Last A1c reviewed-   Lab Results   Component Value Date    HGBA1C 8.9 (H) 02/24/2022     Most recent fingerstick glucose reviewed-   Recent  Labs   Lab 05/19/23  0857 05/19/23  1102   POCTGLUCOSE 333* 258*     Current correctional scale  Medium  Maintain anti-hyperglycemic dose as follows-   Antihyperglycemics (From admission, onward)      Start     Stop Route Frequency Ordered    05/19/23 1104  insulin aspart U-100 pen 1-10 Units         -- SubQ Every 6 hours PRN 05/19/23 1004          Hold Oral hypoglycemics while patient is in the hospital.      VTE Risk Mitigation (From admission, onward)           Ordered     enoxaparin injection 40 mg  Daily         05/19/23 1115     IP VTE HIGH RISK PATIENT  Once         05/19/23 1115     Place sequential compression device  Until discontinued         05/19/23 1115                       On 05/19/2023, patient should be placed in hospital observation services under my care.        Alejandra Diallo MD  Department of Hospital Medicine  '[facility] - Emergency Dept.

## 2023-05-19 NOTE — ED PROVIDER NOTES
SCRIBE #1 NOTE: IAfshin am scribing for, and in the presence of, Ashok Gregg MD. I have scribed the entire note.      History      Chief Complaint   Patient presents with    Abdominal Pain     Pt complaining of abdominal pain and vomiting that started an hr ago. History of pancreatitis        Review of patient's allergies indicates:   Allergen Reactions    Demerol (pf) [meperidine (pf)] Hives    Medrol [methylprednisolone] Anaphylaxis     Any cortisone    Adhesive      Other reaction(s): Unknown    Amoxicillin-pot clavulanate      Other reaction(s): Unknown    Atarax [hydroxyzine hcl] Hives    Hydralazine analogues Hives    Iodine      Other reaction(s): Unknown    Omnicef [cefdinir]     Penicillins Hives and Nausea And Vomiting    Phenergan [promethazine] Hives and Nausea And Vomiting    Risperidone analogues Hives    Ativan [lorazepam] Hives and Anxiety        HPI   HPI    5/19/2023, 6:22 AM   History obtained from the patient      History of Present Illness: Priti Obrien is a 37 y.o. female patient with a PMHx of HTN, GERD and an Shx of cholecystectomy, hysterectomy who presents to the Emergency Department for epigastric abdominal pain, onset this morning at 4 AM. Pt states that her current sxs are similar to previous pancreatitis flares. Symptoms are constant and moderate in severity. No mitigating or exacerbating factors reported. Associated sxs include n/v. Patient denies any fever, chills, SOB, CP, weakness, numbness, dizziness, headache, and all other sxs at this time. Pt notes that she had 1 shot of tequila last night with dinner for her birthday. No further complaints or concerns at this time.     Arrival mode: Personal vehicle    PCP: RICHI Youngblood       Past Medical History:  Past Medical History:   Diagnosis Date    Allergy     Asthma     Childhood only    Bipolar 1 disorder     Borderline personality disorder     Depression     GERD (gastroesophageal reflux disease)     History of  psychiatric hospitalization     Hx of psychiatric care     Hypertension     pt denied    Pancreatitis     Psychiatric problem     PTSD (post-traumatic stress disorder)     S/P partial hysterectomy 2011    Substance abuse     Suicide attempt        Past Surgical History:  Past Surgical History:   Procedure Laterality Date    APPENDECTOMY       SECTION      CHOLECYSTECTOMY      HYSTERECTOMY  2010    endometrial hyperplasia    LASER LAPAROSCOPY           Family History:  Family History   Problem Relation Age of Onset    Diabetes Mother     Hypertension Mother     Atrial fibrillation Maternal Grandfather     Breast cancer Maternal Aunt        Social History:  Social History     Tobacco Use    Smoking status: Former     Packs/day: 0.25     Types: Cigarettes     Quit date: 2013     Years since quittin.9    Smokeless tobacco: Never   Substance and Sexual Activity    Alcohol use: Yes     Comment: occasional     Drug use: Not Currently    Sexual activity: Yes     Partners: Male, Female       ROS   Review of Systems   Constitutional:  Negative for chills and fever.   HENT:  Negative for sore throat.    Respiratory:  Negative for shortness of breath.    Cardiovascular:  Negative for chest pain.   Gastrointestinal:  Positive for abdominal pain (epigastric), nausea and vomiting. Negative for diarrhea.   Genitourinary:  Negative for dysuria.   Musculoskeletal:  Negative for back pain.   Skin:  Negative for rash.   Neurological:  Negative for dizziness, weakness, light-headedness, numbness and headaches.   Hematological:  Does not bruise/bleed easily.   All other systems reviewed and are negative.    Physical Exam      Initial Vitals [23 0503]   BP Pulse Resp Temp SpO2   (!) 213/119 (!) 135 (!) 22 98.3 °F (36.8 °C) 99 %      MAP       --          Physical Exam  Nursing Notes and Vital Signs Reviewed.  Constitutional: Patient is in no acute distress. Well-developed and well-nourished.  Head:  "Atraumatic. Normocephalic.  Eyes: PERRL. EOM intact. Conjunctivae are not pale. No scleral icterus.  ENT: Mucous membranes are moist. Oropharynx is clear and symmetric.    Neck: Supple. Full ROM.  Cardiovascular: Tachycardic. Regular rhythm. No murmurs, rubs, or gallops. Distal pulses are 2+ and symmetric.  Pulmonary/Chest: No respiratory distress. Clear to auscultation bilaterally. No wheezing or rales.  Abdominal: Soft and non-distended.  Epigastric tenderness to palpation.  No rebound, guarding, or rigidity.   Musculoskeletal: Moves all extremities. No obvious deformities. No edema.  Skin: Warm and dry.  Neurological:  Alert, awake, and appropriate.  Normal speech.  No acute focal neurological deficits are appreciated.  Psychiatric: Normal affect. Good eye contact. Appropriate in content.    ED Course    Procedures  ED Vital Signs:  Vitals:    05/19/23 0503 05/19/23 0552 05/19/23 0640 05/19/23 0730   BP: (!) 213/119 (!) 182/112  (!) 140/75   Pulse: (!) 135 85  76   Resp: (!) 22 15 (!) 22 20   Temp: 98.3 °F (36.8 °C)   98 °F (36.7 °C)   TempSrc: Oral   Oral   SpO2: 99%   98%   Weight: 83.1 kg (183 lb 3.2 oz)      Height: 5' 2" (1.575 m)       05/19/23 0737 05/19/23 0907   BP:     Pulse: 77    Resp:  20   Temp:     TempSrc:     SpO2:     Weight:     Height:         Abnormal Lab Results:  Labs Reviewed   CBC W/ AUTO DIFFERENTIAL - Abnormal; Notable for the following components:       Result Value    RBC 5.43 (*)     Immature Grans (Abs) 0.05 (*)     Gran % 73.9 (*)     Lymph % 17.4 (*)     All other components within normal limits   COMPREHENSIVE METABOLIC PANEL - Abnormal; Notable for the following components:    Sodium 134 (*)     CO2 19 (*)     Glucose 388 (*)     All other components within normal limits   LIPASE - Abnormal; Notable for the following components:    Lipase >1000 (*)     All other components within normal limits   URINALYSIS, REFLEX TO URINE CULTURE - Abnormal; Notable for the following " components:    Specific Gravity, UA >1.030 (*)     Glucose, UA 4+ (*)     Ketones, UA 2+ (*)     All other components within normal limits    Narrative:     Specimen Source->Urine   POCT GLUCOSE - Abnormal; Notable for the following components:    POCT Glucose 333 (*)     All other components within normal limits   HIV 1 / 2 ANTIBODY    Narrative:     Release to patient->Immediate   HEPATITIS C ANTIBODY    Narrative:     Release to patient->Immediate   HEP C VIRUS HOLD SPECIMEN    Narrative:     Release to patient->Immediate   TROPONIN I   LACTIC ACID, PLASMA   URINALYSIS MICROSCOPIC    Narrative:     Specimen Source->Urine   HEMOGLOBIN A1C   POCT GLUCOSE MONITORING CONTINUOUS   POCT GLUCOSE MONITORING CONTINUOUS        All Lab Results:  Results for orders placed or performed during the hospital encounter of 05/19/23   HIV 1/2 Ag/Ab (4th Gen)   Result Value Ref Range    HIV 1/2 Ag/Ab Negative Negative   Hepatitis C Antibody   Result Value Ref Range    Hepatitis C Ab Negative Negative   HCV Virus Hold Specimen   Result Value Ref Range    HEP C Virus Hold Specimen Hold for HCV sendout    CBC auto differential   Result Value Ref Range    WBC 10.23 3.90 - 12.70 K/uL    RBC 5.43 (H) 4.00 - 5.40 M/uL    Hemoglobin 15.9 12.0 - 16.0 g/dL    Hematocrit 44.8 37.0 - 48.5 %    MCV 83 82 - 98 fL    MCH 29.3 27.0 - 31.0 pg    MCHC 35.5 32.0 - 36.0 g/dL    RDW 12.3 11.5 - 14.5 %    Platelets 289 150 - 450 K/uL    MPV 10.0 9.2 - 12.9 fL    Immature Granulocytes 0.5 0.0 - 0.5 %    Gran # (ANC) 7.6 1.8 - 7.7 K/uL    Immature Grans (Abs) 0.05 (H) 0.00 - 0.04 K/uL    Lymph # 1.8 1.0 - 4.8 K/uL    Mono # 0.6 0.3 - 1.0 K/uL    Eos # 0.1 0.0 - 0.5 K/uL    Baso # 0.07 0.00 - 0.20 K/uL    nRBC 0 0 /100 WBC    Gran % 73.9 (H) 38.0 - 73.0 %    Lymph % 17.4 (L) 18.0 - 48.0 %    Mono % 6.1 4.0 - 15.0 %    Eosinophil % 1.4 0.0 - 8.0 %    Basophil % 0.7 0.0 - 1.9 %    Differential Method Automated    Comprehensive metabolic panel   Result Value  Ref Range    Sodium 134 (L) 136 - 145 mmol/L    Potassium 3.6 3.5 - 5.1 mmol/L    Chloride 104 95 - 110 mmol/L    CO2 19 (L) 23 - 29 mmol/L    Glucose 388 (H) 70 - 110 mg/dL    BUN 11 6 - 20 mg/dL    Creatinine 0.8 0.5 - 1.4 mg/dL    Calcium 8.9 8.7 - 10.5 mg/dL    Total Protein 7.6 6.0 - 8.4 g/dL    Albumin 4.2 3.5 - 5.2 g/dL    Total Bilirubin 0.6 0.1 - 1.0 mg/dL    Alkaline Phosphatase 65 55 - 135 U/L    AST 16 10 - 40 U/L    ALT 21 10 - 44 U/L    Anion Gap 11 8 - 16 mmol/L    eGFR >60 >60 mL/min/1.73 m^2   Lipase   Result Value Ref Range    Lipase >1000 (H) 4 - 60 U/L   Urinalysis, Reflex to Urine Culture Urine, Clean Catch    Specimen: Urine   Result Value Ref Range    Specimen UA Urine, Clean Catch     Color, UA Yellow Yellow, Straw, [place]    Appearance, UA Clear Clear    pH, UA 6.0 5.0 - 8.0    Specific Gravity, UA >1.030 (A) 1.005 - 1.030    Protein, UA Negative Negative    Glucose, UA 4+ (A) Negative    Ketones, UA 2+ (A) Negative    Bilirubin (UA) Negative Negative    Occult Blood UA Negative Negative    Nitrite, UA Negative Negative    Urobilinogen, UA Negative <2.0 EU/dL    Leukocytes, UA Negative Negative   Troponin I   Result Value Ref Range    Troponin I <0.006 0.000 - 0.026 ng/mL   Lactic acid, plasma   Result Value Ref Range    Lactate (Lactic Acid) 1.5 0.5 - 2.2 mmol/L   Urinalysis Microscopic   Result Value Ref Range    RBC, UA 1 0 - 4 /hpf    WBC, UA 1 0 - 5 /hpf    Bacteria None None-Occ /hpf    Yeast, UA None None    Squam Epithel, UA 1 /hpf    Microscopic Comment SEE COMMENT    POCT glucose   Result Value Ref Range    POCT Glucose 333 (H) 70 - 110 mg/dL     Imaging Results:  Imaging Results              CT Abdomen Pelvis  Without Contrast (Final result)  Result time 05/19/23 07:57:01   Procedure changed from CT Abdomen Pelvis With Contrast     Final result by Lux Osman MD (05/19/23 07:57:01)                   Impression:      Stranding with shotty nodes within the mesentery could  reflect panniculitis or adenitis. Findings are largely stable compared to prior exam.  No definite peripancreatic stranding to suggest acute pancreatitis.  Correlation with amylase lipase recommended.    All CT scans at this facility use dose modulation, iterative reconstruction, and/or weight based dosing when appropriate to reduce radiation dose to as low as reasonable achievable.      Electronically signed by: Lux Osman MD  Date:    05/19/2023  Time:    07:57               Narrative:    EXAMINATION:  CT ABDOMEN PELVIS WITHOUT CONTRAST    CLINICAL HISTORY:  Epigastric pain;    TECHNIQUE:  Low dose axial images, sagittal and coronal reformations were obtained from the lung bases to the pubic symphysis.  Oral contrast was not administered.    COMPARISON:  01/21/2022    FINDINGS:  Heart: Normal size. No effusion.    Lung Bases: Clear.  Stable 6 mm nodule left lower lobe.    Liver: Mild hepatomegaly.  Normal attenuation.  No focal lesions.    Gallbladder: No calcified gallstones.    Bile Ducts: No dilatation.    Pancreas: No obvious mass. No peripancreatic fat stranding.    Spleen: Normal.    Adrenals: Normal.    Kidneys/Ureters: No mass, hydroureteronephrosis, or nephroureterolithiasis.    Bladder: No wall thickening.  Mild bladder distension.    Reproductive organs: Post hysterectomy    GI Tract/Mesentery: No evidence of bowel obstruction or inflammation.  Appendix is surgically absent.  Stranding with shotty nodes within the mesentery could reflect panniculitis or adenitis.  Findings are largely stable compared to prior exam    Peritoneal Space: No ascites or free air.    Retroperitoneum: No significant adenopathy.    Abdominal wall: Similar appearance of mild thickening at the level the umbilicus of unknown clinical significance.  Suspect postoperative changes within the midline of the abdominal wall.    Vasculature: No aneurysm.    Bones: No acute fracture.  Mild degenerative change.  No suspicious lytic or  sclerotic lesions.                                     The EKG was ordered, reviewed, and independently interpreted by the ED provider.  Interpretation time: 05:11  Rate: 80 BPM  Rhythm: normal sinus rhythm  Interpretation: No acute ST changes. No STEMI.           The Emergency Provider reviewed the vital signs and test results, which are outlined above.    ED Discussion     10:13 AM: Discussed case with Dr. Diallo ([facility]). Dr. Diallo agrees with current care and management of pt and accepts admission.   Admitting Service: [facility]  Admitting Physician: Dr. Diallo  Admit to: Obs    10:14 AM: Re-evaluated pt. I have discussed test results, shared treatment plan, and the need for admission with patient and family at bedside. Pt and family express understanding at this time and agree with all information. All questions answered. Pt and family have no further questions or concerns at this time. Pt is ready for admit.         ED Medication(s):  Medications   pantoprazole EC tablet 40 mg (40 mg Oral Not Given 5/19/23 0630)   glucagon (human recombinant) injection 1 mg (has no administration in time range)   dextrose 10% bolus 125 mL 125 mL (has no administration in time range)   dextrose 10% bolus 250 mL 250 mL (has no administration in time range)   insulin aspart U-100 pen 1-10 Units (has no administration in time range)   morphine injection 6 mg (6 mg Intravenous Given 5/19/23 0640)   ondansetron injection 4 mg (4 mg Intravenous Given 5/19/23 0640)   lactated ringers bolus 1,000 mL (1,000 mLs Intravenous New Bag 5/19/23 0906)   insulin regular injection 6 Units 0.06 mL (6 Units Intravenous Given 5/19/23 0907)   ondansetron injection 4 mg (4 mg Intravenous Given 5/19/23 0907)   morphine injection 4 mg (4 mg Intravenous Given 5/19/23 0907)       New Prescriptions    No medications on file       Medical Decision Making    Medical Decision Making:   Clinical Tests:   Lab Tests: Ordered and  Reviewed  Radiological Study: Ordered and Reviewed  Medical Tests: Ordered and Reviewed       DDx includes: Pancreatitis, Gastritis, Adenitis, Enteritis, ACS, Volvulus, Bowel obstrustion  Scribe Attestation:   Scribe #1: I performed the above scribed service and the documentation accurately describes the services I performed. I attest to the accuracy of the note.    Attending:   Physician Attestation Statement for Scribe #1: I, Ashok Gregg MD, personally performed the services described in this documentation, as scribed by Afshin Daly, in my presence, and it is both accurate and complete.          Clinical Impression       ICD-10-CM ICD-9-CM   1. Mesenteric panniculitis  K65.4 567.82   2. Epigastric pain  R10.13 789.06   3. Pancreatitis  K85.90 577.0       Disposition:   Disposition: Placed in Observation  Condition: Fair       Ashok Gregg MD  05/19/23 4938

## 2023-05-19 NOTE — ASSESSMENT & PLAN NOTE
No medications on home med list  Prn IV labetalol as allergic to hydralazine  Monitor blood pressire

## 2023-05-19 NOTE — ASSESSMENT & PLAN NOTE
Likely from recent alcohol ingestion  Bowel rest  IVFs  Pain control  Prn antiemetics  Monitor for improvement in pain

## 2023-05-19 NOTE — ASSESSMENT & PLAN NOTE
Patient's FSGs are uncontrolled due to hyperglycemia on current medication regimen.  Last A1c reviewed-   Lab Results   Component Value Date    HGBA1C 8.9 (H) 02/24/2022     Most recent fingerstick glucose reviewed-   Recent Labs   Lab 05/19/23  0857 05/19/23  1102   POCTGLUCOSE 333* 258*     Current correctional scale  Medium  Maintain anti-hyperglycemic dose as follows-   Antihyperglycemics (From admission, onward)    Start     Stop Route Frequency Ordered    05/19/23 1104  insulin aspart U-100 pen 1-10 Units         -- SubQ Every 6 hours PRN 05/19/23 1004        Hold Oral hypoglycemics while patient is in the hospital.

## 2023-05-20 VITALS
RESPIRATION RATE: 18 BRPM | BODY MASS INDEX: 34.08 KG/M2 | OXYGEN SATURATION: 100 % | HEIGHT: 62 IN | HEART RATE: 57 BPM | WEIGHT: 185.19 LBS | TEMPERATURE: 98 F | DIASTOLIC BLOOD PRESSURE: 75 MMHG | SYSTOLIC BLOOD PRESSURE: 129 MMHG

## 2023-05-20 LAB
ALBUMIN SERPL BCP-MCNC: 3.2 G/DL (ref 3.5–5.2)
ALP SERPL-CCNC: 48 U/L (ref 55–135)
ALT SERPL W/O P-5'-P-CCNC: 18 U/L (ref 10–44)
ANION GAP SERPL CALC-SCNC: 10 MMOL/L (ref 8–16)
AST SERPL-CCNC: 18 U/L (ref 10–40)
BASOPHILS # BLD AUTO: 0.04 K/UL (ref 0–0.2)
BASOPHILS NFR BLD: 0.5 % (ref 0–1.9)
BILIRUB SERPL-MCNC: 0.6 MG/DL (ref 0.1–1)
BUN SERPL-MCNC: 9 MG/DL (ref 6–20)
CALCIUM SERPL-MCNC: 8.3 MG/DL (ref 8.7–10.5)
CHLORIDE SERPL-SCNC: 106 MMOL/L (ref 95–110)
CO2 SERPL-SCNC: 20 MMOL/L (ref 23–29)
CREAT SERPL-MCNC: 0.6 MG/DL (ref 0.5–1.4)
DIFFERENTIAL METHOD: NORMAL
EOSINOPHIL # BLD AUTO: 0.3 K/UL (ref 0–0.5)
EOSINOPHIL NFR BLD: 3.1 % (ref 0–8)
ERYTHROCYTE [DISTWIDTH] IN BLOOD BY AUTOMATED COUNT: 12.8 % (ref 11.5–14.5)
EST. GFR  (NO RACE VARIABLE): >60 ML/MIN/1.73 M^2
GLUCOSE SERPL-MCNC: 191 MG/DL (ref 70–110)
HCT VFR BLD AUTO: 41.2 % (ref 37–48.5)
HGB BLD-MCNC: 14 G/DL (ref 12–16)
IMM GRANULOCYTES # BLD AUTO: 0.02 K/UL (ref 0–0.04)
IMM GRANULOCYTES NFR BLD AUTO: 0.2 % (ref 0–0.5)
LIPASE SERPL-CCNC: 306 U/L (ref 4–60)
LYMPHOCYTES # BLD AUTO: 2 K/UL (ref 1–4.8)
LYMPHOCYTES NFR BLD: 24.4 % (ref 18–48)
MCH RBC QN AUTO: 30 PG (ref 27–31)
MCHC RBC AUTO-ENTMCNC: 34 G/DL (ref 32–36)
MCV RBC AUTO: 88 FL (ref 82–98)
MONOCYTES # BLD AUTO: 0.5 K/UL (ref 0.3–1)
MONOCYTES NFR BLD: 6.5 % (ref 4–15)
NEUTROPHILS # BLD AUTO: 5.3 K/UL (ref 1.8–7.7)
NEUTROPHILS NFR BLD: 65.3 % (ref 38–73)
NRBC BLD-RTO: 0 /100 WBC
PLATELET # BLD AUTO: 238 K/UL (ref 150–450)
PMV BLD AUTO: 10.3 FL (ref 9.2–12.9)
POCT GLUCOSE: 203 MG/DL (ref 70–110)
POCT GLUCOSE: 227 MG/DL (ref 70–110)
POTASSIUM SERPL-SCNC: 3.4 MMOL/L (ref 3.5–5.1)
PROT SERPL-MCNC: 5.8 G/DL (ref 6–8.4)
RBC # BLD AUTO: 4.66 M/UL (ref 4–5.4)
SODIUM SERPL-SCNC: 136 MMOL/L (ref 136–145)
WBC # BLD AUTO: 8.11 K/UL (ref 3.9–12.7)

## 2023-05-20 PROCEDURE — 83690 ASSAY OF LIPASE: CPT | Performed by: INTERNAL MEDICINE

## 2023-05-20 PROCEDURE — 63600175 PHARM REV CODE 636 W HCPCS: Performed by: INTERNAL MEDICINE

## 2023-05-20 PROCEDURE — 85025 COMPLETE CBC W/AUTO DIFF WBC: CPT | Performed by: INTERNAL MEDICINE

## 2023-05-20 PROCEDURE — 96376 TX/PRO/DX INJ SAME DRUG ADON: CPT

## 2023-05-20 PROCEDURE — 80053 COMPREHEN METABOLIC PANEL: CPT | Performed by: INTERNAL MEDICINE

## 2023-05-20 PROCEDURE — G0378 HOSPITAL OBSERVATION PER HR: HCPCS

## 2023-05-20 PROCEDURE — 96372 THER/PROPH/DIAG INJ SC/IM: CPT | Performed by: INTERNAL MEDICINE

## 2023-05-20 PROCEDURE — 36415 COLL VENOUS BLD VENIPUNCTURE: CPT | Performed by: INTERNAL MEDICINE

## 2023-05-20 PROCEDURE — 96361 HYDRATE IV INFUSION ADD-ON: CPT

## 2023-05-20 RX ORDER — ONDANSETRON 4 MG/1
4 TABLET, ORALLY DISINTEGRATING ORAL EVERY 6 HOURS PRN
Qty: 20 TABLET | Refills: 0 | Status: ON HOLD | OUTPATIENT
Start: 2023-05-20 | End: 2023-10-19 | Stop reason: SDUPTHER

## 2023-05-20 RX ORDER — HYDROCODONE BITARTRATE AND ACETAMINOPHEN 7.5; 325 MG/1; MG/1
1 TABLET ORAL EVERY 6 HOURS PRN
Qty: 18 TABLET | Refills: 0 | Status: ON HOLD | OUTPATIENT
Start: 2023-05-20 | End: 2023-10-19

## 2023-05-20 RX ADMIN — HYDROMORPHONE HYDROCHLORIDE 1 MG: 1 INJECTION, SOLUTION INTRAMUSCULAR; INTRAVENOUS; SUBCUTANEOUS at 09:05

## 2023-05-20 RX ADMIN — INSULIN ASPART 4 UNITS: 100 INJECTION, SOLUTION INTRAVENOUS; SUBCUTANEOUS at 12:05

## 2023-05-20 RX ADMIN — HYDROMORPHONE HYDROCHLORIDE 1 MG: 1 INJECTION, SOLUTION INTRAMUSCULAR; INTRAVENOUS; SUBCUTANEOUS at 01:05

## 2023-05-20 RX ADMIN — SODIUM CHLORIDE, POTASSIUM CHLORIDE, SODIUM LACTATE AND CALCIUM CHLORIDE: 600; 310; 30; 20 INJECTION, SOLUTION INTRAVENOUS at 01:05

## 2023-05-20 RX ADMIN — SODIUM CHLORIDE, POTASSIUM CHLORIDE, SODIUM LACTATE AND CALCIUM CHLORIDE: 600; 310; 30; 20 INJECTION, SOLUTION INTRAVENOUS at 05:05

## 2023-05-20 RX ADMIN — HYDROMORPHONE HYDROCHLORIDE 1 MG: 1 INJECTION, SOLUTION INTRAMUSCULAR; INTRAVENOUS; SUBCUTANEOUS at 05:05

## 2023-05-20 RX ADMIN — HYDROMORPHONE HYDROCHLORIDE 1 MG: 1 INJECTION, SOLUTION INTRAMUSCULAR; INTRAVENOUS; SUBCUTANEOUS at 12:05

## 2023-05-20 NOTE — PLAN OF CARE
Problem: Adult Inpatient Plan of Care  Goal: Plan of Care Review  Outcome: Ongoing, Progressing     Problem: Diabetes Comorbidity  Goal: Blood Glucose Level Within Targeted Range  Outcome: Ongoing, Progressing  Intervention: Monitor and Manage Glycemia  Flowsheets (Taken 5/20/2023 0331)  Glycemic Management: blood glucose monitored     Problem: Pain Acute  Goal: Acceptable Pain Control and Functional Ability  Outcome: Ongoing, Progressing  Intervention: Develop Pain Management Plan  Flowsheets (Taken 5/20/2023 0331)  Pain Management Interventions: pillow support provided  Intervention: Prevent or Manage Pain  Flowsheets (Taken 5/20/2023 0331)  Sleep/Rest Enhancement: awakenings minimized  Medication Review/Management: medications reviewed

## 2023-05-20 NOTE — PLAN OF CARE
[facility] - Telemetry (Hospital)  Discharge Final Note    Primary Care Provider: RICHI Youngblood    Expected Discharge Date: 5/20/2023    Final Discharge Note (most recent)       Final Note - 05/20/23 1519          Final Note    Assessment Type Final Discharge Note     Anticipated Discharge Disposition Home or Self Care        Post-Acute Status    Discharge Delays None known at this time                     Important Message from Medicare             Contact Info       [facility]    [address], [place] [address] [phone]       Next Steps: Schedule an appointment as soon as possible for a visit in 1 week(s)    Instructions: For re-evaluation and further treatment    [facility] - Emergency Dept.   Specialty: Emergency Medicine    [address]  [place] [address]   Phone: [phone]       Next Steps: Go today    Instructions: If symptoms worsen, For re-evaluation and further treatment, As needed    [facility]   Specialty: Endocrinology    [address] [facility]  [place] [address]   Phone: [phone]       Next Steps: Schedule an appointment as soon as possible for a visit    Instructions: For re-evaluation and further treatment          Pt has no discharge needs at the time of chart review

## 2023-05-20 NOTE — HOSPITAL COURSE
36 yo female with h/o Idiopathic recurrent pancreatitis and extensive GI evaluation admitted with acute pancreatitis. Pt reports onset of symptoms after drinking Tequila. Aggressive IVF, prn pain medications and bowel rest initiated on admission. Lipase trended down. Patient tolerated clear liquid diet well. She was advance to bland diet with tolerance. Patient afebrile, hemodynamics stable. She is medically stable for discharge home. Recommend follow up with pcp within 1 week.

## 2023-05-20 NOTE — DISCHARGE SUMMARY
[facility] - Telemetry ([facility])  [facility]  Discharge Summary      Patient Name: Priti Obrien  MRN: [id number]  [facility]: [id number]  Patient Class: OP- Observation  Admission Date: 5/19/2023  Hospital Length of Stay: 0 days  Discharge Date and Time:  05/20/2023 3:12 PM  Attending Physician: Cruz Carmona, *   Discharging Provider: Luisana Jensen NP  Primary Care Provider: RICHI Youngblood    Primary Care Team: Networked reference to record PCT     HPI:   The patient is a 36 yo female with past medical history of recurrent pancreatitis, GERD, PTSD and diabetes who presented to the ED with epigastric abdominal pain and vomiting that started this morning. Patient reports this pain is similar to her previous episodes of pancreatitis. She had a shot of tequila last night with dinner. The pain is constant. Workup revealed elevated lipase. Hospital medicine consulted. Patient placed in observation.      * No surgery found *      Hospital Course:   36 yo female with h/o Idiopathic recurrent pancreatitis and extensive GI evaluation admitted with acute pancreatitis. Pt reports onset of symptoms after drinking Tequila. Aggressive IVF, prn pain medications and bowel rest initiated on admission. Lipase trended down. Patient tolerated clear liquid diet well. She was advance to bland diet with tolerance. Patient afebrile, hemodynamics stable. She is medically stable for discharge home. Recommend follow up with pcp within 1 week.              Goals of Care Treatment Preferences:  Code Status: Full Code      Consults:     Cardiac/Vascular  Essential hypertension  No medications on home med list  Prn IV labetalol as allergic to hydralazine  Monitor blood pressire      GI  * Pancreatitis    Likely from recent alcohol ingestion  Bowel rest  IVFs  Pain control  Prn antiemetics  Monitor for improvement in pain    Lipase 1000> 306   Patient tolerated diet well   Stable for discharge       Final Active Diagnoses:     Diagnosis Date Noted POA    PRINCIPAL PROBLEM:  Pancreatitis [K85.90] 01/24/2015 Yes    Essential hypertension [I10] 02/13/2018 Yes    Diabetes mellitus type 2 in obese [E11.69, E66.9] 05/22/2014 Yes      Problems Resolved During this Admission:       Discharged Condition: stable    Disposition: Home or Self Care    Follow Up:   Follow-up Information     [facility]. Schedule an appointment as soon as possible for a visit in 1 week.    Why: For re-evaluation and further treatment  Contact information:  [address] [place] [address]  [phone]           [facility] - Emergency Dept.. Go today.    Specialty: Emergency Medicine  Why: If symptoms worsen, For re-evaluation and further treatment, As needed  Contact information:  [address] [facility] [address] [phone]           Schedule an appointment as soon as possible for a visit  with [facility].    Specialty: Endocrinology  Why: For re-evaluation and further treatment  Contact information:  [address] [facility] [id number] [phone]  Additional information:  Please park on the [address] side and use the Clinic entrance. Check in on the 2nd floor, to the left.                     Patient Instructions:   No discharge procedures on file.    Significant Diagnostic Studies: Labs:   CMP   Recent Labs   Lab 05/19/23  0641 05/20/23  0608   * 136   K 3.6 3.4*    106   CO2 19* 20*   * 191*   BUN 11 9   CREATININE 0.8 0.6   CALCIUM 8.9 8.3*   PROT 7.6 5.8*   ALBUMIN 4.2 3.2*   BILITOT 0.6 0.6   ALKPHOS 65 48*   AST 16 18   ALT 21 18   ANIONGAP 11 10    and CBC   Recent Labs   Lab 05/19/23  0641 05/20/23  0541   WBC 10.23 8.11   HGB 15.9 14.0   HCT 44.8 41.2    238       Pending Diagnostic Studies:     None         Medications:  Reconciled Home Medications:      Medication List      START taking these medications     HYDROcodone-acetaminophen 7.5-325 mg per tablet  Commonly known as: NORCO  Take 1 tablet by mouth every 6 (six) hours as needed for Pain.  Replaces: HYDROcodone-acetaminophen 5-325 mg per tablet     ondansetron 4 MG Tbdl  Commonly known as: ZOFRAN-ODT  Take 1 tablet (4 mg total) by mouth every 6 (six) hours as needed (nausea).        CONTINUE taking these medications    blood sugar diagnostic Strp  1 each by Misc.(Non-Drug; Combo Route) route 3 (three) times daily.     lancets Misc  1 each by Misc.(Non-Drug; Combo Route) route 3 (three) times daily.     metFORMIN 500 MG ER 24hr tablet  Commonly known as: GLUCOPHAGE-XR  Take 2 tablets (1,000 mg total) by mouth 2 (two) times daily with meals.     ondansetron 4 MG tablet  Commonly known as: ZOFRAN  Take 1 tablet (4 mg total) by mouth every 6 (six) hours.        STOP taking these medications    HYDROcodone-acetaminophen 5-325 mg per tablet  Commonly known as: NORCO  Replaced by: HYDROcodone-acetaminophen 7.5-325 mg per tablet            Indwelling Lines/Drains at time of discharge:   Lines/Drains/Airways     None                 Time spent on the discharge of patient:  >35  minutes         Luisana Jensen NP  Department of Hospital Medicine  [facility])

## 2023-05-20 NOTE — ASSESSMENT & PLAN NOTE
Likely from recent alcohol ingestion  Bowel rest  IVFs  Pain control  Prn antiemetics  Monitor for improvement in pain    Lipase 1000> 306   Patient tolerated diet well   Stable for discharge

## 2023-05-20 NOTE — PLAN OF CARE
A251/A251 RUBAYaneth Obrien is a 37 y.o.female admitted on 5/19/2023 for Pancreatitis   Code Status: Full Code MRN: [id number]   Review of patient's allergies indicates:   Allergen Reactions    Demerol (pf) [meperidine (pf)] Hives    Medrol [methylprednisolone] Anaphylaxis     Any cortisone    Adhesive      Other reaction(s): Unknown    Amoxicillin-pot clavulanate      Other reaction(s): Unknown    Atarax [hydroxyzine hcl] Hives    Hydralazine analogues Hives    Iodine      Other reaction(s): Unknown    Omnicef [cefdinir]     Penicillins Hives and Nausea And Vomiting    Phenergan [promethazine] Hives and Nausea And Vomiting    Risperidone analogues Hives    Ativan [lorazepam] Hives and Anxiety     Past Medical History:   Diagnosis Date    Allergy     Asthma     Childhood only    Bipolar 1 disorder     Borderline personality disorder     Depression     GERD (gastroesophageal reflux disease)     History of psychiatric hospitalization     Hx of psychiatric care     Hypertension     pt denied    Pancreatitis     Psychiatric problem     PTSD (post-traumatic stress disorder)     S/P partial hysterectomy September 2011    Substance abuse     Suicide attempt       PRN meds    aluminum-magnesium hydroxide-simethicone, 30 mL, QID PRN  dextrose 10%, 12.5 g, PRN  dextrose 10%, 25 g, PRN  glucagon (human recombinant), 1 mg, PRN  HYDROmorphone, 1 mg, Q4H PRN  insulin aspart U-100, 1-10 Units, Q6H PRN  labetaloL, 10 mg, Q6H PRN  naloxone, 0.02 mg, PRN  ondansetron, 4 mg, Q6H PRN  simethicone, 1 tablet, QID PRN  sodium chloride 0.9%, 10 mL, Q12H PRN      Discharge instructions received and reviewed with pt and family at bedside.  Pt voiced understanding and all questions answered to satisfaction.  Stressed importance to making and keeping all follow up appointments.  Medications sent to pt pharmacy and reviewed with pt.  Tele monitor removed and brought to monitor tech.  IV d/c'd with tip intact, pressure dressing applied.  Pt  transported to front  hospital via w/c by PCT to be discharged home.                  Rhythm: sinus tachycardia    Cardiac/Telemetry Box Number: [id number]  VTE Required Core Measure: Pharmacological prophylaxis initiated/maintained Last Bowel Movement: 05/19/23  Diet [place]  Voiding Characteristics: voids spontaneously without difficulty  Tyrese Score: 23  Fall Risk Score: 0  Accucheck []   Freq?      Lines/Drains/Airways       Peripheral Intravenous Line  Duration                  Peripheral IV - Single Lumen 05/19/23 0551 20 G Left Forearm 1 day

## 2023-07-05 ENCOUNTER — HOSPITAL ENCOUNTER (EMERGENCY)
Facility: HOSPITAL | Age: 38
Discharge: HOME | End: 2023-07-06
Attending: EMERGENCY MEDICINE
Payer: COMMERCIAL

## 2023-07-05 DIAGNOSIS — Z87.19 HISTORY OF PANCREATITIS: ICD-10-CM

## 2023-07-05 DIAGNOSIS — R10.13 EPIGASTRIC PAIN: Primary | ICD-10-CM

## 2023-07-05 LAB
ALBUMIN SERPL BCP-MCNC: 3.9 G/DL (ref 3.5–5.2)
ALP SERPL-CCNC: 47 U/L (ref 55–135)
ALT SERPL W/O P-5'-P-CCNC: 27 U/L (ref 10–44)
ANION GAP SERPL CALC-SCNC: 14 MMOL/L (ref 8–16)
AST SERPL-CCNC: 29 U/L (ref 10–40)
BASOPHILS # BLD AUTO: 0.06 K/UL (ref 0–0.2)
BASOPHILS NFR BLD: 0.7 % (ref 0–1.9)
BILIRUB SERPL-MCNC: 0.7 MG/DL (ref 0.1–1)
BUN SERPL-MCNC: 13 MG/DL (ref 6–20)
CALCIUM SERPL-MCNC: 9.8 MG/DL (ref 8.7–10.5)
CHLORIDE SERPL-SCNC: 105 MMOL/L (ref 95–110)
CO2 SERPL-SCNC: 21 MMOL/L (ref 23–29)
CREAT SERPL-MCNC: 0.7 MG/DL (ref 0.5–1.4)
DIFFERENTIAL METHOD: ABNORMAL
EOSINOPHIL # BLD AUTO: 0.2 K/UL (ref 0–0.5)
EOSINOPHIL NFR BLD: 2.3 % (ref 0–8)
ERYTHROCYTE [DISTWIDTH] IN BLOOD BY AUTOMATED COUNT: 12.1 % (ref 11.5–14.5)
EST. GFR  (NO RACE VARIABLE): >60 ML/MIN/1.73 M^2
GLUCOSE SERPL-MCNC: 123 MG/DL (ref 70–110)
HCT VFR BLD AUTO: 42.3 % (ref 37–48.5)
HGB BLD-MCNC: 14.8 G/DL (ref 12–16)
IMM GRANULOCYTES # BLD AUTO: 0.02 K/UL (ref 0–0.04)
IMM GRANULOCYTES NFR BLD AUTO: 0.2 % (ref 0–0.5)
LIPASE SERPL-CCNC: 17 U/L (ref 4–60)
LYMPHOCYTES # BLD AUTO: 3.3 K/UL (ref 1–4.8)
LYMPHOCYTES NFR BLD: 36.2 % (ref 18–48)
MCH RBC QN AUTO: 28.7 PG (ref 27–31)
MCHC RBC AUTO-ENTMCNC: 35 G/DL (ref 32–36)
MCV RBC AUTO: 82 FL (ref 82–98)
MONOCYTES # BLD AUTO: 0.6 K/UL (ref 0.3–1)
MONOCYTES NFR BLD: 6.4 % (ref 4–15)
NEUTROPHILS # BLD AUTO: 4.9 K/UL (ref 1.8–7.7)
NEUTROPHILS NFR BLD: 54.2 % (ref 38–73)
NRBC BLD-RTO: 0 /100 WBC
PLATELET # BLD AUTO: 278 K/UL (ref 150–450)
PLATELET BLD QL SMEAR: ABNORMAL
PMV BLD AUTO: 10.7 FL (ref 9.2–12.9)
POTASSIUM SERPL-SCNC: 3.7 MMOL/L (ref 3.5–5.1)
PROT SERPL-MCNC: 7.4 G/DL (ref 6–8.4)
RBC # BLD AUTO: 5.15 M/UL (ref 4–5.4)
SODIUM SERPL-SCNC: 140 MMOL/L (ref 136–145)
WBC # BLD AUTO: 8.97 K/UL (ref 3.9–12.7)

## 2023-07-05 PROCEDURE — 99285 EMERGENCY DEPT VISIT HI MDM: CPT | Mod: 25

## 2023-07-05 PROCEDURE — 63600175 PHARM REV CODE 636 W HCPCS: Performed by: EMERGENCY MEDICINE

## 2023-07-05 PROCEDURE — 96375 TX/PRO/DX INJ NEW DRUG ADDON: CPT

## 2023-07-05 PROCEDURE — 96374 THER/PROPH/DIAG INJ IV PUSH: CPT

## 2023-07-05 PROCEDURE — 83690 ASSAY OF LIPASE: CPT | Performed by: EMERGENCY MEDICINE

## 2023-07-05 PROCEDURE — 85025 COMPLETE CBC W/AUTO DIFF WBC: CPT | Performed by: EMERGENCY MEDICINE

## 2023-07-05 PROCEDURE — 80053 COMPREHEN METABOLIC PANEL: CPT | Performed by: EMERGENCY MEDICINE

## 2023-07-05 PROCEDURE — 25000003 PHARM REV CODE 250: Performed by: EMERGENCY MEDICINE

## 2023-07-05 PROCEDURE — 96361 HYDRATE IV INFUSION ADD-ON: CPT

## 2023-07-05 RX ORDER — ONDANSETRON 2 MG/ML
4 INJECTION INTRAMUSCULAR; INTRAVENOUS
Status: COMPLETED | OUTPATIENT
Start: 2023-07-05 | End: 2023-07-05

## 2023-07-05 RX ORDER — HYDROMORPHONE HYDROCHLORIDE 2 MG/ML
1 INJECTION, SOLUTION INTRAMUSCULAR; INTRAVENOUS; SUBCUTANEOUS
Status: COMPLETED | OUTPATIENT
Start: 2023-07-05 | End: 2023-07-05

## 2023-07-05 RX ADMIN — ONDANSETRON 4 MG: 2 INJECTION INTRAMUSCULAR; INTRAVENOUS at 10:07

## 2023-07-05 RX ADMIN — SODIUM CHLORIDE 1000 ML: 9 INJECTION, SOLUTION INTRAVENOUS at 10:07

## 2023-07-05 RX ADMIN — HYDROMORPHONE HYDROCHLORIDE 1 MG: 2 INJECTION INTRAMUSCULAR; INTRAVENOUS; SUBCUTANEOUS at 10:07

## 2023-07-06 VITALS
HEART RATE: 68 BPM | RESPIRATION RATE: 20 BRPM | SYSTOLIC BLOOD PRESSURE: 157 MMHG | BODY MASS INDEX: 34.08 KG/M2 | HEIGHT: 62 IN | TEMPERATURE: 98 F | OXYGEN SATURATION: 100 % | WEIGHT: 185.19 LBS | DIASTOLIC BLOOD PRESSURE: 74 MMHG

## 2023-07-06 PROCEDURE — 25000003 PHARM REV CODE 250: Performed by: EMERGENCY MEDICINE

## 2023-07-06 PROCEDURE — 96361 HYDRATE IV INFUSION ADD-ON: CPT

## 2023-07-06 RX ORDER — KETOROLAC TROMETHAMINE 30 MG/ML
15 INJECTION, SOLUTION INTRAMUSCULAR; INTRAVENOUS
Status: DISCONTINUED | OUTPATIENT
Start: 2023-07-06 | End: 2023-07-06

## 2023-07-06 RX ORDER — HYDROCODONE BITARTRATE AND ACETAMINOPHEN 7.5; 325 MG/1; MG/1
1 TABLET ORAL EVERY 6 HOURS PRN
Qty: 5 TABLET | Refills: 0 | Status: ON HOLD | OUTPATIENT
Start: 2023-07-06 | End: 2023-10-19

## 2023-07-06 RX ORDER — HYDROCODONE BITARTRATE AND ACETAMINOPHEN 10; 325 MG/1; MG/1
1 TABLET ORAL
Status: DISCONTINUED | OUTPATIENT
Start: 2023-07-06 | End: 2023-07-06

## 2023-07-06 RX ORDER — ONDANSETRON 4 MG/1
4 TABLET, ORALLY DISINTEGRATING ORAL EVERY 6 HOURS PRN
Qty: 15 TABLET | Refills: 0 | Status: ON HOLD | OUTPATIENT
Start: 2023-07-06 | End: 2023-10-19

## 2023-07-06 RX ADMIN — HYDROCODONE BITARTRATE AND ACETAMINOPHEN 1 TABLET: 10; 325 TABLET ORAL at 01:07

## 2023-07-06 NOTE — ED PROVIDER NOTES
SCRIBE #1 NOTE: ILeni, am scribing for, and in the presence of, Lux Becerra MD. I have scribed the entire note.       History     Chief Complaint   Patient presents with    Abdominal Pain     Epighastric pain x2 days. Hx pancreatitis     Vomiting     Onset yesterday. Zofran taken 0800.     Review of patient's allergies indicates:   Allergen Reactions    Demerol (pf) [meperidine (pf)] Hives    Medrol [methylprednisolone] Anaphylaxis     Any cortisone    Adhesive      Other reaction(s): Unknown    Amoxicillin-pot clavulanate      Other reaction(s): Unknown    Atarax [hydroxyzine hcl] Hives    Hydralazine analogues Hives    Iodine      Other reaction(s): Unknown    Omnicef [cefdinir]     Penicillins Hives and Nausea And Vomiting    Phenergan [promethazine] Hives and Nausea And Vomiting    Risperidone analogues Hives    Ativan [lorazepam] Hives and Anxiety         History of Present Illness     HPI    7/5/2023, 9:50 PM  History obtained from the patient      History of Present Illness: Priti Obrien is a 38 y.o. female patient with a PMHx of GERD, pancreatitis, HTN, substance abuse, and psychiatric care who presents to the Emergency Department for evaluation of epigastric abdominal pain that radiates to her lower back which onset 2 days PTA. Pt has a Hx of pancreatitis and she reports that these sxs feel similar to prior episodes. Pt reports multiple similar episodes since May. She was evaluated a few months ago and was told that her lipase level was over 1000. She recently saw her PCP and was told that her lipase level was 37. Pt has a SHx of appendectomy, cholecystectomy, hysterectomy, and multiple exploratory ERCPs. Symptoms are constant and moderate in severity. No mitigating or exacerbating factors reported. Associated sxs include N/V which onset 1 day PTA.  Patient denies any CP, SOB, diarrhea, fever, and all other sxs at this time. Prior Tx includes Zofran taken at 0800. No further complaints or  concerns at this time.        Arrival mode: Personal vehicle    PCP: RICHI Youngblood        Past Medical History:  Past Medical History:   Diagnosis Date    Allergy     Asthma     Childhood only    Bipolar 1 disorder     Borderline personality disorder     Depression     GERD (gastroesophageal reflux disease)     History of psychiatric hospitalization     Hx of psychiatric care     Hypertension     pt denied    Pancreatitis     Psychiatric problem     PTSD (post-traumatic stress disorder)     S/P partial hysterectomy 2011    Substance abuse     Suicide attempt        Past Surgical History:  Past Surgical History:   Procedure Laterality Date    APPENDECTOMY       SECTION      CHOLECYSTECTOMY      HYSTERECTOMY  2010    endometrial hyperplasia    LASER LAPAROSCOPY           Family History:  Family History   Problem Relation Age of Onset    Diabetes Mother     Hypertension Mother     Atrial fibrillation Maternal Grandfather     Breast cancer Maternal Aunt        Social History:  Social History     Tobacco Use    Smoking status: Former     Packs/day: 0.25     Types: Cigarettes     Quit date: 2013     Years since quitting: 10.0    Smokeless tobacco: Never   Substance and Sexual Activity    Alcohol use: Yes     Comment: occasional     Drug use: Not Currently    Sexual activity: Yes     Partners: Male, Female        Review of Systems     Review of Systems   Constitutional:  Negative for fever.   HENT:  Negative for sore throat.    Respiratory:  Negative for shortness of breath.    Cardiovascular:  Negative for chest pain.   Gastrointestinal:  Positive for abdominal pain (epigastric), nausea and vomiting. Negative for diarrhea.   Genitourinary:  Negative for dysuria and hematuria.   Musculoskeletal:  Positive for back pain (lower).   Skin:  Negative for rash.   Neurological:  Negative for weakness.   Hematological:  Does not bruise/bleed easily.   All other systems reviewed and are negative.      "Physical Exam     Initial Vitals [07/05/23 2121]   BP Pulse Resp Temp SpO2   (S) (!) 184/105 74 18 97.8 °F (36.6 °C) 97 %      MAP       --          Physical Exam  Nursing Notes and Vital Signs Reviewed.  Constitutional: Patient is in mild distress. Elevated BMI.   Head: Atraumatic. Normocephalic.  Eyes: PERRL. EOM intact. Conjunctivae are not pale. No scleral icterus.  ENT: Mucous membranes are moist. Oropharynx is clear and symmetric.    Neck: Supple. Full ROM. No lymphadenopathy.  Cardiovascular: Regular rate. Regular rhythm. No murmurs, rubs, or gallops. Distal pulses are 2+ and symmetric.  Pulmonary/Chest: No respiratory distress. Clear to auscultation bilaterally. No wheezing or rales.  Abdominal: Soft and non-distended.  Epigastric tenderness.  No rebound, guarding, or rigidity. Good bowel sounds.  Genitourinary: No CVA tenderness  Musculoskeletal: Moves all extremities. No obvious deformities. No edema.  Skin: Warm and dry.  Neurological:  Alert, awake, and appropriate.  Normal speech.  No acute focal neurological deficits are appreciated.  Psychiatric: Normal affect. Good eye contact. Appropriate in content.     ED Course   Procedures  ED Vital Signs:  Vitals:    07/05/23 2121 07/05/23 2210 07/05/23 2217 07/06/23 0126   BP: (S) (!) 184/105 (!) 160/84     Pulse: 74 66     Resp: 18  20 20   Temp: 97.8 °F (36.6 °C)      TempSrc: Oral      SpO2: 97% 100%     Weight: 84 kg (185 lb 3 oz)      Height: 5' 2" (1.575 m)       07/06/23 0130   BP: (!) 157/74   Pulse: 68   Resp:    Temp:    TempSrc:    SpO2: 100%   Weight:    Height:        Abnormal Lab Results:  Labs Reviewed   CBC W/ AUTO DIFFERENTIAL - Abnormal; Notable for the following components:       Result Value    Platelet Estimate Clumped (*)     All other components within normal limits   COMPREHENSIVE METABOLIC PANEL - Abnormal; Notable for the following components:    CO2 21 (*)     Glucose 123 (*)     Alkaline Phosphatase 47 (*)     All other components " within normal limits   LIPASE        All Lab Results:  Results for orders placed or performed during the hospital encounter of 07/05/23   CBC auto differential   Result Value Ref Range    WBC 8.97 3.90 - 12.70 K/uL    RBC 5.15 4.00 - 5.40 M/uL    Hemoglobin 14.8 12.0 - 16.0 g/dL    Hematocrit 42.3 37.0 - 48.5 %    MCV 82 82 - 98 fL    MCH 28.7 27.0 - 31.0 pg    MCHC 35.0 32.0 - 36.0 g/dL    RDW 12.1 11.5 - 14.5 %    Platelets 278 150 - 450 K/uL    MPV 10.7 9.2 - 12.9 fL    Immature Granulocytes 0.2 0.0 - 0.5 %    Gran # (ANC) 4.9 1.8 - 7.7 K/uL    Immature Grans (Abs) 0.02 0.00 - 0.04 K/uL    Lymph # 3.3 1.0 - 4.8 K/uL    Mono # 0.6 0.3 - 1.0 K/uL    Eos # 0.2 0.0 - 0.5 K/uL    Baso # 0.06 0.00 - 0.20 K/uL    nRBC 0 0 /100 WBC    Gran % 54.2 38.0 - 73.0 %    Lymph % 36.2 18.0 - 48.0 %    Mono % 6.4 4.0 - 15.0 %    Eosinophil % 2.3 0.0 - 8.0 %    Basophil % 0.7 0.0 - 1.9 %    Platelet Estimate Clumped (A)     Differential Method Automated    Comprehensive metabolic panel   Result Value Ref Range    Sodium 140 136 - 145 mmol/L    Potassium 3.7 3.5 - 5.1 mmol/L    Chloride 105 95 - 110 mmol/L    CO2 21 (L) 23 - 29 mmol/L    Glucose 123 (H) 70 - 110 mg/dL    BUN 13 6 - 20 mg/dL    Creatinine 0.7 0.5 - 1.4 mg/dL    Calcium 9.8 8.7 - 10.5 mg/dL    Total Protein 7.4 6.0 - 8.4 g/dL    Albumin 3.9 3.5 - 5.2 g/dL    Total Bilirubin 0.7 0.1 - 1.0 mg/dL    Alkaline Phosphatase 47 (L) 55 - 135 U/L    AST 29 10 - 40 U/L    ALT 27 10 - 44 U/L    eGFR >60 >60 mL/min/1.73 m^2    Anion Gap 14 8 - 16 mmol/L   Lipase   Result Value Ref Range    Lipase 17 4 - 60 U/L         Imaging Results:  Imaging Results              CT Abdomen Pelvis  Without Contrast (Final result)  Result time 07/05/23 23:53:34   Procedure changed from CT Abdomen Pelvis With Contrast     Final result by Efrain Woodruff MD (07/05/23 23:53:34)                   Impression:      Mild mesenteric fat stranding in the mid abdomen with a few lymph nodes similar to prior  exam.  This could relate to adenitis.    All CT scans   are performed using dose optimization techniques including the following: automated exposure control; adjustment of the mA and/or kV; use of iterative reconstruction technique.  Dose modulation was employed for ALARA by means of: Automated exposure control; adjustment of the mA and/or kV according to patient size (this includes techniques or standardized protocols for targeted exams where dose is matched to indication/reason for exam; i.e. extremities or head); and/or use of iterative reconstructive technique.      Electronically signed by: Efrain Woodruff  Date:    07/05/2023  Time:    23:53               Narrative:    EXAMINATION:  CT ABDOMEN PELVIS WITHOUT CONTRAST    CLINICAL HISTORY:  Epigastric pain;    TECHNIQUE:  Low dose axial images, sagittal and coronal reformations were obtained from the lung bases to the pubic symphysis,    COMPARISON:  Prior    FINDINGS:  Heart: Normal in size as far as seen.  No pericardial effusion as far seen.    Lung Bases: Well aerated, without consolidation or pleural fluid.    Liver: Normal in size and attenuation, with no focal hepatic lesions.    Gallbladder: No calcified gallstones.    Bile Ducts: No evidence of dilated ducts.    Pancreas: No mass or peripancreatic fat stranding.    Spleen: Unremarkable.    Adrenals: Unremarkable.    Kidneys/ Ureters: Unremarkable.    Bladder: No evidence of wall thickening.    Reproductive organs: Unremarkable.    GI Tract/Mesentery: No evidence of bowel obstruction or inflammation.  Postsurgical changes of the right lower quadrant.  Mild mesenteric fat stranding in the mid abdomen with a few lymph nodes.    Peritoneal Space: No ascites. No free air.    Retroperitoneum: No significant adenopathy.    Abdominal wall: Unremarkable.    Vasculature: No aneurysm.    Bones: No acute fracture.                                                The Emergency Provider reviewed the vital signs and test  results, which are outlined above.     ED Discussion     1:15 AM: Reassessed pt at this time.  Discussed with pt all pertinent ED information and results. Discussed pt dx and plan of tx. Gave pt all f/u and return to the ED instructions. All questions and concerns were addressed at this time. Pt expresses understanding of information and instructions, and is comfortable with plan to discharge. Pt is stable for discharge.    I discussed with patient and/or family/caretaker that evaluation in the ED does not suggest any emergent or life threatening medical conditions requiring immediate intervention beyond what was provided in the ED, and I believe patient is safe for discharge.  Regardless, an unremarkable evaluation in the ED does not preclude the development or presence of a serious of life threatening condition. As such, patient was instructed to return immediately for any worsening or change in current symptoms.         Medical Decision Making:   History:   Old Medical Records: I decided to obtain old medical records.  Old Records Summarized: other records.       <> Summary of Records: Prior records reviewed.  Patient does have a history of pancreatitis.  Prior lipase levels have been greater than a 1000.  History of alcohol use.  Initial Assessment:   Epigastric pain with a past medical history of pancreatitis  Clinical Tests:   Lab Tests: Ordered and Reviewed  Radiological Study: Ordered and Reviewed  ED Management:  Lipase within normal limits.  Patient treated symptomatically.  Discharged home with a refill of Zofran and a short-term prescription hydrocodone.  Patient to be discharged once a ride arrives to the emergency department for her         ED Medication(s):  Medications   ondansetron injection 4 mg (4 mg Intravenous Given 7/5/23 2217)   HYDROmorphone (PF) injection 1 mg (1 mg Intravenous Given 7/5/23 2217)   sodium chloride 0.9% bolus 1,000 mL 1,000 mL (0 mLs Intravenous Stopped 7/6/23 0638)       New  Prescriptions    HYDROCODONE-ACETAMINOPHEN (NORCO) 7.5-325 MG PER TABLET    Take 1 tablet by mouth every 6 (six) hours as needed for Pain.    ONDANSETRON (ZOFRAN-ODT) 4 MG TBDL    Take 1 tablet (4 mg total) by mouth every 6 (six) hours as needed (nausea).        Follow-up Information       Call  RICHI Youngblood.    Specialty: Family Medicine  Contact information:  [address] [address] [phone]               [facility] - Emergency Dept..    Specialty: Emergency Medicine  Why: As needed, If symptoms worsen  Contact information:  [address]  [facility] [address] [phone]                               Scribe Attestation:   Scribe #1: I performed the above scribed service and the documentation accurately describes the services I performed. I attest to the accuracy of the note.     Attending:   Physician Attestation Statement for Scribe #1: I, Lux Becerra MD, personally performed the services described in this documentation, as scribed by Leni Lopez, in my presence, and it is both accurate and complete.           Clinical Impression       ICD-10-CM ICD-9-CM   1. Epigastric pain  R10.13 789.06   2. History of pancreatitis  Z87.19 V12.79       Disposition:   Disposition: Discharged  Condition: Stable       Lux Becerra MD  07/06/23 8997

## 2023-08-10 ENCOUNTER — HOSPITAL ENCOUNTER (EMERGENCY)
Facility: HOSPITAL | Age: 38
Discharge: HOME | End: 2023-08-10
Attending: EMERGENCY MEDICINE
Payer: COMMERCIAL

## 2023-08-10 VITALS
SYSTOLIC BLOOD PRESSURE: 137 MMHG | DIASTOLIC BLOOD PRESSURE: 86 MMHG | HEART RATE: 73 BPM | OXYGEN SATURATION: 97 % | RESPIRATION RATE: 19 BRPM | TEMPERATURE: 97 F

## 2023-08-10 DIAGNOSIS — E16.0 HYPOGLYCEMIA DUE TO INSULIN: Primary | ICD-10-CM

## 2023-08-10 DIAGNOSIS — T38.3X5A HYPOGLYCEMIA DUE TO INSULIN: Primary | ICD-10-CM

## 2023-08-10 LAB
ALBUMIN SERPL BCP-MCNC: 4 G/DL (ref 3.5–5.2)
ALP SERPL-CCNC: 48 U/L (ref 55–135)
ALT SERPL W/O P-5'-P-CCNC: 22 U/L (ref 10–44)
ANION GAP SERPL CALC-SCNC: 10 MMOL/L (ref 8–16)
AST SERPL-CCNC: 20 U/L (ref 10–40)
BASOPHILS # BLD AUTO: 0.06 K/UL (ref 0–0.2)
BASOPHILS NFR BLD: 0.5 % (ref 0–1.9)
BILIRUB SERPL-MCNC: 0.5 MG/DL (ref 0.1–1)
BUN SERPL-MCNC: 19 MG/DL (ref 6–20)
CALCIUM SERPL-MCNC: 9.2 MG/DL (ref 8.7–10.5)
CHLORIDE SERPL-SCNC: 106 MMOL/L (ref 95–110)
CO2 SERPL-SCNC: 25 MMOL/L (ref 23–29)
CREAT SERPL-MCNC: 0.7 MG/DL (ref 0.5–1.4)
DIFFERENTIAL METHOD: ABNORMAL
EOSINOPHIL # BLD AUTO: 0.2 K/UL (ref 0–0.5)
EOSINOPHIL NFR BLD: 1.5 % (ref 0–8)
ERYTHROCYTE [DISTWIDTH] IN BLOOD BY AUTOMATED COUNT: 12.6 % (ref 11.5–14.5)
EST. GFR  (NO RACE VARIABLE): >60 ML/MIN/1.73 M^2
GLUCOSE SERPL-MCNC: 160 MG/DL (ref 70–110)
HCT VFR BLD AUTO: 41.6 % (ref 37–48.5)
HGB BLD-MCNC: 14.7 G/DL (ref 12–16)
IMM GRANULOCYTES # BLD AUTO: 0.06 K/UL (ref 0–0.04)
IMM GRANULOCYTES NFR BLD AUTO: 0.5 % (ref 0–0.5)
LYMPHOCYTES # BLD AUTO: 1.2 K/UL (ref 1–4.8)
LYMPHOCYTES NFR BLD: 11.3 % (ref 18–48)
MCH RBC QN AUTO: 30 PG (ref 27–31)
MCHC RBC AUTO-ENTMCNC: 35.3 G/DL (ref 32–36)
MCV RBC AUTO: 85 FL (ref 82–98)
MONOCYTES # BLD AUTO: 0.6 K/UL (ref 0.3–1)
MONOCYTES NFR BLD: 5.9 % (ref 4–15)
NEUTROPHILS # BLD AUTO: 8.8 K/UL (ref 1.8–7.7)
NEUTROPHILS NFR BLD: 80.3 % (ref 38–73)
NRBC BLD-RTO: 0 /100 WBC
PLATELET # BLD AUTO: 285 K/UL (ref 150–450)
PMV BLD AUTO: 9.9 FL (ref 9.2–12.9)
POCT GLUCOSE: 145 MG/DL (ref 70–110)
POTASSIUM SERPL-SCNC: 3.6 MMOL/L (ref 3.5–5.1)
PROT SERPL-MCNC: 7.4 G/DL (ref 6–8.4)
RBC # BLD AUTO: 4.9 M/UL (ref 4–5.4)
SODIUM SERPL-SCNC: 141 MMOL/L (ref 136–145)
WBC # BLD AUTO: 10.93 K/UL (ref 3.9–12.7)

## 2023-08-10 PROCEDURE — 25000003 PHARM REV CODE 250: Performed by: EMERGENCY MEDICINE

## 2023-08-10 PROCEDURE — 80053 COMPREHEN METABOLIC PANEL: CPT | Performed by: EMERGENCY MEDICINE

## 2023-08-10 PROCEDURE — 96360 HYDRATION IV INFUSION INIT: CPT

## 2023-08-10 PROCEDURE — 85025 COMPLETE CBC W/AUTO DIFF WBC: CPT | Performed by: EMERGENCY MEDICINE

## 2023-08-10 PROCEDURE — 82962 GLUCOSE BLOOD TEST: CPT

## 2023-08-10 PROCEDURE — 99284 EMERGENCY DEPT VISIT MOD MDM: CPT | Mod: 25

## 2023-08-10 RX ORDER — ONDANSETRON HYDROCHLORIDE 4 MG/5ML
4 SOLUTION ORAL ONCE
Status: COMPLETED | OUTPATIENT
Start: 2023-08-10 | End: 2023-08-10

## 2023-08-10 RX ADMIN — SODIUM CHLORIDE 1000 ML: 9 INJECTION, SOLUTION INTRAVENOUS at 11:08

## 2023-08-10 RX ADMIN — ONDANSETRON 4 MG: 4 SOLUTION ORAL at 11:08

## 2023-08-10 NOTE — ED PROVIDER NOTES
SCRIBE #1 NOTE: I, Amira Diallo, am scribing for, and in the presence of, Tomas Haywood MD. I have scribed the entire note.      History      Chief Complaint   Patient presents with    Hypoglycemia     X4-5 days, +n/v/dizziness. CBG low today 58, ate sugar cookies at home, cbg 107 on arrival to ED       Review of patient's allergies indicates:   Allergen Reactions    Demerol (pf) [meperidine (pf)] Hives    Medrol [methylprednisolone] Anaphylaxis     Any cortisone    Adhesive      Other reaction(s): Unknown    Amoxicillin-pot clavulanate      Other reaction(s): Unknown    Atarax [hydroxyzine hcl] Hives    Hydralazine analogues Hives    Iodine      Other reaction(s): Unknown    Omnicef [cefdinir]     Penicillins Hives and Nausea And Vomiting    Phenergan [promethazine] Hives and Nausea And Vomiting    Risperidone analogues Hives    Ativan [lorazepam] Hives and Anxiety        HPI   HPI    8/10/2023, 11:01 AM   History obtained from the patient       History of Present Illness: Priti Obrien is a 38 y.o. female patient with a PMHx of asthma, HTN, and pancreatitis who presents to the Emergency Department for hypoglycemia which onset a few weeks ago. The pt reports that she is followed by Sana Johnson NP for DM and has had recent changes in her Toujeo dosages because her blood sugar was running in the 400s 2 months ago when she was taking 20 units of Toujeo. Pt states that her dose of Toujeo was initially increased to 54 units, but her dose was lowered by her NP to 48 units 2 weeks ago because her blood sugar was too low. She also reports that her HbA1C was 6.5 2 weeks ago. Today, the pt noted her blood sugar to be 146 after she woke up. She reports that she did not eat dinner last night, but drank a protein shake for breakfast. After breakfast, when the pt retook her blood sugar it was 58, so she ate some sugar cookies and came to the ER for an evaluation. Pt c/o dizziness and nausea. Symptoms are constant and  moderate in severity. No mitigating or exacerbating factors reported. No other associated sxs reported. Pt also c/o R ear pain. Patient denies any fever, chills, diarrhea, SOB, CP, weakness, and all other sxs at this time.. No further complaints or concerns at this time.         Arrival mode: EMS    PCP: Sherri Kovacs FNP       Past Medical History:  Past Medical History:   Diagnosis Date    Allergy     Asthma     Childhood only    Bipolar 1 disorder     Borderline personality disorder     Depression     GERD (gastroesophageal reflux disease)     History of psychiatric hospitalization     Hx of psychiatric care     Hypertension     pt denied    Pancreatitis     Psychiatric problem     PTSD (post-traumatic stress disorder)     S/P partial hysterectomy 2011    Substance abuse     Suicide attempt        Past Surgical History:  Past Surgical History:   Procedure Laterality Date    APPENDECTOMY       SECTION      CHOLECYSTECTOMY      HYSTERECTOMY  2010    endometrial hyperplasia    LASER LAPAROSCOPY           Family History:  Family History   Problem Relation Age of Onset    Diabetes Mother     Hypertension Mother     Atrial fibrillation Maternal Grandfather     Breast cancer Maternal Aunt        Social History:  Social History     Tobacco Use    Smoking status: Former     Current packs/day: 0.00     Types: Cigarettes     Quit date: 2013     Years since quitting: 10.1    Smokeless tobacco: Never   Substance and Sexual Activity    Alcohol use: Yes     Comment: occasional     Drug use: Not Currently    Sexual activity: Yes     Partners: Male, Female       ROS   Review of Systems   Constitutional:  Negative for chills and fever.   HENT:  Positive for ear pain (R). Negative for sore throat.    Respiratory:  Negative for shortness of breath.    Cardiovascular:  Negative for chest pain.   Gastrointestinal:  Positive for nausea. Negative for diarrhea.   Genitourinary:  Negative for dysuria.    Musculoskeletal:  Negative for back pain.   Skin:  Negative for rash.   Neurological:  Positive for dizziness. Negative for weakness.   Hematological:  Does not bruise/bleed easily.   All other systems reviewed and are negative.      Physical Exam      Initial Vitals [08/10/23 1016]   BP Pulse Resp Temp SpO2   (!) 142/75 77 18 97.2 °F (36.2 °C) 97 %      MAP       --          Physical Exam  Nursing Notes and Vital Signs Reviewed.  Constitutional: Patient is in no acute distress. Well-developed and well-nourished.  Head: Atraumatic. Normocephalic.  Eyes: PERRL. EOM intact. Conjunctivae are not pale. No scleral icterus.  ENT: Mucous membranes are moist. Oropharynx is clear and symmetric.    Neck: Supple. Full ROM. No lymphadenopathy.  Cardiovascular: Regular rate. Regular rhythm. No murmurs, rubs, or gallops. Distal pulses are 2+ and symmetric.  Pulmonary/Chest: No respiratory distress. Clear to auscultation bilaterally. No wheezing or rales.  Abdominal: Soft and non-distended.  There is no tenderness.  No rebound, guarding, or rigidity. Good bowel sounds.  Genitourinary: No CVA tenderness  Musculoskeletal: Moves all extremities. No obvious deformities. No edema. No calf tenderness.  Skin: Warm and dry.  Neurological:  Alert, awake, and appropriate.  Normal speech.  No acute focal neurological deficits are appreciated.  Psychiatric: Normal affect. Good eye contact. Appropriate in content.    ED Course    Procedures  ED Vital Signs:  Vitals:    08/10/23 1016 08/10/23 1102   BP: (!) 142/75 137/86   Pulse: 77 73   Resp: 18 19   Temp: 97.2 °F (36.2 °C)    TempSrc: Axillary    SpO2: 97% 97%       Abnormal Lab Results:  Labs Reviewed   CBC W/ AUTO DIFFERENTIAL - Abnormal; Notable for the following components:       Result Value    Gran # (ANC) 8.8 (*)     Immature Grans (Abs) 0.06 (*)     Gran % 80.3 (*)     Lymph % 11.3 (*)     All other components within normal limits   COMPREHENSIVE METABOLIC PANEL - Abnormal; Notable  for the following components:    Glucose 160 (*)     Alkaline Phosphatase 48 (*)     All other components within normal limits   POCT GLUCOSE - Abnormal; Notable for the following components:    POCT Glucose 145 (*)     All other components within normal limits        All Lab Results:  Results for orders placed or performed during the hospital encounter of 08/10/23   CBC auto differential   Result Value Ref Range    WBC 10.93 3.90 - 12.70 K/uL    RBC 4.90 4.00 - 5.40 M/uL    Hemoglobin 14.7 12.0 - 16.0 g/dL    Hematocrit 41.6 37.0 - 48.5 %    MCV 85 82 - 98 fL    MCH 30.0 27.0 - 31.0 pg    MCHC 35.3 32.0 - 36.0 g/dL    RDW 12.6 11.5 - 14.5 %    Platelets 285 150 - 450 K/uL    MPV 9.9 9.2 - 12.9 fL    Immature Granulocytes 0.5 0.0 - 0.5 %    Gran # (ANC) 8.8 (H) 1.8 - 7.7 K/uL    Immature Grans (Abs) 0.06 (H) 0.00 - 0.04 K/uL    Lymph # 1.2 1.0 - 4.8 K/uL    Mono # 0.6 0.3 - 1.0 K/uL    Eos # 0.2 0.0 - 0.5 K/uL    Baso # 0.06 0.00 - 0.20 K/uL    nRBC 0 0 /100 WBC    Gran % 80.3 (H) 38.0 - 73.0 %    Lymph % 11.3 (L) 18.0 - 48.0 %    Mono % 5.9 4.0 - 15.0 %    Eosinophil % 1.5 0.0 - 8.0 %    Basophil % 0.5 0.0 - 1.9 %    Differential Method Automated    Comprehensive metabolic panel   Result Value Ref Range    Sodium 141 136 - 145 mmol/L    Potassium 3.6 3.5 - 5.1 mmol/L    Chloride 106 95 - 110 mmol/L    CO2 25 23 - 29 mmol/L    Glucose 160 (H) 70 - 110 mg/dL    BUN 19 6 - 20 mg/dL    Creatinine 0.7 0.5 - 1.4 mg/dL    Calcium 9.2 8.7 - 10.5 mg/dL    Total Protein 7.4 6.0 - 8.4 g/dL    Albumin 4.0 3.5 - 5.2 g/dL    Total Bilirubin 0.5 0.1 - 1.0 mg/dL    Alkaline Phosphatase 48 (L) 55 - 135 U/L    AST 20 10 - 40 U/L    ALT 22 10 - 44 U/L    eGFR >60 >60 mL/min/1.73 m^2    Anion Gap 10 8 - 16 mmol/L   POCT glucose   Result Value Ref Range    POCT Glucose 145 (H) 70 - 110 mg/dL         Imaging Results:  Imaging Results    None                 The Emergency Provider reviewed the vital signs and test results, which are  outlined above.    ED Discussion     12:25 PM: Reassessed pt at this time. Pt's blood sugar is in the 160s. She is feeling better and is able to tolerate PO. Discussed with pt all pertinent ED information and results. Discussed pt dx and plan of tx. Gave pt all f/u and return to the ED instructions. All questions and concerns were addressed at this time. Pt expresses understanding of information and instructions, and is comfortable with plan to discharge. Pt is stable for discharge.    I discussed with patient and/or family/caretaker that evaluation in the ED does not suggest any emergent or life threatening medical conditions requiring immediate intervention beyond what was provided in the ED, and I believe patient is safe for discharge.  Regardless, an unremarkable evaluation in the ED does not preclude the development or presence of a serious of life threatening condition. As such, patient was instructed to return immediately for any worsening or change in current symptoms.           ED Medication(s):  Medications   sodium chloride 0.9% bolus 1,000 mL 1,000 mL (0 mLs Intravenous Stopped 8/10/23 1212)   ondansetron 4 mg/5 mL solution 4 mg (4 mg Oral Given 8/10/23 1112)        Follow-up Information       Sherri Kovacs FNP In 1 week.    Specialty: Family Medicine  Contact information:  [address] [facility] [address] [phone]                             Discharge Medication List as of 8/10/2023 12:26 PM        Medical Decision Making  Problems Addressed:  Hypoglycemia due to insulin: acute illness or injury that poses a threat to life or bodily functions    Amount and/or Complexity of Data Reviewed  Independent Historian: EMS     Details: pt glucose and symptoms improving with tx - VSS en route  External Data Reviewed: notes.     Details: Home medications (Toujeo dosing) from PCP reviewed  Labs: ordered. Decision-making details documented in ED Course.     Details: Glucose-160, BUN/creat=19/0.7, WBC  - 10.93  Radiology:      Details: considered CXR but No indicatiobn for imaging at this time  Discussion of management or test interpretation with external provider(s): Discussed pt's case with Sana Johnson NP.  Agrees c cont current dosing (make sure she eats), record blood sugars Q QC/HS and she will f/u c the pt in her office early next week    Risk  Prescription drug management.  Decision regarding hospitalization.  Risk Details: Diff dx includes infection, accidental/intentional overdose, food insecurity, etc          Medical Decision Making              Scribe Attestation:   Scribe #1: I performed the above scribed service and the documentation accurately describes the services I performed. I attest to the accuracy of the note.    Attending:   Physician Attestation Statement for Scribe #1: I, Tomas Haywood MD, personally performed the services described in this documentation, as scribed by Amira Diallo, in my presence, and it is both accurate and complete.          Clinical Impression       ICD-10-CM ICD-9-CM   1. Hypoglycemia due to insulin  E16.0 251.1    T38.3X5A        Disposition:   Disposition: Discharged  Condition: Stable         Tomas Haywood MD  08/11/23 0752

## 2023-08-10 NOTE — ED NOTES
Bed: 16  Expected date:   Expected time:   Means of arrival: Ambulance Service  Comments:   The right coronary artery was selectively engaged and injected.

## 2023-09-28 ENCOUNTER — HOSPITAL ENCOUNTER (EMERGENCY)
Facility: HOSPITAL | Age: 38
Discharge: HOME | End: 2023-09-28
Attending: EMERGENCY MEDICINE
Payer: COMMERCIAL

## 2023-09-28 VITALS
WEIGHT: 208.13 LBS | BODY MASS INDEX: 38.3 KG/M2 | HEART RATE: 82 BPM | HEIGHT: 62 IN | DIASTOLIC BLOOD PRESSURE: 98 MMHG | SYSTOLIC BLOOD PRESSURE: 158 MMHG | OXYGEN SATURATION: 100 % | TEMPERATURE: 98 F | RESPIRATION RATE: 18 BRPM

## 2023-09-28 DIAGNOSIS — J18.9 PNEUMONIA OF BOTH LOWER LOBES DUE TO INFECTIOUS ORGANISM: Primary | ICD-10-CM

## 2023-09-28 DIAGNOSIS — R06.02 SOB (SHORTNESS OF BREATH): ICD-10-CM

## 2023-09-28 LAB
ALBUMIN SERPL BCP-MCNC: 3.8 G/DL (ref 3.5–5.2)
ALP SERPL-CCNC: 51 U/L (ref 55–135)
ALT SERPL W/O P-5'-P-CCNC: 18 U/L (ref 10–44)
ANION GAP SERPL CALC-SCNC: 13 MMOL/L (ref 8–16)
AST SERPL-CCNC: 15 U/L (ref 10–40)
BASOPHILS # BLD AUTO: 0.08 K/UL (ref 0–0.2)
BASOPHILS NFR BLD: 0.7 % (ref 0–1.9)
BILIRUB SERPL-MCNC: 0.5 MG/DL (ref 0.1–1)
BNP SERPL-MCNC: 14 PG/ML (ref 0–99)
BUN SERPL-MCNC: 11 MG/DL (ref 6–20)
CALCIUM SERPL-MCNC: 8.7 MG/DL (ref 8.7–10.5)
CHLORIDE SERPL-SCNC: 105 MMOL/L (ref 95–110)
CO2 SERPL-SCNC: 19 MMOL/L (ref 23–29)
CREAT SERPL-MCNC: 0.7 MG/DL (ref 0.5–1.4)
DIFFERENTIAL METHOD: ABNORMAL
EOSINOPHIL # BLD AUTO: 0.4 K/UL (ref 0–0.5)
EOSINOPHIL NFR BLD: 3.3 % (ref 0–8)
ERYTHROCYTE [DISTWIDTH] IN BLOOD BY AUTOMATED COUNT: 12.2 % (ref 11.5–14.5)
EST. GFR  (NO RACE VARIABLE): >60 ML/MIN/1.73 M^2
GLUCOSE SERPL-MCNC: 213 MG/DL (ref 70–110)
HCT VFR BLD AUTO: 39.2 % (ref 37–48.5)
HGB BLD-MCNC: 14 G/DL (ref 12–16)
IMM GRANULOCYTES # BLD AUTO: 0.06 K/UL (ref 0–0.04)
IMM GRANULOCYTES NFR BLD AUTO: 0.6 % (ref 0–0.5)
LYMPHOCYTES # BLD AUTO: 2.3 K/UL (ref 1–4.8)
LYMPHOCYTES NFR BLD: 21.7 % (ref 18–48)
MCH RBC QN AUTO: 29.3 PG (ref 27–31)
MCHC RBC AUTO-ENTMCNC: 35.7 G/DL (ref 32–36)
MCV RBC AUTO: 82 FL (ref 82–98)
MONOCYTES # BLD AUTO: 0.9 K/UL (ref 0.3–1)
MONOCYTES NFR BLD: 8.3 % (ref 4–15)
NEUTROPHILS # BLD AUTO: 7.1 K/UL (ref 1.8–7.7)
NEUTROPHILS NFR BLD: 65.4 % (ref 38–73)
NRBC BLD-RTO: 0 /100 WBC
PLATELET # BLD AUTO: 271 K/UL (ref 150–450)
PMV BLD AUTO: 9.7 FL (ref 9.2–12.9)
POTASSIUM SERPL-SCNC: 3.8 MMOL/L (ref 3.5–5.1)
PROT SERPL-MCNC: 7.1 G/DL (ref 6–8.4)
RBC # BLD AUTO: 4.78 M/UL (ref 4–5.4)
SODIUM SERPL-SCNC: 137 MMOL/L (ref 136–145)
TROPONIN I SERPL DL<=0.01 NG/ML-MCNC: <0.006 NG/ML (ref 0–0.03)
WBC # BLD AUTO: 10.79 K/UL (ref 3.9–12.7)

## 2023-09-28 PROCEDURE — 83880 ASSAY OF NATRIURETIC PEPTIDE: CPT | Performed by: EMERGENCY MEDICINE

## 2023-09-28 PROCEDURE — 93010 EKG 12-LEAD: ICD-10-PCS | Mod: ,,, | Performed by: INTERNAL MEDICINE

## 2023-09-28 PROCEDURE — 94640 AIRWAY INHALATION TREATMENT: CPT

## 2023-09-28 PROCEDURE — 25000003 PHARM REV CODE 250: Performed by: EMERGENCY MEDICINE

## 2023-09-28 PROCEDURE — 84484 ASSAY OF TROPONIN QUANT: CPT | Performed by: EMERGENCY MEDICINE

## 2023-09-28 PROCEDURE — 93005 ELECTROCARDIOGRAM TRACING: CPT

## 2023-09-28 PROCEDURE — 85025 COMPLETE CBC W/AUTO DIFF WBC: CPT | Performed by: EMERGENCY MEDICINE

## 2023-09-28 PROCEDURE — 80053 COMPREHEN METABOLIC PANEL: CPT | Performed by: EMERGENCY MEDICINE

## 2023-09-28 PROCEDURE — 25000242 PHARM REV CODE 250 ALT 637 W/ HCPCS: Performed by: EMERGENCY MEDICINE

## 2023-09-28 PROCEDURE — 93010 ELECTROCARDIOGRAM REPORT: CPT | Mod: ,,, | Performed by: INTERNAL MEDICINE

## 2023-09-28 PROCEDURE — 94761 N-INVAS EAR/PLS OXIMETRY MLT: CPT

## 2023-09-28 PROCEDURE — 99285 EMERGENCY DEPT VISIT HI MDM: CPT | Mod: 25

## 2023-09-28 RX ORDER — LEVOFLOXACIN 750 MG/1
750 TABLET ORAL
Status: COMPLETED | OUTPATIENT
Start: 2023-09-28 | End: 2023-09-28

## 2023-09-28 RX ORDER — LEVOFLOXACIN 500 MG/1
500 TABLET, FILM COATED ORAL DAILY
Qty: 6 TABLET | Refills: 0 | Status: SHIPPED | OUTPATIENT
Start: 2023-09-28 | End: 2023-09-28 | Stop reason: SDUPTHER

## 2023-09-28 RX ORDER — LEVOFLOXACIN 500 MG/1
500 TABLET, FILM COATED ORAL DAILY
Qty: 6 TABLET | Refills: 0 | Status: SHIPPED | OUTPATIENT
Start: 2023-09-28 | End: 2023-10-04

## 2023-09-28 RX ORDER — ALBUTEROL SULFATE 90 UG/1
2 AEROSOL, METERED RESPIRATORY (INHALATION) EVERY 4 HOURS PRN
Qty: 3.5 G | Refills: 0 | Status: SHIPPED | OUTPATIENT
Start: 2023-09-28 | End: 2023-09-28 | Stop reason: SDUPTHER

## 2023-09-28 RX ORDER — IPRATROPIUM BROMIDE AND ALBUTEROL SULFATE 2.5; .5 MG/3ML; MG/3ML
3 SOLUTION RESPIRATORY (INHALATION)
Status: COMPLETED | OUTPATIENT
Start: 2023-09-28 | End: 2023-09-28

## 2023-09-28 RX ORDER — ALBUTEROL SULFATE 90 UG/1
2 AEROSOL, METERED RESPIRATORY (INHALATION) EVERY 4 HOURS PRN
Qty: 3.5 G | Refills: 0 | Status: SHIPPED | OUTPATIENT
Start: 2023-09-28 | End: 2024-09-27

## 2023-09-28 RX ADMIN — IPRATROPIUM BROMIDE AND ALBUTEROL SULFATE 3 ML: .5; 3 SOLUTION RESPIRATORY (INHALATION) at 06:09

## 2023-09-28 RX ADMIN — LEVOFLOXACIN 750 MG: 750 TABLET, FILM COATED ORAL at 06:09

## 2023-09-28 NOTE — Clinical Note
"Priti Christianaileen Obrien was seen and treated in our emergency department on 9/28/2023.  She may return to work on 09/30/2023.       If you have any questions or concerns, please don't hesitate to call.      Peter Melgoza Jr., MD"

## 2023-09-28 NOTE — PROGRESS NOTES
Pharmacist Renal Dose Adjustment Note    Priti Obrien is a 38 y.o. female being treated with the medication Levaquin    Patient Data:    Vital Signs (Most Recent):  Temp: 98.2 °F (36.8 °C) (09/28/23 0518)  Pulse: 77 (09/28/23 0607)  Resp: 20 (09/28/23 0607)  BP: (!) 167/109 (09/28/23 0518)  SpO2: 100 % (09/28/23 0607) Vital Signs (72h Range):  Temp:  [98.2 °F (36.8 °C)]   Pulse:  [77-96]   Resp:  [20]   BP: (167)/(109)   SpO2:  [92 %-100 %]      Recent Labs   Lab 09/28/23 0540   CREATININE 0.7     Serum creatinine: 0.7 mg/dL 09/28/23 0540  Estimated creatinine clearance: 116.6 mL/min    Medication:Levaquin 500 mg daily was changed to 750 mg daily per protocol.    Pharmacist's Name: Nico Roque  Pharmacist's Extension: [phone]

## 2023-09-28 NOTE — DISCHARGE INSTRUCTIONS
You have crackles in the bases of both lungs.  Use Levaquin as prescribed for infection albuterol as an inhaler.  Follow up with her doctor in 1-2 days for re-evaluation return as needed for any worsening symptoms, concerns.

## 2023-10-10 NOTE — NURSING
Discharge orders in. Pt will drive herself home, however received IV morphine. Will wait 4hrs before discharging. MD aware. Pt agreeable.   None

## 2023-10-15 ENCOUNTER — HOSPITAL ENCOUNTER (INPATIENT)
Facility: HOSPITAL | Age: 38
LOS: 4 days | Discharge: HOME | DRG: 439 | End: 2023-10-19
Attending: EMERGENCY MEDICINE | Admitting: HOSPITALIST
Payer: COMMERCIAL

## 2023-10-15 DIAGNOSIS — R11.2 NAUSEA & VOMITING: ICD-10-CM

## 2023-10-15 DIAGNOSIS — K85.90 ACUTE PANCREATITIS: Primary | ICD-10-CM

## 2023-10-15 DIAGNOSIS — R07.9 CHEST PAIN: ICD-10-CM

## 2023-10-15 PROBLEM — E78.2 MIXED HYPERLIPIDEMIA: Status: ACTIVE | Noted: 2023-10-15

## 2023-10-15 LAB
ALBUMIN SERPL BCP-MCNC: 3.8 G/DL (ref 3.5–5.2)
ALBUMIN SERPL BCP-MCNC: 4.1 G/DL (ref 3.5–5.2)
ALP SERPL-CCNC: 45 U/L (ref 55–135)
ALP SERPL-CCNC: 52 U/L (ref 55–135)
ALT SERPL W/O P-5'-P-CCNC: 23 U/L (ref 10–44)
ALT SERPL W/O P-5'-P-CCNC: 27 U/L (ref 10–44)
ANION GAP SERPL CALC-SCNC: 12 MMOL/L (ref 8–16)
ANION GAP SERPL CALC-SCNC: 15 MMOL/L (ref 8–16)
AST SERPL-CCNC: 18 U/L (ref 10–40)
AST SERPL-CCNC: 25 U/L (ref 10–40)
BACTERIA #/AREA URNS HPF: NORMAL /HPF
BASOPHILS # BLD AUTO: 0.06 K/UL (ref 0–0.2)
BASOPHILS NFR BLD: 0.6 % (ref 0–1.9)
BILIRUB SERPL-MCNC: 0.5 MG/DL (ref 0.1–1)
BILIRUB SERPL-MCNC: 0.7 MG/DL (ref 0.1–1)
BILIRUB UR QL STRIP: NEGATIVE
BUN SERPL-MCNC: 10 MG/DL (ref 6–20)
BUN SERPL-MCNC: 11 MG/DL (ref 6–20)
CALCIUM SERPL-MCNC: 8.6 MG/DL (ref 8.7–10.5)
CALCIUM SERPL-MCNC: 9 MG/DL (ref 8.7–10.5)
CHLORIDE SERPL-SCNC: 102 MMOL/L (ref 95–110)
CHLORIDE SERPL-SCNC: 104 MMOL/L (ref 95–110)
CLARITY UR: CLEAR
CO2 SERPL-SCNC: 22 MMOL/L (ref 23–29)
CO2 SERPL-SCNC: 25 MMOL/L (ref 23–29)
COLOR UR: YELLOW
CREAT SERPL-MCNC: 0.7 MG/DL (ref 0.5–1.4)
CREAT SERPL-MCNC: 0.8 MG/DL (ref 0.5–1.4)
DIFFERENTIAL METHOD: NORMAL
EOSINOPHIL # BLD AUTO: 0.3 K/UL (ref 0–0.5)
EOSINOPHIL NFR BLD: 2.9 % (ref 0–8)
ERYTHROCYTE [DISTWIDTH] IN BLOOD BY AUTOMATED COUNT: 12.1 % (ref 11.5–14.5)
EST. GFR  (NO RACE VARIABLE): >60 ML/MIN/1.73 M^2
EST. GFR  (NO RACE VARIABLE): >60 ML/MIN/1.73 M^2
ESTIMATED AVG GLUCOSE: 140 MG/DL (ref 68–131)
GLUCOSE SERPL-MCNC: 128 MG/DL (ref 70–110)
GLUCOSE SERPL-MCNC: 260 MG/DL (ref 70–110)
GLUCOSE UR QL STRIP: ABNORMAL
HBA1C MFR BLD: 6.5 % (ref 4–5.6)
HCT VFR BLD AUTO: 42 % (ref 37–48.5)
HGB BLD-MCNC: 14.9 G/DL (ref 12–16)
HGB UR QL STRIP: NEGATIVE
IMM GRANULOCYTES # BLD AUTO: 0.03 K/UL (ref 0–0.04)
IMM GRANULOCYTES NFR BLD AUTO: 0.3 % (ref 0–0.5)
KETONES UR QL STRIP: NEGATIVE
LEUKOCYTE ESTERASE UR QL STRIP: NEGATIVE
LIPASE SERPL-CCNC: >1000 U/L (ref 4–60)
LYMPHOCYTES # BLD AUTO: 2.4 K/UL (ref 1–4.8)
LYMPHOCYTES NFR BLD: 25.4 % (ref 18–48)
MCH RBC QN AUTO: 29.2 PG (ref 27–31)
MCHC RBC AUTO-ENTMCNC: 35.5 G/DL (ref 32–36)
MCV RBC AUTO: 82 FL (ref 82–98)
MICROSCOPIC COMMENT: NORMAL
MONOCYTES # BLD AUTO: 0.7 K/UL (ref 0.3–1)
MONOCYTES NFR BLD: 7.2 % (ref 4–15)
NEUTROPHILS # BLD AUTO: 5.9 K/UL (ref 1.8–7.7)
NEUTROPHILS NFR BLD: 63.6 % (ref 38–73)
NITRITE UR QL STRIP: NEGATIVE
NRBC BLD-RTO: 0 /100 WBC
PH UR STRIP: 8 [PH] (ref 5–8)
PLATELET # BLD AUTO: 315 K/UL (ref 150–450)
PMV BLD AUTO: 9.6 FL (ref 9.2–12.9)
POCT GLUCOSE: 141 MG/DL (ref 70–110)
POCT GLUCOSE: 166 MG/DL (ref 70–110)
POCT GLUCOSE: 253 MG/DL (ref 70–110)
POTASSIUM SERPL-SCNC: 3.5 MMOL/L (ref 3.5–5.1)
POTASSIUM SERPL-SCNC: 3.8 MMOL/L (ref 3.5–5.1)
PROT SERPL-MCNC: 7 G/DL (ref 6–8.4)
PROT SERPL-MCNC: 7.8 G/DL (ref 6–8.4)
PROT UR QL STRIP: NEGATIVE
RBC # BLD AUTO: 5.11 M/UL (ref 4–5.4)
SODIUM SERPL-SCNC: 139 MMOL/L (ref 136–145)
SODIUM SERPL-SCNC: 141 MMOL/L (ref 136–145)
SP GR UR STRIP: 1.02 (ref 1–1.03)
URN SPEC COLLECT METH UR: ABNORMAL
UROBILINOGEN UR STRIP-ACNC: NEGATIVE EU/DL
WBC # BLD AUTO: 9.26 K/UL (ref 3.9–12.7)
WBC #/AREA URNS HPF: 2 /HPF (ref 0–5)
YEAST URNS QL MICRO: NORMAL

## 2023-10-15 PROCEDURE — 63600175 PHARM REV CODE 636 W HCPCS: Performed by: NURSE PRACTITIONER

## 2023-10-15 PROCEDURE — 83036 HEMOGLOBIN GLYCOSYLATED A1C: CPT | Performed by: EMERGENCY MEDICINE

## 2023-10-15 PROCEDURE — 93010 ELECTROCARDIOGRAM REPORT: CPT | Mod: ,,, | Performed by: INTERNAL MEDICINE

## 2023-10-15 PROCEDURE — 36415 COLL VENOUS BLD VENIPUNCTURE: CPT | Performed by: HOSPITALIST

## 2023-10-15 PROCEDURE — 63600175 PHARM REV CODE 636 W HCPCS: Performed by: EMERGENCY MEDICINE

## 2023-10-15 PROCEDURE — 99285 EMERGENCY DEPT VISIT HI MDM: CPT | Mod: 25

## 2023-10-15 PROCEDURE — 80053 COMPREHEN METABOLIC PANEL: CPT | Mod: 91 | Performed by: HOSPITALIST

## 2023-10-15 PROCEDURE — 80053 COMPREHEN METABOLIC PANEL: CPT | Performed by: EMERGENCY MEDICINE

## 2023-10-15 PROCEDURE — 93010 EKG 12-LEAD: ICD-10-PCS | Mod: ,,, | Performed by: INTERNAL MEDICINE

## 2023-10-15 PROCEDURE — 96375 TX/PRO/DX INJ NEW DRUG ADDON: CPT

## 2023-10-15 PROCEDURE — 36415 COLL VENOUS BLD VENIPUNCTURE: CPT | Performed by: EMERGENCY MEDICINE

## 2023-10-15 PROCEDURE — 96374 THER/PROPH/DIAG INJ IV PUSH: CPT

## 2023-10-15 PROCEDURE — 93005 ELECTROCARDIOGRAM TRACING: CPT

## 2023-10-15 PROCEDURE — 25000003 PHARM REV CODE 250: Performed by: EMERGENCY MEDICINE

## 2023-10-15 PROCEDURE — 25000003 PHARM REV CODE 250: Performed by: NURSE PRACTITIONER

## 2023-10-15 PROCEDURE — 81000 URINALYSIS NONAUTO W/SCOPE: CPT | Performed by: EMERGENCY MEDICINE

## 2023-10-15 PROCEDURE — 83690 ASSAY OF LIPASE: CPT | Performed by: EMERGENCY MEDICINE

## 2023-10-15 PROCEDURE — 11000001 HC ACUTE MED/SURG PRIVATE ROOM

## 2023-10-15 PROCEDURE — 85025 COMPLETE CBC W/AUTO DIFF WBC: CPT | Performed by: EMERGENCY MEDICINE

## 2023-10-15 RX ORDER — MORPHINE SULFATE 4 MG/ML
4 INJECTION, SOLUTION INTRAMUSCULAR; INTRAVENOUS EVERY 4 HOURS PRN
Status: DISCONTINUED | OUTPATIENT
Start: 2023-10-15 | End: 2023-10-16

## 2023-10-15 RX ORDER — ONDANSETRON 2 MG/ML
4 INJECTION INTRAMUSCULAR; INTRAVENOUS
Status: COMPLETED | OUTPATIENT
Start: 2023-10-15 | End: 2023-10-15

## 2023-10-15 RX ORDER — HYDROCODONE BITARTRATE AND ACETAMINOPHEN 5; 325 MG/1; MG/1
1 TABLET ORAL EVERY 6 HOURS PRN
Status: DISCONTINUED | OUTPATIENT
Start: 2023-10-15 | End: 2023-10-17

## 2023-10-15 RX ORDER — GLUCAGON 1 MG
1 KIT INJECTION
Status: DISCONTINUED | OUTPATIENT
Start: 2023-10-15 | End: 2023-10-19 | Stop reason: HOSPADM

## 2023-10-15 RX ORDER — MAG HYDROX/ALUMINUM HYD/SIMETH 200-200-20
30 SUSPENSION, ORAL (FINAL DOSE FORM) ORAL 4 TIMES DAILY PRN
Status: DISCONTINUED | OUTPATIENT
Start: 2023-10-15 | End: 2023-10-19 | Stop reason: HOSPADM

## 2023-10-15 RX ORDER — HYDROMORPHONE HYDROCHLORIDE 2 MG/ML
1 INJECTION, SOLUTION INTRAMUSCULAR; INTRAVENOUS; SUBCUTANEOUS
Status: COMPLETED | OUTPATIENT
Start: 2023-10-15 | End: 2023-10-15

## 2023-10-15 RX ORDER — SIMETHICONE 80 MG
1 TABLET,CHEWABLE ORAL 4 TIMES DAILY PRN
Status: DISCONTINUED | OUTPATIENT
Start: 2023-10-15 | End: 2023-10-19 | Stop reason: HOSPADM

## 2023-10-15 RX ORDER — INSULIN GLARGINE 300 U/ML
60 INJECTION, SOLUTION SUBCUTANEOUS DAILY
COMMUNITY
Start: 2023-10-02

## 2023-10-15 RX ORDER — ONDANSETRON 2 MG/ML
4 INJECTION INTRAMUSCULAR; INTRAVENOUS EVERY 6 HOURS PRN
Status: DISCONTINUED | OUTPATIENT
Start: 2023-10-15 | End: 2023-10-19 | Stop reason: HOSPADM

## 2023-10-15 RX ORDER — TALC
6 POWDER (GRAM) TOPICAL NIGHTLY PRN
Status: DISCONTINUED | OUTPATIENT
Start: 2023-10-15 | End: 2023-10-19 | Stop reason: HOSPADM

## 2023-10-15 RX ORDER — AMLODIPINE BESYLATE 5 MG/1
5 TABLET ORAL DAILY
Status: DISCONTINUED | OUTPATIENT
Start: 2023-10-15 | End: 2023-10-19 | Stop reason: HOSPADM

## 2023-10-15 RX ORDER — ACETAMINOPHEN 325 MG/1
650 TABLET ORAL EVERY 8 HOURS PRN
Status: DISCONTINUED | OUTPATIENT
Start: 2023-10-15 | End: 2023-10-19 | Stop reason: HOSPADM

## 2023-10-15 RX ORDER — SIMVASTATIN 40 MG/1
40 TABLET, FILM COATED ORAL NIGHTLY
COMMUNITY
Start: 2023-08-15 | End: 2024-01-06

## 2023-10-15 RX ORDER — SODIUM CHLORIDE 0.9 % (FLUSH) 0.9 %
3 SYRINGE (ML) INJECTION EVERY 12 HOURS PRN
Status: DISCONTINUED | OUTPATIENT
Start: 2023-10-15 | End: 2023-10-19 | Stop reason: HOSPADM

## 2023-10-15 RX ORDER — POLYETHYLENE GLYCOL 3350 17 G/17G
17 POWDER, FOR SOLUTION ORAL DAILY PRN
Status: DISCONTINUED | OUTPATIENT
Start: 2023-10-15 | End: 2023-10-19 | Stop reason: HOSPADM

## 2023-10-15 RX ORDER — SODIUM CHLORIDE, SODIUM LACTATE, POTASSIUM CHLORIDE, CALCIUM CHLORIDE 600; 310; 30; 20 MG/100ML; MG/100ML; MG/100ML; MG/100ML
INJECTION, SOLUTION INTRAVENOUS CONTINUOUS
Status: DISCONTINUED | OUTPATIENT
Start: 2023-10-15 | End: 2023-10-19

## 2023-10-15 RX ORDER — HALOPERIDOL 5 MG/ML
5 INJECTION INTRAMUSCULAR
Status: COMPLETED | OUTPATIENT
Start: 2023-10-15 | End: 2023-10-15

## 2023-10-15 RX ORDER — IBUPROFEN 200 MG
16 TABLET ORAL
Status: DISCONTINUED | OUTPATIENT
Start: 2023-10-15 | End: 2023-10-19 | Stop reason: HOSPADM

## 2023-10-15 RX ORDER — AMLODIPINE BESYLATE 5 MG/1
5 TABLET ORAL
Status: ON HOLD | COMMUNITY
Start: 2023-10-07 | End: 2023-10-19 | Stop reason: SDUPTHER

## 2023-10-15 RX ORDER — IBUPROFEN 200 MG
24 TABLET ORAL
Status: DISCONTINUED | OUTPATIENT
Start: 2023-10-15 | End: 2023-10-19 | Stop reason: HOSPADM

## 2023-10-15 RX ORDER — ACETAMINOPHEN 650 MG/1
650 SUPPOSITORY RECTAL EVERY 4 HOURS PRN
Status: DISCONTINUED | OUTPATIENT
Start: 2023-10-15 | End: 2023-10-19 | Stop reason: HOSPADM

## 2023-10-15 RX ORDER — NALOXONE HCL 0.4 MG/ML
0.02 VIAL (ML) INJECTION
Status: DISCONTINUED | OUTPATIENT
Start: 2023-10-15 | End: 2023-10-19 | Stop reason: HOSPADM

## 2023-10-15 RX ORDER — INSULIN ASPART 100 [IU]/ML
0-5 INJECTION, SOLUTION INTRAVENOUS; SUBCUTANEOUS
Status: DISCONTINUED | OUTPATIENT
Start: 2023-10-15 | End: 2023-10-19 | Stop reason: HOSPADM

## 2023-10-15 RX ORDER — INSULIN GLARGINE 300 U/ML
INJECTION, SOLUTION SUBCUTANEOUS
Status: ON HOLD | COMMUNITY
Start: 2023-08-27 | End: 2023-10-19 | Stop reason: HOSPADM

## 2023-10-15 RX ADMIN — SODIUM CHLORIDE, SODIUM LACTATE, POTASSIUM CHLORIDE, AND CALCIUM CHLORIDE: 600; 310; 30; 20 INJECTION, SOLUTION INTRAVENOUS at 06:10

## 2023-10-15 RX ADMIN — ONDANSETRON 4 MG: 2 INJECTION INTRAMUSCULAR; INTRAVENOUS at 07:10

## 2023-10-15 RX ADMIN — SODIUM CHLORIDE 1000 ML: 9 INJECTION, SOLUTION INTRAVENOUS at 06:10

## 2023-10-15 RX ADMIN — ONDANSETRON 4 MG: 2 INJECTION INTRAMUSCULAR; INTRAVENOUS at 03:10

## 2023-10-15 RX ADMIN — MORPHINE SULFATE 4 MG: 4 INJECTION INTRAVENOUS at 09:10

## 2023-10-15 RX ADMIN — HYDROMORPHONE HYDROCHLORIDE 1 MG: 2 INJECTION, SOLUTION INTRAMUSCULAR; INTRAVENOUS; SUBCUTANEOUS at 05:10

## 2023-10-15 RX ADMIN — MORPHINE SULFATE 4 MG: 4 INJECTION INTRAVENOUS at 01:10

## 2023-10-15 RX ADMIN — HALOPERIDOL LACTATE 5 MG: 5 INJECTION, SOLUTION INTRAMUSCULAR at 03:10

## 2023-10-15 RX ADMIN — INSULIN DETEMIR 12 UNITS: 100 INJECTION, SOLUTION SUBCUTANEOUS at 09:10

## 2023-10-15 RX ADMIN — MORPHINE SULFATE 4 MG: 4 INJECTION INTRAVENOUS at 05:10

## 2023-10-15 RX ADMIN — ONDANSETRON 4 MG: 2 INJECTION INTRAMUSCULAR; INTRAVENOUS at 01:10

## 2023-10-15 RX ADMIN — HYDROMORPHONE HYDROCHLORIDE 1 MG: 2 INJECTION, SOLUTION INTRAMUSCULAR; INTRAVENOUS; SUBCUTANEOUS at 03:10

## 2023-10-15 RX ADMIN — SODIUM CHLORIDE, SODIUM LACTATE, POTASSIUM CHLORIDE, AND CALCIUM CHLORIDE: 600; 310; 30; 20 INJECTION, SOLUTION INTRAVENOUS at 09:10

## 2023-10-15 RX ADMIN — ONDANSETRON 4 MG: 2 INJECTION INTRAMUSCULAR; INTRAVENOUS at 05:10

## 2023-10-15 NOTE — ASSESSMENT & PLAN NOTE
"Patient's FSGs are uncontrolled due to hyperglycemia on current medication regimen.  Last A1c reviewed-   Lab Results   Component Value Date    HGBA1C 9.6 (H) 05/19/2023     Most recent fingerstick glucose reviewed- No results for input(s): "POCTGLUCOSE" in the last 24 hours.  Current correctional scale  Low  Maintain anti-hyperglycemic dose as follows-   Antihyperglycemics (From admission, onward)    Start     Stop Route Frequency Ordered    10/15/23 0631  insulin aspart U-100 pen 0-5 Units         -- SubQ Before meals & nightly PRN 10/15/23 0531        Most recent A1c measuring   Hemoglobin A1C   Date Value Ref Range Status   05/19/2023 9.6 (H) 4.0 - 5.6 % Final     Comment:     ADA Screening Guidelines:  5.7-6.4%  Consistent with prediabetes  >or=6.5%  Consistent with diabetes    High levels of fetal hemoglobin interfere with the HbA1C  assay. Heterozygous hemoglobin variants (HbS, HgC, etc)do  not significantly interfere with this assay.   However, presence of multiple variants may affect accuracy.     02/24/2022 8.9 (H) 4.0 - 5.6 % Final     Comment:     ADA Screening Guidelines:  5.7-6.4%  Consistent with prediabetes  >or=6.5%  Consistent with diabetes    High levels of fetal hemoglobin interfere with the HbA1C  assay. Heterozygous hemoglobin variants (HbS, HgC, etc)do  not significantly interfere with this assay.   However, presence of multiple variants may affect accuracy.     05/27/2021 9.9 (H) 4.0 - 5.6 % Final     Comment:     ADA Screening Guidelines:  5.7-6.4%  Consistent with prediabetes  >or=6.5%  Consistent with diabetes    High levels of fetal hemoglobin interfere with the HbA1C  assay. Heterozygous hemoglobin variants (HbS, HgC, etc)do  not significantly interfere with this assay.   However, presence of multiple variants may affect accuracy.        Plan:  -SSI  -Accu-checks   -Hypoglycemic protocol   -Obtain A1c level   -Hold oral antihyperglycemics while inpatient   -Continue bud-acting insulin at " 20% decrease, titrate as needed

## 2023-10-15 NOTE — Clinical Note
Diagnosis: Acute pancreatitis [577.0.ICD-9-CM]   Admitting Provider:: LOREE MAGALLON [[id number]]   Future Attending Provider: LOREE MAGALLON [[id number]]   Reason for IP Medical Treatment  (Clinical interventions that can only be accomplished in the IP setting? ) :: acute pancreatitis   I certify that Inpatient services for greater than or equal to 2 midnights are medically necessary:: Yes   Plans for Post-Acute care--if anticipated (pick the single best option):: A. No post acute care anticipated at this time

## 2023-10-15 NOTE — ASSESSMENT & PLAN NOTE
Numerous CT imaging of abdomen/pelvis throughout the last year.  Most recent 1 was performed on 07/05/2023 which revealed:[   Mild mesenteric fat stranding in the mid abdomen with a few lymph nodes similar to prior exam.  This could relate to adenitis].  Plan:  -NPO   -IVFs   -analgesics p.r.n.   -GI consult warranted

## 2023-10-15 NOTE — ED NOTES
"Pt refused cardiac monitoring. Stated "there is no point in putting those on me, I don't want them on me." Pt is hooked up to o2 and bp. Nurse notified.   "

## 2023-10-15 NOTE — ASSESSMENT & PLAN NOTE
Patient is chronically on statin.will not continue for now. Last Lipid Panel:   Lab Results   Component Value Date    CHOL 216 (H) 06/30/2023    HDL 42 06/30/2023    LDLCALC 151 (H) 06/30/2023    TRIG 126 06/30/2023    CHOLHDL 18.5 (L) 02/24/2022     Plan:  -Hold home medication  -low fat/low calorie diet

## 2023-10-15 NOTE — ED PROVIDER NOTES
SCRIBE #1 NOTE: I, Rebeca Allred, am scribing for, and in the presence of, Selena Logan DO. I have scribed the entire note.       History     Chief Complaint   Patient presents with    Abdominal Pain     Vomiting, started 1 hr ago     Review of patient's allergies indicates:   Allergen Reactions    Demerol (pf) [meperidine (pf)] Hives    Medrol [methylprednisolone] Anaphylaxis     Any cortisone    Adhesive      Other reaction(s): Unknown    Amoxicillin-pot clavulanate      Other reaction(s): Unknown    Atarax [hydroxyzine hcl] Hives    Hydralazine analogues Hives    Iodine      Other reaction(s): Unknown    Omnicef [cefdinir]     Penicillins Hives and Nausea And Vomiting    Phenergan [promethazine] Hives and Nausea And Vomiting    Risperidone analogues Hives    Ativan [lorazepam] Hives and Anxiety         History of Present Illness     HPI    10/15/2023, 3:10 AM  History obtained from the patient      History of Present Illness: Priti Obrien is a 38 y.o. female patient with a PMHx of GERD, hypertension, diabetes, and pancreatitis who presents to the Emergency Department for evaluation of abd pain which onset 1 hour PTA. Symptoms are constant and severe. Pt has a history of pancreatitis since she was 8 years old, unknown cause. Associated sxs include nausea and vomiting. Patient denies any fever, constipation, diarrhea, dysuria, hematuria, flank pain, and all other sxs at this time. Zofran does not help. She was given haldol and dilaudid last time she had these symptoms. CBG was 224 prior to arrival. No further complaints or concerns at this time.       Arrival mode: Personal vehicle      PCP: Sherri Kovacs FNP        Past Medical History:  Past Medical History:   Diagnosis Date    Allergy     Asthma     Childhood only    Bipolar 1 disorder     Borderline personality disorder     Depression     GERD (gastroesophageal reflux disease)     History of psychiatric hospitalization     Hx of psychiatric  care     Hypertension     pt denied    Pancreatitis     Psychiatric problem     PTSD (post-traumatic stress disorder)     S/P partial hysterectomy 2011    Substance abuse     Suicide attempt        Past Surgical History:  Past Surgical History:   Procedure Laterality Date    APPENDECTOMY       SECTION      CHOLECYSTECTOMY      HYSTERECTOMY  2010    endometrial hyperplasia    LASER LAPAROSCOPY           Family History:  Family History   Problem Relation Age of Onset    Diabetes Mother     Hypertension Mother     Atrial fibrillation Maternal Grandfather     Breast cancer Maternal Aunt        Social History:  Social History     Tobacco Use    Smoking status: Former     Current packs/day: 0.00     Types: Cigarettes     Quit date: 2013     Years since quitting: 10.3    Smokeless tobacco: Never   Substance and Sexual Activity    Alcohol use: Yes     Comment: occasional     Drug use: Not Currently    Sexual activity: Yes     Partners: Male, Female        Review of Systems     Review of Systems   Constitutional:  Negative for fever.   Respiratory:  Negative for shortness of breath.    Cardiovascular:  Negative for chest pain.   Gastrointestinal:  Positive for abdominal pain, nausea and vomiting. Negative for constipation and diarrhea.   Genitourinary:  Negative for dysuria, flank pain, hematuria, vaginal bleeding and vaginal discharge.      Physical Exam     Initial Vitals   BP Pulse Resp Temp SpO2   10/15/23 0227 10/15/23 0244 10/15/23 0227 10/15/23 0227 10/15/23 0227   (!) 187/85 76 20 98.7 °F (37.1 °C) 97 %      MAP       --                 Physical Exam  Nursing Notes and Vital Signs Reviewed.  Constitutional: Patient is in moderate distress. Well-developed and well-nourished.  Head: Atraumatic. Normocephalic.  Eyes: PERRL. EOM intact. Conjunctivae are not pale. No scleral icterus.  ENT: Mucous membranes are moist. Oropharynx is clear and symmetric.    Neck: Supple. Full ROM. No  lymphadenopathy.  Cardiovascular: Regular rate. Regular rhythm. No murmurs, rubs, or gallops. Distal pulses are 2+ and symmetric.  Pulmonary/Chest: No respiratory distress. Clear to auscultation bilaterally. No wheezing or rales.  Abdominal: Soft and non-distended.  There is tenderness in epigastric and RUQ to palpation with voluntary guarding.  No rebound or rigidity. Good bowel sounds.  Genitourinary: No CVA tenderness  Musculoskeletal: Moves all extremities. No obvious deformities. No edema. No calf tenderness.  Skin: Warm and dry.  Neurological:  Alert, awake, and appropriate.  Normal speech.  No acute focal neurological deficits are appreciated.  Psychiatric: Normal affect. Good eye contact. Appropriate in content.     ED Course   Procedures  ED Vital Signs:  Vitals:    10/16/23 1930 10/16/23 1931 10/16/23 2339 10/17/23 0035   BP: 138/80   109/69   Pulse: 71   78   Resp: 18 18 18 18   Temp: 98.7 °F (37.1 °C)   97.8 °F (36.6 °C)   TempSrc:    Oral   SpO2: 97%   98%   Weight:       Height:        10/17/23 0347 10/17/23 0400 10/17/23 0409 10/17/23 0735   BP:  118/75 118/75    Pulse:  72 72    Resp: 18 20 20 18   Temp:  98.1 °F (36.7 °C) 98.1 °F (36.7 °C)    TempSrc:  Oral Oral    SpO2:  96% 96%    Weight:       Height:        10/17/23 0751 10/17/23 0907 10/17/23 1133 10/17/23 1144   BP: (!) 100/58   130/78   Pulse: 69   74   Resp: 12 12 12 18   Temp: 98.6 °F (37 °C)   97.9 °F (36.6 °C)   TempSrc: Oral   Oral   SpO2: 97%   100%   Weight:       Height:        10/17/23 1405 10/17/23 1527 10/17/23 1641   BP:   135/86   Pulse:   74   Resp: 12 12 17   Temp:   98.5 °F (36.9 °C)   TempSrc:   Oral   SpO2:   96%   Weight:      Height:          Abnormal Lab Results:  Labs Reviewed   COMPREHENSIVE METABOLIC PANEL - Abnormal; Notable for the following components:       Result Value    CO2 22 (*)     Glucose 260 (*)     Alkaline Phosphatase 52 (*)     All other components within normal limits   URINALYSIS, REFLEX TO URINE  CULTURE - Abnormal; Notable for the following components:    Glucose, UA 4+ (*)     All other components within normal limits    Narrative:     Specimen Source->Urine   LIPASE - Abnormal; Notable for the following components:    Lipase >1000 (*)     All other components within normal limits   POCT GLUCOSE - Abnormal; Notable for the following components:    POCT Glucose 253 (*)     All other components within normal limits   CBC W/ AUTO DIFFERENTIAL   HEMOGLOBIN A1C   URINALYSIS MICROSCOPIC    Narrative:     Specimen Source->Urine   POCT GLUCOSE MONITORING CONTINUOUS        All Lab Results:  Results for orders placed or performed during the hospital encounter of 10/15/23   CBC auto differential   Result Value Ref Range    WBC 9.26 3.90 - 12.70 K/uL    RBC 5.11 4.00 - 5.40 M/uL    Hemoglobin 14.9 12.0 - 16.0 g/dL    Hematocrit 42.0 37.0 - 48.5 %    MCV 82 82 - 98 fL    MCH 29.2 27.0 - 31.0 pg    MCHC 35.5 32.0 - 36.0 g/dL    RDW 12.1 11.5 - 14.5 %    Platelets 315 150 - 450 K/uL    MPV 9.6 9.2 - 12.9 fL    Immature Granulocytes 0.3 0.0 - 0.5 %    Gran # (ANC) 5.9 1.8 - 7.7 K/uL    Immature Grans (Abs) 0.03 0.00 - 0.04 K/uL    Lymph # 2.4 1.0 - 4.8 K/uL    Mono # 0.7 0.3 - 1.0 K/uL    Eos # 0.3 0.0 - 0.5 K/uL    Baso # 0.06 0.00 - 0.20 K/uL    nRBC 0 0 /100 WBC    Gran % 63.6 38.0 - 73.0 %    Lymph % 25.4 18.0 - 48.0 %    Mono % 7.2 4.0 - 15.0 %    Eosinophil % 2.9 0.0 - 8.0 %    Basophil % 0.6 0.0 - 1.9 %    Differential Method Automated    Comprehensive metabolic panel   Result Value Ref Range    Sodium 139 136 - 145 mmol/L    Potassium 3.8 3.5 - 5.1 mmol/L    Chloride 102 95 - 110 mmol/L    CO2 22 (L) 23 - 29 mmol/L    Glucose 260 (H) 70 - 110 mg/dL    BUN 10 6 - 20 mg/dL    Creatinine 0.8 0.5 - 1.4 mg/dL    Calcium 9.0 8.7 - 10.5 mg/dL    Total Protein 7.8 6.0 - 8.4 g/dL    Albumin 4.1 3.5 - 5.2 g/dL    Total Bilirubin 0.5 0.1 - 1.0 mg/dL    Alkaline Phosphatase 52 (L) 55 - 135 U/L    AST 25 10 - 40 U/L    ALT 27  10 - 44 U/L    eGFR >60 >60 mL/min/1.73 m^2    Anion Gap 15 8 - 16 mmol/L   Urinalysis, Reflex to Urine Culture Urine, Clean Catch    Specimen: Urine   Result Value Ref Range    Specimen UA Urine, Clean Catch     Color, UA Yellow Yellow, Straw, [place]    Appearance, UA Clear Clear    pH, UA 8.0 5.0 - 8.0    Specific Gravity, UA 1.020 1.005 - 1.030    Protein, UA Negative Negative    Glucose, UA 4+ (A) Negative    Ketones, UA Negative Negative    Bilirubin (UA) Negative Negative    Occult Blood UA Negative Negative    Nitrite, UA Negative Negative    Urobilinogen, UA Negative <2.0 EU/dL    Leukocytes, UA Negative Negative   Lipase   Result Value Ref Range    Lipase >1000 (H) 4 - 60 U/L   Hemoglobin A1C   Result Value Ref Range    Hemoglobin A1C 6.5 (H) 4.0 - 5.6 %    Estimated Avg Glucose 140 (H) 68 - 131 mg/dL   Urinalysis Microscopic   Result Value Ref Range    WBC, UA 2 0 - 5 /hpf    Bacteria None None-Occ /hpf    Yeast, UA None None    Microscopic Comment SEE COMMENT    Comprehensive metabolic panel   Result Value Ref Range    Sodium 141 136 - 145 mmol/L    Potassium 3.5 3.5 - 5.1 mmol/L    Chloride 104 95 - 110 mmol/L    CO2 25 23 - 29 mmol/L    Glucose 128 (H) 70 - 110 mg/dL    BUN 11 6 - 20 mg/dL    Creatinine 0.7 0.5 - 1.4 mg/dL    Calcium 8.6 (L) 8.7 - 10.5 mg/dL    Total Protein 7.0 6.0 - 8.4 g/dL    Albumin 3.8 3.5 - 5.2 g/dL    Total Bilirubin 0.7 0.1 - 1.0 mg/dL    Alkaline Phosphatase 45 (L) 55 - 135 U/L    AST 18 10 - 40 U/L    ALT 23 10 - 44 U/L    eGFR >60 >60 mL/min/1.73 m^2    Anion Gap 12 8 - 16 mmol/L   CBC Auto Differential   Result Value Ref Range    WBC 7.50 3.90 - 12.70 K/uL    RBC 4.43 4.00 - 5.40 M/uL    Hemoglobin 12.8 12.0 - 16.0 g/dL    Hematocrit 37.0 37.0 - 48.5 %    MCV 84 82 - 98 fL    MCH 28.9 27.0 - 31.0 pg    MCHC 34.6 32.0 - 36.0 g/dL    RDW 12.3 11.5 - 14.5 %    Platelets 293 150 - 450 K/uL    MPV 10.0 9.2 - 12.9 fL    Immature Granulocytes 0.3 0.0 - 0.5 %    Gran # (ANC) 4.0 1.8  - 7.7 K/uL    Immature Grans (Abs) 0.02 0.00 - 0.04 K/uL    Lymph # 2.5 1.0 - 4.8 K/uL    Mono # 0.5 0.3 - 1.0 K/uL    Eos # 0.4 0.0 - 0.5 K/uL    Baso # 0.05 0.00 - 0.20 K/uL    nRBC 0 0 /100 WBC    Gran % 53.0 38.0 - 73.0 %    Lymph % 33.9 18.0 - 48.0 %    Mono % 6.8 4.0 - 15.0 %    Eosinophil % 5.3 0.0 - 8.0 %    Basophil % 0.7 0.0 - 1.9 %    Differential Method Automated    Comprehensive Metabolic Panel   Result Value Ref Range    Sodium 140 136 - 145 mmol/L    Potassium 3.7 3.5 - 5.1 mmol/L    Chloride 105 95 - 110 mmol/L    CO2 25 23 - 29 mmol/L    Glucose 201 (H) 70 - 110 mg/dL    BUN 10 6 - 20 mg/dL    Creatinine 0.8 0.5 - 1.4 mg/dL    Calcium 8.4 (L) 8.7 - 10.5 mg/dL    Total Protein 6.6 6.0 - 8.4 g/dL    Albumin 3.5 3.5 - 5.2 g/dL    Total Bilirubin 0.5 0.1 - 1.0 mg/dL    Alkaline Phosphatase 47 (L) 55 - 135 U/L    AST 16 10 - 40 U/L    ALT 21 10 - 44 U/L    eGFR >60 >60 mL/min/1.73 m^2    Anion Gap 10 8 - 16 mmol/L   POCT glucose   Result Value Ref Range    POCT Glucose 253 (H) 70 - 110 mg/dL   POCT glucose   Result Value Ref Range    POCT Glucose 166 (H) 70 - 110 mg/dL   POCT glucose   Result Value Ref Range    POCT Glucose 141 (H) 70 - 110 mg/dL   POCT glucose   Result Value Ref Range    POCT Glucose 121 (H) 70 - 110 mg/dL   POCT glucose   Result Value Ref Range    POCT Glucose 135 (H) 70 - 110 mg/dL   POCT glucose   Result Value Ref Range    POCT Glucose 155 (H) 70 - 110 mg/dL   POCT glucose   Result Value Ref Range    POCT Glucose 195 (H) 70 - 110 mg/dL   POCT glucose   Result Value Ref Range    POCT Glucose 173 (H) 70 - 110 mg/dL   POCT glucose   Result Value Ref Range    POCT Glucose 134 (H) 70 - 110 mg/dL         Imaging Results:  Imaging Results    None          The EKG was ordered, reviewed, and independently interpreted by the ED provider.  Interpretation time: 2:38  Rate: 76 BPM  Rhythm: normal sinus rhythm  Interpretation: Possible Anterior infarct, age undetermined. NH interval 128. QRS 78.  . No STEMI.           The Emergency Provider reviewed the vital signs and test results, which are outlined above.     ED Discussion       ED Course as of 10/17/23 1717   Sun Oct 15, 2023   0427 38-year-old female with a history of diabetes presents with right upper quadrant/epigastric abdominal pain radiating to the back with nausea and vomiting.  Pain improved with Dilaudid and nausea/vomiting controlled with Haldol.  Lipase greater than a 1000.  Reports a history of pancreatitis of unknown etiology.  Denies alcohol use.  Glucose 260 with a normal anion gap and normal LFTs.  No signs of DKA or HHS.  CBC shows no leukocytosis or anemia.  Patient has had multiple CTs of her abdomen and pelvis that did not show any necrosis or pseudocyst, last one July 2023 therefore I did not repeat the imaging.  EKG shows no acute ischemic changes. [NF]      ED Course User Index  [NF] Selena Logan, DO     4:50 AM: Discussed case with Marlo Hilario NP ([facility]). Dr. Hilario agrees with current care and management of pt and accepts admission.   Admitting Service: Hospital Medicine  Admitting Physician: Dr. Hilario  Admit to: inpatient med surg    4:50 AM: Re-evaluated pt. I have discussed test results, shared treatment plan, and the need for admission with patient and family at bedside. Pt and family express understanding at this time and agree with all information. All questions answered. Pt and family have no further questions or concerns at this time. Pt is ready for admit.      Medical Decision Making  Amount and/or Complexity of Data Reviewed  Labs: ordered. Decision-making details documented in ED Course.  ECG/medicine tests: ordered. Decision-making details documented in ED Course.    Risk  Prescription drug management.  Decision regarding hospitalization.                ED Medication(s):  Medications   sodium chloride 0.9% flush 3 mL (has no administration in time range)   lactated ringers infusion (  Intravenous New Bag 10/17/23 1644)   melatonin tablet 6 mg (has no administration in time range)   ondansetron injection 4 mg (4 mg Intravenous Given 10/17/23 0345)   polyethylene glycol packet 17 g (has no administration in time range)   acetaminophen tablet 650 mg (has no administration in time range)   simethicone chewable tablet 80 mg (has no administration in time range)   aluminum-magnesium hydroxide-simethicone 200-200-20 mg/5 mL suspension 30 mL (has no administration in time range)   acetaminophen suppository 650 mg (has no administration in time range)   naloxone 0.4 mg/mL injection 0.02 mg (has no administration in time range)   glucose chewable tablet 16 g (has no administration in time range)   glucose chewable tablet 24 g (has no administration in time range)   glucagon (human recombinant) injection 1 mg (has no administration in time range)   insulin aspart U-100 pen 0-5 Units (has no administration in time range)   dextrose 10% bolus 125 mL 125 mL (has no administration in time range)   dextrose 10% bolus 250 mL 250 mL (has no administration in time range)   amLODIPine tablet 5 mg (5 mg Oral Given 10/17/23 0907)   insulin detemir U-100 (Levemir) pen 12 Units (12 Units Subcutaneous Given 10/17/23 0909)   influenza (QUADRIVALENT PF) vaccine 0.5 mL (has no administration in time range)   oxyCODONE-acetaminophen  mg per tablet 1 tablet (1 tablet Oral Given 10/17/23 1527)   azithromycin tablet 500 mg (500 mg Oral Given 10/16/23 1504)     Followed by   azithromycin tablet 250 mg (250 mg Oral Given 10/17/23 0907)   morphine injection 2 mg (2 mg Intravenous Given 10/17/23 1405)   HYDROcodone-acetaminophen 5-325 mg per tablet 1 tablet (has no administration in time range)   ondansetron injection 4 mg (4 mg Intravenous Given 10/15/23 0321)   HYDROmorphone (PF) injection 1 mg (1 mg Intravenous Given 10/15/23 0322)   haloperidol lactate injection 5 mg (5 mg Intravenous Given 10/15/23 0322)   HYDROmorphone  (PF) injection 1 mg (1 mg Intravenous Given 10/15/23 0552)   sodium chloride 0.9% bolus 1,000 mL 1,000 mL (0 mLs Intravenous Stopped 10/15/23 0736)       Current Discharge Medication List                  Scribe Attestation:   Scribe #1: I performed the above scribed service and the documentation accurately describes the services I performed. I attest to the accuracy of the note.     Attending:   Physician Attestation Statement for Scribe #1: I, Selena Logan DO, personally performed the services described in this documentation, as scribed by Rebeca Allred, in my presence, and it is both accurate and complete.           Clinical Impression       ICD-10-CM ICD-9-CM   1. Acute pancreatitis  K85.90 577.0   2. Nausea & vomiting  R11.2 787.01             Disposition:   Disposition: Admitted  Condition: Fair         Selena Logan DO  10/17/23 1717

## 2023-10-15 NOTE — PLAN OF CARE
LR @ 125mL/hr. Remains NPO. Accu checks AC&HS. PRN nausea and pain meds adm as needed. Remains free from incident/injury. Call light in reach. All active orders reviewed. Chart check complete.

## 2023-10-15 NOTE — SUBJECTIVE & OBJECTIVE
Past Medical History:   Diagnosis Date    Allergy     Asthma     Childhood only    Bipolar 1 disorder     Borderline personality disorder     Depression     GERD (gastroesophageal reflux disease)     History of psychiatric hospitalization     Hx of psychiatric care     Hypertension     pt denied    Pancreatitis     Psychiatric problem     PTSD (post-traumatic stress disorder)     S/P partial hysterectomy 2011    Substance abuse     Suicide attempt        Past Surgical History:   Procedure Laterality Date    APPENDECTOMY       SECTION      CHOLECYSTECTOMY      HYSTERECTOMY      endometrial hyperplasia    LASER LAPAROSCOPY         Review of patient's allergies indicates:   Allergen Reactions    Demerol (pf) [meperidine (pf)] Hives    Medrol [methylprednisolone] Anaphylaxis     Any cortisone    Adhesive      Other reaction(s): Unknown    Amoxicillin-pot clavulanate      Other reaction(s): Unknown    Atarax [hydroxyzine hcl] Hives    Hydralazine analogues Hives    Iodine      Other reaction(s): Unknown    Omnicef [cefdinir]     Penicillins Hives and Nausea And Vomiting    Phenergan [promethazine] Hives and Nausea And Vomiting    Risperidone analogues Hives    Ativan [lorazepam] Hives and Anxiety       No current facility-administered medications on file prior to encounter.     Current Outpatient Medications on File Prior to Encounter   Medication Sig    albuterol (PROVENTIL/VENTOLIN HFA) 90 mcg/actuation inhaler Inhale 2 puffs into the lungs every 4 (four) hours as needed. Take 2 puffs of the lungs every 4 hr as needed for wheezing or shortness of breath    blood sugar diagnostic Strp 1 each by Misc.(Non-Drug; Combo Route) route 3 (three) times daily.    HYDROcodone-acetaminophen (NORCO) 7.5-325 mg per tablet Take 1 tablet by mouth every 6 (six) hours as needed for Pain.    HYDROcodone-acetaminophen (NORCO) 7.5-325 mg per tablet Take 1 tablet by mouth every 6 (six) hours as needed for Pain.     lancets Misc 1 each by Misc.(Non-Drug; Combo Route) route 3 (three) times daily.    metFORMIN (GLUCOPHAGE-XR) 500 MG ER 24hr tablet Take 2 tablets (1,000 mg total) by mouth 2 (two) times daily with meals.    ondansetron (ZOFRAN) 4 MG tablet Take 1 tablet (4 mg total) by mouth every 6 (six) hours.    ondansetron (ZOFRAN-ODT) 4 MG TbDL Take 1 tablet (4 mg total) by mouth every 6 (six) hours as needed (nausea).    ondansetron (ZOFRAN-ODT) 4 MG TbDL Take 1 tablet (4 mg total) by mouth every 6 (six) hours as needed (nausea).     Family History       Problem Relation (Age of Onset)    Atrial fibrillation Maternal Grandfather    Breast cancer Maternal Aunt    Diabetes Mother    Hypertension Mother          Tobacco Use    Smoking status: Former     Current packs/day: 0.00     Types: Cigarettes     Quit date: 6/5/2013     Years since quitting: 10.3    Smokeless tobacco: Never   Substance and Sexual Activity    Alcohol use: Yes     Comment: occasional     Drug use: Not Currently    Sexual activity: Yes     Partners: Male, Female     Review of Systems   Constitutional:  Negative for chills, diaphoresis, fatigue and fever.   Respiratory:  Negative for cough and shortness of breath.    Cardiovascular:  Negative for chest pain and palpitations.   Gastrointestinal:  Positive for abdominal pain, nausea and vomiting. Negative for abdominal distention, blood in stool and diarrhea.   All other systems reviewed and are negative.    Objective:     Vital Signs (Most Recent):  Temp: 98.7 °F (37.1 °C) (10/15/23 0227)  Pulse: 76 (10/15/23 0244)  Resp: 18 (10/15/23 0322)  BP: (!) 187/85 (10/15/23 0227)  SpO2: 97 % (10/15/23 0227) Vital Signs (24h Range):  Temp:  [98.7 °F (37.1 °C)] 98.7 °F (37.1 °C)  Pulse:  [76] 76  Resp:  [18-20] 18  SpO2:  [97 %] 97 %  BP: (187)/(85) 187/85     Weight: 94.3 kg (208 lb)  Body mass index is 38.04 kg/m².     Physical Exam  Vitals and nursing note reviewed.   Constitutional:       General: She is awake. She  is not in acute distress.     Appearance: Normal appearance. She is well-developed and well-groomed. She is obese. She is not ill-appearing, toxic-appearing or diaphoretic.   HENT:      Head: Normocephalic and atraumatic.   Eyes:      Extraocular Movements: Extraocular movements intact.      Conjunctiva/sclera: Conjunctivae normal.   Cardiovascular:      Rate and Rhythm: Normal rate and regular rhythm.      Heart sounds: Normal heart sounds. No murmur heard.  Pulmonary:      Effort: Pulmonary effort is normal.      Breath sounds: Normal breath sounds.   Abdominal:      General: Bowel sounds are normal.      Palpations: Abdomen is soft.      Tenderness: There is generalized abdominal tenderness. There is no guarding or rebound.   Musculoskeletal:      Cervical back: Normal range of motion and neck supple.      Comments: 5/5 strength throughout   Skin:     General: Skin is warm and dry.      Capillary Refill: Capillary refill takes less than 2 seconds.   Neurological:      General: No focal deficit present.      Mental Status: She is alert and oriented to person, place, and time. Mental status is at baseline.      GCS: GCS eye subscore is 4. GCS verbal subscore is 5. GCS motor subscore is 6.      Motor: Motor function is intact.   Psychiatric:         Mood and Affect: Mood normal.         Speech: Speech normal.         Behavior: Behavior normal. Behavior is cooperative.              LABS:  Recent Results (from the past 24 hour(s))   CBC auto differential    Collection Time: 10/15/23  3:17 AM   Result Value Ref Range    WBC 9.26 3.90 - 12.70 K/uL    RBC 5.11 4.00 - 5.40 M/uL    Hemoglobin 14.9 12.0 - 16.0 g/dL    Hematocrit 42.0 37.0 - 48.5 %    MCV 82 82 - 98 fL    MCH 29.2 27.0 - 31.0 pg    MCHC 35.5 32.0 - 36.0 g/dL    RDW 12.1 11.5 - 14.5 %    Platelets 315 150 - 450 K/uL    MPV 9.6 9.2 - 12.9 fL    Immature Granulocytes 0.3 0.0 - 0.5 %    Gran # (ANC) 5.9 1.8 - 7.7 K/uL    Immature Grans (Abs) 0.03 0.00 - 0.04  K/uL    Lymph # 2.4 1.0 - 4.8 K/uL    Mono # 0.7 0.3 - 1.0 K/uL    Eos # 0.3 0.0 - 0.5 K/uL    Baso # 0.06 0.00 - 0.20 K/uL    nRBC 0 0 /100 WBC    Gran % 63.6 38.0 - 73.0 %    Lymph % 25.4 18.0 - 48.0 %    Mono % 7.2 4.0 - 15.0 %    Eosinophil % 2.9 0.0 - 8.0 %    Basophil % 0.6 0.0 - 1.9 %    Differential Method Automated    Comprehensive metabolic panel    Collection Time: 10/15/23  3:17 AM   Result Value Ref Range    Sodium 139 136 - 145 mmol/L    Potassium 3.8 3.5 - 5.1 mmol/L    Chloride 102 95 - 110 mmol/L    CO2 22 (L) 23 - 29 mmol/L    Glucose 260 (H) 70 - 110 mg/dL    BUN 10 6 - 20 mg/dL    Creatinine 0.8 0.5 - 1.4 mg/dL    Calcium 9.0 8.7 - 10.5 mg/dL    Total Protein 7.8 6.0 - 8.4 g/dL    Albumin 4.1 3.5 - 5.2 g/dL    Total Bilirubin 0.5 0.1 - 1.0 mg/dL    Alkaline Phosphatase 52 (L) 55 - 135 U/L    AST 25 10 - 40 U/L    ALT 27 10 - 44 U/L    eGFR >60 >60 mL/min/1.73 m^2    Anion Gap 15 8 - 16 mmol/L   Lipase    Collection Time: 10/15/23  3:17 AM   Result Value Ref Range    Lipase >1000 (H) 4 - 60 U/L       RADIOLOGY  X-Ray Chest PA And Lateral    Result Date: 9/28/2023  EXAMINATION: XR CHEST PA AND LATERAL CLINICAL HISTORY: Shortness of breath TECHNIQUE: PA and lateral views of the chest were performed. FINDINGS: Comparison: May 20, 2020. Right subclavian central venous catheter with port is redemonstrated in stable position. Mediastinal silhouette is within normal limits.  The lungs are clear.  No pneumothorax or pleural effusion.  No acute osseous finding.     No acute finding in the chest. Electronically signed by: Alan Burgess Date:    09/28/2023 Time:    07:21      EKG    MICROBIOLOGY    MDM     Amount and/or Complexity of Data Reviewed  Clinical lab tests: reviewed  Tests in the radiology section of CPT®: reviewed  Tests in the medicine section of CPT®: reviewed  Discussion of test results with the performing providers: yes  Decide to obtain previous medical records or to obtain history from  someone other than the patient: yes  Obtain history from someone other than the patient: yes  Review and summarize past medical records: yes  Discuss the patient with other providers: yes  Independent visualization of images, tracings, or specimens: yes

## 2023-10-15 NOTE — PLAN OF CARE
[facility] - Med Surg  Initial Discharge Assessment       Primary Care Provider: Sherri Kovacs FNP    Admission Diagnosis: Acute pancreatitis [K85.90]  Nausea & vomiting [R11.2]  Chest pain [R07.9]    Admission Date: 10/15/2023  Expected Discharge Date:     Transition of Care Barriers: None    Payor: BLUE CROSS BLUE SHIELD / Plan: BCBS ALL OUT OF STATE / Product Type: PPO /     Extended Emergency Contact Information  Primary Emergency Contact: Nicki Wills   [facility]  Home Phone: [phone]  Relation: Mother              [facility] [address] [place], [place] - [address]  [address] [facility] [address]  Phone: [phone] Fax: [phone]    [id number] [place], [place] - [address] [facility]  [address] [facility] [address]  Phone: [phone] Fax: [phone]    [facility] [address] [place], [place] - [address]  [address]  [facility] [address]  Phone: [phone] Fax: [phone]      Initial Assessment (most recent)       Adult Discharge Assessment - 10/15/23 1301          Discharge Assessment    Assessment Type Discharge Planning Assessment     Confirmed/corrected address, phone number and insurance Yes     Confirmed Demographics Contacted registration to update   [id number]    Source of Information patient     Communicated TANYA with patient/caregiver Yes     Reason For Admission pancreatitis     People in Home alone     Facility Arrived From: home     Do you expect to return to your current living situation? Yes     Do you have help at home or someone to help you manage your care at home? No     Prior to hospitilization cognitive status: Alert/Oriented     Current cognitive status: Alert/Oriented     Home Layout Able to live on 1st floor     Equipment Currently Used at Home none     Readmission within 30 days? No     Patient currently being followed by outpatient case management? No      Do you currently have service(s) that help you manage your care at home? No     Do you take prescription medications? Yes     Do you have prescription coverage? Yes     Do you have any problems affording any of your prescribed medications? No     Is the patient taking medications as prescribed? yes     Who is going to help you get home at discharge? will need ride     How do you get to doctors appointments? car, drives self     Are you on dialysis? No     Do you take coumadin? No     DME Needed Upon Discharge  none     Discharge Plan discussed with: Patient     Transition of Care Barriers None                   Anticipated DC dispo: home  Prior Level of Function: indp  People in home:  lives alone without any assistance    Comments:  CM met with patient at bedside to introduce role and discuss discharge planning. Pt lives alone and will need ride transport at time of discharge. CM discharge needs depends on hospital progress. CM will continue following to assist with other needs.

## 2023-10-15 NOTE — ASSESSMENT & PLAN NOTE
Currently normotensive.  BP usually well controlled per patient with home medications.  Plan:  -Optimize pain control   -Continue home medications (norvasc), titrate as needed   -Monitor BP  -Low salt/cardiac diet when not NPO  -IV hydralazine prn for SBP>160 or DBP>90

## 2023-10-15 NOTE — HPI
Priti Obrien is a 38 y.o. female with a PMH  has a past medical history of Allergy, Asthma, Bipolar 1 disorder, Borderline personality disorder, Depression, GERD (gastroesophageal reflux disease), History of psychiatric hospitalization, psychiatric care, Hypertension, Pancreatitis, Psychiatric problem, PTSD (post-traumatic stress disorder), S/P partial hysterectomy (September 2011), Substance abuse, and Suicide attempt.  Presented to the ER for evaluation of epigastric abdominal pain which started approximately 1 hour prior to arrival.  Associated symptoms include nonbloody/nonbilious vomitus.  Patient reports the pain radiates into her back.  Reports taking Zofran prior to arrival without any relief of her symptoms.  Denies any other associated symptoms.  Patient has a longstanding history of pancreatitis since she was 8 years old, but states she is not been evaluated by a GI specialist in over a few years.  Denies any GI/ symptoms.  ER workup revealed CBG of 260 mg/d (last A1c level was 9.6% on 05/19/2023) and lipase level greater than 1,000. Patient received 5 mg of Haldol, 1 mg Dilaudid, 4 mg Zofran, and 1 L of normal saline in ED. hospital Medicine consulted to admit patient for acute pancreatitis.    PCP: Sherri Kovacs

## 2023-10-15 NOTE — H&P
[facility] - Emergency Dept.  Hospital Medicine  History & Physical    Patient Name: Priti Obrien  MRN: [id number]  Patient Class: IP- Inpatient  Admission Date: 10/15/2023  Attending Physician: Duncan Hilario MD   Primary Care Provider: Sherri Kovacs FNP         Patient information was obtained from patient, past medical records and ER records.     Subjective:     Principal Problem:Acute pancreatitis    Chief Complaint:   Chief Complaint   Patient presents with    Abdominal Pain     Vomiting, started 1 hr ago        HPI: Priti Obrien is a 38 y.o. female with a PMH  has a past medical history of Allergy, Asthma, Bipolar 1 disorder, Borderline personality disorder, Depression, GERD (gastroesophageal reflux disease), History of psychiatric hospitalization, psychiatric care, Hypertension, Pancreatitis, Psychiatric problem, PTSD (post-traumatic stress disorder), S/P partial hysterectomy (September 2011), Substance abuse, and Suicide attempt.  Presented to the ER for evaluation of epigastric abdominal pain which started approximately 1 hour prior to arrival.  Associated symptoms include nonbloody/nonbilious vomitus.  Patient reports the pain radiates into her back.  Reports taking Zofran prior to arrival without any relief of her symptoms.  Denies any other associated symptoms.  Patient has a longstanding history of pancreatitis since she was 8 years old, but states she is not been evaluated by a GI specialist in over a few years.  Denies any GI/ symptoms.  ER workup revealed CBG of 260 mg/d (last A1c level was 9.6% on 05/19/2023) and lipase level greater than 1,000. Patient received 5 mg of Haldol, 1 mg Dilaudid, 4 mg Zofran, and 1 L of normal saline in ED. hospital Medicine consulted to admit patient for acute pancreatitis.    PCP: Sherri Kovacs      Past Medical History:   Diagnosis Date    Allergy     Asthma     Childhood only    Bipolar 1 disorder     Borderline personality disorder      Depression     GERD (gastroesophageal reflux disease)     History of psychiatric hospitalization     Hx of psychiatric care     Hypertension     pt denied    Pancreatitis     Psychiatric problem     PTSD (post-traumatic stress disorder)     S/P partial hysterectomy 2011    Substance abuse     Suicide attempt        Past Surgical History:   Procedure Laterality Date    APPENDECTOMY       SECTION      CHOLECYSTECTOMY      HYSTERECTOMY      endometrial hyperplasia    LASER LAPAROSCOPY         Review of patient's allergies indicates:   Allergen Reactions    Demerol (pf) [meperidine (pf)] Hives    Medrol [methylprednisolone] Anaphylaxis     Any cortisone    Adhesive      Other reaction(s): Unknown    Amoxicillin-pot clavulanate      Other reaction(s): Unknown    Atarax [hydroxyzine hcl] Hives    Hydralazine analogues Hives    Iodine      Other reaction(s): Unknown    Omnicef [cefdinir]     Penicillins Hives and Nausea And Vomiting    Phenergan [promethazine] Hives and Nausea And Vomiting    Risperidone analogues Hives    Ativan [lorazepam] Hives and Anxiety       No current facility-administered medications on file prior to encounter.     Current Outpatient Medications on File Prior to Encounter   Medication Sig    albuterol (PROVENTIL/VENTOLIN HFA) 90 mcg/actuation inhaler Inhale 2 puffs into the lungs every 4 (four) hours as needed. Take 2 puffs of the lungs every 4 hr as needed for wheezing or shortness of breath    blood sugar diagnostic Strp 1 each by Misc.(Non-Drug; Combo Route) route 3 (three) times daily.    HYDROcodone-acetaminophen (NORCO) 7.5-325 mg per tablet Take 1 tablet by mouth every 6 (six) hours as needed for Pain.    HYDROcodone-acetaminophen (NORCO) 7.5-325 mg per tablet Take 1 tablet by mouth every 6 (six) hours as needed for Pain.    lancets Misc 1 each by Misc.(Non-Drug; Combo Route) route 3 (three) times daily.    metFORMIN (GLUCOPHAGE-XR) 500  MG ER 24hr tablet Take 2 tablets (1,000 mg total) by mouth 2 (two) times daily with meals.    ondansetron (ZOFRAN) 4 MG tablet Take 1 tablet (4 mg total) by mouth every 6 (six) hours.    ondansetron (ZOFRAN-ODT) 4 MG TbDL Take 1 tablet (4 mg total) by mouth every 6 (six) hours as needed (nausea).    ondansetron (ZOFRAN-ODT) 4 MG TbDL Take 1 tablet (4 mg total) by mouth every 6 (six) hours as needed (nausea).     Family History       Problem Relation (Age of Onset)    Atrial fibrillation Maternal Grandfather    Breast cancer Maternal Aunt    Diabetes Mother    Hypertension Mother          Tobacco Use    Smoking status: Former     Current packs/day: 0.00     Types: Cigarettes     Quit date: 6/5/2013     Years since quitting: 10.3    Smokeless tobacco: Never   Substance and Sexual Activity    Alcohol use: Yes     Comment: occasional     Drug use: Not Currently    Sexual activity: Yes     Partners: Male, Female     Review of Systems   Constitutional:  Negative for chills, diaphoresis, fatigue and fever.   Respiratory:  Negative for cough and shortness of breath.    Cardiovascular:  Negative for chest pain and palpitations.   Gastrointestinal:  Positive for abdominal pain, nausea and vomiting. Negative for abdominal distention, blood in stool and diarrhea.   All other systems reviewed and are negative.    Objective:     Vital Signs (Most Recent):  Temp: 98.7 °F (37.1 °C) (10/15/23 0227)  Pulse: 76 (10/15/23 0244)  Resp: 18 (10/15/23 0322)  BP: (!) 187/85 (10/15/23 0227)  SpO2: 97 % (10/15/23 0227) Vital Signs (24h Range):  Temp:  [98.7 °F (37.1 °C)] 98.7 °F (37.1 °C)  Pulse:  [76] 76  Resp:  [18-20] 18  SpO2:  [97 %] 97 %  BP: (187)/(85) 187/85     Weight: 94.3 kg (208 lb)  Body mass index is 38.04 kg/m².     Physical Exam  Vitals and nursing note reviewed.   Constitutional:       General: She is awake. She is not in acute distress.     Appearance: Normal appearance. She is well-developed and well-groomed. She is  obese. She is not ill-appearing, toxic-appearing or diaphoretic.   HENT:      Head: Normocephalic and atraumatic.   Eyes:      Extraocular Movements: Extraocular movements intact.      Conjunctiva/sclera: Conjunctivae normal.   Cardiovascular:      Rate and Rhythm: Normal rate and regular rhythm.      Heart sounds: Normal heart sounds. No murmur heard.  Pulmonary:      Effort: Pulmonary effort is normal.      Breath sounds: Normal breath sounds.   Abdominal:      General: Bowel sounds are normal.      Palpations: Abdomen is soft.      Tenderness: There is generalized abdominal tenderness. There is no guarding or rebound.   Musculoskeletal:      Cervical back: Normal range of motion and neck supple.      Comments: 5/5 strength throughout   Skin:     General: Skin is warm and dry.      Capillary Refill: Capillary refill takes less than 2 seconds.   Neurological:      General: No focal deficit present.      Mental Status: She is alert and oriented to person, place, and time. Mental status is at baseline.      GCS: GCS eye subscore is 4. GCS verbal subscore is 5. GCS motor subscore is 6.      Motor: Motor function is intact.   Psychiatric:         Mood and Affect: Mood normal.         Speech: Speech normal.         Behavior: Behavior normal. Behavior is cooperative.              LABS:  Recent Results (from the past 24 hour(s))   CBC auto differential    Collection Time: 10/15/23  3:17 AM   Result Value Ref Range    WBC 9.26 3.90 - 12.70 K/uL    RBC 5.11 4.00 - 5.40 M/uL    Hemoglobin 14.9 12.0 - 16.0 g/dL    Hematocrit 42.0 37.0 - 48.5 %    MCV 82 82 - 98 fL    MCH 29.2 27.0 - 31.0 pg    MCHC 35.5 32.0 - 36.0 g/dL    RDW 12.1 11.5 - 14.5 %    Platelets 315 150 - 450 K/uL    MPV 9.6 9.2 - 12.9 fL    Immature Granulocytes 0.3 0.0 - 0.5 %    Gran # (ANC) 5.9 1.8 - 7.7 K/uL    Immature Grans (Abs) 0.03 0.00 - 0.04 K/uL    Lymph # 2.4 1.0 - 4.8 K/uL    Mono # 0.7 0.3 - 1.0 K/uL    Eos # 0.3 0.0 - 0.5 K/uL    Baso # 0.06 0.00  - 0.20 K/uL    nRBC 0 0 /100 WBC    Gran % 63.6 38.0 - 73.0 %    Lymph % 25.4 18.0 - 48.0 %    Mono % 7.2 4.0 - 15.0 %    Eosinophil % 2.9 0.0 - 8.0 %    Basophil % 0.6 0.0 - 1.9 %    Differential Method Automated    Comprehensive metabolic panel    Collection Time: 10/15/23  3:17 AM   Result Value Ref Range    Sodium 139 136 - 145 mmol/L    Potassium 3.8 3.5 - 5.1 mmol/L    Chloride 102 95 - 110 mmol/L    CO2 22 (L) 23 - 29 mmol/L    Glucose 260 (H) 70 - 110 mg/dL    BUN 10 6 - 20 mg/dL    Creatinine 0.8 0.5 - 1.4 mg/dL    Calcium 9.0 8.7 - 10.5 mg/dL    Total Protein 7.8 6.0 - 8.4 g/dL    Albumin 4.1 3.5 - 5.2 g/dL    Total Bilirubin 0.5 0.1 - 1.0 mg/dL    Alkaline Phosphatase 52 (L) 55 - 135 U/L    AST 25 10 - 40 U/L    ALT 27 10 - 44 U/L    eGFR >60 >60 mL/min/1.73 m^2    Anion Gap 15 8 - 16 mmol/L   Lipase    Collection Time: 10/15/23  3:17 AM   Result Value Ref Range    Lipase >1000 (H) 4 - 60 U/L       RADIOLOGY  X-Ray Chest PA And Lateral    Result Date: 9/28/2023  EXAMINATION: XR CHEST PA AND LATERAL CLINICAL HISTORY: Shortness of breath TECHNIQUE: PA and lateral views of the chest were performed. FINDINGS: Comparison: May 20, 2020. Right subclavian central venous catheter with port is redemonstrated in stable position. Mediastinal silhouette is within normal limits.  The lungs are clear.  No pneumothorax or pleural effusion.  No acute osseous finding.     No acute finding in the chest. Electronically signed by: Alan Burgess Date:    09/28/2023 Time:    07:21      EKG    MICROBIOLOGY    MDM     Amount and/or Complexity of Data Reviewed  Clinical lab tests: reviewed  Tests in the radiology section of CPT®: reviewed  Tests in the medicine section of CPT®: reviewed  Discussion of test results with the performing providers: yes  Decide to obtain previous medical records or to obtain history from someone other than the patient: yes  Obtain history from someone other than the patient: yes  Review and summarize  "past medical records: yes  Discuss the patient with other providers: yes  Independent visualization of images, tracings, or specimens: yes          Assessment/Plan:     * Acute pancreatitis  Numerous CT imaging of abdomen/pelvis throughout the last year.  Most recent 1 was performed on 07/05/2023 which revealed:[   Mild mesenteric fat stranding in the mid abdomen with a few lymph nodes similar to prior exam.  This could relate to adenitis].  Plan:  -NPO   -IVFs   -analgesics p.r.n.   -GI consult warranted      Diabetes mellitus type 2 in obese  Patient's FSGs are uncontrolled due to hyperglycemia on current medication regimen.  Last A1c reviewed-   Lab Results   Component Value Date    HGBA1C 9.6 (H) 05/19/2023     Most recent fingerstick glucose reviewed- No results for input(s): "POCTGLUCOSE" in the last 24 hours.  Current correctional scale  Low  Maintain anti-hyperglycemic dose as follows-   Antihyperglycemics (From admission, onward)    Start     Stop Route Frequency Ordered    10/15/23 0631  insulin aspart U-100 pen 0-5 Units         -- SubQ Before meals & nightly PRN 10/15/23 0531        Most recent A1c measuring   Hemoglobin A1C   Date Value Ref Range Status   05/19/2023 9.6 (H) 4.0 - 5.6 % Final     Comment:     ADA Screening Guidelines:  5.7-6.4%  Consistent with prediabetes  >or=6.5%  Consistent with diabetes    High levels of fetal hemoglobin interfere with the HbA1C  assay. Heterozygous hemoglobin variants (HbS, HgC, etc)do  not significantly interfere with this assay.   However, presence of multiple variants may affect accuracy.     02/24/2022 8.9 (H) 4.0 - 5.6 % Final     Comment:     ADA Screening Guidelines:  5.7-6.4%  Consistent with prediabetes  >or=6.5%  Consistent with diabetes    High levels of fetal hemoglobin interfere with the HbA1C  assay. Heterozygous hemoglobin variants (HbS, HgC, etc)do  not significantly interfere with this assay.   However, presence of multiple variants may affect " accuracy.     05/27/2021 9.9 (H) 4.0 - 5.6 % Final     Comment:     ADA Screening Guidelines:  5.7-6.4%  Consistent with prediabetes  >or=6.5%  Consistent with diabetes    High levels of fetal hemoglobin interfere with the HbA1C  assay. Heterozygous hemoglobin variants (HbS, HgC, etc)do  not significantly interfere with this assay.   However, presence of multiple variants may affect accuracy.        Plan:  -SSI  -Accu-checks   -Hypoglycemic protocol   -Obtain A1c level   -Hold oral antihyperglycemics while inpatient   -Continue bud-acting insulin at 20% decrease, titrate as needed         Essential hypertension  Currently normotensive.  BP usually well controlled per patient with home medications.  Plan:  -Optimize pain control   -Continue home medications (norvasc), titrate as needed   -Monitor BP  -Low salt/cardiac diet when not NPO  -IV hydralazine prn for SBP>160 or DBP>90           Bipolar 1 disorder  Chronic. Stable. Not in acute exacerbation and currently denies endorsing any suicidal/homicidal ideations. Not currently on medications.  Plan:  -Continue to monitor        Depressed bipolar II disorder  See above      Borderline personality disorder  See above    Mixed hyperlipidemia  Patient is chronically on statin.will not continue for now. Last Lipid Panel:   Lab Results   Component Value Date    CHOL 216 (H) 06/30/2023    HDL 42 06/30/2023    LDLCALC 151 (H) 06/30/2023    TRIG 126 06/30/2023    CHOLHDL 18.5 (L) 02/24/2022     Plan:  -Hold home medication  -low fat/low calorie diet        VTE Risk Mitigation (From admission, onward)         Ordered     Reason for No Pharmacological VTE Prophylaxis  Once        Question:  Reasons:  Answer:  Physician Provided (leave comment)    10/15/23 0531     IP VTE HIGH RISK PATIENT  Once         10/15/23 0531     Place sequential compression device  Until discontinued         10/15/23 0531               //Core Measures   -DVT proph: SCDs, withholding anticoagulation  pending surgical intervention   -Code status Full    -Surrogate:none    Components of this note were documented using a voice recognition system and are subject to errors not corrected at the time the document was proof read. Please contact the author for any clarifications.       Marlo Hilario NP  Department of Hospital Medicine  [facility] - Emergency Dept.

## 2023-10-15 NOTE — ASSESSMENT & PLAN NOTE
Chronic. Stable. Not in acute exacerbation and currently denies endorsing any suicidal/homicidal ideations. Not currently on medications.  Plan:  -Continue to monitor

## 2023-10-16 PROBLEM — F31.81: Status: RESOLVED | Noted: 2018-02-14 | Resolved: 2023-10-16

## 2023-10-16 PROBLEM — H66.92 LEFT OTITIS MEDIA: Status: ACTIVE | Noted: 2023-10-16

## 2023-10-16 LAB
BASOPHILS # BLD AUTO: 0.05 K/UL (ref 0–0.2)
BASOPHILS NFR BLD: 0.7 % (ref 0–1.9)
DIFFERENTIAL METHOD: NORMAL
EOSINOPHIL # BLD AUTO: 0.4 K/UL (ref 0–0.5)
EOSINOPHIL NFR BLD: 5.3 % (ref 0–8)
ERYTHROCYTE [DISTWIDTH] IN BLOOD BY AUTOMATED COUNT: 12.3 % (ref 11.5–14.5)
HCT VFR BLD AUTO: 37 % (ref 37–48.5)
HGB BLD-MCNC: 12.8 G/DL (ref 12–16)
IMM GRANULOCYTES # BLD AUTO: 0.02 K/UL (ref 0–0.04)
IMM GRANULOCYTES NFR BLD AUTO: 0.3 % (ref 0–0.5)
LYMPHOCYTES # BLD AUTO: 2.5 K/UL (ref 1–4.8)
LYMPHOCYTES NFR BLD: 33.9 % (ref 18–48)
MCH RBC QN AUTO: 28.9 PG (ref 27–31)
MCHC RBC AUTO-ENTMCNC: 34.6 G/DL (ref 32–36)
MCV RBC AUTO: 84 FL (ref 82–98)
MONOCYTES # BLD AUTO: 0.5 K/UL (ref 0.3–1)
MONOCYTES NFR BLD: 6.8 % (ref 4–15)
NEUTROPHILS # BLD AUTO: 4 K/UL (ref 1.8–7.7)
NEUTROPHILS NFR BLD: 53 % (ref 38–73)
NRBC BLD-RTO: 0 /100 WBC
PLATELET # BLD AUTO: 293 K/UL (ref 150–450)
PMV BLD AUTO: 10 FL (ref 9.2–12.9)
POCT GLUCOSE: 121 MG/DL (ref 70–110)
POCT GLUCOSE: 135 MG/DL (ref 70–110)
POCT GLUCOSE: 155 MG/DL (ref 70–110)
POCT GLUCOSE: 195 MG/DL (ref 70–110)
RBC # BLD AUTO: 4.43 M/UL (ref 4–5.4)
WBC # BLD AUTO: 7.5 K/UL (ref 3.9–12.7)

## 2023-10-16 PROCEDURE — 85025 COMPLETE CBC W/AUTO DIFF WBC: CPT | Performed by: HOSPITALIST

## 2023-10-16 PROCEDURE — 25000003 PHARM REV CODE 250: Performed by: NURSE PRACTITIONER

## 2023-10-16 PROCEDURE — 25000003 PHARM REV CODE 250: Performed by: INTERNAL MEDICINE

## 2023-10-16 PROCEDURE — 11000001 HC ACUTE MED/SURG PRIVATE ROOM

## 2023-10-16 PROCEDURE — 63600175 PHARM REV CODE 636 W HCPCS: Performed by: INTERNAL MEDICINE

## 2023-10-16 PROCEDURE — 63600175 PHARM REV CODE 636 W HCPCS: Performed by: NURSE PRACTITIONER

## 2023-10-16 PROCEDURE — 63700000 PHARM REV CODE 250 ALT 637 W/O HCPCS: Performed by: INTERNAL MEDICINE

## 2023-10-16 PROCEDURE — 36415 COLL VENOUS BLD VENIPUNCTURE: CPT | Performed by: HOSPITALIST

## 2023-10-16 RX ORDER — AZITHROMYCIN 250 MG/1
500 TABLET, FILM COATED ORAL ONCE
Status: COMPLETED | OUTPATIENT
Start: 2023-10-16 | End: 2023-10-16

## 2023-10-16 RX ORDER — MORPHINE SULFATE 4 MG/ML
4 INJECTION, SOLUTION INTRAMUSCULAR; INTRAVENOUS EVERY 4 HOURS PRN
Status: DISCONTINUED | OUTPATIENT
Start: 2023-10-16 | End: 2023-10-17

## 2023-10-16 RX ORDER — OXYCODONE AND ACETAMINOPHEN 10; 325 MG/1; MG/1
1 TABLET ORAL EVERY 4 HOURS PRN
Status: DISCONTINUED | OUTPATIENT
Start: 2023-10-16 | End: 2023-10-18

## 2023-10-16 RX ORDER — AZITHROMYCIN 250 MG/1
250 TABLET, FILM COATED ORAL DAILY
Status: DISCONTINUED | OUTPATIENT
Start: 2023-10-17 | End: 2023-10-19 | Stop reason: HOSPADM

## 2023-10-16 RX ADMIN — AZITHROMYCIN 500 MG: 250 TABLET, FILM COATED ORAL at 03:10

## 2023-10-16 RX ADMIN — HYDROCODONE BITARTRATE AND ACETAMINOPHEN 1 TABLET: 5; 325 TABLET ORAL at 09:10

## 2023-10-16 RX ADMIN — ONDANSETRON 4 MG: 2 INJECTION INTRAMUSCULAR; INTRAVENOUS at 11:10

## 2023-10-16 RX ADMIN — MORPHINE SULFATE 4 MG: 4 INJECTION INTRAVENOUS at 01:10

## 2023-10-16 RX ADMIN — INSULIN DETEMIR 12 UNITS: 100 INJECTION, SOLUTION SUBCUTANEOUS at 09:10

## 2023-10-16 RX ADMIN — MORPHINE SULFATE 4 MG: 4 INJECTION INTRAVENOUS at 10:10

## 2023-10-16 RX ADMIN — MORPHINE SULFATE 4 MG: 4 INJECTION INTRAVENOUS at 06:10

## 2023-10-16 RX ADMIN — SODIUM CHLORIDE, SODIUM LACTATE, POTASSIUM CHLORIDE, AND CALCIUM CHLORIDE: 600; 310; 30; 20 INJECTION, SOLUTION INTRAVENOUS at 09:10

## 2023-10-16 RX ADMIN — SODIUM CHLORIDE, SODIUM LACTATE, POTASSIUM CHLORIDE, AND CALCIUM CHLORIDE: 600; 310; 30; 20 INJECTION, SOLUTION INTRAVENOUS at 01:10

## 2023-10-16 RX ADMIN — ONDANSETRON 4 MG: 2 INJECTION INTRAMUSCULAR; INTRAVENOUS at 01:10

## 2023-10-16 RX ADMIN — MORPHINE SULFATE 4 MG: 4 INJECTION INTRAVENOUS at 03:10

## 2023-10-16 RX ADMIN — SODIUM CHLORIDE, SODIUM LACTATE, POTASSIUM CHLORIDE, AND CALCIUM CHLORIDE: 600; 310; 30; 20 INJECTION, SOLUTION INTRAVENOUS at 11:10

## 2023-10-16 RX ADMIN — MORPHINE SULFATE 4 MG: 4 INJECTION INTRAVENOUS at 07:10

## 2023-10-16 RX ADMIN — OXYCODONE AND ACETAMINOPHEN 1 TABLET: 10; 325 TABLET ORAL at 05:10

## 2023-10-16 RX ADMIN — AMLODIPINE BESYLATE 5 MG: 5 TABLET ORAL at 09:10

## 2023-10-16 RX ADMIN — ONDANSETRON 4 MG: 2 INJECTION INTRAMUSCULAR; INTRAVENOUS at 10:10

## 2023-10-16 RX ADMIN — SODIUM CHLORIDE, SODIUM LACTATE, POTASSIUM CHLORIDE, AND CALCIUM CHLORIDE: 600; 310; 30; 20 INJECTION, SOLUTION INTRAVENOUS at 05:10

## 2023-10-16 RX ADMIN — MORPHINE SULFATE 4 MG: 4 INJECTION INTRAVENOUS at 11:10

## 2023-10-16 NOTE — ASSESSMENT & PLAN NOTE
Chronic. Stable. Not in acute exacerbation and currently denies endorsing any suicidal/homicidal ideations. Not currently on medications.  Continue to monitor  Follow up outpatient

## 2023-10-16 NOTE — ASSESSMENT & PLAN NOTE
Currently normotensive.  BP usually well controlled per patient with home medications.  -Optimize pain control   -Continue home medications (norvasc), titrate as needed   -Monitor BP  -Low salt/cardiac diet when not NPO  -IV hydralazine prn for SBP>160 or DBP>90

## 2023-10-16 NOTE — PLAN OF CARE
Patient resting currently, no distress noted, VSS, patient has rcv'd morphine and zofran throughout the night for pain and nausea, LR@125cc/hr infusing currently, blood sugars monitored, fall precautions in place

## 2023-10-16 NOTE — ASSESSMENT & PLAN NOTE
Patient's FSGs are controlled on current medication regimen.  Last A1c reviewed-   Lab Results   Component Value Date    HGBA1C 6.5 (H) 10/15/2023     Most recent fingerstick glucose reviewed-   Recent Labs   Lab 10/15/23  2140 10/16/23  0506 10/16/23  1120   POCTGLUCOSE 141* 121* 135*     Current correctional scale  Low  Maintain anti-hyperglycemic dose as follows-   Antihyperglycemics (From admission, onward)    Start     Stop Route Frequency Ordered    10/15/23 0900  insulin detemir U-100 (Levemir) pen 12 Units         -- SubQ Daily 10/15/23 0547    10/15/23 0631  insulin aspart U-100 pen 0-5 Units         -- SubQ Before meals & nightly PRN 10/15/23 0531        Most recent A1c measuring   Hemoglobin A1C   Date Value Ref Range Status   10/15/2023 6.5 (H) 4.0 - 5.6 % Final     Comment:     ADA Screening Guidelines:  5.7-6.4%  Consistent with prediabetes  >or=6.5%  Consistent with diabetes    High levels of fetal hemoglobin interfere with the HbA1C  assay. Heterozygous hemoglobin variants (HbS, HgC, etc)do  not significantly interfere with this assay.   However, presence of multiple variants may affect accuracy.     05/19/2023 9.6 (H) 4.0 - 5.6 % Final     Comment:     ADA Screening Guidelines:  5.7-6.4%  Consistent with prediabetes  >or=6.5%  Consistent with diabetes    High levels of fetal hemoglobin interfere with the HbA1C  assay. Heterozygous hemoglobin variants (HbS, HgC, etc)do  not significantly interfere with this assay.   However, presence of multiple variants may affect accuracy.     02/24/2022 8.9 (H) 4.0 - 5.6 % Final     Comment:     ADA Screening Guidelines:  5.7-6.4%  Consistent with prediabetes  >or=6.5%  Consistent with diabetes    High levels of fetal hemoglobin interfere with the HbA1C  assay. Heterozygous hemoglobin variants (HbS, HgC, etc)do  not significantly interfere with this assay.   However, presence of multiple variants may affect accuracy.        -SSI  -Accu-checks   -Hypoglycemia  protocol   -Hold oral antihyperglycemics while inpatient   -Continue bud-acting insulin at lower dose given NPO status due to acute pancreatitis

## 2023-10-16 NOTE — ASSESSMENT & PLAN NOTE
Patient reports pain on the left ear with sensation of drainage.  Unable to view tympanic membrane with otoscope due to cerumen, however with noted tenderness  Start azithromycin for presumed otitis media

## 2023-10-16 NOTE — PROGRESS NOTES
[facility] Medicine  Progress Note    Patient Name: Priti Obrien  MRN: [id number]  Patient Class: IP- Inpatient   Admission Date: 10/15/2023  Length of Stay: 1 days  Attending Physician: Darlene Platt DO  Primary Care Provider: Sherri Kovacs FNP        Subjective:     Principal Problem:Acute pancreatitis        HPI:  Priti Obrien is a 38 y.o. female with a PMH  has a past medical history of Allergy, Asthma, Bipolar 1 disorder, Borderline personality disorder, Depression, GERD (gastroesophageal reflux disease), History of psychiatric hospitalization, psychiatric care, Hypertension, Pancreatitis, Psychiatric problem, PTSD (post-traumatic stress disorder), S/P partial hysterectomy (September 2011), Substance abuse, and Suicide attempt.  Presented to the ER for evaluation of epigastric abdominal pain which started approximately 1 hour prior to arrival.  Associated symptoms include nonbloody/nonbilious vomitus.  Patient reports the pain radiates into her back.  Reports taking Zofran prior to arrival without any relief of her symptoms.  Denies any other associated symptoms.  Patient has a longstanding history of pancreatitis since she was 8 years old, but states she is not been evaluated by a GI specialist in over a few years.  Denies any GI/ symptoms.  ER workup revealed CBG of 260 mg/d (last A1c level was 9.6% on 05/19/2023) and lipase level greater than 1,000. Patient received 5 mg of Haldol, 1 mg Dilaudid, 4 mg Zofran, and 1 L of normal saline in ED. hospital Medicine consulted to admit patient for acute pancreatitis.    PCP: Sherri Kovacs      Overview/Hospital Course:  No notes on file    Interval History: No acute events overnight.  Seen and examined without any family present.  Continues to report abdominal pain and nausea.  Also reports feeling like she is experiencing ear infection on the left side reports sensation of drainage.  Admits to feeling stressed about the need for  continued hospitalization.  Denies any other complaints at this time    Review of Systems  Objective:     Vital Signs (Most Recent):  Temp: 98.1 °F (36.7 °C) (10/16/23 1203)  Pulse: 70 (10/16/23 1203)  Resp: 18 (10/16/23 1203)  BP: 138/84 (10/16/23 1203)  SpO2: 97 % (10/16/23 1203) Vital Signs (24h Range):  Temp:  [97.9 °F (36.6 °C)-98.2 °F (36.8 °C)] 98.1 °F (36.7 °C)  Pulse:  [59-70] 70  Resp:  [16-20] 18  SpO2:  [93 %-99 %] 97 %  BP: (111-143)/(65-88) 138/84     Weight: 99.8 kg (220 lb 0.3 oz)  Body mass index is 40.24 kg/m².    Intake/Output Summary (Last 24 hours) at 10/16/2023 1439  Last data filed at 10/16/2023 0646  Gross per 24 hour   Intake 3116.79 ml   Output --   Net 3116.79 ml         Physical Exam  Vitals and nursing note reviewed.   Constitutional:       General: She is not in acute distress.     Appearance: She is morbidly obese. She is not ill-appearing.   HENT:      Head: Normocephalic and atraumatic.      Right Ear: External ear normal.      Left Ear: External ear normal.      Ears:      Comments: Unable to view left tympanic membrane due to presence of cerumen, erythema of canal noted     Nose: Nose normal.      Mouth/Throat:      Mouth: Mucous membranes are moist.   Eyes:      Pupils: Pupils are equal, round, and reactive to light.   Cardiovascular:      Rate and Rhythm: Normal rate and regular rhythm.   Pulmonary:      Effort: Pulmonary effort is normal. No accessory muscle usage or respiratory distress.      Breath sounds: No wheezing or rhonchi.      Comments: On room air  Abdominal:      General: Bowel sounds are normal. There is no distension.      Palpations: Abdomen is soft.      Tenderness: There is no abdominal tenderness. There is no guarding or rebound.   Musculoskeletal:      Cervical back: Neck supple.      Right lower leg: No edema.      Left lower leg: No edema.   Skin:     General: Skin is warm and dry.   Neurological:      General: No focal deficit present.      Mental Status:  She is alert and oriented to person, place, and time. Mental status is at baseline.   Psychiatric:         Behavior: Behavior is cooperative.             Significant Labs: All pertinent labs within the past 24 hours have been reviewed.  CBC:   Recent Labs   Lab 10/15/23  0317 10/16/23  0526   WBC 9.26 7.50   HGB 14.9 12.8   HCT 42.0 37.0    293     CMP:   Recent Labs   Lab 10/15/23  0317 10/15/23  1637    141   K 3.8 3.5    104   CO2 22* 25   * 128*   BUN 10 11   CREATININE 0.8 0.7   CALCIUM 9.0 8.6*   PROT 7.8 7.0   ALBUMIN 4.1 3.8   BILITOT 0.5 0.7   ALKPHOS 52* 45*   AST 25 18   ALT 27 23   ANIONGAP 15 12       Significant Imaging: I have reviewed all pertinent imaging results/findings within the past 24 hours.      Assessment/Plan:      * Acute pancreatitis  Numerous CT imaging of abdomen/pelvis throughout the last year.  Most recent 1 was performed on 07/05/2023 which revealed:[ Mild mesenteric fat stranding in the mid abdomen with a few lymph nodes similar to prior exam.  This could relate to adenitis].    -NPO except for ice chips   -IVFs   -analgesics and antiemetics p.r.n.   -GI consult warranted      Left otitis media  Patient reports pain on the left ear with sensation of drainage.  Unable to view tympanic membrane with otoscope due to cerumen, however with noted tenderness  Start azithromycin for presumed otitis media      Mixed hyperlipidemia  Patient is chronically on statin.will not continue for now. Last Lipid Panel:   Lab Results   Component Value Date    CHOL 216 (H) 06/30/2023    HDL 42 06/30/2023    LDLCALC 151 (H) 06/30/2023    TRIG 126 06/30/2023    CHOLHDL 18.5 (L) 02/24/2022     Plan:  -Hold home medication  -low fat/low calorie diet when no longer NPO        Essential hypertension  Currently normotensive.  BP usually well controlled per patient with home medications.  -Optimize pain control   -Continue home medications (norvasc), titrate as needed   -Monitor  BP  -Low salt/cardiac diet when not NPO  -IV hydralazine prn for SBP>160 or DBP>90           Diabetes mellitus type 2 in obese  Patient's FSGs are controlled on current medication regimen.  Last A1c reviewed-   Lab Results   Component Value Date    HGBA1C 6.5 (H) 10/15/2023     Most recent fingerstick glucose reviewed-   Recent Labs   Lab 10/15/23  2140 10/16/23  0506 10/16/23  1120   POCTGLUCOSE 141* 121* 135*     Current correctional scale  Low  Maintain anti-hyperglycemic dose as follows-   Antihyperglycemics (From admission, onward)    Start     Stop Route Frequency Ordered    10/15/23 0900  insulin detemir U-100 (Levemir) pen 12 Units         -- SubQ Daily 10/15/23 0547    10/15/23 0631  insulin aspart U-100 pen 0-5 Units         -- SubQ Before meals & nightly PRN 10/15/23 0531        Most recent A1c measuring   Hemoglobin A1C   Date Value Ref Range Status   10/15/2023 6.5 (H) 4.0 - 5.6 % Final     Comment:     ADA Screening Guidelines:  5.7-6.4%  Consistent with prediabetes  >or=6.5%  Consistent with diabetes    High levels of fetal hemoglobin interfere with the HbA1C  assay. Heterozygous hemoglobin variants (HbS, HgC, etc)do  not significantly interfere with this assay.   However, presence of multiple variants may affect accuracy.     05/19/2023 9.6 (H) 4.0 - 5.6 % Final     Comment:     ADA Screening Guidelines:  5.7-6.4%  Consistent with prediabetes  >or=6.5%  Consistent with diabetes    High levels of fetal hemoglobin interfere with the HbA1C  assay. Heterozygous hemoglobin variants (HbS, HgC, etc)do  not significantly interfere with this assay.   However, presence of multiple variants may affect accuracy.     02/24/2022 8.9 (H) 4.0 - 5.6 % Final     Comment:     ADA Screening Guidelines:  5.7-6.4%  Consistent with prediabetes  >or=6.5%  Consistent with diabetes    High levels of fetal hemoglobin interfere with the HbA1C  assay. Heterozygous hemoglobin variants (HbS, HgC, etc)do  not significantly  interfere with this assay.   However, presence of multiple variants may affect accuracy.        -SSI  -Accu-checks   -Hypoglycemia protocol   -Hold oral antihyperglycemics while inpatient   -Continue bud-acting insulin at lower dose given NPO status due to acute pancreatitis      Class 3 severe obesity in adult  Body mass index is 40.24 kg/m². Morbid obesity complicates all aspects of disease management from diagnostic modalities to treatment. Weight loss encouraged and health benefits explained to patient.         Borderline personality disorder  See above    Bipolar 1 disorder  Chronic. Stable. Not in acute exacerbation and currently denies endorsing any suicidal/homicidal ideations. Not currently on medications.  Continue to monitor  Follow up outpatient        VTE Risk Mitigation (From admission, onward)         Ordered     Reason for No Pharmacological VTE Prophylaxis  Once        Question:  Reasons:  Answer:  Physician Provided (leave comment)    10/15/23 0531     IP VTE HIGH RISK PATIENT  Once         10/15/23 0531     Place sequential compression device  Until discontinued         10/15/23 0531                Discharge Planning   TANYA:      Code Status: Full Code   Is the patient medically ready for discharge?:     Reason for patient still in hospital (select all that apply): Patient trending condition and Treatment                     Darlene Platt DO  Department of Hospital Medicine   [facility] - Med Surg

## 2023-10-16 NOTE — SUBJECTIVE & OBJECTIVE
Interval History: No acute events overnight.  Seen and examined without any family present.  Continues to report abdominal pain and nausea.  Also reports feeling like she is experiencing ear infection on the left side reports sensation of drainage.  Admits to feeling stressed about the need for continued hospitalization.  Denies any other complaints at this time    Review of Systems  Objective:     Vital Signs (Most Recent):  Temp: 98.1 °F (36.7 °C) (10/16/23 1203)  Pulse: 70 (10/16/23 1203)  Resp: 18 (10/16/23 1203)  BP: 138/84 (10/16/23 1203)  SpO2: 97 % (10/16/23 1203) Vital Signs (24h Range):  Temp:  [97.9 °F (36.6 °C)-98.2 °F (36.8 °C)] 98.1 °F (36.7 °C)  Pulse:  [59-70] 70  Resp:  [16-20] 18  SpO2:  [93 %-99 %] 97 %  BP: (111-143)/(65-88) 138/84     Weight: 99.8 kg (220 lb 0.3 oz)  Body mass index is 40.24 kg/m².    Intake/Output Summary (Last 24 hours) at 10/16/2023 1439  Last data filed at 10/16/2023 0646  Gross per 24 hour   Intake 3116.79 ml   Output --   Net 3116.79 ml         Physical Exam  Vitals and nursing note reviewed.   Constitutional:       General: She is not in acute distress.     Appearance: She is morbidly obese. She is not ill-appearing.   HENT:      Head: Normocephalic and atraumatic.      Right Ear: External ear normal.      Left Ear: External ear normal.      Ears:      Comments: Unable to view left tympanic membrane due to presence of cerumen, erythema of canal noted     Nose: Nose normal.      Mouth/Throat:      Mouth: Mucous membranes are moist.   Eyes:      Pupils: Pupils are equal, round, and reactive to light.   Cardiovascular:      Rate and Rhythm: Normal rate and regular rhythm.   Pulmonary:      Effort: Pulmonary effort is normal. No accessory muscle usage or respiratory distress.      Breath sounds: No wheezing or rhonchi.      Comments: On room air  Abdominal:      General: Bowel sounds are normal. There is no distension.      Palpations: Abdomen is soft.      Tenderness: There  is no abdominal tenderness. There is no guarding or rebound.   Musculoskeletal:      Cervical back: Neck supple.      Right lower leg: No edema.      Left lower leg: No edema.   Skin:     General: Skin is warm and dry.   Neurological:      General: No focal deficit present.      Mental Status: She is alert and oriented to person, place, and time. Mental status is at baseline.   Psychiatric:         Behavior: Behavior is cooperative.             Significant Labs: All pertinent labs within the past 24 hours have been reviewed.  CBC:   Recent Labs   Lab 10/15/23  0317 10/16/23  0526   WBC 9.26 7.50   HGB 14.9 12.8   HCT 42.0 37.0    293     CMP:   Recent Labs   Lab 10/15/23  0317 10/15/23  1637    141   K 3.8 3.5    104   CO2 22* 25   * 128*   BUN 10 11   CREATININE 0.8 0.7   CALCIUM 9.0 8.6*   PROT 7.8 7.0   ALBUMIN 4.1 3.8   BILITOT 0.5 0.7   ALKPHOS 52* 45*   AST 25 18   ALT 27 23   ANIONGAP 15 12       Significant Imaging: I have reviewed all pertinent imaging results/findings within the past 24 hours.

## 2023-10-16 NOTE — ASSESSMENT & PLAN NOTE
Body mass index is 40.24 kg/m². Morbid obesity complicates all aspects of disease management from diagnostic modalities to treatment. Weight loss encouraged and health benefits explained to patient.

## 2023-10-16 NOTE — ASSESSMENT & PLAN NOTE
Patient is chronically on statin.will not continue for now. Last Lipid Panel:   Lab Results   Component Value Date    CHOL 216 (H) 06/30/2023    HDL 42 06/30/2023    LDLCALC 151 (H) 06/30/2023    TRIG 126 06/30/2023    CHOLHDL 18.5 (L) 02/24/2022     Plan:  -Hold home medication  -low fat/low calorie diet when no longer NPO

## 2023-10-16 NOTE — ASSESSMENT & PLAN NOTE
Numerous CT imaging of abdomen/pelvis throughout the last year.  Most recent 1 was performed on 07/05/2023 which revealed:[ Mild mesenteric fat stranding in the mid abdomen with a few lymph nodes similar to prior exam.  This could relate to adenitis].    -NPO except for ice chips   -IVFs   -analgesics and antiemetics p.r.n.   -GI consult warranted

## 2023-10-16 NOTE — PLAN OF CARE
Discussed poc with pt, pt verbalized understanding    Purposeful rounding every 2hours    VS wnl  Blood glucose monitoring AC/HS  Fall precautions in place, remains injury free  Pain and nausea under control with PRN meds    IVFs LR @125mL  Accurate I&Os  Abx given as prescribed  Bed locked at lowest position  Call light within reach    Chart check complete  Will cont with POC

## 2023-10-17 LAB
ALBUMIN SERPL BCP-MCNC: 3.5 G/DL (ref 3.5–5.2)
ALP SERPL-CCNC: 47 U/L (ref 55–135)
ALT SERPL W/O P-5'-P-CCNC: 21 U/L (ref 10–44)
ANION GAP SERPL CALC-SCNC: 10 MMOL/L (ref 8–16)
AST SERPL-CCNC: 16 U/L (ref 10–40)
BILIRUB SERPL-MCNC: 0.5 MG/DL (ref 0.1–1)
BUN SERPL-MCNC: 10 MG/DL (ref 6–20)
CALCIUM SERPL-MCNC: 8.4 MG/DL (ref 8.7–10.5)
CHLORIDE SERPL-SCNC: 105 MMOL/L (ref 95–110)
CO2 SERPL-SCNC: 25 MMOL/L (ref 23–29)
CREAT SERPL-MCNC: 0.8 MG/DL (ref 0.5–1.4)
EST. GFR  (NO RACE VARIABLE): >60 ML/MIN/1.73 M^2
GLUCOSE SERPL-MCNC: 201 MG/DL (ref 70–110)
POCT GLUCOSE: 134 MG/DL (ref 70–110)
POCT GLUCOSE: 166 MG/DL (ref 70–110)
POCT GLUCOSE: 173 MG/DL (ref 70–110)
POCT GLUCOSE: 206 MG/DL (ref 70–110)
POTASSIUM SERPL-SCNC: 3.7 MMOL/L (ref 3.5–5.1)
PROT SERPL-MCNC: 6.6 G/DL (ref 6–8.4)
SODIUM SERPL-SCNC: 140 MMOL/L (ref 136–145)

## 2023-10-17 PROCEDURE — 11000001 HC ACUTE MED/SURG PRIVATE ROOM

## 2023-10-17 PROCEDURE — 63600175 PHARM REV CODE 636 W HCPCS: Performed by: NURSE PRACTITIONER

## 2023-10-17 PROCEDURE — 80053 COMPREHEN METABOLIC PANEL: CPT | Performed by: INTERNAL MEDICINE

## 2023-10-17 PROCEDURE — 36415 COLL VENOUS BLD VENIPUNCTURE: CPT | Performed by: INTERNAL MEDICINE

## 2023-10-17 PROCEDURE — 25000003 PHARM REV CODE 250: Performed by: NURSE PRACTITIONER

## 2023-10-17 PROCEDURE — 25000003 PHARM REV CODE 250: Performed by: INTERNAL MEDICINE

## 2023-10-17 PROCEDURE — 63700000 PHARM REV CODE 250 ALT 637 W/O HCPCS: Performed by: INTERNAL MEDICINE

## 2023-10-17 PROCEDURE — 63600175 PHARM REV CODE 636 W HCPCS: Performed by: INTERNAL MEDICINE

## 2023-10-17 RX ORDER — MORPHINE SULFATE 2 MG/ML
2 INJECTION, SOLUTION INTRAMUSCULAR; INTRAVENOUS EVERY 4 HOURS PRN
Status: DISCONTINUED | OUTPATIENT
Start: 2023-10-17 | End: 2023-10-18

## 2023-10-17 RX ORDER — HYDROCODONE BITARTRATE AND ACETAMINOPHEN 5; 325 MG/1; MG/1
1 TABLET ORAL EVERY 4 HOURS PRN
Status: DISCONTINUED | OUTPATIENT
Start: 2023-10-17 | End: 2023-10-19 | Stop reason: HOSPADM

## 2023-10-17 RX ADMIN — MORPHINE SULFATE 2 MG: 2 INJECTION, SOLUTION INTRAMUSCULAR; INTRAVENOUS at 10:10

## 2023-10-17 RX ADMIN — INSULIN ASPART 1 UNITS: 100 INJECTION, SOLUTION INTRAVENOUS; SUBCUTANEOUS at 07:10

## 2023-10-17 RX ADMIN — MORPHINE SULFATE 4 MG: 4 INJECTION INTRAVENOUS at 03:10

## 2023-10-17 RX ADMIN — AMLODIPINE BESYLATE 5 MG: 5 TABLET ORAL at 09:10

## 2023-10-17 RX ADMIN — OXYCODONE AND ACETAMINOPHEN 1 TABLET: 10; 325 TABLET ORAL at 03:10

## 2023-10-17 RX ADMIN — SODIUM CHLORIDE, SODIUM LACTATE, POTASSIUM CHLORIDE, AND CALCIUM CHLORIDE: 600; 310; 30; 20 INJECTION, SOLUTION INTRAVENOUS at 04:10

## 2023-10-17 RX ADMIN — SODIUM CHLORIDE, SODIUM LACTATE, POTASSIUM CHLORIDE, AND CALCIUM CHLORIDE: 600; 310; 30; 20 INJECTION, SOLUTION INTRAVENOUS at 07:10

## 2023-10-17 RX ADMIN — MORPHINE SULFATE 2 MG: 2 INJECTION, SOLUTION INTRAMUSCULAR; INTRAVENOUS at 06:10

## 2023-10-17 RX ADMIN — ONDANSETRON 4 MG: 2 INJECTION INTRAMUSCULAR; INTRAVENOUS at 10:10

## 2023-10-17 RX ADMIN — INSULIN DETEMIR 12 UNITS: 100 INJECTION, SOLUTION SUBCUTANEOUS at 09:10

## 2023-10-17 RX ADMIN — OXYCODONE AND ACETAMINOPHEN 1 TABLET: 10; 325 TABLET ORAL at 11:10

## 2023-10-17 RX ADMIN — MORPHINE SULFATE 2 MG: 2 INJECTION, SOLUTION INTRAMUSCULAR; INTRAVENOUS at 09:10

## 2023-10-17 RX ADMIN — MORPHINE SULFATE 2 MG: 2 INJECTION, SOLUTION INTRAMUSCULAR; INTRAVENOUS at 02:10

## 2023-10-17 RX ADMIN — OXYCODONE AND ACETAMINOPHEN 1 TABLET: 10; 325 TABLET ORAL at 07:10

## 2023-10-17 RX ADMIN — AZITHROMYCIN 250 MG: 250 TABLET, FILM COATED ORAL at 09:10

## 2023-10-17 RX ADMIN — ONDANSETRON 4 MG: 2 INJECTION INTRAMUSCULAR; INTRAVENOUS at 03:10

## 2023-10-17 NOTE — PLAN OF CARE
Discussed poc with pt, pt verbalized understanding    Purposeful rounding every 2hours    VS wnl  Blood glucose monitoring   Fall precautions in place, remains injury free  Pain and nausea under control with PRN meds    IVFs:  mL/h  Accurate I&Os  Bed locked at lowest position  Call light within reach    Chart check complete  Will cont with POC

## 2023-10-17 NOTE — ASSESSMENT & PLAN NOTE
Patient reports pain on the left ear with sensation of drainage.  Unable to view tympanic membrane with otoscope due to cerumen, however with noted tenderness  Started on azithromycin for presumed otitis media

## 2023-10-17 NOTE — SUBJECTIVE & OBJECTIVE
Interval History: No acute events overnight.  Seen and examined without any family present.  Reports abdominal pain is better controlled.  Denies any nausea this morning, agreeable to start clear liquids.  Ear pain is unchanged.  No other complaints      Review of Systems   HENT:  Positive for ear pain.    Respiratory:  Negative for shortness of breath.    Cardiovascular:  Negative for chest pain.   Gastrointestinal:  Positive for abdominal pain. Negative for nausea and vomiting.   All other systems reviewed and are negative.    Objective:     Vital Signs (Most Recent):  Temp: 97.9 °F (36.6 °C) (10/17/23 1144)  Pulse: 74 (10/17/23 1144)  Resp: 18 (10/17/23 1144)  BP: 130/78 (10/17/23 1144)  SpO2: 100 % (10/17/23 1144) Vital Signs (24h Range):  Temp:  [97.8 °F (36.6 °C)-98.7 °F (37.1 °C)] 97.9 °F (36.6 °C)  Pulse:  [69-78] 74  Resp:  [12-20] 18  SpO2:  [94 %-100 %] 100 %  BP: (100-138)/(58-90) 130/78     Weight: 99.8 kg (220 lb 0.3 oz)  Body mass index is 40.24 kg/m².    Intake/Output Summary (Last 24 hours) at 10/17/2023 1231  Last data filed at 10/17/2023 0633  Gross per 24 hour   Intake 0 ml   Output 1 ml   Net -1 ml         Physical Exam  Vitals and nursing note reviewed.   Constitutional:       General: She is not in acute distress.     Appearance: She is morbidly obese. She is not ill-appearing.   HENT:      Head: Normocephalic and atraumatic.      Right Ear: External ear normal.      Left Ear: External ear normal.      Nose: Nose normal.      Mouth/Throat:      Mouth: Mucous membranes are moist.   Eyes:      Pupils: Pupils are equal, round, and reactive to light.   Cardiovascular:      Rate and Rhythm: Normal rate and regular rhythm.   Pulmonary:      Effort: Pulmonary effort is normal. No accessory muscle usage or respiratory distress.      Breath sounds: No wheezing or rhonchi.      Comments: On room air  Abdominal:      General: Bowel sounds are normal. There is no distension.      Palpations: Abdomen is  soft.      Tenderness: There is abdominal tenderness. There is no guarding or rebound.   Musculoskeletal:      Cervical back: Neck supple.      Right lower leg: No edema.      Left lower leg: No edema.   Skin:     General: Skin is warm and dry.   Neurological:      General: No focal deficit present.      Mental Status: She is alert and oriented to person, place, and time. Mental status is at baseline.   Psychiatric:         Behavior: Behavior is cooperative.             Significant Labs: All pertinent labs within the past 24 hours have been reviewed.  CBC:   Recent Labs   Lab 10/16/23  0526   WBC 7.50   HGB 12.8   HCT 37.0        CMP:   Recent Labs   Lab 10/15/23  1637 10/17/23  0519    140   K 3.5 3.7    105   CO2 25 25   * 201*   BUN 11 10   CREATININE 0.7 0.8   CALCIUM 8.6* 8.4*   PROT 7.0 6.6   ALBUMIN 3.8 3.5   BILITOT 0.7 0.5   ALKPHOS 45* 47*   AST 18 16   ALT 23 21   ANIONGAP 12 10       Significant Imaging: I have reviewed all pertinent imaging results/findings within the past 24 hours.

## 2023-10-17 NOTE — PLAN OF CARE
Discussed poc with pt, pt verbalized understanding    Purposeful rounding every 2hours    VS wnl  Blood glucose monitoring   Fall precautions in place, remains injury free  Pain and nausea under control with PRN meds    IVFs  Accurate I&Os  Bed locked at lowest position  Call light within reach    Chart check complete  Will cont with POC

## 2023-10-17 NOTE — PROGRESS NOTES
[facility] Medicine  Progress Note    Patient Name: Priti Obrien  MRN: [id number]  Patient Class: IP- Inpatient   Admission Date: 10/15/2023  Length of Stay: 2 days  Attending Physician: Darlene Platt DO  Primary Care Provider: Sherri Kovacs FNP        Subjective:     Principal Problem:Acute pancreatitis        HPI:  Priti Obrien is a 38 y.o. female with a PMH  has a past medical history of Allergy, Asthma, Bipolar 1 disorder, Borderline personality disorder, Depression, GERD (gastroesophageal reflux disease), History of psychiatric hospitalization, psychiatric care, Hypertension, Pancreatitis, Psychiatric problem, PTSD (post-traumatic stress disorder), S/P partial hysterectomy (September 2011), Substance abuse, and Suicide attempt.  Presented to the ER for evaluation of epigastric abdominal pain which started approximately 1 hour prior to arrival.  Associated symptoms include nonbloody/nonbilious vomitus.  Patient reports the pain radiates into her back.  Reports taking Zofran prior to arrival without any relief of her symptoms.  Denies any other associated symptoms.  Patient has a longstanding history of pancreatitis since she was 8 years old, but states she is not been evaluated by a GI specialist in over a few years.  Denies any GI/ symptoms.  ER workup revealed CBG of 260 mg/d (last A1c level was 9.6% on 05/19/2023) and lipase level greater than 1,000. Patient received 5 mg of Haldol, 1 mg Dilaudid, 4 mg Zofran, and 1 L of normal saline in ED. hospital Medicine consulted to admit patient for acute pancreatitis.    PCP: Sherri Kovacs      Overview/Hospital Course:  No notes on file    Interval History: No acute events overnight.  Seen and examined without any family present.  Reports abdominal pain is better controlled.  Denies any nausea this morning, agreeable to start clear liquids.  Ear pain is unchanged.  No other complaints      Review of Systems   HENT:  Positive for ear  pain.    Respiratory:  Negative for shortness of breath.    Cardiovascular:  Negative for chest pain.   Gastrointestinal:  Positive for abdominal pain. Negative for nausea and vomiting.   All other systems reviewed and are negative.    Objective:     Vital Signs (Most Recent):  Temp: 97.9 °F (36.6 °C) (10/17/23 1144)  Pulse: 74 (10/17/23 1144)  Resp: 18 (10/17/23 1144)  BP: 130/78 (10/17/23 1144)  SpO2: 100 % (10/17/23 1144) Vital Signs (24h Range):  Temp:  [97.8 °F (36.6 °C)-98.7 °F (37.1 °C)] 97.9 °F (36.6 °C)  Pulse:  [69-78] 74  Resp:  [12-20] 18  SpO2:  [94 %-100 %] 100 %  BP: (100-138)/(58-90) 130/78     Weight: 99.8 kg (220 lb 0.3 oz)  Body mass index is 40.24 kg/m².    Intake/Output Summary (Last 24 hours) at 10/17/2023 1231  Last data filed at 10/17/2023 0633  Gross per 24 hour   Intake 0 ml   Output 1 ml   Net -1 ml         Physical Exam  Vitals and nursing note reviewed.   Constitutional:       General: She is not in acute distress.     Appearance: She is morbidly obese. She is not ill-appearing.   HENT:      Head: Normocephalic and atraumatic.      Right Ear: External ear normal.      Left Ear: External ear normal.      Nose: Nose normal.      Mouth/Throat:      Mouth: Mucous membranes are moist.   Eyes:      Pupils: Pupils are equal, round, and reactive to light.   Cardiovascular:      Rate and Rhythm: Normal rate and regular rhythm.   Pulmonary:      Effort: Pulmonary effort is normal. No accessory muscle usage or respiratory distress.      Breath sounds: No wheezing or rhonchi.      Comments: On room air  Abdominal:      General: Bowel sounds are normal. There is no distension.      Palpations: Abdomen is soft.      Tenderness: There is abdominal tenderness. There is no guarding or rebound.   Musculoskeletal:      Cervical back: Neck supple.      Right lower leg: No edema.      Left lower leg: No edema.   Skin:     General: Skin is warm and dry.   Neurological:      General: No focal deficit present.       Mental Status: She is alert and oriented to person, place, and time. Mental status is at baseline.   Psychiatric:         Behavior: Behavior is cooperative.             Significant Labs: All pertinent labs within the past 24 hours have been reviewed.  CBC:   Recent Labs   Lab 10/16/23  0526   WBC 7.50   HGB 12.8   HCT 37.0        CMP:   Recent Labs   Lab 10/15/23  1637 10/17/23  0519    140   K 3.5 3.7    105   CO2 25 25   * 201*   BUN 11 10   CREATININE 0.7 0.8   CALCIUM 8.6* 8.4*   PROT 7.0 6.6   ALBUMIN 3.8 3.5   BILITOT 0.7 0.5   ALKPHOS 45* 47*   AST 18 16   ALT 23 21   ANIONGAP 12 10       Significant Imaging: I have reviewed all pertinent imaging results/findings within the past 24 hours.      Assessment/Plan:      * Acute pancreatitis  Numerous CT imaging of abdomen/pelvis throughout the last year.  Most recent 1 was performed on 07/05/2023 which revealed:[ Mild mesenteric fat stranding in the mid abdomen with a few lymph nodes similar to prior exam.  This could relate to adenitis].    Improving with IV fluid and bowel rest, we will start clear liquid diet  Continue analgesics and antiemetics p.r.n.       Left otitis media  Patient reports pain on the left ear with sensation of drainage.  Unable to view tympanic membrane with otoscope due to cerumen, however with noted tenderness  Started on azithromycin for presumed otitis media      Mixed hyperlipidemia  Patient is chronically on statin.will not continue for now. Last Lipid Panel:   Lab Results   Component Value Date    CHOL 216 (H) 06/30/2023    HDL 42 06/30/2023    LDLCALC 151 (H) 06/30/2023    TRIG 126 06/30/2023    CHOLHDL 18.5 (L) 02/24/2022     Plan:  -Hold home medication  -low fat/low calorie diet when no longer NPO        Essential hypertension  Currently normotensive.  BP usually well controlled per patient with home medications.  -Optimize pain control   -Continue home medications (norvasc), titrate as needed    -Monitor BP  -Low salt/cardiac diet when not NPO  -IV hydralazine prn for SBP>160 or DBP>90           Diabetes mellitus type 2 in obese  Patient's FSGs are controlled on current medication regimen.  Last A1c reviewed-   Lab Results   Component Value Date    HGBA1C 6.5 (H) 10/15/2023     Most recent fingerstick glucose reviewed-   Recent Labs   Lab 10/16/23  1622 10/16/23  2227 10/17/23  0621 10/17/23  1130   POCTGLUCOSE 155* 195* 173* 134*     Current correctional scale  Low  Maintain anti-hyperglycemic dose as follows-   Antihyperglycemics (From admission, onward)    Start     Stop Route Frequency Ordered    10/15/23 0900  insulin detemir U-100 (Levemir) pen 12 Units         -- SubQ Daily 10/15/23 0547    10/15/23 0631  insulin aspart U-100 pen 0-5 Units         -- SubQ Before meals & nightly PRN 10/15/23 0531        Most recent A1c measuring   Hemoglobin A1C   Date Value Ref Range Status   10/15/2023 6.5 (H) 4.0 - 5.6 % Final     Comment:     ADA Screening Guidelines:  5.7-6.4%  Consistent with prediabetes  >or=6.5%  Consistent with diabetes    High levels of fetal hemoglobin interfere with the HbA1C  assay. Heterozygous hemoglobin variants (HbS, HgC, etc)do  not significantly interfere with this assay.   However, presence of multiple variants may affect accuracy.     05/19/2023 9.6 (H) 4.0 - 5.6 % Final     Comment:     ADA Screening Guidelines:  5.7-6.4%  Consistent with prediabetes  >or=6.5%  Consistent with diabetes    High levels of fetal hemoglobin interfere with the HbA1C  assay. Heterozygous hemoglobin variants (HbS, HgC, etc)do  not significantly interfere with this assay.   However, presence of multiple variants may affect accuracy.     02/24/2022 8.9 (H) 4.0 - 5.6 % Final     Comment:     ADA Screening Guidelines:  5.7-6.4%  Consistent with prediabetes  >or=6.5%  Consistent with diabetes    High levels of fetal hemoglobin interfere with the HbA1C  assay. Heterozygous hemoglobin variants (HbS, HgC,  etc)do  not significantly interfere with this assay.   However, presence of multiple variants may affect accuracy.        -SSI  -Accu-checks   -Hypoglycemia protocol   -Hold oral antihyperglycemics while inpatient   -Continue bud-acting insulin at lower dose given NPO status due to acute pancreatitis      Class 3 severe obesity in adult  Body mass index is 40.24 kg/m². Morbid obesity complicates all aspects of disease management from diagnostic modalities to treatment. Weight loss encouraged and health benefits explained to patient.         Borderline personality disorder  See above    Bipolar 1 disorder  Chronic. Stable. Not in acute exacerbation and currently denies endorsing any suicidal/homicidal ideations. Not currently on medications.  Continue to monitor  Follow up outpatient          VTE Risk Mitigation (From admission, onward)         Ordered     Reason for No Pharmacological VTE Prophylaxis  Once        Question:  Reasons:  Answer:  Physician Provided (leave comment)    10/15/23 0531     IP VTE HIGH RISK PATIENT  Once         10/15/23 0531     Place sequential compression device  Until discontinued         10/15/23 0531                Discharge Planning   TANYA:      Code Status: Full Code   Is the patient medically ready for discharge?:     Reason for patient still in hospital (select all that apply): Patient trending condition and Treatment                     Darlene Platt DO  Department of Hospital Medicine   [facility] - Med Surg

## 2023-10-17 NOTE — ASSESSMENT & PLAN NOTE
Patient's FSGs are controlled on current medication regimen.  Last A1c reviewed-   Lab Results   Component Value Date    HGBA1C 6.5 (H) 10/15/2023     Most recent fingerstick glucose reviewed-   Recent Labs   Lab 10/16/23  1622 10/16/23  2227 10/17/23  0621 10/17/23  1130   POCTGLUCOSE 155* 195* 173* 134*     Current correctional scale  Low  Maintain anti-hyperglycemic dose as follows-   Antihyperglycemics (From admission, onward)    Start     Stop Route Frequency Ordered    10/15/23 0900  insulin detemir U-100 (Levemir) pen 12 Units         -- SubQ Daily 10/15/23 0547    10/15/23 0631  insulin aspart U-100 pen 0-5 Units         -- SubQ Before meals & nightly PRN 10/15/23 0531        Most recent A1c measuring   Hemoglobin A1C   Date Value Ref Range Status   10/15/2023 6.5 (H) 4.0 - 5.6 % Final     Comment:     ADA Screening Guidelines:  5.7-6.4%  Consistent with prediabetes  >or=6.5%  Consistent with diabetes    High levels of fetal hemoglobin interfere with the HbA1C  assay. Heterozygous hemoglobin variants (HbS, HgC, etc)do  not significantly interfere with this assay.   However, presence of multiple variants may affect accuracy.     05/19/2023 9.6 (H) 4.0 - 5.6 % Final     Comment:     ADA Screening Guidelines:  5.7-6.4%  Consistent with prediabetes  >or=6.5%  Consistent with diabetes    High levels of fetal hemoglobin interfere with the HbA1C  assay. Heterozygous hemoglobin variants (HbS, HgC, etc)do  not significantly interfere with this assay.   However, presence of multiple variants may affect accuracy.     02/24/2022 8.9 (H) 4.0 - 5.6 % Final     Comment:     ADA Screening Guidelines:  5.7-6.4%  Consistent with prediabetes  >or=6.5%  Consistent with diabetes    High levels of fetal hemoglobin interfere with the HbA1C  assay. Heterozygous hemoglobin variants (HbS, HgC, etc)do  not significantly interfere with this assay.   However, presence of multiple variants may affect accuracy.        -SSI  -Accu-checks    -Hypoglycemia protocol   -Hold oral antihyperglycemics while inpatient   -Continue bud-acting insulin at lower dose given NPO status due to acute pancreatitis

## 2023-10-17 NOTE — ASSESSMENT & PLAN NOTE
Numerous CT imaging of abdomen/pelvis throughout the last year.  Most recent 1 was performed on 07/05/2023 which revealed:[ Mild mesenteric fat stranding in the mid abdomen with a few lymph nodes similar to prior exam.  This could relate to adenitis].    Improving with IV fluid and bowel rest, we will start clear liquid diet  Continue analgesics and antiemetics p.r.n.

## 2023-10-18 LAB
ALBUMIN SERPL BCP-MCNC: 3.2 G/DL (ref 3.5–5.2)
ALP SERPL-CCNC: 43 U/L (ref 55–135)
ALT SERPL W/O P-5'-P-CCNC: 20 U/L (ref 10–44)
ANION GAP SERPL CALC-SCNC: 9 MMOL/L (ref 8–16)
AST SERPL-CCNC: 16 U/L (ref 10–40)
BILIRUB SERPL-MCNC: 0.5 MG/DL (ref 0.1–1)
BUN SERPL-MCNC: 9 MG/DL (ref 6–20)
CALCIUM SERPL-MCNC: 8.1 MG/DL (ref 8.7–10.5)
CHLORIDE SERPL-SCNC: 103 MMOL/L (ref 95–110)
CO2 SERPL-SCNC: 26 MMOL/L (ref 23–29)
CREAT SERPL-MCNC: 0.7 MG/DL (ref 0.5–1.4)
EST. GFR  (NO RACE VARIABLE): >60 ML/MIN/1.73 M^2
GLUCOSE SERPL-MCNC: 234 MG/DL (ref 70–110)
LIPASE SERPL-CCNC: 20 U/L (ref 4–60)
POCT GLUCOSE: 178 MG/DL (ref 70–110)
POCT GLUCOSE: 198 MG/DL (ref 70–110)
POCT GLUCOSE: 217 MG/DL (ref 70–110)
POCT GLUCOSE: 247 MG/DL (ref 70–110)
POTASSIUM SERPL-SCNC: 3.5 MMOL/L (ref 3.5–5.1)
PROT SERPL-MCNC: 6.1 G/DL (ref 6–8.4)
SODIUM SERPL-SCNC: 138 MMOL/L (ref 136–145)

## 2023-10-18 PROCEDURE — 11000001 HC ACUTE MED/SURG PRIVATE ROOM

## 2023-10-18 PROCEDURE — 25000003 PHARM REV CODE 250: Performed by: INTERNAL MEDICINE

## 2023-10-18 PROCEDURE — 36415 COLL VENOUS BLD VENIPUNCTURE: CPT | Performed by: INTERNAL MEDICINE

## 2023-10-18 PROCEDURE — 63600175 PHARM REV CODE 636 W HCPCS: Performed by: INTERNAL MEDICINE

## 2023-10-18 PROCEDURE — 83690 ASSAY OF LIPASE: CPT | Performed by: INTERNAL MEDICINE

## 2023-10-18 PROCEDURE — 63700000 PHARM REV CODE 250 ALT 637 W/O HCPCS: Performed by: INTERNAL MEDICINE

## 2023-10-18 PROCEDURE — 25000003 PHARM REV CODE 250: Performed by: NURSE PRACTITIONER

## 2023-10-18 PROCEDURE — 80053 COMPREHEN METABOLIC PANEL: CPT | Performed by: INTERNAL MEDICINE

## 2023-10-18 PROCEDURE — 63600175 PHARM REV CODE 636 W HCPCS: Performed by: NURSE PRACTITIONER

## 2023-10-18 RX ORDER — MORPHINE SULFATE 2 MG/ML
1 INJECTION, SOLUTION INTRAMUSCULAR; INTRAVENOUS EVERY 4 HOURS PRN
Status: DISCONTINUED | OUTPATIENT
Start: 2023-10-18 | End: 2023-10-19

## 2023-10-18 RX ORDER — HYDROCODONE BITARTRATE AND ACETAMINOPHEN 10; 325 MG/1; MG/1
1 TABLET ORAL EVERY 4 HOURS PRN
Status: DISCONTINUED | OUTPATIENT
Start: 2023-10-18 | End: 2023-10-19 | Stop reason: HOSPADM

## 2023-10-18 RX ADMIN — MORPHINE SULFATE 1 MG: 2 INJECTION, SOLUTION INTRAMUSCULAR; INTRAVENOUS at 08:10

## 2023-10-18 RX ADMIN — SODIUM CHLORIDE, SODIUM LACTATE, POTASSIUM CHLORIDE, AND CALCIUM CHLORIDE: 600; 310; 30; 20 INJECTION, SOLUTION INTRAVENOUS at 07:10

## 2023-10-18 RX ADMIN — OXYCODONE AND ACETAMINOPHEN 1 TABLET: 10; 325 TABLET ORAL at 12:10

## 2023-10-18 RX ADMIN — MORPHINE SULFATE 2 MG: 2 INJECTION, SOLUTION INTRAMUSCULAR; INTRAVENOUS at 02:10

## 2023-10-18 RX ADMIN — OXYCODONE AND ACETAMINOPHEN 1 TABLET: 10; 325 TABLET ORAL at 11:10

## 2023-10-18 RX ADMIN — ONDANSETRON 4 MG: 2 INJECTION INTRAMUSCULAR; INTRAVENOUS at 06:10

## 2023-10-18 RX ADMIN — AZITHROMYCIN 250 MG: 250 TABLET, FILM COATED ORAL at 08:10

## 2023-10-18 RX ADMIN — INSULIN ASPART 2 UNITS: 100 INJECTION, SOLUTION INTRAVENOUS; SUBCUTANEOUS at 06:10

## 2023-10-18 RX ADMIN — OXYCODONE AND ACETAMINOPHEN 1 TABLET: 10; 325 TABLET ORAL at 06:10

## 2023-10-18 RX ADMIN — HYDROCODONE BITARTRATE AND ACETAMINOPHEN 1 TABLET: 10; 325 TABLET ORAL at 09:10

## 2023-10-18 RX ADMIN — INSULIN ASPART 2 UNITS: 100 INJECTION, SOLUTION INTRAVENOUS; SUBCUTANEOUS at 09:10

## 2023-10-18 RX ADMIN — OXYCODONE AND ACETAMINOPHEN 1 TABLET: 10; 325 TABLET ORAL at 04:10

## 2023-10-18 RX ADMIN — MORPHINE SULFATE 1 MG: 2 INJECTION, SOLUTION INTRAMUSCULAR; INTRAVENOUS at 07:10

## 2023-10-18 RX ADMIN — MORPHINE SULFATE 1 MG: 2 INJECTION, SOLUTION INTRAMUSCULAR; INTRAVENOUS at 02:10

## 2023-10-18 RX ADMIN — AMLODIPINE BESYLATE 5 MG: 5 TABLET ORAL at 08:10

## 2023-10-18 RX ADMIN — SODIUM CHLORIDE, SODIUM LACTATE, POTASSIUM CHLORIDE, AND CALCIUM CHLORIDE: 600; 310; 30; 20 INJECTION, SOLUTION INTRAVENOUS at 12:10

## 2023-10-18 NOTE — PLAN OF CARE
[facility]  Discharge Reassessment    Primary Care Provider: Sherri Kovacs FNP    Expected Discharge Date:     Reassessment (most recent)       Discharge Reassessment - 10/18/23 0855          Discharge Reassessment    Assessment Type Discharge Planning Reassessment     Did the patient's condition or plan change since previous assessment? No     Discharge Plan discussed with: Patient     Communicated TANYA with patient/caregiver Date not available/Unable to determine     Discharge Plan A Home

## 2023-10-18 NOTE — ASSESSMENT & PLAN NOTE
Patient's FSGs are controlled on current medication regimen.  Last A1c reviewed-   Lab Results   Component Value Date    HGBA1C 6.5 (H) 10/15/2023     Most recent fingerstick glucose reviewed-   Recent Labs   Lab 10/17/23  2048 10/18/23  0609 10/18/23  1137 10/18/23  1630   POCTGLUCOSE 166* 217* 178* 198*     Current correctional scale  Low  Increase anti-hyperglycemic dose as follows-   Antihyperglycemics (From admission, onward)    Start     Stop Route Frequency Ordered    10/18/23 0900  insulin detemir U-100 (Levemir) pen 15 Units         -- SubQ Daily 10/18/23 0803    10/15/23 0631  insulin aspart U-100 pen 0-5 Units         -- SubQ Before meals & nightly PRN 10/15/23 0531        Most recent A1c measuring   Hemoglobin A1C   Date Value Ref Range Status   10/15/2023 6.5 (H) 4.0 - 5.6 % Final     Comment:     ADA Screening Guidelines:  5.7-6.4%  Consistent with prediabetes  >or=6.5%  Consistent with diabetes    High levels of fetal hemoglobin interfere with the HbA1C  assay. Heterozygous hemoglobin variants (HbS, HgC, etc)do  not significantly interfere with this assay.   However, presence of multiple variants may affect accuracy.     05/19/2023 9.6 (H) 4.0 - 5.6 % Final     Comment:     ADA Screening Guidelines:  5.7-6.4%  Consistent with prediabetes  >or=6.5%  Consistent with diabetes    High levels of fetal hemoglobin interfere with the HbA1C  assay. Heterozygous hemoglobin variants (HbS, HgC, etc)do  not significantly interfere with this assay.   However, presence of multiple variants may affect accuracy.     02/24/2022 8.9 (H) 4.0 - 5.6 % Final     Comment:     ADA Screening Guidelines:  5.7-6.4%  Consistent with prediabetes  >or=6.5%  Consistent with diabetes    High levels of fetal hemoglobin interfere with the HbA1C  assay. Heterozygous hemoglobin variants (HbS, HgC, etc)do  not significantly interfere with this assay.   However, presence of multiple variants may affect accuracy.        -SSI  -Accu-checks    -Hypoglycemia protocol   -Hold oral antihyperglycemics while inpatient

## 2023-10-18 NOTE — PLAN OF CARE
POC discussed with patient; Patient verbalized understanding. VSS. Prn medication given for RUQ and back pain per patient request. IV fluids maintained. Blood glucose monitored. Ambulated in halls independently. Remains free from injury. Call light within reach. Purposeful rounding. Chart review completed.  Problem: Adult Inpatient Plan of Care  Goal: Plan of Care Review  Outcome: Ongoing, Progressing  Goal: Patient-Specific Goal (Individualized)  Outcome: Ongoing, Progressing  Goal: Absence of Hospital-Acquired Illness or Injury  Outcome: Ongoing, Progressing  Goal: Optimal Comfort and Wellbeing  Outcome: Ongoing, Progressing  Goal: Readiness for Transition of Care  Outcome: Ongoing, Progressing     Problem: Diabetes Comorbidity  Goal: Blood Glucose Level Within Targeted Range  Outcome: Ongoing, Progressing     Problem: Fluid Imbalance (Pancreatitis)  Goal: Fluid Balance  Outcome: Ongoing, Progressing     Problem: Infection (Pancreatitis)  Goal: Infection Symptom Resolution  Outcome: Ongoing, Progressing     Problem: Nutrition Impaired (Pancreatitis)  Goal: Optimal Nutrition Intake  Outcome: Ongoing, Progressing     Problem: Pain (Pancreatitis)  Goal: Acceptable Pain Control  Outcome: Ongoing, Progressing     Problem: Respiratory Compromise (Pancreatitis)  Goal: Effective Oxygenation and Ventilation  Outcome: Ongoing, Progressing     Problem: Bariatric Environmental Safety  Goal: Safety Maintained with Care  Outcome: Ongoing, Progressing     Problem: Pain Acute  Goal: Acceptable Pain Control and Functional Ability  Outcome: Ongoing, Progressing     Problem: Nausea and Vomiting  Goal: Fluid and Electrolyte Balance  Outcome: Ongoing, Progressing

## 2023-10-18 NOTE — ASSESSMENT & PLAN NOTE
Patient is chronically on statin.will not continue for now. Last Lipid Panel:   Lab Results   Component Value Date    CHOL 216 (H) 06/30/2023    HDL 42 06/30/2023    LDLCALC 151 (H) 06/30/2023    TRIG 126 06/30/2023    CHOLHDL 18.5 (L) 02/24/2022

## 2023-10-18 NOTE — SUBJECTIVE & OBJECTIVE
Interval History: No acute events overnight.  Seen and examined without any family present.  Abdominal pain and nausea better controlled.  No new complaints.  Agreeable to diet advancement to low-fat diet.    Review of Systems  Objective:     Vital Signs (Most Recent):  Temp: 98.3 °F (36.8 °C) (10/18/23 1638)  Pulse: 74 (10/18/23 1638)  Resp: 17 (10/18/23 1638)  BP: (!) 144/72 (10/18/23 1638)  SpO2: 100 % (10/18/23 1638) Vital Signs (24h Range):  Temp:  [97.2 °F (36.2 °C)-99.1 °F (37.3 °C)] 98.3 °F (36.8 °C)  Pulse:  [66-77] 74  Resp:  [12-18] 17  SpO2:  [93 %-100 %] 100 %  BP: (118-145)/(66-85) 144/72     Weight: 99.8 kg (220 lb 0.3 oz)  Body mass index is 40.24 kg/m².    Intake/Output Summary (Last 24 hours) at 10/18/2023 1743  Last data filed at 10/18/2023 1200  Gross per 24 hour   Intake 4579.02 ml   Output --   Net 4579.02 ml         Physical Exam  Vitals and nursing note reviewed.   Constitutional:       General: She is not in acute distress.     Appearance: She is morbidly obese. She is not ill-appearing.   HENT:      Head: Normocephalic and atraumatic.      Right Ear: External ear normal.      Left Ear: External ear normal.      Nose: Nose normal.      Mouth/Throat:      Mouth: Mucous membranes are moist.   Eyes:      Pupils: Pupils are equal, round, and reactive to light.   Cardiovascular:      Rate and Rhythm: Normal rate and regular rhythm.   Pulmonary:      Effort: Pulmonary effort is normal. No accessory muscle usage or respiratory distress.      Breath sounds: No wheezing or rhonchi.      Comments: On room air  Abdominal:      General: Bowel sounds are normal. There is no distension.      Palpations: Abdomen is soft.      Tenderness: There is abdominal tenderness (Appears improved). There is no guarding or rebound.   Musculoskeletal:      Cervical back: Neck supple.      Right lower leg: No edema.      Left lower leg: No edema.   Skin:     General: Skin is warm and dry.   Neurological:      General: No  "focal deficit present.      Mental Status: She is alert and oriented to person, place, and time. Mental status is at baseline.   Psychiatric:         Behavior: Behavior is cooperative.             Significant Labs: All pertinent labs within the past 24 hours have been reviewed.  CBC: No results for input(s): "WBC", "HGB", "HCT", "PLT" in the last 48 hours.  CMP:   Recent Labs   Lab 10/17/23  0519 10/18/23  0436    138   K 3.7 3.5    103   CO2 25 26   * 234*   BUN 10 9   CREATININE 0.8 0.7   CALCIUM 8.4* 8.1*   PROT 6.6 6.1   ALBUMIN 3.5 3.2*   BILITOT 0.5 0.5   ALKPHOS 47* 43*   AST 16 16   ALT 21 20   ANIONGAP 10 9       Significant Imaging: I have reviewed all pertinent imaging results/findings within the past 24 hours.  "

## 2023-10-18 NOTE — PROGRESS NOTES
[facility] Medicine  Progress Note    Patient Name: Priti Obrien  MRN: [id number]  Patient Class: IP- Inpatient   Admission Date: 10/15/2023  Length of Stay: 3 days  Attending Physician: Darlene Platt DO  Primary Care Provider: Sherri Kovacs FNP        Subjective:     Principal Problem:Acute pancreatitis        HPI:  Priti Obrien is a 38 y.o. female with a PMH  has a past medical history of Allergy, Asthma, Bipolar 1 disorder, Borderline personality disorder, Depression, GERD (gastroesophageal reflux disease), History of psychiatric hospitalization, psychiatric care, Hypertension, Pancreatitis, Psychiatric problem, PTSD (post-traumatic stress disorder), S/P partial hysterectomy (September 2011), Substance abuse, and Suicide attempt.  Presented to the ER for evaluation of epigastric abdominal pain which started approximately 1 hour prior to arrival.  Associated symptoms include nonbloody/nonbilious vomitus.  Patient reports the pain radiates into her back.  Reports taking Zofran prior to arrival without any relief of her symptoms.  Denies any other associated symptoms.  Patient has a longstanding history of pancreatitis since she was 8 years old, but states she is not been evaluated by a GI specialist in over a few years.  Denies any GI/ symptoms.  ER workup revealed CBG of 260 mg/d (last A1c level was 9.6% on 05/19/2023) and lipase level greater than 1,000. Patient received 5 mg of Haldol, 1 mg Dilaudid, 4 mg Zofran, and 1 L of normal saline in ED. hospital Medicine consulted to admit patient for acute pancreatitis.    PCP: Sherri Kovacs      Overview/Hospital Course:  No notes on file    Interval History: No acute events overnight.  Seen and examined without any family present.  Abdominal pain and nausea better controlled.  No new complaints.  Agreeable to diet advancement to low-fat diet.    Review of Systems  Objective:     Vital Signs (Most Recent):  Temp: 98.3 °F (36.8 °C)  (10/18/23 1638)  Pulse: 74 (10/18/23 1638)  Resp: 17 (10/18/23 1638)  BP: (!) 144/72 (10/18/23 1638)  SpO2: 100 % (10/18/23 1638) Vital Signs (24h Range):  Temp:  [97.2 °F (36.2 °C)-99.1 °F (37.3 °C)] 98.3 °F (36.8 °C)  Pulse:  [66-77] 74  Resp:  [12-18] 17  SpO2:  [93 %-100 %] 100 %  BP: (118-145)/(66-85) 144/72     Weight: 99.8 kg (220 lb 0.3 oz)  Body mass index is 40.24 kg/m².    Intake/Output Summary (Last 24 hours) at 10/18/2023 1743  Last data filed at 10/18/2023 1200  Gross per 24 hour   Intake 4579.02 ml   Output --   Net 4579.02 ml         Physical Exam  Vitals and nursing note reviewed.   Constitutional:       General: She is not in acute distress.     Appearance: She is morbidly obese. She is not ill-appearing.   HENT:      Head: Normocephalic and atraumatic.      Right Ear: External ear normal.      Left Ear: External ear normal.      Nose: Nose normal.      Mouth/Throat:      Mouth: Mucous membranes are moist.   Eyes:      Pupils: Pupils are equal, round, and reactive to light.   Cardiovascular:      Rate and Rhythm: Normal rate and regular rhythm.   Pulmonary:      Effort: Pulmonary effort is normal. No accessory muscle usage or respiratory distress.      Breath sounds: No wheezing or rhonchi.      Comments: On room air  Abdominal:      General: Bowel sounds are normal. There is no distension.      Palpations: Abdomen is soft.      Tenderness: There is abdominal tenderness (Appears improved). There is no guarding or rebound.   Musculoskeletal:      Cervical back: Neck supple.      Right lower leg: No edema.      Left lower leg: No edema.   Skin:     General: Skin is warm and dry.   Neurological:      General: No focal deficit present.      Mental Status: She is alert and oriented to person, place, and time. Mental status is at baseline.   Psychiatric:         Behavior: Behavior is cooperative.             Significant Labs: All pertinent labs within the past 24 hours have been reviewed.  CBC: No  "results for input(s): "WBC", "HGB", "HCT", "PLT" in the last 48 hours.  CMP:   Recent Labs   Lab 10/17/23  0519 10/18/23  0436    138   K 3.7 3.5    103   CO2 25 26   * 234*   BUN 10 9   CREATININE 0.8 0.7   CALCIUM 8.4* 8.1*   PROT 6.6 6.1   ALBUMIN 3.5 3.2*   BILITOT 0.5 0.5   ALKPHOS 47* 43*   AST 16 16   ALT 21 20   ANIONGAP 10 9       Significant Imaging: I have reviewed all pertinent imaging results/findings within the past 24 hours.      Assessment/Plan:      * Acute pancreatitis  Numerous CT imaging of abdomen/pelvis throughout the last year.  Most recent 1 was performed on 07/05/2023 which revealed:[ Mild mesenteric fat stranding in the mid abdomen with a few lymph nodes similar to prior exam.  This could relate to adenitis].    Improving with IV fluid and bowel rest  Tolerating clear liquid diet, advance to low-fat  Continue analgesics and antiemetics p.r.n. wean down opiates    Left otitis media  Patient reports pain on the left ear with sensation of drainage.  Unable to view tympanic membrane with otoscope due to cerumen, however with noted tenderness  Started on azithromycin for presumed otitis media      Mixed hyperlipidemia  Patient is chronically on statin.will not continue for now. Last Lipid Panel:   Lab Results   Component Value Date    CHOL 216 (H) 06/30/2023    HDL 42 06/30/2023    LDLCALC 151 (H) 06/30/2023    TRIG 126 06/30/2023    CHOLHDL 18.5 (L) 02/24/2022           Essential hypertension  Currently normotensive.  BP usually well controlled per patient with home medications.  -Optimize pain control   -Continue home medications (norvasc), titrate as needed   -Monitor BP  -Low salt/cardiac diet when not NPO  -IV hydralazine prn for SBP>160 or DBP>90           Diabetes mellitus type 2 in obese  Patient's FSGs are controlled on current medication regimen.  Last A1c reviewed-   Lab Results   Component Value Date    HGBA1C 6.5 (H) 10/15/2023     Most recent fingerstick glucose " reviewed-   Recent Labs   Lab 10/17/23  2048 10/18/23  0609 10/18/23  1137 10/18/23  1630   POCTGLUCOSE 166* 217* 178* 198*     Current correctional scale  Low  Increase anti-hyperglycemic dose as follows-   Antihyperglycemics (From admission, onward)    Start     Stop Route Frequency Ordered    10/18/23 0900  insulin detemir U-100 (Levemir) pen 15 Units         -- SubQ Daily 10/18/23 0803    10/15/23 0631  insulin aspart U-100 pen 0-5 Units         -- SubQ Before meals & nightly PRN 10/15/23 0531        Most recent A1c measuring   Hemoglobin A1C   Date Value Ref Range Status   10/15/2023 6.5 (H) 4.0 - 5.6 % Final     Comment:     ADA Screening Guidelines:  5.7-6.4%  Consistent with prediabetes  >or=6.5%  Consistent with diabetes    High levels of fetal hemoglobin interfere with the HbA1C  assay. Heterozygous hemoglobin variants (HbS, HgC, etc)do  not significantly interfere with this assay.   However, presence of multiple variants may affect accuracy.     05/19/2023 9.6 (H) 4.0 - 5.6 % Final     Comment:     ADA Screening Guidelines:  5.7-6.4%  Consistent with prediabetes  >or=6.5%  Consistent with diabetes    High levels of fetal hemoglobin interfere with the HbA1C  assay. Heterozygous hemoglobin variants (HbS, HgC, etc)do  not significantly interfere with this assay.   However, presence of multiple variants may affect accuracy.     02/24/2022 8.9 (H) 4.0 - 5.6 % Final     Comment:     ADA Screening Guidelines:  5.7-6.4%  Consistent with prediabetes  >or=6.5%  Consistent with diabetes    High levels of fetal hemoglobin interfere with the HbA1C  assay. Heterozygous hemoglobin variants (HbS, HgC, etc)do  not significantly interfere with this assay.   However, presence of multiple variants may affect accuracy.        -SSI  -Accu-checks   -Hypoglycemia protocol   -Hold oral antihyperglycemics while inpatient       Class 3 severe obesity in adult  Body mass index is 40.24 kg/m². Morbid obesity complicates all aspects  of disease management from diagnostic modalities to treatment. Weight loss encouraged and health benefits explained to patient.         Borderline personality disorder  See above    Bipolar 1 disorder  Chronic. Stable. Not in acute exacerbation and currently denies endorsing any suicidal/homicidal ideations. Not currently on medications.  Continue to monitor  Follow up outpatient          VTE Risk Mitigation (From admission, onward)         Ordered     Reason for No Pharmacological VTE Prophylaxis  Once        Question:  Reasons:  Answer:  Physician Provided (leave comment)    10/15/23 0531     IP VTE HIGH RISK PATIENT  Once         10/15/23 0531     Place sequential compression device  Until discontinued         10/15/23 0531                Discharge Planning   TANYA:      Code Status: Full Code   Is the patient medically ready for discharge?:     Reason for patient still in hospital (select all that apply): Patient trending condition and Treatment  Discharge Plan A: Nando Platt DO  Department of Hospital Medicine   [facility]

## 2023-10-18 NOTE — ASSESSMENT & PLAN NOTE
Numerous CT imaging of abdomen/pelvis throughout the last year.  Most recent 1 was performed on 07/05/2023 which revealed:[ Mild mesenteric fat stranding in the mid abdomen with a few lymph nodes similar to prior exam.  This could relate to adenitis].    Improving with IV fluid and bowel rest  Tolerating clear liquid diet, advance to low-fat  Continue analgesics and antiemetics p.r.n. wean down opiates

## 2023-10-18 NOTE — PLAN OF CARE
Discussed poc with pt, pt. verbalized understanding     Purposeful rounding every 2hours    VS wnl  Blood glucose monitoring  Fall precautions in place, remains injury free  Pain and nausea under control with PRN medication    IVFs  Accurate I&Os  Bed locked at lowest position  Call light within reach    Chart check complete  Will cont with POC     Problem: Adult Inpatient Plan of Care  Goal: Plan of Care Review  Outcome: Ongoing, Progressing  Goal: Patient-Specific Goal (Individualized)  Outcome: Ongoing, Progressing  Goal: Absence of Hospital-Acquired Illness or Injury  Outcome: Ongoing, Progressing  Goal: Optimal Comfort and Wellbeing  Outcome: Ongoing, Progressing  Goal: Readiness for Transition of Care  Outcome: Ongoing, Progressing     Problem: Diabetes Comorbidity  Goal: Blood Glucose Level Within Targeted Range  Outcome: Ongoing, Progressing     Problem: Fluid Imbalance (Pancreatitis)  Goal: Fluid Balance  Outcome: Ongoing, Progressing     Problem: Infection (Pancreatitis)  Goal: Infection Symptom Resolution  Outcome: Ongoing, Progressing     Problem: Nutrition Impaired (Pancreatitis)  Goal: Optimal Nutrition Intake  Outcome: Ongoing, Progressing     Problem: Pain (Pancreatitis)  Goal: Acceptable Pain Control  Outcome: Ongoing, Progressing     Problem: Respiratory Compromise (Pancreatitis)  Goal: Effective Oxygenation and Ventilation  Outcome: Ongoing, Progressing     Problem: Bariatric Environmental Safety  Goal: Safety Maintained with Care  Outcome: Ongoing, Progressing     Problem: Pain Acute  Goal: Acceptable Pain Control and Functional Ability  Outcome: Ongoing, Progressing     Problem: Nausea and Vomiting  Goal: Fluid and Electrolyte Balance  Outcome: Ongoing, Progressing

## 2023-10-19 VITALS
SYSTOLIC BLOOD PRESSURE: 137 MMHG | RESPIRATION RATE: 18 BRPM | BODY MASS INDEX: 40.48 KG/M2 | DIASTOLIC BLOOD PRESSURE: 88 MMHG | OXYGEN SATURATION: 98 % | TEMPERATURE: 99 F | HEIGHT: 62 IN | HEART RATE: 75 BPM | WEIGHT: 220 LBS

## 2023-10-19 LAB
ALBUMIN SERPL BCP-MCNC: 3.4 G/DL (ref 3.5–5.2)
ALP SERPL-CCNC: 47 U/L (ref 55–135)
ALT SERPL W/O P-5'-P-CCNC: 22 U/L (ref 10–44)
ANION GAP SERPL CALC-SCNC: 10 MMOL/L (ref 8–16)
AST SERPL-CCNC: 15 U/L (ref 10–40)
BILIRUB SERPL-MCNC: 0.5 MG/DL (ref 0.1–1)
BUN SERPL-MCNC: 9 MG/DL (ref 6–20)
CALCIUM SERPL-MCNC: 8.2 MG/DL (ref 8.7–10.5)
CHLORIDE SERPL-SCNC: 103 MMOL/L (ref 95–110)
CO2 SERPL-SCNC: 23 MMOL/L (ref 23–29)
CREAT SERPL-MCNC: 0.7 MG/DL (ref 0.5–1.4)
EST. GFR  (NO RACE VARIABLE): >60 ML/MIN/1.73 M^2
GLUCOSE SERPL-MCNC: 224 MG/DL (ref 70–110)
POCT GLUCOSE: 181 MG/DL (ref 70–110)
POTASSIUM SERPL-SCNC: 3.7 MMOL/L (ref 3.5–5.1)
PROT SERPL-MCNC: 6.4 G/DL (ref 6–8.4)
SODIUM SERPL-SCNC: 136 MMOL/L (ref 136–145)

## 2023-10-19 PROCEDURE — 80053 COMPREHEN METABOLIC PANEL: CPT | Performed by: INTERNAL MEDICINE

## 2023-10-19 PROCEDURE — 36415 COLL VENOUS BLD VENIPUNCTURE: CPT | Performed by: INTERNAL MEDICINE

## 2023-10-19 PROCEDURE — 63700000 PHARM REV CODE 250 ALT 637 W/O HCPCS: Performed by: INTERNAL MEDICINE

## 2023-10-19 PROCEDURE — 25000003 PHARM REV CODE 250: Performed by: NURSE PRACTITIONER

## 2023-10-19 PROCEDURE — 25000003 PHARM REV CODE 250: Performed by: INTERNAL MEDICINE

## 2023-10-19 PROCEDURE — 63600175 PHARM REV CODE 636 W HCPCS: Performed by: INTERNAL MEDICINE

## 2023-10-19 RX ORDER — AZITHROMYCIN 250 MG/1
250 TABLET, FILM COATED ORAL DAILY
Qty: 1 TABLET | Refills: 0 | Status: SHIPPED | OUTPATIENT
Start: 2023-10-20 | End: 2024-01-06

## 2023-10-19 RX ORDER — AMLODIPINE BESYLATE 5 MG/1
5 TABLET ORAL DAILY
Qty: 30 TABLET | Refills: 0 | Status: SHIPPED | OUTPATIENT
Start: 2023-10-19

## 2023-10-19 RX ORDER — ONDANSETRON 4 MG/1
4 TABLET, ORALLY DISINTEGRATING ORAL EVERY 8 HOURS PRN
Qty: 20 TABLET | Refills: 0 | Status: ON HOLD | OUTPATIENT
Start: 2023-10-19 | End: 2024-01-07 | Stop reason: HOSPADM

## 2023-10-19 RX ORDER — OXYCODONE AND ACETAMINOPHEN 5; 325 MG/1; MG/1
1 TABLET ORAL EVERY 6 HOURS PRN
Qty: 15 TABLET | Refills: 0 | Status: SHIPPED | OUTPATIENT
Start: 2023-10-19 | End: 2023-10-24

## 2023-10-19 RX ADMIN — MORPHINE SULFATE 1 MG: 2 INJECTION, SOLUTION INTRAMUSCULAR; INTRAVENOUS at 04:10

## 2023-10-19 RX ADMIN — HYDROCODONE BITARTRATE AND ACETAMINOPHEN 1 TABLET: 10; 325 TABLET ORAL at 02:10

## 2023-10-19 RX ADMIN — AMLODIPINE BESYLATE 5 MG: 5 TABLET ORAL at 09:10

## 2023-10-19 RX ADMIN — HYDROCODONE BITARTRATE AND ACETAMINOPHEN 1 TABLET: 10; 325 TABLET ORAL at 07:10

## 2023-10-19 RX ADMIN — AZITHROMYCIN 250 MG: 250 TABLET, FILM COATED ORAL at 09:10

## 2023-10-19 RX ADMIN — MORPHINE SULFATE 1 MG: 2 INJECTION, SOLUTION INTRAMUSCULAR; INTRAVENOUS at 12:10

## 2023-10-19 NOTE — PLAN OF CARE
[facility]  Discharge Final Note    Primary Care Provider: Sherri Kovacs FNP    Expected Discharge Date: 10/19/2023    Final Discharge Note (most recent)       Final Note - 10/19/23 1034          Final Note    Assessment Type Final Discharge Note     Anticipated Discharge Disposition Home or Self Care     Hospital Resources/Appts/Education Provided Appointments scheduled and added to AVS                              Contact Info       Sherri Kovacs FNP   Specialty: Family Medicine   Relationship: PCP - General    [address] [address]   Phone: [phone]       Next Steps: Follow up in 3 day(s)

## 2023-10-19 NOTE — PLAN OF CARE
POC discussed with patient; Patient verbalized understanding. VSS. Prn medication given for RUQ and back pain per patient request. Remains free from injury. Call light within reach. Purposeful rounding. Discharge instructions reviewed with patient; Patient verbalized understanding. IV removed per policy. No further concerns. Chart review completed.  Problem: Adult Inpatient Plan of Care  Goal: Plan of Care Review  Outcome: Met  Goal: Patient-Specific Goal (Individualized)  Outcome: Met  Goal: Absence of Hospital-Acquired Illness or Injury  Outcome: Met  Goal: Optimal Comfort and Wellbeing  Outcome: Met  Goal: Readiness for Transition of Care  Outcome: Met     Problem: Diabetes Comorbidity  Goal: Blood Glucose Level Within Targeted Range  Outcome: Met     Problem: Fluid Imbalance (Pancreatitis)  Goal: Fluid Balance  Outcome: Met     Problem: Infection (Pancreatitis)  Goal: Infection Symptom Resolution  Outcome: Met     Problem: Nutrition Impaired (Pancreatitis)  Goal: Optimal Nutrition Intake  Outcome: Met     Problem: Pain (Pancreatitis)  Goal: Acceptable Pain Control  Outcome: Met     Problem: Respiratory Compromise (Pancreatitis)  Goal: Effective Oxygenation and Ventilation  Outcome: Met     Problem: Bariatric Environmental Safety  Goal: Safety Maintained with Care  Outcome: Met     Problem: Pain Acute  Goal: Acceptable Pain Control and Functional Ability  Outcome: Met     Problem: Nausea and Vomiting  Goal: Fluid and Electrolyte Balance  Outcome: Met

## 2023-10-19 NOTE — HOSPITAL COURSE
Started on bowel rest and IV fluid for treatment of acute pancreatitis.  Given initial NPO status, basal insulin dose decreased and adjusted as needed.  Patient also ordered insulin for glucose control.  Gradually pain and nausea improved.  Patient tolerated advancement to clear liquid diet than to low-fat diet.  Tapered down on pain meds requirement.    Seen and examined personally on the day of discharge.  Pain better controlled, tolerating diet without any further nausea or vomiting .  Discharge plan followups discussed with patient.  All questions answered to her satisfaction.  She is stable for discharge home at this time with close outpatient with her PCP

## 2023-10-19 NOTE — DISCHARGE SUMMARY
[facility] Medicine  Discharge Summary      Patient Name: Priti Obrien  MRN: [id number]  [facility]: [id number]  Patient Class: IP- Inpatient  Admission Date: 10/15/2023  Hospital Length of Stay: 4 days  Discharge Date and Time:  10/19/2023 9:42 AM  Attending Physician: Darlene Platt DO   Discharging Provider: Darlene Platt DO  Primary Care Provider: Sherri Kovacs FNP    Primary Care Team: [facility] MEDICINE A    HPI:   rPiti Obrien is a 38 y.o. female with a PMH  has a past medical history of Allergy, Asthma, Bipolar 1 disorder, Borderline personality disorder, Depression, GERD (gastroesophageal reflux disease), History of psychiatric hospitalization, psychiatric care, Hypertension, Pancreatitis, Psychiatric problem, PTSD (post-traumatic stress disorder), S/P partial hysterectomy (September 2011), Substance abuse, and Suicide attempt.  Presented to the ER for evaluation of epigastric abdominal pain which started approximately 1 hour prior to arrival.  Associated symptoms include nonbloody/nonbilious vomitus.  Patient reports the pain radiates into her back.  Reports taking Zofran prior to arrival without any relief of her symptoms.  Denies any other associated symptoms.  Patient has a longstanding history of pancreatitis since she was 8 years old, but states she is not been evaluated by a GI specialist in over a few years.  Denies any GI/ symptoms.  ER workup revealed CBG of 260 mg/d (last A1c level was 9.6% on 05/19/2023) and lipase level greater than 1,000. Patient received 5 mg of Haldol, 1 mg Dilaudid, 4 mg Zofran, and 1 L of normal saline in ED. hospital Medicine consulted to admit patient for acute pancreatitis.    PCP: Sherri Kovacs      * No surgery found *      Hospital Course:   Started on bowel rest and IV fluid for treatment of acute pancreatitis.  Given initial NPO status, basal insulin dose decreased and adjusted as needed.  Patient also ordered insulin for glucose control.   Gradually pain and nausea improved.  Patient tolerated advancement to clear liquid diet than to low-fat diet.  Tapered down on pain meds requirement.    Seen and examined personally on the day of discharge.  Pain better controlled, tolerating diet without any further nausea or vomiting .  Discharge plan followups discussed with patient.  All questions answered to her satisfaction.  She is stable for discharge home at this time with close outpatient with her PCP     Goals of Care Treatment Preferences:  Code Status: Full Code      Consults:     No new Assessment & Plan notes have been filed under this hospital service since the last note was generated.  Service: Hospital Medicine    Final Active Diagnoses:    Diagnosis Date Noted POA    PRINCIPAL PROBLEM:  Acute pancreatitis [K85.90] 04/26/2019 Yes    Left otitis media [H66.92] 10/16/2023 Yes    Mixed hyperlipidemia [E78.2] 10/15/2023 Yes    Essential hypertension [I10] 02/13/2018 Yes    Diabetes mellitus type 2 in obese [E11.69, E66.9] 05/22/2014 Yes    Class 3 severe obesity in adult [E66.01] 05/01/2014 Yes     Chronic    Bipolar 1 disorder [F31.9] 11/07/2013 Yes    Borderline personality disorder [F60.3] 11/07/2013 Yes      Problems Resolved During this Admission:    Diagnosis Date Noted Date Resolved POA    Depressed bipolar II disorder [F31.81] 02/14/2018 10/16/2023 Yes       Discharged Condition: stable    Disposition: Home or Self Care    Follow Up:   Follow-up Information       Sherri Kovacs FNP Follow up in 3 day(s).    Specialty: Family Medicine  Contact information:  [address] [facility] [address] [phone]                           Patient Instructions:      Diet Adult Regular     Order Specific Question Answer Comments   Additional restrictions: Low Chol/Sat Fat    Additional restrictions: Diabetic 1800      Notify your health care provider if you experience any of the following:  persistent nausea and vomiting or diarrhea     Activity  as tolerated       Significant Diagnostic Studies: Labs: CMP   Recent Labs   Lab 10/18/23  0436 10/19/23  0619    136   K 3.5 3.7    103   CO2 26 23   * 224*   BUN 9 9   CREATININE 0.7 0.7   CALCIUM 8.1* 8.2*   PROT 6.1 6.4   ALBUMIN 3.2* 3.4*   BILITOT 0.5 0.5   ALKPHOS 43* 47*   AST 16 15   ALT 20 22   ANIONGAP 9 10        Pending Diagnostic Studies:       None           Medications:  Reconciled Home Medications:      Medication List        START taking these medications      azithromycin 250 MG tablet  Commonly known as: Z-OLMAN  Take 1 tablet (250 mg total) by mouth once daily.  Start taking on: October 20, 2023     oxyCODONE-acetaminophen 5-325 mg per tablet  Commonly known as: PERCOCET  Take 1 tablet by mouth every 6 (six) hours as needed for Pain.            CHANGE how you take these medications      amLODIPine 5 MG tablet  Commonly known as: NORVASC  Take 1 tablet (5 mg total) by mouth once daily.  What changed: when to take this     ondansetron 4 MG Tbdl  Commonly known as: ZOFRAN-ODT  Take 1 tablet (4 mg total) by mouth every 8 (eight) hours as needed (nausea).  What changed: when to take this     TOUJEO MAX U-300 SOLOSTAR 300 unit/mL (3 mL) insulin pen  Generic drug: insulin glargine U-300 conc  Inject 60 Units into the skin once daily.  What changed: Another medication with the same name was removed. Continue taking this medication, and follow the directions you see here.            CONTINUE taking these medications      albuterol 90 mcg/actuation inhaler  Commonly known as: PROVENTIL/VENTOLIN HFA  Inhale 2 puffs into the lungs every 4 (four) hours as needed. Take 2 puffs of the lungs every 4 hr as needed for wheezing or shortness of breath     blood sugar diagnostic Strp  1 each by Misc.(Non-Drug; Combo Route) route 3 (three) times daily.     lancets Misc  1 each by Misc.(Non-Drug; Combo Route) route 3 (three) times daily.     metFORMIN 500 MG ER 24hr tablet  Commonly known as:  GLUCOPHAGE-XR  Take 2 tablets (1,000 mg total) by mouth 2 (two) times daily with meals.     simvastatin 40 MG tablet  Commonly known as: ZOCOR  Take 40 mg by mouth every evening.              Indwelling Lines/Drains at time of discharge:   Lines/Drains/Airways       None                   Time spent on the discharge of patient: 35 minutes         Darlene Platt DO  Department of Hospital Medicine  [facility]

## 2023-12-25 ENCOUNTER — HOSPITAL ENCOUNTER (EMERGENCY)
Facility: HOSPITAL | Age: 38
Discharge: HOME | End: 2023-12-25
Attending: EMERGENCY MEDICINE
Payer: COMMERCIAL

## 2023-12-25 VITALS
TEMPERATURE: 99 F | WEIGHT: 207.25 LBS | BODY MASS INDEX: 37.9 KG/M2 | SYSTOLIC BLOOD PRESSURE: 146 MMHG | DIASTOLIC BLOOD PRESSURE: 87 MMHG | OXYGEN SATURATION: 98 % | RESPIRATION RATE: 18 BRPM | HEART RATE: 85 BPM

## 2023-12-25 DIAGNOSIS — L03.114 CELLULITIS OF LEFT UPPER ARM: Primary | ICD-10-CM

## 2023-12-25 PROCEDURE — 99284 EMERGENCY DEPT VISIT MOD MDM: CPT

## 2023-12-25 RX ORDER — HYDROCODONE BITARTRATE AND ACETAMINOPHEN 5; 325 MG/1; MG/1
1 TABLET ORAL EVERY 4 HOURS PRN
Qty: 18 TABLET | Refills: 0 | Status: ON HOLD | OUTPATIENT
Start: 2023-12-25 | End: 2024-01-07

## 2023-12-25 RX ORDER — SULFAMETHOXAZOLE AND TRIMETHOPRIM 800; 160 MG/1; MG/1
1 TABLET ORAL 2 TIMES DAILY
Qty: 20 TABLET | Refills: 0 | Status: SHIPPED | OUTPATIENT
Start: 2023-12-25 | End: 2024-01-04

## 2023-12-26 NOTE — ED PROVIDER NOTES
Encounter Date: 2023       History     Chief Complaint   Patient presents with    Cellulitis     Redness and swelling to lt. Upper arm where dexcom device is located x 4 days     Patient is a 30-year-old female who presents with redness and swelling to the left upper arm.  Patient replaced her Dexcom device 3 days ago in that arm.  She denies any drainage from the area.  She denies fever, nausea or vomiting.  Patient shows no signs of distress at this time.  No medications taken for relief of symptoms.      Review of patient's allergies indicates:   Allergen Reactions    Demerol (pf) [meperidine (pf)] Hives    Medrol [methylprednisolone] Anaphylaxis     Any cortisone    Adhesive      Other reaction(s): Unknown    Amoxicillin-pot clavulanate      Other reaction(s): Unknown    Atarax [hydroxyzine hcl] Hives    Hydralazine analogues Hives    Iodine      Other reaction(s): Unknown    Omnicef [cefdinir]     Penicillins Hives and Nausea And Vomiting    Phenergan [promethazine] Hives and Nausea And Vomiting    Risperidone analogues Hives    Ativan [lorazepam] Hives and Anxiety     Past Medical History:   Diagnosis Date    Allergy     Asthma     Childhood only    Bipolar 1 disorder     Borderline personality disorder     Depression     GERD (gastroesophageal reflux disease)     History of psychiatric hospitalization     Hx of psychiatric care     Hypertension     pt denied    Pancreatitis     Psychiatric problem     PTSD (post-traumatic stress disorder)     S/P partial hysterectomy 2011    Substance abuse     Suicide attempt      Past Surgical History:   Procedure Laterality Date    APPENDECTOMY       SECTION      CHOLECYSTECTOMY      HYSTERECTOMY      endometrial hyperplasia    LASER LAPAROSCOPY       Family History   Problem Relation Age of Onset    Diabetes Mother     Hypertension Mother     Atrial fibrillation Maternal Grandfather     Breast cancer Maternal Aunt      Social History      Tobacco Use    Smoking status: Former     Current packs/day: 0.00     Types: Cigarettes     Quit date: 6/5/2013     Years since quitting: 10.5    Smokeless tobacco: Never   Substance Use Topics    Alcohol use: Yes     Comment: occasional     Drug use: Not Currently     Review of Systems   Constitutional:  Negative for fever.   HENT:  Negative for sore throat.    Respiratory:  Negative for shortness of breath.    Cardiovascular:  Negative for chest pain.   Gastrointestinal:  Negative for nausea.   Genitourinary:  Negative for dysuria.   Musculoskeletal:  Negative for back pain.   Skin:  Positive for color change (Erythema to left upper arm). Negative for rash.   Neurological:  Negative for weakness.   Hematological:  Does not bruise/bleed easily.       Physical Exam     Initial Vitals [12/25/23 1833]   BP Pulse Resp Temp SpO2   (!) 146/87 85 18 99.1 °F (37.3 °C) 98 %      MAP       --         Physical Exam    Nursing note and vitals reviewed.  Constitutional: She appears well-developed and well-nourished.   HENT:   Head: Normocephalic and atraumatic.   Eyes: EOM are normal. Pupils are equal, round, and reactive to light.   Neck: Neck supple.   Normal range of motion.  Cardiovascular:  Normal rate, regular rhythm, normal heart sounds and intact distal pulses.           Pulmonary/Chest: Breath sounds normal.   Abdominal: Abdomen is soft. Bowel sounds are normal.   Musculoskeletal:         General: Normal range of motion.        Arms:       Cervical back: Normal range of motion and neck supple.     Neurological: She is alert and oriented to person, place, and time. She has normal strength and normal reflexes.   Skin: Skin is warm and dry.         ED Course   Procedures  Labs Reviewed - No data to display       Imaging Results    None          Medications - No data to display  Medical Decision Making  Patient instructed to take antibiotics and pain medication as prescribed.  She was instructed to return to the  emergency room with any worsening symptoms including fever, nausea, vomiting or worsening erythema.  Patient verbalized understanding agrees to plan.                                      Clinical Impression:  Final diagnoses:  [L03.114] Cellulitis of left upper arm (Primary)          ED Disposition Condition    Discharge Stable          ED Prescriptions       Medication Sig Dispense Start Date End Date Auth. Provider    sulfamethoxazole-trimethoprim 800-160mg (BACTRIM DS) 800-160 mg Tab Take 1 tablet by mouth 2 (two) times daily. for 10 days 20 tablet 12/25/2023 1/4/2024 Mina Pinzon NP    HYDROcodone-acetaminophen (NORCO) 5-325 mg per tablet Take 1 tablet by mouth every 4 (four) hours as needed. 18 tablet 12/25/2023 -- Mina Pinzon NP          Follow-up Information       Follow up With Specialties Details Why Contact Info    Sherri Kovacs, [facility]  As needed [id number] [facility] [id number] [phone]               Mina Pinzon NP  12/25/23 0044

## 2023-12-27 ENCOUNTER — HOSPITAL ENCOUNTER (EMERGENCY)
Facility: HOSPITAL | Age: 38
Discharge: HOME | End: 2023-12-27
Attending: EMERGENCY MEDICINE
Payer: COMMERCIAL

## 2023-12-27 VITALS
HEIGHT: 62 IN | BODY MASS INDEX: 38.12 KG/M2 | OXYGEN SATURATION: 100 % | TEMPERATURE: 99 F | RESPIRATION RATE: 18 BRPM | SYSTOLIC BLOOD PRESSURE: 155 MMHG | DIASTOLIC BLOOD PRESSURE: 83 MMHG | WEIGHT: 207.13 LBS | HEART RATE: 95 BPM

## 2023-12-27 DIAGNOSIS — L02.414 ABSCESS OF LEFT ARM: Primary | ICD-10-CM

## 2023-12-27 DIAGNOSIS — L03.114 CELLULITIS OF ARM, LEFT: ICD-10-CM

## 2023-12-27 LAB
ALBUMIN SERPL BCP-MCNC: 4 G/DL (ref 3.5–5.2)
ALP SERPL-CCNC: 46 U/L (ref 55–135)
ALT SERPL W/O P-5'-P-CCNC: 30 U/L (ref 10–44)
ANION GAP SERPL CALC-SCNC: 9 MMOL/L (ref 8–16)
AST SERPL-CCNC: 27 U/L (ref 10–40)
BASOPHILS # BLD AUTO: 0.05 K/UL (ref 0–0.2)
BASOPHILS NFR BLD: 0.7 % (ref 0–1.9)
BILIRUB SERPL-MCNC: 0.8 MG/DL (ref 0.1–1)
BUN SERPL-MCNC: 8 MG/DL (ref 6–20)
CALCIUM SERPL-MCNC: 8.8 MG/DL (ref 8.7–10.5)
CHLORIDE SERPL-SCNC: 105 MMOL/L (ref 95–110)
CO2 SERPL-SCNC: 25 MMOL/L (ref 23–29)
CREAT SERPL-MCNC: 0.8 MG/DL (ref 0.5–1.4)
DIFFERENTIAL METHOD: ABNORMAL
EOSINOPHIL # BLD AUTO: 0.3 K/UL (ref 0–0.5)
EOSINOPHIL NFR BLD: 3.9 % (ref 0–8)
ERYTHROCYTE [DISTWIDTH] IN BLOOD BY AUTOMATED COUNT: 12.9 % (ref 11.5–14.5)
EST. GFR  (NO RACE VARIABLE): >60 ML/MIN/1.73 M^2
GLUCOSE SERPL-MCNC: 108 MG/DL (ref 70–110)
HCT VFR BLD AUTO: 41.7 % (ref 37–48.5)
HGB BLD-MCNC: 14.7 G/DL (ref 12–16)
IMM GRANULOCYTES # BLD AUTO: 0.02 K/UL (ref 0–0.04)
IMM GRANULOCYTES NFR BLD AUTO: 0.3 % (ref 0–0.5)
LACTATE SERPL-SCNC: 1.3 MMOL/L (ref 0.5–2.2)
LYMPHOCYTES # BLD AUTO: 2.6 K/UL (ref 1–4.8)
LYMPHOCYTES NFR BLD: 36 % (ref 18–48)
MCH RBC QN AUTO: 28.7 PG (ref 27–31)
MCHC RBC AUTO-ENTMCNC: 35.3 G/DL (ref 32–36)
MCV RBC AUTO: 81 FL (ref 82–98)
MONOCYTES # BLD AUTO: 0.5 K/UL (ref 0.3–1)
MONOCYTES NFR BLD: 6.2 % (ref 4–15)
NEUTROPHILS # BLD AUTO: 3.9 K/UL (ref 1.8–7.7)
NEUTROPHILS NFR BLD: 52.9 % (ref 38–73)
NRBC BLD-RTO: 0 /100 WBC
PLATELET # BLD AUTO: 288 K/UL (ref 150–450)
PMV BLD AUTO: 9.7 FL (ref 9.2–12.9)
POTASSIUM SERPL-SCNC: 3.4 MMOL/L (ref 3.5–5.1)
PROT SERPL-MCNC: 7.4 G/DL (ref 6–8.4)
RBC # BLD AUTO: 5.13 M/UL (ref 4–5.4)
SODIUM SERPL-SCNC: 139 MMOL/L (ref 136–145)
WBC # BLD AUTO: 7.27 K/UL (ref 3.9–12.7)

## 2023-12-27 PROCEDURE — 83605 ASSAY OF LACTIC ACID: CPT | Performed by: NURSE PRACTITIONER

## 2023-12-27 PROCEDURE — 99283 EMERGENCY DEPT VISIT LOW MDM: CPT | Mod: 25

## 2023-12-27 PROCEDURE — 87040 BLOOD CULTURE FOR BACTERIA: CPT | Performed by: NURSE PRACTITIONER

## 2023-12-27 PROCEDURE — 10060 I&D ABSCESS SIMPLE/SINGLE: CPT

## 2023-12-27 PROCEDURE — 25000003 PHARM REV CODE 250: Performed by: EMERGENCY MEDICINE

## 2023-12-27 PROCEDURE — 80053 COMPREHEN METABOLIC PANEL: CPT | Performed by: NURSE PRACTITIONER

## 2023-12-27 PROCEDURE — 85025 COMPLETE CBC W/AUTO DIFF WBC: CPT | Performed by: NURSE PRACTITIONER

## 2023-12-27 RX ORDER — LIDOCAINE HYDROCHLORIDE 10 MG/ML
10 INJECTION, SOLUTION EPIDURAL; INFILTRATION; INTRACAUDAL; PERINEURAL
Status: COMPLETED | OUTPATIENT
Start: 2023-12-27 | End: 2023-12-27

## 2023-12-27 RX ADMIN — LIDOCAINE HYDROCHLORIDE 100 MG: 10 INJECTION, SOLUTION EPIDURAL; INFILTRATION; INTRACAUDAL at 12:12

## 2023-12-27 NOTE — FIRST PROVIDER EVALUATION
"Medical screening examination initiated.  I have conducted a focused provider triage encounter, findings are as follows:    Brief history of present illness:  Patient presents to ER for worsening skin infection to left upper extremity.  States redness and pain has extended to left elbow.    Vitals:    12/27/23 0900   BP: (!) 155/83   BP Location: Right arm   Patient Position: Sitting   Pulse: 95   Resp: 18   Temp: 98.7 °F (37.1 °C)   TempSrc: Oral   SpO2: 100%   Weight: 93.9 kg (207 lb 2 oz)   Height: 5' 2" (1.575 m)       Pertinent physical exam:  +erythema to posterior left upper arm with +tenderness upon palpation.    Brief workup plan:  Workup.    Preliminary workup initiated; this workup will be continued and followed by the physician or advanced practice provider that is assigned to the patient when roomed.  "

## 2023-12-27 NOTE — ED PROVIDER NOTES
SCRIBE #1 NOTE: I, Lynn Best, am scribing for, and in the presence of, Madelin Foster MD. I have scribed the entire note.       History     Chief Complaint   Patient presents with    Arm Pain     Dx with cellulitis on Friday in left arm. Elbow pain and complaining of pus coming out.      Review of patient's allergies indicates:   Allergen Reactions    Demerol (pf) [meperidine (pf)] Hives    Medrol [methylprednisolone] Anaphylaxis     Any cortisone    Adhesive      Other reaction(s): Unknown    Amoxicillin-pot clavulanate      Other reaction(s): Unknown    Atarax [hydroxyzine hcl] Hives    Hydralazine analogues Hives    Iodine      Other reaction(s): Unknown    Omnicef [cefdinir]     Penicillins Hives and Nausea And Vomiting    Phenergan [promethazine] Hives and Nausea And Vomiting    Risperidone analogues Hives    Ativan [lorazepam] Hives and Anxiety         History of Present Illness     HPI    12/27/2023, 12:09 PM  History obtained from the patient      History of Present Illness: Priti Obrien is a 38 y.o. female patient with a PMHx of DM and HTN  who presents to the Emergency Department for evaluation of worsening cellulitis of her LUE which pt was diagnosed with on 12/25/23. Pt states that she changed her glucose monitor prior to symptom onset. Pt changed her monitor and now reports worsening pain to the area and swelling down her entire LUE. Symptoms are constant and moderate in severity. No mitigating or exacerbating factors reported. No associated sxs reported. Patient denies any fever, chills, nausea, vomiting, and all other sxs at this time. Prior Tx includes bactrim. Pt took her first dose yesterday morning although was seen in the ER on 12/25.  She also put another glucose monitor in the same area as the cellulitis. No further complaints or concerns at this time.       Arrival mode: Personal vehicle   PCP: Sherri Kovacs FNP        Past Medical History:  Past Medical History:   Diagnosis  Date    Allergy     Asthma     Childhood only    Bipolar 1 disorder     Borderline personality disorder     Depression     GERD (gastroesophageal reflux disease)     History of psychiatric hospitalization     Hx of psychiatric care     Hypertension     pt denied    Pancreatitis     Psychiatric problem     PTSD (post-traumatic stress disorder)     S/P partial hysterectomy 2011    Substance abuse     Suicide attempt        Past Surgical History:  Past Surgical History:   Procedure Laterality Date    APPENDECTOMY       SECTION      CHOLECYSTECTOMY      HYSTERECTOMY  2010    endometrial hyperplasia    LASER LAPAROSCOPY           Family History:  Family History   Problem Relation Age of Onset    Diabetes Mother     Hypertension Mother     Atrial fibrillation Maternal Grandfather     Breast cancer Maternal Aunt        Social History:  Social History     Tobacco Use    Smoking status: Former     Current packs/day: 0.00     Types: Cigarettes     Quit date: 2013     Years since quitting: 10.5    Smokeless tobacco: Never   Substance and Sexual Activity    Alcohol use: Yes     Comment: occasional     Drug use: Not Currently    Sexual activity: Yes     Partners: Male, Female        Review of Systems     Review of Systems   Physical Exam     Initial Vitals [23 0900]   BP Pulse Resp Temp SpO2   (!) 155/83 95 18 98.7 °F (37.1 °C) 100 %      MAP       --          Physical Exam  Nursing Notes and Vital Signs Reviewed.  Constitutional: Patient is in no acute distress. Well-developed and well-nourished.  Head: Atraumatic. Normocephalic.  Eyes: PERRL. EOM intact. Conjunctivae are not pale. No scleral icterus.  ENT: Mucous membranes are moist. Oropharynx is clear and symmetric.    Neck: Supple. Full ROM. No lymphadenopathy.  Cardiovascular: Regular rate. Regular rhythm. No murmurs, rubs, or gallops. Distal pulses are 2+ and symmetric.  Pulmonary/Chest: No respiratory distress. Clear to auscultation  "bilaterally. No wheezing or rales.  Abdominal: Soft and non-distended.  There is no tenderness.  No rebound, guarding, or rigidity. Good bowel sounds.  Genitourinary: No CVA tenderness  Musculoskeletal: Moves all extremities. No obvious deformities. No edema. No calf tenderness.  Skin: Warm and dry. Small abscess to left upper arm near dexcom site with mild erythema, no crepitus, no active drainage or streaking down arm, neurovascularly intact.  Neurological:  Alert, awake, and appropriate.  Normal speech.  No acute focal neurological deficits are appreciated.  Psychiatric: Normal affect. Good eye contact. Appropriate in content.     ED Course   I & D - Incision and Drainage    Date/Time: 12/27/2023 12:41 PM  Location procedure was performed: [facility] EMERGENCY DEPARTMENT    Performed by: Cecil Hernandez NP  Authorized by: Madelin Foster MD  Type: abscess  Body area: upper extremity  Location details: left arm  Anesthesia: local infiltration    Anesthesia:  Local Anesthetic: lidocaine 1% without epinephrine  Scalpel size: 11  Incision type: single straight  Incision depth: dermal  Complexity: simple  Drainage: pus  Drainage amount: scant  Wound treatment: incision, drainage and wound packed  Packing material: 1/4 in gauze  Complications: No  Specimens: No  Implants: No    Incision depth: dermal        ED Vital Signs:  Vitals:    12/27/23 0900   BP: (!) 155/83   Pulse: 95   Resp: 18   Temp: 98.7 °F (37.1 °C)   TempSrc: Oral   SpO2: 100%   Weight: 93.9 kg (207 lb 2 oz)   Height: 5' 2" (1.575 m)       Abnormal Lab Results:  Labs Reviewed   CBC W/ AUTO DIFFERENTIAL - Abnormal; Notable for the following components:       Result Value    MCV 81 (*)     All other components within normal limits   COMPREHENSIVE METABOLIC PANEL - Abnormal; Notable for the following components:    Potassium 3.4 (*)     Alkaline Phosphatase 46 (*)     All other components within normal limits   CULTURE, BLOOD   LACTIC ACID, PLASMA    "     All Lab Results:  Results for orders placed or performed during the hospital encounter of 12/27/23   Blood Culture #1 **CANNOT BE ORDERED STAT**    Specimen: Peripheral, Antecubital, Right; Blood   Result Value Ref Range    Blood Culture, Routine No Growth to date     Blood Culture, Routine No Growth to date    CBC auto differential   Result Value Ref Range    WBC 7.27 3.90 - 12.70 K/uL    RBC 5.13 4.00 - 5.40 M/uL    Hemoglobin 14.7 12.0 - 16.0 g/dL    Hematocrit 41.7 37.0 - 48.5 %    MCV 81 (L) 82 - 98 fL    MCH 28.7 27.0 - 31.0 pg    MCHC 35.3 32.0 - 36.0 g/dL    RDW 12.9 11.5 - 14.5 %    Platelets 288 150 - 450 K/uL    MPV 9.7 9.2 - 12.9 fL    Immature Granulocytes 0.3 0.0 - 0.5 %    Gran # (ANC) 3.9 1.8 - 7.7 K/uL    Immature Grans (Abs) 0.02 0.00 - 0.04 K/uL    Lymph # 2.6 1.0 - 4.8 K/uL    Mono # 0.5 0.3 - 1.0 K/uL    Eos # 0.3 0.0 - 0.5 K/uL    Baso # 0.05 0.00 - 0.20 K/uL    nRBC 0 0 /100 WBC    Gran % 52.9 38.0 - 73.0 %    Lymph % 36.0 18.0 - 48.0 %    Mono % 6.2 4.0 - 15.0 %    Eosinophil % 3.9 0.0 - 8.0 %    Basophil % 0.7 0.0 - 1.9 %    Differential Method Automated    Comprehensive metabolic panel   Result Value Ref Range    Sodium 139 136 - 145 mmol/L    Potassium 3.4 (L) 3.5 - 5.1 mmol/L    Chloride 105 95 - 110 mmol/L    CO2 25 23 - 29 mmol/L    Glucose 108 70 - 110 mg/dL    BUN 8 6 - 20 mg/dL    Creatinine 0.8 0.5 - 1.4 mg/dL    Calcium 8.8 8.7 - 10.5 mg/dL    Total Protein 7.4 6.0 - 8.4 g/dL    Albumin 4.0 3.5 - 5.2 g/dL    Total Bilirubin 0.8 0.1 - 1.0 mg/dL    Alkaline Phosphatase 46 (L) 55 - 135 U/L    AST 27 10 - 40 U/L    ALT 30 10 - 44 U/L    eGFR >60 >60 mL/min/1.73 m^2    Anion Gap 9 8 - 16 mmol/L   Lactic acid, plasma   Result Value Ref Range    Lactate (Lactic Acid) 1.3 0.5 - 2.2 mmol/L         Imaging Results:  Imaging Results    None                     The Emergency Provider reviewed the vital signs and test results, which are outlined above.     ED Discussion       12:21 PM:  Reassessed pt at this time. Discussed with pt all pertinent ED information and results. Discussed pt dx and plan of tx. Gave pt all f/u and return to the ED instructions. All questions and concerns were addressed at this time. Pt expresses understanding of information and instructions, and is comfortable with plan to discharge. Pt is stable for discharge.    I discussed with patient and/or family/caretaker that evaluation in the ED does not suggest any emergent or life threatening medical conditions requiring immediate intervention beyond what was provided in the ED, and I believe patient is safe for discharge.  Regardless, an unremarkable evaluation in the ED does not preclude the development or presence of a serious of life threatening condition. As such, patient was instructed to return immediately for any worsening or change in current symptoms.         Medical Decision Making  DDX:  1. Cellulitis  2. Abscess  3. Sepsis    Seen in the ER on 12/25 for redness and swelling to her left upper arm after changing out her dexacom, now presents with worsening redness and small abscess, she put a new dexacom in the same area that is actively infected, she now has a small abscess, there is no streaking or swelling down her arm.  Vital signs reviewed and normal. Lab work obtained to rule out sepsis and labs are otherwise normal.  I had her remove her dexacom and an I&D was performed at bedside, scant purulent drainage.  She is taking Bactrim but only has been taking it for one day.  She was advised not to put another dexacom in that area and to make sure when she does put it at another site that she cleans its well prior to. She is stable for discharge in my opinion, reasons to return given.         Amount and/or Complexity of Data Reviewed  External Data Reviewed: labs and notes.  Labs: ordered. Decision-making details documented in ED Course.    Risk  Prescription drug management.                ED  Medication(s):  Medications   LIDOcaine (PF) 10 mg/ml (1%) injection 100 mg (100 mg Infiltration Given 12/27/23 1213)       Discharge Medication List as of 12/27/2023 12:19 PM                  Scribe Attestation:   Scribe #1: I performed the above scribed service and the documentation accurately describes the services I performed. I attest to the accuracy of the note.     Attending:   Physician Attestation Statement for Scribe #1: I, Madelin Foster MD, personally performed the services described in this documentation, as scribed by Lynn Best, in my presence, and it is both accurate and complete.           Clinical Impression       ICD-10-CM ICD-9-CM   1. Abscess of left arm  L02.414 682.3   2. Cellulitis of arm, left  L03.114 682.3       Disposition:   Disposition: Discharged  Condition: Stable         Madelin Foster MD  12/29/23 1054

## 2023-12-27 NOTE — Clinical Note
"Priti Burgess" Micah was seen and treated in our emergency department on 12/27/2023.  She may return to work on 12/30/2023.       If you have any questions or concerns, please don't hesitate to call.      CNASH LPN    "

## 2023-12-27 NOTE — DISCHARGE INSTRUCTIONS
You will need to continue the Bactrim as already prescribed for the remainder of the treatment.  Please keep arm clean and elevate your arm and do not apply another Dexacom in that area. The most common cause of a skin abscess and cellulitis is staph infection and or strep infection, which is why you were given the Bactrim.  It will take a few days before this gets better.

## 2024-01-01 LAB — BACTERIA BLD CULT: NORMAL

## 2024-01-06 ENCOUNTER — HOSPITAL ENCOUNTER (OUTPATIENT)
Facility: HOSPITAL | Age: 39
Discharge: HOME | End: 2024-01-07
Attending: EMERGENCY MEDICINE | Admitting: HOSPITALIST
Payer: COMMERCIAL

## 2024-01-06 DIAGNOSIS — R10.13 EPIGASTRIC ABDOMINAL PAIN: ICD-10-CM

## 2024-01-06 DIAGNOSIS — K85.90 ACUTE PANCREATITIS: Primary | ICD-10-CM

## 2024-01-06 DIAGNOSIS — R07.9 CHEST PAIN: ICD-10-CM

## 2024-01-06 DIAGNOSIS — E11.65 HYPERGLYCEMIA DUE TO DIABETES MELLITUS: ICD-10-CM

## 2024-01-06 DIAGNOSIS — R11.2 NAUSEA AND VOMITING, UNSPECIFIED VOMITING TYPE: ICD-10-CM

## 2024-01-06 PROBLEM — R10.9 ABDOMINAL PAIN: Status: ACTIVE | Noted: 2024-01-06

## 2024-01-06 LAB
ALBUMIN SERPL BCP-MCNC: 3.6 G/DL (ref 3.5–5.2)
ALBUMIN SERPL BCP-MCNC: 4.3 G/DL (ref 3.5–5.2)
ALP SERPL-CCNC: 34 U/L (ref 55–135)
ALP SERPL-CCNC: 41 U/L (ref 55–135)
ALT SERPL W/O P-5'-P-CCNC: 17 U/L (ref 10–44)
ALT SERPL W/O P-5'-P-CCNC: 20 U/L (ref 10–44)
ANION GAP SERPL CALC-SCNC: 13 MMOL/L (ref 8–16)
ANION GAP SERPL CALC-SCNC: 9 MMOL/L (ref 8–16)
AST SERPL-CCNC: 16 U/L (ref 10–40)
AST SERPL-CCNC: 21 U/L (ref 10–40)
BACTERIA #/AREA URNS HPF: NORMAL /HPF
BASOPHILS # BLD AUTO: 0.04 K/UL (ref 0–0.2)
BASOPHILS # BLD AUTO: 0.04 K/UL (ref 0–0.2)
BASOPHILS NFR BLD: 0.4 % (ref 0–1.9)
BASOPHILS NFR BLD: 0.5 % (ref 0–1.9)
BILIRUB SERPL-MCNC: 0.7 MG/DL (ref 0.1–1)
BILIRUB SERPL-MCNC: 0.9 MG/DL (ref 0.1–1)
BILIRUB UR QL STRIP: NEGATIVE
BUN SERPL-MCNC: 12 MG/DL (ref 6–20)
BUN SERPL-MCNC: 16 MG/DL (ref 6–20)
CALCIUM SERPL-MCNC: 8 MG/DL (ref 8.7–10.5)
CALCIUM SERPL-MCNC: 9.1 MG/DL (ref 8.7–10.5)
CHLORIDE SERPL-SCNC: 105 MMOL/L (ref 95–110)
CHLORIDE SERPL-SCNC: 108 MMOL/L (ref 95–110)
CLARITY UR: CLEAR
CO2 SERPL-SCNC: 19 MMOL/L (ref 23–29)
CO2 SERPL-SCNC: 20 MMOL/L (ref 23–29)
COLOR UR: YELLOW
CREAT SERPL-MCNC: 0.7 MG/DL (ref 0.5–1.4)
CREAT SERPL-MCNC: 0.9 MG/DL (ref 0.5–1.4)
DIFFERENTIAL METHOD BLD: ABNORMAL
DIFFERENTIAL METHOD BLD: ABNORMAL
EOSINOPHIL # BLD AUTO: 0.1 K/UL (ref 0–0.5)
EOSINOPHIL # BLD AUTO: 0.1 K/UL (ref 0–0.5)
EOSINOPHIL NFR BLD: 0.9 % (ref 0–8)
EOSINOPHIL NFR BLD: 1.4 % (ref 0–8)
ERYTHROCYTE [DISTWIDTH] IN BLOOD BY AUTOMATED COUNT: 13 % (ref 11.5–14.5)
ERYTHROCYTE [DISTWIDTH] IN BLOOD BY AUTOMATED COUNT: 13.1 % (ref 11.5–14.5)
EST. GFR  (NO RACE VARIABLE): >60 ML/MIN/1.73 M^2
EST. GFR  (NO RACE VARIABLE): >60 ML/MIN/1.73 M^2
GLUCOSE SERPL-MCNC: 126 MG/DL (ref 70–110)
GLUCOSE SERPL-MCNC: 230 MG/DL (ref 70–110)
GLUCOSE UR QL STRIP: ABNORMAL
HCT VFR BLD AUTO: 35.6 % (ref 37–48.5)
HCT VFR BLD AUTO: 40.8 % (ref 37–48.5)
HGB BLD-MCNC: 12.5 G/DL (ref 12–16)
HGB BLD-MCNC: 14.6 G/DL (ref 12–16)
HGB UR QL STRIP: NEGATIVE
IMM GRANULOCYTES # BLD AUTO: 0.02 K/UL (ref 0–0.04)
IMM GRANULOCYTES # BLD AUTO: 0.03 K/UL (ref 0–0.04)
IMM GRANULOCYTES NFR BLD AUTO: 0.3 % (ref 0–0.5)
IMM GRANULOCYTES NFR BLD AUTO: 0.3 % (ref 0–0.5)
KETONES UR QL STRIP: ABNORMAL
LEUKOCYTE ESTERASE UR QL STRIP: NEGATIVE
LIPASE SERPL-CCNC: >1000 U/L (ref 4–60)
LYMPHOCYTES # BLD AUTO: 1.4 K/UL (ref 1–4.8)
LYMPHOCYTES # BLD AUTO: 2.8 K/UL (ref 1–4.8)
LYMPHOCYTES NFR BLD: 12.9 % (ref 18–48)
LYMPHOCYTES NFR BLD: 36 % (ref 18–48)
MCH RBC QN AUTO: 28.8 PG (ref 27–31)
MCH RBC QN AUTO: 28.9 PG (ref 27–31)
MCHC RBC AUTO-ENTMCNC: 35.1 G/DL (ref 32–36)
MCHC RBC AUTO-ENTMCNC: 35.8 G/DL (ref 32–36)
MCV RBC AUTO: 81 FL (ref 82–98)
MCV RBC AUTO: 82 FL (ref 82–98)
MICROSCOPIC COMMENT: NORMAL
MONOCYTES # BLD AUTO: 0.5 K/UL (ref 0.3–1)
MONOCYTES # BLD AUTO: 0.5 K/UL (ref 0.3–1)
MONOCYTES NFR BLD: 4.6 % (ref 4–15)
MONOCYTES NFR BLD: 6.6 % (ref 4–15)
NEUTROPHILS # BLD AUTO: 4.2 K/UL (ref 1.8–7.7)
NEUTROPHILS # BLD AUTO: 8.5 K/UL (ref 1.8–7.7)
NEUTROPHILS NFR BLD: 55.2 % (ref 38–73)
NEUTROPHILS NFR BLD: 80.9 % (ref 38–73)
NITRITE UR QL STRIP: NEGATIVE
NRBC BLD-RTO: 0 /100 WBC
NRBC BLD-RTO: 0 /100 WBC
PH UR STRIP: 8 [PH] (ref 5–8)
PLATELET # BLD AUTO: 254 K/UL (ref 150–450)
PLATELET # BLD AUTO: 289 K/UL (ref 150–450)
PMV BLD AUTO: 9.5 FL (ref 9.2–12.9)
PMV BLD AUTO: 9.6 FL (ref 9.2–12.9)
POCT GLUCOSE: 188 MG/DL (ref 70–110)
POCT GLUCOSE: 80 MG/DL (ref 70–110)
POTASSIUM SERPL-SCNC: 3.7 MMOL/L (ref 3.5–5.1)
POTASSIUM SERPL-SCNC: 3.8 MMOL/L (ref 3.5–5.1)
PROT SERPL-MCNC: 6.3 G/DL (ref 6–8.4)
PROT SERPL-MCNC: 7.7 G/DL (ref 6–8.4)
PROT UR QL STRIP: ABNORMAL
RBC # BLD AUTO: 4.32 M/UL (ref 4–5.4)
RBC # BLD AUTO: 5.07 M/UL (ref 4–5.4)
SODIUM SERPL-SCNC: 137 MMOL/L (ref 136–145)
SODIUM SERPL-SCNC: 137 MMOL/L (ref 136–145)
SP GR UR STRIP: 1.02 (ref 1–1.03)
URN SPEC COLLECT METH UR: ABNORMAL
UROBILINOGEN UR STRIP-ACNC: NEGATIVE EU/DL
WBC # BLD AUTO: 10.55 K/UL (ref 3.9–12.7)
WBC # BLD AUTO: 7.63 K/UL (ref 3.9–12.7)
YEAST URNS QL MICRO: NORMAL

## 2024-01-06 PROCEDURE — 93010 ELECTROCARDIOGRAM REPORT: CPT | Mod: ,,, | Performed by: INTERNAL MEDICINE

## 2024-01-06 PROCEDURE — 85025 COMPLETE CBC W/AUTO DIFF WBC: CPT | Performed by: EMERGENCY MEDICINE

## 2024-01-06 PROCEDURE — 96376 TX/PRO/DX INJ SAME DRUG ADON: CPT

## 2024-01-06 PROCEDURE — 25000003 PHARM REV CODE 250: Performed by: EMERGENCY MEDICINE

## 2024-01-06 PROCEDURE — 83690 ASSAY OF LIPASE: CPT | Performed by: EMERGENCY MEDICINE

## 2024-01-06 PROCEDURE — 25000003 PHARM REV CODE 250: Performed by: HOSPITALIST

## 2024-01-06 PROCEDURE — 99285 EMERGENCY DEPT VISIT HI MDM: CPT | Mod: 25

## 2024-01-06 PROCEDURE — 96372 THER/PROPH/DIAG INJ SC/IM: CPT | Mod: 59 | Performed by: HOSPITALIST

## 2024-01-06 PROCEDURE — 63600175 PHARM REV CODE 636 W HCPCS: Performed by: EMERGENCY MEDICINE

## 2024-01-06 PROCEDURE — 85025 COMPLETE CBC W/AUTO DIFF WBC: CPT | Mod: 91 | Performed by: HOSPITALIST

## 2024-01-06 PROCEDURE — 96374 THER/PROPH/DIAG INJ IV PUSH: CPT

## 2024-01-06 PROCEDURE — 82962 GLUCOSE BLOOD TEST: CPT

## 2024-01-06 PROCEDURE — 96375 TX/PRO/DX INJ NEW DRUG ADDON: CPT

## 2024-01-06 PROCEDURE — 81000 URINALYSIS NONAUTO W/SCOPE: CPT | Performed by: EMERGENCY MEDICINE

## 2024-01-06 PROCEDURE — 80053 COMPREHEN METABOLIC PANEL: CPT | Performed by: EMERGENCY MEDICINE

## 2024-01-06 PROCEDURE — G0378 HOSPITAL OBSERVATION PER HR: HCPCS

## 2024-01-06 PROCEDURE — 63600175 PHARM REV CODE 636 W HCPCS: Performed by: HOSPITALIST

## 2024-01-06 PROCEDURE — 36415 COLL VENOUS BLD VENIPUNCTURE: CPT | Performed by: HOSPITALIST

## 2024-01-06 PROCEDURE — 96361 HYDRATE IV INFUSION ADD-ON: CPT

## 2024-01-06 PROCEDURE — 93005 ELECTROCARDIOGRAM TRACING: CPT

## 2024-01-06 PROCEDURE — 80053 COMPREHEN METABOLIC PANEL: CPT | Mod: 91 | Performed by: HOSPITALIST

## 2024-01-06 RX ORDER — AMLODIPINE BESYLATE 5 MG/1
5 TABLET ORAL DAILY
Status: DISCONTINUED | OUTPATIENT
Start: 2024-01-06 | End: 2024-01-06

## 2024-01-06 RX ORDER — ONDANSETRON 2 MG/ML
4 INJECTION INTRAMUSCULAR; INTRAVENOUS
Status: COMPLETED | OUTPATIENT
Start: 2024-01-06 | End: 2024-01-06

## 2024-01-06 RX ORDER — INSULIN ASPART 100 [IU]/ML
0-5 INJECTION, SOLUTION INTRAVENOUS; SUBCUTANEOUS
Status: DISCONTINUED | OUTPATIENT
Start: 2024-01-06 | End: 2024-01-07 | Stop reason: HOSPADM

## 2024-01-06 RX ORDER — AMLODIPINE BESYLATE 5 MG/1
5 TABLET ORAL ONCE
Status: COMPLETED | OUTPATIENT
Start: 2024-01-06 | End: 2024-01-06

## 2024-01-06 RX ORDER — HYDROMORPHONE HYDROCHLORIDE 2 MG/ML
1 INJECTION, SOLUTION INTRAMUSCULAR; INTRAVENOUS; SUBCUTANEOUS
Status: COMPLETED | OUTPATIENT
Start: 2024-01-06 | End: 2024-01-06

## 2024-01-06 RX ORDER — HALOPERIDOL 5 MG/ML
2 INJECTION INTRAMUSCULAR
Status: DISPENSED | OUTPATIENT
Start: 2024-01-06 | End: 2024-01-06

## 2024-01-06 RX ORDER — ONDANSETRON 2 MG/ML
4 INJECTION INTRAMUSCULAR; INTRAVENOUS EVERY 8 HOURS PRN
Status: DISCONTINUED | OUTPATIENT
Start: 2024-01-06 | End: 2024-01-07 | Stop reason: HOSPADM

## 2024-01-06 RX ORDER — ACETAMINOPHEN 650 MG/1
650 SUPPOSITORY RECTAL EVERY 6 HOURS PRN
Status: DISCONTINUED | OUTPATIENT
Start: 2024-01-06 | End: 2024-01-07 | Stop reason: HOSPADM

## 2024-01-06 RX ORDER — ACETAMINOPHEN 325 MG/1
650 TABLET ORAL EVERY 8 HOURS PRN
Status: DISCONTINUED | OUTPATIENT
Start: 2024-01-06 | End: 2024-01-07 | Stop reason: HOSPADM

## 2024-01-06 RX ORDER — IBUPROFEN 200 MG
16 TABLET ORAL
Status: DISCONTINUED | OUTPATIENT
Start: 2024-01-06 | End: 2024-01-07 | Stop reason: HOSPADM

## 2024-01-06 RX ORDER — IPRATROPIUM BROMIDE AND ALBUTEROL SULFATE 2.5; .5 MG/3ML; MG/3ML
3 SOLUTION RESPIRATORY (INHALATION) EVERY 6 HOURS PRN
Status: DISCONTINUED | OUTPATIENT
Start: 2024-01-06 | End: 2024-01-07 | Stop reason: HOSPADM

## 2024-01-06 RX ORDER — AMLODIPINE BESYLATE 10 MG/1
10 TABLET ORAL DAILY
Status: DISCONTINUED | OUTPATIENT
Start: 2024-01-07 | End: 2024-01-07 | Stop reason: HOSPADM

## 2024-01-06 RX ORDER — HYDROCODONE BITARTRATE AND ACETAMINOPHEN 5; 325 MG/1; MG/1
1 TABLET ORAL EVERY 6 HOURS PRN
Status: DISCONTINUED | OUTPATIENT
Start: 2024-01-06 | End: 2024-01-07 | Stop reason: HOSPADM

## 2024-01-06 RX ORDER — METOCLOPRAMIDE HYDROCHLORIDE 5 MG/ML
10 INJECTION INTRAMUSCULAR; INTRAVENOUS
Status: COMPLETED | OUTPATIENT
Start: 2024-01-06 | End: 2024-01-06

## 2024-01-06 RX ORDER — SODIUM CHLORIDE, SODIUM LACTATE, POTASSIUM CHLORIDE, CALCIUM CHLORIDE 600; 310; 30; 20 MG/100ML; MG/100ML; MG/100ML; MG/100ML
INJECTION, SOLUTION INTRAVENOUS CONTINUOUS
Status: DISCONTINUED | OUTPATIENT
Start: 2024-01-06 | End: 2024-01-07 | Stop reason: HOSPADM

## 2024-01-06 RX ORDER — NALOXONE HCL 0.4 MG/ML
0.02 VIAL (ML) INJECTION
Status: DISCONTINUED | OUTPATIENT
Start: 2024-01-06 | End: 2024-01-07 | Stop reason: HOSPADM

## 2024-01-06 RX ORDER — AMOXICILLIN 250 MG
1 CAPSULE ORAL 2 TIMES DAILY PRN
Status: DISCONTINUED | OUTPATIENT
Start: 2024-01-06 | End: 2024-01-07 | Stop reason: HOSPADM

## 2024-01-06 RX ORDER — MAG HYDROX/ALUMINUM HYD/SIMETH 200-200-20
30 SUSPENSION, ORAL (FINAL DOSE FORM) ORAL 4 TIMES DAILY PRN
Status: DISCONTINUED | OUTPATIENT
Start: 2024-01-06 | End: 2024-01-07 | Stop reason: HOSPADM

## 2024-01-06 RX ORDER — MORPHINE SULFATE 4 MG/ML
2 INJECTION, SOLUTION INTRAMUSCULAR; INTRAVENOUS EVERY 4 HOURS PRN
Status: DISCONTINUED | OUTPATIENT
Start: 2024-01-06 | End: 2024-01-07 | Stop reason: HOSPADM

## 2024-01-06 RX ORDER — SODIUM CHLORIDE 0.9 % (FLUSH) 0.9 %
10 SYRINGE (ML) INJECTION EVERY 12 HOURS PRN
Status: DISCONTINUED | OUTPATIENT
Start: 2024-01-06 | End: 2024-01-07 | Stop reason: HOSPADM

## 2024-01-06 RX ORDER — ENOXAPARIN SODIUM 100 MG/ML
40 INJECTION SUBCUTANEOUS EVERY 24 HOURS
Status: DISCONTINUED | OUTPATIENT
Start: 2024-01-06 | End: 2024-01-07 | Stop reason: HOSPADM

## 2024-01-06 RX ORDER — IBUPROFEN 200 MG
24 TABLET ORAL
Status: DISCONTINUED | OUTPATIENT
Start: 2024-01-06 | End: 2024-01-07 | Stop reason: HOSPADM

## 2024-01-06 RX ORDER — GLUCAGON 1 MG
1 KIT INJECTION
Status: DISCONTINUED | OUTPATIENT
Start: 2024-01-06 | End: 2024-01-07 | Stop reason: HOSPADM

## 2024-01-06 RX ORDER — TALC
6 POWDER (GRAM) TOPICAL NIGHTLY PRN
Status: DISCONTINUED | OUTPATIENT
Start: 2024-01-06 | End: 2024-01-07 | Stop reason: HOSPADM

## 2024-01-06 RX ADMIN — MORPHINE SULFATE 2 MG: 4 INJECTION INTRAVENOUS at 05:01

## 2024-01-06 RX ADMIN — HYDROCODONE BITARTRATE AND ACETAMINOPHEN 1 TABLET: 5; 325 TABLET ORAL at 09:01

## 2024-01-06 RX ADMIN — ONDANSETRON 4 MG: 2 INJECTION INTRAMUSCULAR; INTRAVENOUS at 09:01

## 2024-01-06 RX ADMIN — SODIUM CHLORIDE, POTASSIUM CHLORIDE, SODIUM LACTATE AND CALCIUM CHLORIDE: 600; 310; 30; 20 INJECTION, SOLUTION INTRAVENOUS at 10:01

## 2024-01-06 RX ADMIN — INSULIN DETEMIR 48 UNITS: 100 INJECTION, SOLUTION SUBCUTANEOUS at 09:01

## 2024-01-06 RX ADMIN — SODIUM CHLORIDE 1000 ML: 9 INJECTION, SOLUTION INTRAVENOUS at 02:01

## 2024-01-06 RX ADMIN — ONDANSETRON 4 MG: 2 INJECTION INTRAMUSCULAR; INTRAVENOUS at 05:01

## 2024-01-06 RX ADMIN — ONDANSETRON 4 MG: 2 INJECTION INTRAMUSCULAR; INTRAVENOUS at 02:01

## 2024-01-06 RX ADMIN — SODIUM CHLORIDE, POTASSIUM CHLORIDE, SODIUM LACTATE AND CALCIUM CHLORIDE: 600; 310; 30; 20 INJECTION, SOLUTION INTRAVENOUS at 04:01

## 2024-01-06 RX ADMIN — MORPHINE SULFATE 2 MG: 4 INJECTION INTRAVENOUS at 12:01

## 2024-01-06 RX ADMIN — HYDROMORPHONE HYDROCHLORIDE 1 MG: 2 INJECTION INTRAMUSCULAR; INTRAVENOUS; SUBCUTANEOUS at 03:01

## 2024-01-06 RX ADMIN — SODIUM CHLORIDE, POTASSIUM CHLORIDE, SODIUM LACTATE AND CALCIUM CHLORIDE: 600; 310; 30; 20 INJECTION, SOLUTION INTRAVENOUS at 03:01

## 2024-01-06 RX ADMIN — AMLODIPINE BESYLATE 5 MG: 5 TABLET ORAL at 03:01

## 2024-01-06 RX ADMIN — METOCLOPRAMIDE 10 MG: 5 INJECTION, SOLUTION INTRAMUSCULAR; INTRAVENOUS at 03:01

## 2024-01-06 RX ADMIN — AMLODIPINE BESYLATE 5 MG: 5 TABLET ORAL at 09:01

## 2024-01-06 RX ADMIN — MORPHINE SULFATE 2 MG: 4 INJECTION INTRAVENOUS at 06:01

## 2024-01-06 RX ADMIN — MORPHINE SULFATE 2 MG: 4 INJECTION INTRAVENOUS at 09:01

## 2024-01-06 RX ADMIN — HYDROCODONE BITARTRATE AND ACETAMINOPHEN 1 TABLET: 5; 325 TABLET ORAL at 03:01

## 2024-01-06 RX ADMIN — HYDROMORPHONE HYDROCHLORIDE 1 MG: 2 INJECTION INTRAMUSCULAR; INTRAVENOUS; SUBCUTANEOUS at 02:01

## 2024-01-06 RX ADMIN — SODIUM CHLORIDE, POTASSIUM CHLORIDE, SODIUM LACTATE AND CALCIUM CHLORIDE: 600; 310; 30; 20 INJECTION, SOLUTION INTRAVENOUS at 01:01

## 2024-01-06 NOTE — ED PROVIDER NOTES
SCRIBE #1 NOTE: I, James Hutchins, am scribing for, and in the presence of, Sleena Logan DO. I have scribed the entire note.       History     Chief Complaint   Patient presents with    Abdominal Pain     Upper abdominal pain with nausea and vomiting. Hx of pancreatitis.     Review of patient's allergies indicates:   Allergen Reactions    Demerol (pf) [meperidine (pf)] Hives    Medrol [methylprednisolone] Anaphylaxis     Any cortisone    Adhesive      Other reaction(s): Unknown    Amoxicillin-pot clavulanate      Other reaction(s): Unknown    Atarax [hydroxyzine hcl] Hives    Hydralazine analogues Hives    Iodine      Other reaction(s): Unknown    Omnicef [cefdinir]     Penicillins Hives and Nausea And Vomiting    Phenergan [promethazine] Hives and Nausea And Vomiting    Risperidone analogues Hives    Ativan [lorazepam] Hives and Anxiety         History of Present Illness     HPI    1/6/2024, 1:45 AM  History obtained from the patient      History of Present Illness: Priti Obrien is a 38 y.o. female patient with a PMHx of GERD, HTN, pancreatitis, partial hysterectomy who presents to the Emergency Department for evaluation of epigastric abdominal pain which onset gradually 2 hours ago. Patient states her pain is similar to her previous episode of pancreatitis. Patient describes her pain to be sharp and radiates to her back. She denies any EtOH use with this episode. She notes she started taking mounjaro 2 months ago. Symptoms are constant and severe in severity. No mitigating factors reported.  Exacerbating factors include vomiting.  Associated sxs include N/V. Patient denies any urinary symptoms, vaginal symptoms, SOB, CP, fever. No further complaints or concerns at this time.       Arrival mode: EMS      PCP: Sherri Kovacs FNP        Past Medical History:  Past Medical History:   Diagnosis Date    Allergy     Asthma     Childhood only    Bipolar 1 disorder     Borderline personality disorder     Depression      GERD (gastroesophageal reflux disease)     History of psychiatric hospitalization     Hx of psychiatric care     Hypertension     pt denied    Pancreatitis     Psychiatric problem     PTSD (post-traumatic stress disorder)     S/P partial hysterectomy 2011    Substance abuse     Suicide attempt        Past Surgical History:  Past Surgical History:   Procedure Laterality Date    APPENDECTOMY       SECTION      CHOLECYSTECTOMY      HYSTERECTOMY  2010    endometrial hyperplasia    LASER LAPAROSCOPY           Family History:  Family History   Problem Relation Age of Onset    Diabetes Mother     Hypertension Mother     Atrial fibrillation Maternal Grandfather     Breast cancer Maternal Aunt        Social History:  Social History     Tobacco Use    Smoking status: Former     Current packs/day: 0.00     Types: Cigarettes     Quit date: 2013     Years since quitting: 10.5    Smokeless tobacco: Never   Substance and Sexual Activity    Alcohol use: Yes     Comment: occasional     Drug use: Not Currently    Sexual activity: Yes     Partners: Male, Female        Review of Systems       Review of Systems   Constitutional:  Negative for fever.   Respiratory:  Negative for shortness of breath.    Cardiovascular:  Negative for chest pain.   Gastrointestinal:  Positive for abdominal pain (epigastric), nausea and vomiting.   Genitourinary:  Negative for dysuria and vaginal bleeding.        Physical Exam     Initial Vitals   BP Pulse Resp Temp SpO2   24 0039 24 0039 24 0039 24 0245 24 0039   (!) 151/76 94 18 98.2 °F (36.8 °C) 100 %      MAP       --                 Physical Exam   Nursing Notes and Vital Signs Reviewed.  Constitutional: Patient is in moderate distress. Well-developed and well-nourished. Actively vomiting.   Head: Atraumatic. Normocephalic.  Eyes: PERRL. EOM intact. Conjunctivae are not pale. No scleral icterus.  ENT: Mucous membranes are dry.  Neck: Supple. Full  "ROM.   Cardiovascular: Regular rate. Regular rhythm. No murmurs, rubs, or gallops.   Pulmonary/Chest: No respiratory distress. Clear to auscultation bilaterally. No wheezing or rales.  Abdominal: Soft and non-distended.  There is epigastric tenderness.  Musculoskeletal: Moves all extremities. No obvious deformities. No edema. No calf tenderness.  Skin: Warm and dry.  No jaundice.  Neurological:  Alert and awake.  Normal speech.  No acute focal neurological deficits are appreciated.  Psychiatric: Normal affect. Good eye contact. Appropriate in content.     ED Course   Procedures  ED Vital Signs:  Vitals:    01/06/24 0039 01/06/24 0215 01/06/24 0245 01/06/24 0304   BP: (!) 151/76  (!) 140/81    Pulse: 94  83    Resp: 18 18     Temp:   98.2 °F (36.8 °C)    TempSrc: Oral  Oral    SpO2: 100%  98%    Weight:    94.5 kg (208 lb 4.8 oz)   Height:        01/06/24 0340 01/06/24 0353   BP:     Pulse:     Resp: 18    Temp:     TempSrc:     SpO2:     Weight:     Height:  5' 2" (1.575 m)       Abnormal Lab Results:  Labs Reviewed   CBC W/ AUTO DIFFERENTIAL - Abnormal; Notable for the following components:       Result Value    MCV 81 (*)     Gran # (ANC) 8.5 (*)     Gran % 80.9 (*)     Lymph % 12.9 (*)     All other components within normal limits   COMPREHENSIVE METABOLIC PANEL - Abnormal; Notable for the following components:    CO2 19 (*)     Glucose 230 (*)     Alkaline Phosphatase 41 (*)     All other components within normal limits   LIPASE - Abnormal; Notable for the following components:    Lipase >1000 (*)     All other components within normal limits   URINALYSIS, REFLEX TO URINE CULTURE - Abnormal; Notable for the following components:    Protein, UA Trace (*)     Glucose, UA 4+ (*)     Ketones, UA 3+ (*)     All other components within normal limits    Narrative:     Specimen Source->Urine   URINALYSIS MICROSCOPIC    Narrative:     Specimen Source->Urine   COMPREHENSIVE METABOLIC PANEL   CBC W/ AUTO DIFFERENTIAL "   POCT GLUCOSE MONITORING CONTINUOUS        All Lab Results:  Results for orders placed or performed during the hospital encounter of 01/06/24   CBC W/ AUTO DIFFERENTIAL   Result Value Ref Range    WBC 10.55 3.90 - 12.70 K/uL    RBC 5.07 4.00 - 5.40 M/uL    Hemoglobin 14.6 12.0 - 16.0 g/dL    Hematocrit 40.8 37.0 - 48.5 %    MCV 81 (L) 82 - 98 fL    MCH 28.8 27.0 - 31.0 pg    MCHC 35.8 32.0 - 36.0 g/dL    RDW 13.0 11.5 - 14.5 %    Platelets 289 150 - 450 K/uL    MPV 9.6 9.2 - 12.9 fL    Immature Granulocytes 0.3 0.0 - 0.5 %    Gran # (ANC) 8.5 (H) 1.8 - 7.7 K/uL    Immature Grans (Abs) 0.03 0.00 - 0.04 K/uL    Lymph # 1.4 1.0 - 4.8 K/uL    Mono # 0.5 0.3 - 1.0 K/uL    Eos # 0.1 0.0 - 0.5 K/uL    Baso # 0.04 0.00 - 0.20 K/uL    nRBC 0 0 /100 WBC    Gran % 80.9 (H) 38.0 - 73.0 %    Lymph % 12.9 (L) 18.0 - 48.0 %    Mono % 4.6 4.0 - 15.0 %    Eosinophil % 0.9 0.0 - 8.0 %    Basophil % 0.4 0.0 - 1.9 %    Differential Method Automated    Comp. Metabolic Panel   Result Value Ref Range    Sodium 137 136 - 145 mmol/L    Potassium 3.8 3.5 - 5.1 mmol/L    Chloride 105 95 - 110 mmol/L    CO2 19 (L) 23 - 29 mmol/L    Glucose 230 (H) 70 - 110 mg/dL    BUN 16 6 - 20 mg/dL    Creatinine 0.9 0.5 - 1.4 mg/dL    Calcium 9.1 8.7 - 10.5 mg/dL    Total Protein 7.7 6.0 - 8.4 g/dL    Albumin 4.3 3.5 - 5.2 g/dL    Total Bilirubin 0.9 0.1 - 1.0 mg/dL    Alkaline Phosphatase 41 (L) 55 - 135 U/L    AST 21 10 - 40 U/L    ALT 20 10 - 44 U/L    eGFR >60 >60 mL/min/1.73 m^2    Anion Gap 13 8 - 16 mmol/L   Lipase   Result Value Ref Range    Lipase >1000 (H) 4 - 60 U/L   Urinalysis, Reflex to Urine Culture Urine, Clean Catch    Specimen: Urine   Result Value Ref Range    Specimen UA Urine, Clean Catch     Color, UA Yellow Yellow, Straw, [place]    Appearance, UA Clear Clear    pH, UA 8.0 5.0 - 8.0    Specific Gravity, UA 1.020 1.005 - 1.030    Protein, UA Trace (A) Negative    Glucose, UA 4+ (A) Negative    Ketones, UA 3+ (A) Negative    Bilirubin  (UA) Negative Negative    Occult Blood UA Negative Negative    Nitrite, UA Negative Negative    Urobilinogen, UA Negative <2.0 EU/dL    Leukocytes, UA Negative Negative   Urinalysis Microscopic   Result Value Ref Range    Bacteria None None-Occ /hpf    Yeast, UA None None    Microscopic Comment SEE COMMENT          Imaging Results:  Imaging Results    None            The Emergency Provider reviewed the vital signs and test results, which are outlined above.     ED Discussion         ED Course as of 01/06/24 0550   Sat Jan 06, 2024   0231 Normal sinus rhythm rate 73.  OH interval 130.  QRS 78.  .  No ST or T-wave changes.  Q-waves in the anterior leads. [NF]   0332 30-year-old female presents with epigastric abdominal pain with nausea and vomiting.  Lipase greater than a 1000.  Started on Mounjaro 2 months ago.  Glucose 230 with normal anion gap.  3+ ketones in the urine.  Normal renal function and electrolytes.  CBC is normal. [NF]   0549 Differential diagnosis includes but is not limited to acute pancreatitis, DKA, HHS, acute cholecystitis, cholangitis, small-bowel obstruction.  [NF]      ED Course User Index  [NF] Selena Logan, DO     Medical Decision Making  Amount and/or Complexity of Data Reviewed  Labs: ordered. Decision-making details documented in ED Course.  ECG/medicine tests: ordered and independent interpretation performed. Decision-making details documented in ED Course.  Discussion of management or test interpretation with external provider(s):     3:36 AM: 30-year-old female presents with epigastric abdominal pain with nausea and vomiting.  Lipase greater than a 1000.  Started on Mounjaro 2 months ago.  Glucose 230 with normal anion gap.  3+ ketones in the urine.  Normal renal function and electrolytes. Consulted  for admission    3:36 AM: Discussed case with Duncan Hilario MD ([facility]). Dr. Hilario agrees with current care and management of pt and accepts admission.    Admitting Service: Hospital Medicine  Admitting Physician: Dr. Hilario  Admit to: Obs med surg    3:36 AM: Re-evaluated pt. I have discussed test results, shared treatment plan, and the need for admission with patient and family at bedside. Pt and family express understanding at this time and agree with all information. All questions answered. Pt and family have no further questions or concerns at this time. Pt is ready for admit.      Risk  Prescription drug management.                 ED Medication(s):  Medications   haloperidol lactate injection 2 mg (2 mg Intravenous Not Given 1/6/24 0245)   sodium chloride 0.9% flush 10 mL (has no administration in time range)   lactated ringers infusion ( Intravenous New Bag 1/6/24 3261)   albuterol-ipratropium 2.5 mg-0.5 mg/3 mL nebulizer solution 3 mL (has no administration in time range)   melatonin tablet 6 mg (has no administration in time range)   ondansetron injection 4 mg (has no administration in time range)   senna-docusate 8.6-50 mg per tablet 1 tablet (has no administration in time range)   acetaminophen tablet 650 mg (has no administration in time range)   aluminum-magnesium hydroxide-simethicone 200-200-20 mg/5 mL suspension 30 mL (has no administration in time range)   acetaminophen suppository 650 mg (has no administration in time range)   HYDROcodone-acetaminophen 5-325 mg per tablet 1 tablet (has no administration in time range)   morphine injection 2 mg (has no administration in time range)   naloxone 0.4 mg/mL injection 0.02 mg (has no administration in time range)   glucose chewable tablet 16 g (has no administration in time range)   glucose chewable tablet 24 g (has no administration in time range)   glucagon (human recombinant) injection 1 mg (has no administration in time range)   enoxaparin injection 40 mg (has no administration in time range)   insulin aspart U-100 pen 0-5 Units (has no administration in time range)   dextrose 10% bolus 125 mL 125  mL (has no administration in time range)   dextrose 10% bolus 250 mL 250 mL (has no administration in time range)   amLODIPine tablet 5 mg (has no administration in time range)   insulin detemir U-100 (Levemir) pen 48 Units (has no administration in time range)   sodium chloride 0.9% bolus 1,000 mL 1,000 mL (0 mLs Intravenous Stopped 1/6/24 0355)   HYDROmorphone (PF) injection 1 mg (1 mg Intravenous Given 1/6/24 0215)   ondansetron injection 4 mg (4 mg Intravenous Given 1/6/24 0215)   HYDROmorphone (PF) injection 1 mg (1 mg Intravenous Given 1/6/24 0340)   metoclopramide injection 10 mg (10 mg Intravenous Given 1/6/24 0340)       New Prescriptions    No medications on file               Scribe Attestation:   Scribe #1: I performed the above scribed service and the documentation accurately describes the services I performed. I attest to the accuracy of the note.     Attending:   Physician Attestation Statement for Scribe #1: ISelena DO, personally performed the services described in this documentation, as scribed by James Hutchins, in my presence, and it is both accurate and complete.           Clinical Impression       ICD-10-CM ICD-9-CM   1. Acute pancreatitis  K85.90 577.0   2. Epigastric abdominal pain  R10.13 789.06   3. Chest pain  R07.9 786.50   4. Hyperglycemia due to diabetes mellitus  E11.65 250.02   5. Nausea and vomiting, unspecified vomiting type  R11.2 787.01       Disposition:   Disposition: Placed in Observation  Condition: Stable         Selena Logan DO  01/06/24 [id number]

## 2024-01-06 NOTE — Clinical Note
Diagnosis: Acute pancreatitis [577.0.ICD-9-CM]   Future Attending Provider: LOREE MAGALLON [[id number]]   Admitting Provider:: LOREE MAGALLON. [[id number]]

## 2024-01-06 NOTE — ASSESSMENT & PLAN NOTE
"Patient's FSGs are uncontrolled due to hyperglycemia on current medication regimen.  Last A1c reviewed-   Lab Results   Component Value Date    HGBA1C 6.5 (H) 10/15/2023     Most recent fingerstick glucose reviewed- No results for input(s): "POCTGLUCOSE" in the last 24 hours.  Current correctional scale  Medium  titrate as needed  anti-hyperglycemic dose as follows-   Antihyperglycemics (From admission, onward)      Start     Stop Route Frequency Ordered    01/06/24 0437  insulin aspart U-100 pen 0-5 Units         -- SubQ Before meals & nightly PRN 01/06/24 0337        Plan:  -SSI  -Accu-checks  -Hold oral hypoglycemics while patient is in the hospital  -Hypoglycemic protocol     "

## 2024-01-06 NOTE — ASSESSMENT & PLAN NOTE
Body mass index is 38.1 kg/m². Morbid obesity complicates all aspects of disease management from diagnostic modalities to treatment. Weight loss encouraged and health benefits explained to patient.

## 2024-01-06 NOTE — H&P
[facility] - Emergency Dept.  [facility] Medicine  History & Physical    Patient Name: Priti Obrien  MRN: [id number]  Patient Class: OP- Observation  Admission Date: 1/6/2024  Attending Physician: Duncan Hilario MD   Primary Care Provider: Sherri Kovacs FNP         Patient information was obtained from patient, past medical records, and ER records.     Subjective:     Principal Problem:Abdominal pain    Chief Complaint:   Chief Complaint   Patient presents with    Abdominal Pain     Upper abdominal pain with nausea and vomiting. Hx of pancreatitis.        HPI: Priti Obrien is a 38 y.o. female with a PMH  has a past medical history of Allergy, Asthma, Bipolar 1 disorder, Borderline personality disorder, Depression, GERD (gastroesophageal reflux disease), History of psychiatric hospitalization, psychiatric care, Hypertension, Pancreatitis, Psychiatric problem, PTSD (post-traumatic stress disorder), S/P partial hysterectomy (September 2011), Substance abuse, and Suicide attempt. who presented to the ED for further evaluation of acute onset epigastric abdominal pain x2 hours duration consistent with previous episodes of acute pancreatitis.  Patient has significant history of recurrent pancreatitis and was last admitted back in October for similar symptoms.  Patient reported starting majora 2 months ago is stated she normally tolerates injections with 1 day of nausea and decreased appetite but otherwise tolerates medications without complications.  She denied recent use of alcohol or illicit drug usage.  Pain is currently described as sharp/stabbing in nature, constant, rated 7/10 in severity radiating from her epigastric region to her back with no known alleviating factors, and aggravating factors including movement and palpation to the affected area.  Associated symptoms included nausea, vomiting, and decreased p.o. intake as noted above but reports all other review of systems negative.  Prior to onset  of symptoms, patient reported being in her usual state of health with no other concerns or complaints.  Initial workup in the ED revealed elevated lipase >1000 with LFTs within normal limits.  Patient initiated on IVFs, antiemetics, and multimodal pain regimen with improvement in symptoms.  Patient admitted to Hospital Medicine under observation for continued medical management.      PCP: Sherri Kovacs      Past Medical History:   Diagnosis Date    Allergy     Asthma     Childhood only    Bipolar 1 disorder     Borderline personality disorder     Depression     GERD (gastroesophageal reflux disease)     History of psychiatric hospitalization     Hx of psychiatric care     Hypertension     pt denied    Pancreatitis     Psychiatric problem     PTSD (post-traumatic stress disorder)     S/P partial hysterectomy 2011    Substance abuse     Suicide attempt        Past Surgical History:   Procedure Laterality Date    APPENDECTOMY       SECTION      CHOLECYSTECTOMY      HYSTERECTOMY      endometrial hyperplasia    LASER LAPAROSCOPY         Review of patient's allergies indicates:   Allergen Reactions    Demerol (pf) [meperidine (pf)] Hives    Medrol [methylprednisolone] Anaphylaxis     Any cortisone    Adhesive      Other reaction(s): Unknown    Amoxicillin-pot clavulanate      Other reaction(s): Unknown    Atarax [hydroxyzine hcl] Hives    Hydralazine analogues Hives    Iodine      Other reaction(s): Unknown    Omnicef [cefdinir]     Penicillins Hives and Nausea And Vomiting    Phenergan [promethazine] Hives and Nausea And Vomiting    Risperidone analogues Hives    Ativan [lorazepam] Hives and Anxiety       No current facility-administered medications on file prior to encounter.     Current Outpatient Medications on File Prior to Encounter   Medication Sig    albuterol (PROVENTIL/VENTOLIN HFA) 90 mcg/actuation inhaler Inhale 2 puffs into the lungs every 4 (four) hours as needed. Take 2 puffs of the  lungs every 4 hr as needed for wheezing or shortness of breath    amLODIPine (NORVASC) 5 MG tablet Take 1 tablet (5 mg total) by mouth once daily.    blood sugar diagnostic Strp 1 each by Misc.(Non-Drug; Combo Route) route 3 (three) times daily.    HYDROcodone-acetaminophen (NORCO) 5-325 mg per tablet Take 1 tablet by mouth every 4 (four) hours as needed.    insulin glargine U-300 conc (TOUJEO MAX U-300 SOLOSTAR) 300 unit/mL (3 mL) insulin pen Inject 60 Units into the skin once daily.    lancets Misc 1 each by Misc.(Non-Drug; Combo Route) route 3 (three) times daily.    ondansetron (ZOFRAN-ODT) 4 MG TbDL DISSOLVE 1 tablet (4 mg total) by mouth every 8 (eight) hours as needed (nausea).    metFORMIN (GLUCOPHAGE-XR) 500 MG ER 24hr tablet Take 2 tablets (1,000 mg total) by mouth 2 (two) times daily with meals.    [DISCONTINUED] azithromycin (Z-OLMAN) 250 MG tablet Take 1 tablet (250 mg total) by mouth once daily.    [DISCONTINUED] simvastatin (ZOCOR) 40 MG tablet Take 40 mg by mouth every evening.     Family History       Problem Relation (Age of Onset)    Atrial fibrillation Maternal Grandfather    Breast cancer Maternal Aunt    Diabetes Mother    Hypertension Mother          Tobacco Use    Smoking status: Former     Current packs/day: 0.00     Types: Cigarettes     Quit date: 6/5/2013     Years since quitting: 10.5    Smokeless tobacco: Never   Substance and Sexual Activity    Alcohol use: Yes     Comment: occasional     Drug use: Not Currently    Sexual activity: Yes     Partners: Male, Female     Review of Systems   All other systems reviewed and are negative.    Objective:     Vital Signs (Most Recent):  Temp: 98.2 °F (36.8 °C) (01/06/24 0245)  Pulse: 83 (01/06/24 0245)  Resp: 18 (01/06/24 0215)  BP: (!) 140/81 (01/06/24 0245)  SpO2: 98 % (01/06/24 0245) Vital Signs (24h Range):  Temp:  [98.2 °F (36.8 °C)] 98.2 °F (36.8 °C)  Pulse:  [83-94] 83  Resp:  [18] 18  SpO2:  [98 %-100 %] 98 %  BP: (140-151)/(76-81) 140/81      Weight: 94.5 kg (208 lb 4.8 oz)  Body mass index is 38.1 kg/m².     Physical Exam  Vitals reviewed.   Constitutional:       General: She is in acute distress.      Appearance: She is obese. She is not ill-appearing, toxic-appearing or diaphoretic.   HENT:      Head: Normocephalic and atraumatic.      Right Ear: External ear normal.      Left Ear: External ear normal.      Nose: Nose normal. No congestion or rhinorrhea.      Mouth/Throat:      Mouth: Mucous membranes are moist.      Pharynx: Oropharynx is clear. No oropharyngeal exudate or posterior oropharyngeal erythema.   Eyes:      General: No scleral icterus.     Extraocular Movements: Extraocular movements intact.      Conjunctiva/sclera: Conjunctivae normal.      Pupils: Pupils are equal, round, and reactive to light.   Neck:      Vascular: No carotid bruit.   Cardiovascular:      Rate and Rhythm: Normal rate and regular rhythm.      Pulses: Normal pulses.      Heart sounds: Normal heart sounds. No murmur heard.     No friction rub. No gallop.   Pulmonary:      Effort: Pulmonary effort is normal. No respiratory distress.      Breath sounds: Normal breath sounds. No stridor. No wheezing, rhonchi or rales.   Chest:      Chest wall: No tenderness.   Abdominal:      General: Abdomen is flat. Bowel sounds are normal. There is no distension.      Palpations: Abdomen is soft.      Tenderness: There is abdominal tenderness. There is no right CVA tenderness, left CVA tenderness, guarding or rebound.      Hernia: No hernia is present.      Comments: TTP throughout entire abdomen greatest in epigastric region without evidence of guarding or rebound tenderness noted   Musculoskeletal:         General: No swelling, tenderness, deformity or signs of injury. Normal range of motion.      Cervical back: Normal range of motion and neck supple. No rigidity or tenderness.      Right lower leg: No edema.      Left lower leg: No edema.   Lymphadenopathy:      Cervical: No  cervical adenopathy.   Skin:     General: Skin is warm and dry.      Capillary Refill: Capillary refill takes less than 2 seconds.      Coloration: Skin is not jaundiced or pale.      Findings: No bruising, erythema, lesion or rash.   Neurological:      General: No focal deficit present.      Mental Status: She is alert and oriented to person, place, and time. Mental status is at baseline.      Cranial Nerves: No cranial nerve deficit.      Sensory: No sensory deficit.      Motor: No weakness.      Coordination: Coordination normal.   Psychiatric:         Mood and Affect: Mood normal.         Behavior: Behavior normal.         Thought Content: Thought content normal.         Judgment: Judgment normal.              CRANIAL NERVES     CN III, IV, VI   Pupils are equal, round, and reactive to light.       Significant Labs: All pertinent labs within the past 24 hours have been reviewed.    Significant Imaging: I have reviewed all pertinent imaging results/findings within the past 24 hours.    LABS:  Recent Results (from the past 24 hour(s))   CBC W/ AUTO DIFFERENTIAL    Collection Time: 01/06/24  2:14 AM   Result Value Ref Range    WBC 10.55 3.90 - 12.70 K/uL    RBC 5.07 4.00 - 5.40 M/uL    Hemoglobin 14.6 12.0 - 16.0 g/dL    Hematocrit 40.8 37.0 - 48.5 %    MCV 81 (L) 82 - 98 fL    MCH 28.8 27.0 - 31.0 pg    MCHC 35.8 32.0 - 36.0 g/dL    RDW 13.0 11.5 - 14.5 %    Platelets 289 150 - 450 K/uL    MPV 9.6 9.2 - 12.9 fL    Immature Granulocytes 0.3 0.0 - 0.5 %    Gran # (ANC) 8.5 (H) 1.8 - 7.7 K/uL    Immature Grans (Abs) 0.03 0.00 - 0.04 K/uL    Lymph # 1.4 1.0 - 4.8 K/uL    Mono # 0.5 0.3 - 1.0 K/uL    Eos # 0.1 0.0 - 0.5 K/uL    Baso # 0.04 0.00 - 0.20 K/uL    nRBC 0 0 /100 WBC    Gran % 80.9 (H) 38.0 - 73.0 %    Lymph % 12.9 (L) 18.0 - 48.0 %    Mono % 4.6 4.0 - 15.0 %    Eosinophil % 0.9 0.0 - 8.0 %    Basophil % 0.4 0.0 - 1.9 %    Differential Method Automated    Comp. Metabolic Panel    Collection Time: 01/06/24   2:14 AM   Result Value Ref Range    Sodium 137 136 - 145 mmol/L    Potassium 3.8 3.5 - 5.1 mmol/L    Chloride 105 95 - 110 mmol/L    CO2 19 (L) 23 - 29 mmol/L    Glucose 230 (H) 70 - 110 mg/dL    BUN 16 6 - 20 mg/dL    Creatinine 0.9 0.5 - 1.4 mg/dL    Calcium 9.1 8.7 - 10.5 mg/dL    Total Protein 7.7 6.0 - 8.4 g/dL    Albumin 4.3 3.5 - 5.2 g/dL    Total Bilirubin 0.9 0.1 - 1.0 mg/dL    Alkaline Phosphatase 41 (L) 55 - 135 U/L    AST 21 10 - 40 U/L    ALT 20 10 - 44 U/L    eGFR >60 >60 mL/min/1.73 m^2    Anion Gap 13 8 - 16 mmol/L   Lipase    Collection Time: 01/06/24  2:14 AM   Result Value Ref Range    Lipase >1000 (H) 4 - 60 U/L   Urinalysis, Reflex to Urine Culture Urine, Clean Catch    Collection Time: 01/06/24  2:39 AM    Specimen: Urine   Result Value Ref Range    Specimen UA Urine, Clean Catch     Color, UA Yellow Yellow, Straw, Humaira    Appearance, UA Clear Clear    pH, UA 8.0 5.0 - 8.0    Specific Gravity, UA 1.020 1.005 - 1.030    Protein, UA Trace (A) Negative    Glucose, UA 4+ (A) Negative    Ketones, UA 3+ (A) Negative    Bilirubin (UA) Negative Negative    Occult Blood UA Negative Negative    Nitrite, UA Negative Negative    Urobilinogen, UA Negative <2.0 EU/dL    Leukocytes, UA Negative Negative   Urinalysis Microscopic    Collection Time: 01/06/24  2:39 AM   Result Value Ref Range    Bacteria None None-Occ /hpf    Yeast, UA None None    Microscopic Comment SEE COMMENT        RADIOLOGY  No results found.    EKG    MICROBIOLOGY    [facility]    Assessment/Plan:     * Abdominal pain    Nausea and vomiting    Acute pancreatitis  Patient presented with worsening abdominal pain with intractable nausea and vomiting he was found to have evidence of acute on chronic pancreatitis.  Lipase >1000 with LFTs within normal limits.  Patient has history of chronic recurrent pancreatitis with most current flare being contributed to starting majora 2 months ago.  Patient initiated on IVFs, antiemetics, and multimodal pain  "regimen. CT abdomen not obtained.   Plan:  -NPO  -Continue current pain regimen, titrate as needed  -Bowel regimen prn  -Bedrest  -Continue to hold NGT decompression as patient not actively vomiting or have concerns for bowel obstruction   -Continue IVFs  -Antiemetics prn      Diabetes mellitus type 2 in obese  Patient's FSGs are uncontrolled due to hyperglycemia on current medication regimen.  Last A1c reviewed-   Lab Results   Component Value Date    HGBA1C 6.5 (H) 10/15/2023     Most recent fingerstick glucose reviewed- No results for input(s): "POCTGLUCOSE" in the last 24 hours.  Current correctional scale  Medium  titrate as needed  anti-hyperglycemic dose as follows-   Antihyperglycemics (From admission, onward)      Start     Stop Route Frequency Ordered    01/06/24 0437  insulin aspart U-100 pen 0-5 Units         -- SubQ Before meals & nightly PRN 01/06/24 0337        Plan:  -SSI  -Accu-checks  -Hold oral hypoglycemics while patient is in the hospital  -Hypoglycemic protocol       Essential hypertension  Chronic, controlled. Latest blood pressure and vitals reviewed-     Temp:  [98.2 °F (36.8 °C)]   Pulse:  [83-94]   Resp:  [18]   BP: (140-151)/(76-81)   SpO2:  [98 %-100 %] .   Home meds for hypertension were reviewed and noted below.   Hypertension Medications               amLODIPine (NORVASC) 5 MG tablet Take 1 tablet (5 mg total) by mouth once daily.          While in the hospital, will manage blood pressure as follows; Continue home antihypertensive regimen    Will utilize p.r.n. blood pressure medication only if patient's blood pressure greater than 160/100 and she develops symptoms such as worsening chest pain or shortness of breath.      Mixed hyperlipidemia  Patient is chronically on statin.will continue for now. Last Lipid Panel:   Lab Results   Component Value Date    CHOL 216 (H) 06/30/2023    HDL 42 06/30/2023    LDLCALC 151 (H) 06/30/2023    TRIG 126 06/30/2023    CHOLHDL 18.5 (L) 02/24/2022 "   Plan:  -Continue home medication  -low fat/low calorie diet when not NPO      Bipolar 1 disorder    Borderline personality disorder  Chronic. Stable. Not in acute exacerbation and currently denies endorsing any suicidal/homicidal ideations.   Plan:  -Continue home medications       Class 2 severe obesity due to excess calories with serious comorbidity and body mass index (BMI) of 38.0 to 38.9 in adult  Body mass index is 38.1 kg/m². Morbid obesity complicates all aspects of disease management from diagnostic modalities to treatment. Weight loss encouraged and health benefits explained to patient.         VTE Risk Mitigation (From admission, onward)           Ordered     enoxaparin injection 40 mg  Daily         01/06/24 0337     IP VTE HIGH RISK PATIENT  Once         01/06/24 0337     Place sequential compression device  Until discontinued         01/06/24 0337                  //Core Measures   -DVT proph: SCDs, Lovenox   -Code status: Full    -Surrogate: none provided       Components of this note were documented using a voice recognition system and are subject to errors not corrected at the time the document was proof read. Please contact the author for any clarifications.       On 01/06/2024, patient should be placed in hospital observation services under my care.       Duncan Hilario MD  Department of Hospital Medicine  [facility] - Emergency Dept.

## 2024-01-06 NOTE — SUBJECTIVE & OBJECTIVE
Past Medical History:   Diagnosis Date    Allergy     Asthma     Childhood only    Bipolar 1 disorder     Borderline personality disorder     Depression     GERD (gastroesophageal reflux disease)     History of psychiatric hospitalization     Hx of psychiatric care     Hypertension     pt denied    Pancreatitis     Psychiatric problem     PTSD (post-traumatic stress disorder)     S/P partial hysterectomy 2011    Substance abuse     Suicide attempt        Past Surgical History:   Procedure Laterality Date    APPENDECTOMY       SECTION      CHOLECYSTECTOMY      HYSTERECTOMY      endometrial hyperplasia    LASER LAPAROSCOPY         Review of patient's allergies indicates:   Allergen Reactions    Demerol (pf) [meperidine (pf)] Hives    Medrol [methylprednisolone] Anaphylaxis     Any cortisone    Adhesive      Other reaction(s): Unknown    Amoxicillin-pot clavulanate      Other reaction(s): Unknown    Atarax [hydroxyzine hcl] Hives    Hydralazine analogues Hives    Iodine      Other reaction(s): Unknown    Omnicef [cefdinir]     Penicillins Hives and Nausea And Vomiting    Phenergan [promethazine] Hives and Nausea And Vomiting    Risperidone analogues Hives    Ativan [lorazepam] Hives and Anxiety       No current facility-administered medications on file prior to encounter.     Current Outpatient Medications on File Prior to Encounter   Medication Sig    albuterol (PROVENTIL/VENTOLIN HFA) 90 mcg/actuation inhaler Inhale 2 puffs into the lungs every 4 (four) hours as needed. Take 2 puffs of the lungs every 4 hr as needed for wheezing or shortness of breath    amLODIPine (NORVASC) 5 MG tablet Take 1 tablet (5 mg total) by mouth once daily.    blood sugar diagnostic Strp 1 each by Misc.(Non-Drug; Combo Route) route 3 (three) times daily.    HYDROcodone-acetaminophen (NORCO) 5-325 mg per tablet Take 1 tablet by mouth every 4 (four) hours as needed.    insulin glargine U-300 conc (TOUJEO MAX U-300  SOLOSTAR) 300 unit/mL (3 mL) insulin pen Inject 60 Units into the skin once daily.    lancets Misc 1 each by Misc.(Non-Drug; Combo Route) route 3 (three) times daily.    ondansetron (ZOFRAN-ODT) 4 MG TbDL DISSOLVE 1 tablet (4 mg total) by mouth every 8 (eight) hours as needed (nausea).    metFORMIN (GLUCOPHAGE-XR) 500 MG ER 24hr tablet Take 2 tablets (1,000 mg total) by mouth 2 (two) times daily with meals.    [DISCONTINUED] azithromycin (Z-OLMAN) 250 MG tablet Take 1 tablet (250 mg total) by mouth once daily.    [DISCONTINUED] simvastatin (ZOCOR) 40 MG tablet Take 40 mg by mouth every evening.     Family History       Problem Relation (Age of Onset)    Atrial fibrillation Maternal Grandfather    Breast cancer Maternal Aunt    Diabetes Mother    Hypertension Mother          Tobacco Use    Smoking status: Former     Current packs/day: 0.00     Types: Cigarettes     Quit date: 6/5/2013     Years since quitting: 10.5    Smokeless tobacco: Never   Substance and Sexual Activity    Alcohol use: Yes     Comment: occasional     Drug use: Not Currently    Sexual activity: Yes     Partners: Male, Female     Review of Systems   All other systems reviewed and are negative.    Objective:     Vital Signs (Most Recent):  Temp: 98.2 °F (36.8 °C) (01/06/24 0245)  Pulse: 83 (01/06/24 0245)  Resp: 18 (01/06/24 0215)  BP: (!) 140/81 (01/06/24 0245)  SpO2: 98 % (01/06/24 0245) Vital Signs (24h Range):  Temp:  [98.2 °F (36.8 °C)] 98.2 °F (36.8 °C)  Pulse:  [83-94] 83  Resp:  [18] 18  SpO2:  [98 %-100 %] 98 %  BP: (140-151)/(76-81) 140/81     Weight: 94.5 kg (208 lb 4.8 oz)  Body mass index is 38.1 kg/m².     Physical Exam  Vitals reviewed.   Constitutional:       General: She is in acute distress.      Appearance: She is obese. She is not ill-appearing, toxic-appearing or diaphoretic.   HENT:      Head: Normocephalic and atraumatic.      Right Ear: External ear normal.      Left Ear: External ear normal.      Nose: Nose normal. No  congestion or rhinorrhea.      Mouth/Throat:      Mouth: Mucous membranes are moist.      Pharynx: Oropharynx is clear. No oropharyngeal exudate or posterior oropharyngeal erythema.   Eyes:      General: No scleral icterus.     Extraocular Movements: Extraocular movements intact.      Conjunctiva/sclera: Conjunctivae normal.      Pupils: Pupils are equal, round, and reactive to light.   Neck:      Vascular: No carotid bruit.   Cardiovascular:      Rate and Rhythm: Normal rate and regular rhythm.      Pulses: Normal pulses.      Heart sounds: Normal heart sounds. No murmur heard.     No friction rub. No gallop.   Pulmonary:      Effort: Pulmonary effort is normal. No respiratory distress.      Breath sounds: Normal breath sounds. No stridor. No wheezing, rhonchi or rales.   Chest:      Chest wall: No tenderness.   Abdominal:      General: Abdomen is flat. Bowel sounds are normal. There is no distension.      Palpations: Abdomen is soft.      Tenderness: There is abdominal tenderness. There is no right CVA tenderness, left CVA tenderness, guarding or rebound.      Hernia: No hernia is present.      Comments: TTP throughout entire abdomen greatest in epigastric region without evidence of guarding or rebound tenderness noted   Musculoskeletal:         General: No swelling, tenderness, deformity or signs of injury. Normal range of motion.      Cervical back: Normal range of motion and neck supple. No rigidity or tenderness.      Right lower leg: No edema.      Left lower leg: No edema.   Lymphadenopathy:      Cervical: No cervical adenopathy.   Skin:     General: Skin is warm and dry.      Capillary Refill: Capillary refill takes less than 2 seconds.      Coloration: Skin is not jaundiced or pale.      Findings: No bruising, erythema, lesion or rash.   Neurological:      General: No focal deficit present.      Mental Status: She is alert and oriented to person, place, and time. Mental status is at baseline.      Cranial  Nerves: No cranial nerve deficit.      Sensory: No sensory deficit.      Motor: No weakness.      Coordination: Coordination normal.   Psychiatric:         Mood and Affect: Mood normal.         Behavior: Behavior normal.         Thought Content: Thought content normal.         Judgment: Judgment normal.              CRANIAL NERVES     CN III, IV, VI   Pupils are equal, round, and reactive to light.       Significant Labs: All pertinent labs within the past 24 hours have been reviewed.    Significant Imaging: I have reviewed all pertinent imaging results/findings within the past 24 hours.    LABS:  Recent Results (from the past 24 hour(s))   CBC W/ AUTO DIFFERENTIAL    Collection Time: 01/06/24  2:14 AM   Result Value Ref Range    WBC 10.55 3.90 - 12.70 K/uL    RBC 5.07 4.00 - 5.40 M/uL    Hemoglobin 14.6 12.0 - 16.0 g/dL    Hematocrit 40.8 37.0 - 48.5 %    MCV 81 (L) 82 - 98 fL    MCH 28.8 27.0 - 31.0 pg    MCHC 35.8 32.0 - 36.0 g/dL    RDW 13.0 11.5 - 14.5 %    Platelets 289 150 - 450 K/uL    MPV 9.6 9.2 - 12.9 fL    Immature Granulocytes 0.3 0.0 - 0.5 %    Gran # (ANC) 8.5 (H) 1.8 - 7.7 K/uL    Immature Grans (Abs) 0.03 0.00 - 0.04 K/uL    Lymph # 1.4 1.0 - 4.8 K/uL    Mono # 0.5 0.3 - 1.0 K/uL    Eos # 0.1 0.0 - 0.5 K/uL    Baso # 0.04 0.00 - 0.20 K/uL    nRBC 0 0 /100 WBC    Gran % 80.9 (H) 38.0 - 73.0 %    Lymph % 12.9 (L) 18.0 - 48.0 %    Mono % 4.6 4.0 - 15.0 %    Eosinophil % 0.9 0.0 - 8.0 %    Basophil % 0.4 0.0 - 1.9 %    Differential Method Automated    Comp. Metabolic Panel    Collection Time: 01/06/24  2:14 AM   Result Value Ref Range    Sodium 137 136 - 145 mmol/L    Potassium 3.8 3.5 - 5.1 mmol/L    Chloride 105 95 - 110 mmol/L    CO2 19 (L) 23 - 29 mmol/L    Glucose 230 (H) 70 - 110 mg/dL    BUN 16 6 - 20 mg/dL    Creatinine 0.9 0.5 - 1.4 mg/dL    Calcium 9.1 8.7 - 10.5 mg/dL    Total Protein 7.7 6.0 - 8.4 g/dL    Albumin 4.3 3.5 - 5.2 g/dL    Total Bilirubin 0.9 0.1 - 1.0 mg/dL    Alkaline  Phosphatase 41 (L) 55 - 135 U/L    AST 21 10 - 40 U/L    ALT 20 10 - 44 U/L    eGFR >60 >60 mL/min/1.73 m^2    Anion Gap 13 8 - 16 mmol/L   Lipase    Collection Time: 01/06/24  2:14 AM   Result Value Ref Range    Lipase >1000 (H) 4 - 60 U/L   Urinalysis, Reflex to Urine Culture Urine, Clean Catch    Collection Time: 01/06/24  2:39 AM    Specimen: Urine   Result Value Ref Range    Specimen UA Urine, Clean Catch     Color, UA Yellow Yellow, Straw, Humaira    Appearance, UA Clear Clear    pH, UA 8.0 5.0 - 8.0    Specific Gravity, UA 1.020 1.005 - 1.030    Protein, UA Trace (A) Negative    Glucose, UA 4+ (A) Negative    Ketones, UA 3+ (A) Negative    Bilirubin (UA) Negative Negative    Occult Blood UA Negative Negative    Nitrite, UA Negative Negative    Urobilinogen, UA Negative <2.0 EU/dL    Leukocytes, UA Negative Negative   Urinalysis Microscopic    Collection Time: 01/06/24  2:39 AM   Result Value Ref Range    Bacteria None None-Occ /hpf    Yeast, UA None None    Microscopic Comment SEE COMMENT        RADIOLOGY  No results found.    EKG    MICROBIOLOGY    MDM

## 2024-01-06 NOTE — PLAN OF CARE
Received to room 315 via wheelchair from ED. AAOx4. C/O abdominal pain, DX Pancreatitis. Ambulates without difficulty. NPO except meds. Oriented to room/unit, teach back complete. Call bell in reach. No s/s acute distress.

## 2024-01-06 NOTE — ASSESSMENT & PLAN NOTE
Patient is chronically on statin.will continue for now. Last Lipid Panel:   Lab Results   Component Value Date    CHOL 216 (H) 06/30/2023    HDL 42 06/30/2023    LDLCALC 151 (H) 06/30/2023    TRIG 126 06/30/2023    CHOLHDL 18.5 (L) 02/24/2022   Plan:  -Continue home medication  -low fat/low calorie diet when not NPO

## 2024-01-06 NOTE — HPI
Priti Obrien is a 38 y.o. female with a PMH  has a past medical history of Allergy, Asthma, Bipolar 1 disorder, Borderline personality disorder, Depression, GERD (gastroesophageal reflux disease), History of psychiatric hospitalization, psychiatric care, Hypertension, Pancreatitis, Psychiatric problem, PTSD (post-traumatic stress disorder), S/P partial hysterectomy (September 2011), Substance abuse, and Suicide attempt. who presented to the ED for further evaluation of acute onset epigastric abdominal pain x2 hours duration consistent with previous episodes of acute pancreatitis.  Patient has significant history of recurrent pancreatitis and was last admitted back in October for similar symptoms.  Patient reported starting majora 2 months ago is stated she normally tolerates injections with 1 day of nausea and decreased appetite but otherwise tolerates medications without complications.  She denied recent use of alcohol or illicit drug usage.  Pain is currently described as sharp/stabbing in nature, constant, rated 7/10 in severity radiating from her epigastric region to her back with no known alleviating factors, and aggravating factors including movement and palpation to the affected area.  Associated symptoms included nausea, vomiting, and decreased p.o. intake as noted above but reports all other review of systems negative.  Prior to onset of symptoms, patient reported being in her usual state of health with no other concerns or complaints.  Initial workup in the ED revealed elevated lipase >1000 with LFTs within normal limits.  Patient initiated on IVFs, antiemetics, and multimodal pain regimen with improvement in symptoms.  Patient admitted to Hospital Medicine under observation for continued medical management.      PCP: Sherri Kovacs

## 2024-01-06 NOTE — ASSESSMENT & PLAN NOTE
Chronic, controlled. Latest blood pressure and vitals reviewed-     Temp:  [98.2 °F (36.8 °C)]   Pulse:  [83-94]   Resp:  [18]   BP: (140-151)/(76-81)   SpO2:  [98 %-100 %] .   Home meds for hypertension were reviewed and noted below.   Hypertension Medications               amLODIPine (NORVASC) 5 MG tablet Take 1 tablet (5 mg total) by mouth once daily.          While in the hospital, will manage blood pressure as follows; Continue home antihypertensive regimen    Will utilize p.r.n. blood pressure medication only if patient's blood pressure greater than 160/100 and she develops symptoms such as worsening chest pain or shortness of breath.

## 2024-01-06 NOTE — ASSESSMENT & PLAN NOTE
Patient presented with worsening abdominal pain with intractable nausea and vomiting he was found to have evidence of acute on chronic pancreatitis.  Lipase >1000 with LFTs within normal limits.  Patient has history of chronic recurrent pancreatitis with most current flare being contributed to starting majora 2 months ago.  Patient initiated on IVFs, antiemetics, and multimodal pain regimen. CT abdomen not obtained.   Plan:  -NPO  -Continue current pain regimen, titrate as needed  -Bowel regimen prn  -Bedrest  -Continue to hold NGT decompression as patient not actively vomiting or have concerns for bowel obstruction   -Continue IVFs  -Antiemetics prn

## 2024-01-07 VITALS
TEMPERATURE: 99 F | DIASTOLIC BLOOD PRESSURE: 50 MMHG | HEART RATE: 70 BPM | SYSTOLIC BLOOD PRESSURE: 97 MMHG | HEIGHT: 62 IN | BODY MASS INDEX: 38.33 KG/M2 | RESPIRATION RATE: 18 BRPM | WEIGHT: 208.31 LBS | OXYGEN SATURATION: 98 %

## 2024-01-07 LAB
ALBUMIN SERPL BCP-MCNC: 3.2 G/DL (ref 3.5–5.2)
ALP SERPL-CCNC: 33 U/L (ref 55–135)
ALT SERPL W/O P-5'-P-CCNC: 16 U/L (ref 10–44)
ANION GAP SERPL CALC-SCNC: 11 MMOL/L (ref 8–16)
AST SERPL-CCNC: 15 U/L (ref 10–40)
BASOPHILS # BLD AUTO: 0.04 K/UL (ref 0–0.2)
BASOPHILS NFR BLD: 0.6 % (ref 0–1.9)
BILIRUB SERPL-MCNC: 0.5 MG/DL (ref 0.1–1)
BUN SERPL-MCNC: 9 MG/DL (ref 6–20)
CALCIUM SERPL-MCNC: 8 MG/DL (ref 8.7–10.5)
CHLORIDE SERPL-SCNC: 107 MMOL/L (ref 95–110)
CO2 SERPL-SCNC: 19 MMOL/L (ref 23–29)
CREAT SERPL-MCNC: 0.7 MG/DL (ref 0.5–1.4)
DIFFERENTIAL METHOD BLD: ABNORMAL
EOSINOPHIL # BLD AUTO: 0.3 K/UL (ref 0–0.5)
EOSINOPHIL NFR BLD: 5.4 % (ref 0–8)
ERYTHROCYTE [DISTWIDTH] IN BLOOD BY AUTOMATED COUNT: 13.1 % (ref 11.5–14.5)
EST. GFR  (NO RACE VARIABLE): >60 ML/MIN/1.73 M^2
GLUCOSE SERPL-MCNC: 161 MG/DL (ref 70–110)
HCT VFR BLD AUTO: 34.2 % (ref 37–48.5)
HGB BLD-MCNC: 11.7 G/DL (ref 12–16)
IMM GRANULOCYTES # BLD AUTO: 0.02 K/UL (ref 0–0.04)
IMM GRANULOCYTES NFR BLD AUTO: 0.3 % (ref 0–0.5)
LIPASE SERPL-CCNC: 131 U/L (ref 4–60)
LYMPHOCYTES # BLD AUTO: 2.3 K/UL (ref 1–4.8)
LYMPHOCYTES NFR BLD: 35.8 % (ref 18–48)
MCH RBC QN AUTO: 28.2 PG (ref 27–31)
MCHC RBC AUTO-ENTMCNC: 34.2 G/DL (ref 32–36)
MCV RBC AUTO: 82 FL (ref 82–98)
MONOCYTES # BLD AUTO: 0.4 K/UL (ref 0.3–1)
MONOCYTES NFR BLD: 6.2 % (ref 4–15)
NEUTROPHILS # BLD AUTO: 3.3 K/UL (ref 1.8–7.7)
NEUTROPHILS NFR BLD: 51.7 % (ref 38–73)
NRBC BLD-RTO: 0 /100 WBC
PLATELET # BLD AUTO: 252 K/UL (ref 150–450)
PMV BLD AUTO: 9.8 FL (ref 9.2–12.9)
POCT GLUCOSE: 125 MG/DL (ref 70–110)
POTASSIUM SERPL-SCNC: 3.6 MMOL/L (ref 3.5–5.1)
PROT SERPL-MCNC: 5.9 G/DL (ref 6–8.4)
RBC # BLD AUTO: 4.15 M/UL (ref 4–5.4)
SODIUM SERPL-SCNC: 137 MMOL/L (ref 136–145)
WBC # BLD AUTO: 6.32 K/UL (ref 3.9–12.7)

## 2024-01-07 PROCEDURE — 80053 COMPREHEN METABOLIC PANEL: CPT | Performed by: HOSPITALIST

## 2024-01-07 PROCEDURE — 96376 TX/PRO/DX INJ SAME DRUG ADON: CPT

## 2024-01-07 PROCEDURE — 25000003 PHARM REV CODE 250: Performed by: HOSPITALIST

## 2024-01-07 PROCEDURE — 85025 COMPLETE CBC W/AUTO DIFF WBC: CPT | Performed by: HOSPITALIST

## 2024-01-07 PROCEDURE — 96361 HYDRATE IV INFUSION ADD-ON: CPT

## 2024-01-07 PROCEDURE — 83690 ASSAY OF LIPASE: CPT | Performed by: HOSPITALIST

## 2024-01-07 PROCEDURE — 36415 COLL VENOUS BLD VENIPUNCTURE: CPT | Performed by: HOSPITALIST

## 2024-01-07 PROCEDURE — G0378 HOSPITAL OBSERVATION PER HR: HCPCS

## 2024-01-07 PROCEDURE — 63600175 PHARM REV CODE 636 W HCPCS: Performed by: HOSPITALIST

## 2024-01-07 PROCEDURE — 25000003 PHARM REV CODE 250: Performed by: FAMILY MEDICINE

## 2024-01-07 PROCEDURE — 99900035 HC TECH TIME PER 15 MIN (STAT)

## 2024-01-07 RX ORDER — HYDROCODONE BITARTRATE AND ACETAMINOPHEN 5; 325 MG/1; MG/1
1 TABLET ORAL EVERY 6 HOURS PRN
Qty: 21 TABLET | Refills: 0 | Status: SHIPPED | OUTPATIENT
Start: 2024-01-07

## 2024-01-07 RX ORDER — ONDANSETRON 4 MG/1
4 TABLET, ORALLY DISINTEGRATING ORAL EVERY 8 HOURS PRN
Qty: 20 TABLET | Refills: 0 | Status: SHIPPED | OUTPATIENT
Start: 2024-01-07

## 2024-01-07 RX ORDER — LOSARTAN POTASSIUM 25 MG/1
25 TABLET ORAL DAILY
Status: DISCONTINUED | OUTPATIENT
Start: 2024-01-07 | End: 2024-01-07 | Stop reason: HOSPADM

## 2024-01-07 RX ADMIN — ONDANSETRON 4 MG: 2 INJECTION INTRAMUSCULAR; INTRAVENOUS at 12:01

## 2024-01-07 RX ADMIN — MORPHINE SULFATE 2 MG: 4 INJECTION INTRAVENOUS at 05:01

## 2024-01-07 RX ADMIN — MORPHINE SULFATE 2 MG: 4 INJECTION INTRAVENOUS at 09:01

## 2024-01-07 RX ADMIN — LOSARTAN POTASSIUM 25 MG: 25 TABLET, FILM COATED ORAL at 09:01

## 2024-01-07 RX ADMIN — AMLODIPINE BESYLATE 10 MG: 10 TABLET ORAL at 08:01

## 2024-01-07 RX ADMIN — MORPHINE SULFATE 2 MG: 4 INJECTION INTRAVENOUS at 01:01

## 2024-01-07 RX ADMIN — SODIUM CHLORIDE, POTASSIUM CHLORIDE, SODIUM LACTATE AND CALCIUM CHLORIDE: 600; 310; 30; 20 INJECTION, SOLUTION INTRAVENOUS at 08:01

## 2024-01-07 RX ADMIN — MORPHINE SULFATE 2 MG: 4 INJECTION INTRAVENOUS at 02:01

## 2024-01-07 RX ADMIN — INSULIN DETEMIR 48 UNITS: 100 INJECTION, SOLUTION SUBCUTANEOUS at 08:01

## 2024-01-07 NOTE — PLAN OF CARE
Plan of care reviewed with patient, pt verbalized understanding.  Pt remains free from falls this shift, safety precautions in place.bed alarm set.  Pt remains free from skin breakdown, pt educated on the importance of frequent weight shift to decrease the risk of pressure injury. Pt verbalized understanding teaching and outcomes.  AAOx4,NAD noted at this time.   PIV 20 G to R FA , Saline locked  Pt remained afebrile.  NSR on the tele monitor  LR @ 125mL/hr.  Pt admitted for Acute pancreatitis.  Pt currently resting comfortably in bed.  Hourly rounding complete. Bed in lowest position, side rails up, call light in reach.      Problem: Adult Inpatient Plan of Care  Goal: Plan of Care Review  Outcome: Ongoing, Progressing  Goal: Patient-Specific Goal (Individualized)  Outcome: Ongoing, Progressing  Goal: Absence of Hospital-Acquired Illness or Injury  Outcome: Ongoing, Progressing  Goal: Optimal Comfort and Wellbeing  Outcome: Ongoing, Progressing  Goal: Readiness for Transition of Care  Outcome: Ongoing, Progressing     Problem: Diabetes Comorbidity  Goal: Blood Glucose Level Within Targeted Range  Outcome: Ongoing, Progressing     Problem: Fluid Imbalance (Pancreatitis)  Goal: Fluid Balance  Outcome: Ongoing, Progressing     Problem: Infection (Pancreatitis)  Goal: Infection Symptom Resolution  Outcome: Ongoing, Progressing     Problem: Nutrition Impaired (Pancreatitis)  Goal: Optimal Nutrition Intake  Outcome: Ongoing, Progressing     Problem: Pain (Pancreatitis)  Goal: Acceptable Pain Control  Outcome: Ongoing, Progressing     Problem: Respiratory Compromise (Pancreatitis)  Goal: Effective Oxygenation and Ventilation  Outcome: Ongoing, Progressing

## 2024-01-07 NOTE — ASSESSMENT & PLAN NOTE
Patient's FSGs are uncontrolled due to hyperglycemia on current medication regimen.  Last A1c reviewed-   Lab Results   Component Value Date    HGBA1C 6.5 (H) 10/15/2023     Most recent fingerstick glucose reviewed-   Recent Labs   Lab 01/06/24  0552 01/06/24  1707   POCTGLUCOSE 188* 80     Current correctional scale  Medium  titrate as needed  anti-hyperglycemic dose as follows-   Antihyperglycemics (From admission, onward)      Start     Stop Route Frequency Ordered    01/06/24 0900  insulin detemir U-100 (Levemir) pen 48 Units         -- SubQ Daily 01/06/24 0455    01/06/24 0437  insulin aspart U-100 pen 0-5 Units         -- SubQ Before meals & nightly PRN 01/06/24 0337        Plan:  -SSI  -Accu-checks  -Hold oral hypoglycemics while patient is in the hospital  -Hypoglycemic protocol

## 2024-01-07 NOTE — PROGRESS NOTES
[facility] [address] [place] [address] Medicine  Progress Note    Patient Name: Priti Obrien  MRN: [id number]  Patient Class: OP- Observation   Admission Date: 1/6/2024  Length of Stay: 0 days  Attending Physician: Duncan Hilario MD  Primary Care Provider: Sherri Kovacs FNP        Subjective:     Principal Problem:Acute pancreatitis        HPI:  Priti Obrien is a 38 y.o. female with a PMH  has a past medical history of Allergy, Asthma, Bipolar 1 disorder, Borderline personality disorder, Depression, GERD (gastroesophageal reflux disease), History of psychiatric hospitalization, psychiatric care, Hypertension, Pancreatitis, Psychiatric problem, PTSD (post-traumatic stress disorder), S/P partial hysterectomy (September 2011), Substance abuse, and Suicide attempt. who presented to the ED for further evaluation of acute onset epigastric abdominal pain x2 hours duration consistent with previous episodes of acute pancreatitis.  Patient has significant history of recurrent pancreatitis and was last admitted back in October for similar symptoms.  Patient reported starting majora 2 months ago is stated she normally tolerates injections with 1 day of nausea and decreased appetite but otherwise tolerates medications without complications.  She denied recent use of alcohol or illicit drug usage.  Pain is currently described as sharp/stabbing in nature, constant, rated 7/10 in severity radiating from her epigastric region to her back with no known alleviating factors, and aggravating factors including movement and palpation to the affected area.  Associated symptoms included nausea, vomiting, and decreased p.o. intake as noted above but reports all other review of systems negative.  Prior to onset of symptoms, patient reported being in her usual state of health with no other concerns or complaints.  Initial workup in the ED revealed elevated lipase >1000 with LFTs within normal limits.  Patient initiated on IVFs,  antiemetics, and multimodal pain regimen with improvement in symptoms.  Patient admitted to Hospital Medicine under observation for continued medical management.      PCP: Sherri Kovacs      Overview/Hospital Course:  1/6/24  Patient admitted earlier today with acute pancreatitis  Reports continued mid-epigastric abdominal pain, mild improvement  Reports no appetite; continue NPO, IV fluids, pain control      Review of Systems   All other systems reviewed and are negative.    Objective:     Vital Signs (Most Recent):  Temp: 98.3 °F (36.8 °C) (01/06/24 1543)  Pulse: 69 (01/06/24 1543)  Resp: 18 (01/06/24 1704)  BP: 117/76 (01/06/24 1543)  SpO2: 99 % (01/06/24 1543) Vital Signs (24h Range):  Temp:  [98.2 °F (36.8 °C)-98.3 °F (36.8 °C)] 98.3 °F (36.8 °C)  Pulse:  [65-94] 69  Resp:  [16-18] 18  SpO2:  [95 %-100 %] 99 %  BP: (109-151)/(60-82) 117/76     Weight: 94.5 kg (208 lb 4.8 oz)  Body mass index is 38.1 kg/m².    Intake/Output Summary (Last 24 hours) at 1/6/2024 1836  Last data filed at 1/6/2024 1814  Gross per 24 hour   Intake 2618.47 ml   Output --   Net 2618.47 ml         Physical Exam  Vitals and nursing note reviewed.   Constitutional:       General: She is not in acute distress.     Appearance: Normal appearance. She is obese.   Cardiovascular:      Rate and Rhythm: Normal rate and regular rhythm.      Heart sounds: No murmur heard.  Pulmonary:      Effort: Pulmonary effort is normal. No respiratory distress.      Breath sounds: No wheezing.   Abdominal:      Tenderness: There is abdominal tenderness.   Neurological:      General: No focal deficit present.      Mental Status: She is alert and oriented to person, place, and time.   Psychiatric:         Mood and Affect: Mood normal.         Behavior: Behavior normal.             Significant Labs: I have reviewed all pertinent imaging results/findings within the past 24 hours.    Significant Imaging: I have reviewed all pertinent imaging results/findings within  the past 24 hours.      Assessment/Plan:      * Acute pancreatitis  Patient presented with worsening abdominal pain with intractable nausea and vomiting he was found to have evidence of acute on chronic pancreatitis.  Lipase >1000 with LFTs within normal limits.  Patient has history of chronic recurrent pancreatitis with most current flare being contributed to starting majora 2 months ago.  Patient initiated on IVFs, antiemetics, and multimodal pain regimen. CT abdomen not obtained.   Plan:  -NPO  -Continue current pain regimen, titrate as needed  -Bowel regimen prn  -Bedrest  -Continue to hold NGT decompression as patient not actively vomiting or have concerns for bowel obstruction   -Continue IVFs  -Antiemetics prn      Essential hypertension  Chronic, controlled. Latest blood pressure and vitals reviewed-     Temp:  [98.2 °F (36.8 °C)-98.3 °F (36.8 °C)]   Pulse:  [65-94]   Resp:  [16-18]   BP: (109-151)/(60-82)   SpO2:  [95 %-100 %] .   Home meds for hypertension were reviewed and noted below.   Hypertension Medications               amLODIPine (NORVASC) 5 MG tablet Take 1 tablet (5 mg total) by mouth once daily.          While in the hospital, will manage blood pressure as follows; Continue home antihypertensive regimen    Will utilize p.r.n. blood pressure medication only if patient's blood pressure greater than 160/100 and she develops symptoms such as worsening chest pain or shortness of breath.    Diabetes mellitus type 2 in obese  Patient's FSGs are uncontrolled due to hyperglycemia on current medication regimen.  Last A1c reviewed-   Lab Results   Component Value Date    HGBA1C 6.5 (H) 10/15/2023     Most recent fingerstick glucose reviewed-   Recent Labs   Lab 01/06/24  0552 01/06/24  1707   POCTGLUCOSE 188* 80     Current correctional scale  Medium  titrate as needed  anti-hyperglycemic dose as follows-   Antihyperglycemics (From admission, onward)      Start     Stop Route Frequency Ordered    01/06/24  0900  insulin detemir U-100 (Levemir) pen 48 Units         -- SubQ Daily 01/06/24 0455    01/06/24 0437  insulin aspart U-100 pen 0-5 Units         -- SubQ Before meals & nightly PRN 01/06/24 0337        Plan:  -SSI  -Accu-checks  -Hold oral hypoglycemics while patient is in the hospital  -Hypoglycemic protocol     Mixed hyperlipidemia  Patient is chronically on statin.will continue for now. Last Lipid Panel:   Lab Results   Component Value Date    CHOL 216 (H) 06/30/2023    HDL 42 06/30/2023    LDLCALC 151 (H) 06/30/2023    TRIG 126 06/30/2023    CHOLHDL 18.5 (L) 02/24/2022   Plan:  -Continue home medication  -low fat/low calorie diet when not NPO    Borderline personality disorder  Chronic. Stable. Not in acute exacerbation and currently denies endorsing any suicidal/homicidal ideations.   Plan:  -Continue home medications     Bipolar 1 disorder        Class 2 severe obesity due to excess calories with serious comorbidity and body mass index (BMI) of 38.0 to 38.9 in adult  Body mass index is 38.1 kg/m². Morbid obesity complicates all aspects of disease management from diagnostic modalities to treatment. Weight loss encouraged and health benefits explained to patient.       VTE Risk Mitigation (From admission, onward)           Ordered     enoxaparin injection 40 mg  Daily         01/06/24 0337     IP VTE HIGH RISK PATIENT  Once         01/06/24 0337     Place sequential compression device  Until discontinued         01/06/24 0337                    Discharge Planning   TANYA:      Code Status: Full Code   Is the patient medically ready for discharge?:     Reason for patient still in hospital (select all that apply): Patient trending condition, Laboratory test, and Treatment                     Leonel Morales MD  Department of Hospital Medicine   [facility] - [facility]

## 2024-01-07 NOTE — HOSPITAL COURSE
Patient was admitted for pancreatitis.  She was started on IVF and pain control.  Lipase greater than 1000.  Lipase was trending down on day of discharge.  Diet advanced as tolerated.  Pain improved.  Patient tolerated diet.  She was discharged home on p.o. Zofran and Norco.

## 2024-01-07 NOTE — ASSESSMENT & PLAN NOTE
Chronic, controlled. Latest blood pressure and vitals reviewed-     Temp:  [98.2 °F (36.8 °C)-98.3 °F (36.8 °C)]   Pulse:  [65-94]   Resp:  [16-18]   BP: (109-151)/(60-82)   SpO2:  [95 %-100 %] .   Home meds for hypertension were reviewed and noted below.   Hypertension Medications               amLODIPine (NORVASC) 5 MG tablet Take 1 tablet (5 mg total) by mouth once daily.          While in the hospital, will manage blood pressure as follows; Continue home antihypertensive regimen    Will utilize p.r.n. blood pressure medication only if patient's blood pressure greater than 160/100 and she develops symptoms such as worsening chest pain or shortness of breath.

## 2024-01-07 NOTE — SUBJECTIVE & OBJECTIVE
Review of Systems   All other systems reviewed and are negative.    Objective:     Vital Signs (Most Recent):  Temp: 98.3 °F (36.8 °C) (01/06/24 1543)  Pulse: 69 (01/06/24 1543)  Resp: 18 (01/06/24 1704)  BP: 117/76 (01/06/24 1543)  SpO2: 99 % (01/06/24 1543) Vital Signs (24h Range):  Temp:  [98.2 °F (36.8 °C)-98.3 °F (36.8 °C)] 98.3 °F (36.8 °C)  Pulse:  [65-94] 69  Resp:  [16-18] 18  SpO2:  [95 %-100 %] 99 %  BP: (109-151)/(60-82) 117/76     Weight: 94.5 kg (208 lb 4.8 oz)  Body mass index is 38.1 kg/m².    Intake/Output Summary (Last 24 hours) at 1/6/2024 1836  Last data filed at 1/6/2024 1814  Gross per 24 hour   Intake 2618.47 ml   Output --   Net 2618.47 ml         Physical Exam  Vitals and nursing note reviewed.   Constitutional:       General: She is not in acute distress.     Appearance: Normal appearance. She is obese.   Cardiovascular:      Rate and Rhythm: Normal rate and regular rhythm.      Heart sounds: No murmur heard.  Pulmonary:      Effort: Pulmonary effort is normal. No respiratory distress.      Breath sounds: No wheezing.   Abdominal:      Tenderness: There is abdominal tenderness.   Neurological:      General: No focal deficit present.      Mental Status: She is alert and oriented to person, place, and time.   Psychiatric:         Mood and Affect: Mood normal.         Behavior: Behavior normal.             Significant Labs: I have reviewed all pertinent imaging results/findings within the past 24 hours.    Significant Imaging: I have reviewed all pertinent imaging results/findings within the past 24 hours.

## 2024-01-07 NOTE — PLAN OF CARE
[facility]  Discharge Final Note    Primary Care Provider: Sherri Kovacs FNP    Expected Discharge Date: 1/7/2024    Final Discharge Note (most recent)       Final Note - 01/07/24 1535          Final Note    Assessment Type Final Discharge Note     Anticipated Discharge Disposition Home or Self Care        Post-Acute Status    Discharge Delays None known at this time                     Important Message from Medicare         Discharge home, no home health or dme orders noted.

## 2024-01-08 NOTE — DISCHARGE SUMMARY
[facility]  [facility] Medicine  Discharge Summary      Patient Name: Priti Obrien  MRN: [id number]  [facility]: [id number]  Patient Class: OP- Observation  Admission Date: 1/6/2024  Hospital Length of Stay: 0 days  Discharge Date and Time: 1/7/2024  6:05 PM  Attending Physician: Luana att. providers found   Discharging Provider: Malcom Reynolds MD  Primary Care Provider: Sherri Kovacs FNP    Primary Care Team: Networked reference to record PCT     HPI:   Priti Obrien is a 38 y.o. female with a PMH  has a past medical history of Allergy, Asthma, Bipolar 1 disorder, Borderline personality disorder, Depression, GERD (gastroesophageal reflux disease), History of psychiatric hospitalization, psychiatric care, Hypertension, Pancreatitis, Psychiatric problem, PTSD (post-traumatic stress disorder), S/P partial hysterectomy (September 2011), Substance abuse, and Suicide attempt. who presented to the ED for further evaluation of acute onset epigastric abdominal pain x2 hours duration consistent with previous episodes of acute pancreatitis.  Patient has significant history of recurrent pancreatitis and was last admitted back in October for similar symptoms.  Patient reported starting majora 2 months ago is stated she normally tolerates injections with 1 day of nausea and decreased appetite but otherwise tolerates medications without complications.  She denied recent use of alcohol or illicit drug usage.  Pain is currently described as sharp/stabbing in nature, constant, rated 7/10 in severity radiating from her epigastric region to her back with no known alleviating factors, and aggravating factors including movement and palpation to the affected area.  Associated symptoms included nausea, vomiting, and decreased p.o. intake as noted above but reports all other review of systems negative.  Prior to onset of symptoms, patient reported being in her usual state of health with no other concerns or complaints.  Initial workup  in the ED revealed elevated lipase >1000 with LFTs within normal limits.  Patient initiated on IVFs, antiemetics, and multimodal pain regimen with improvement in symptoms.  Patient admitted to Hospital Medicine under observation for continued medical management.      PCP: Sherri Kovacs      * No surgery found *      Hospital Course:   Patient was admitted for pancreatitis.  She was started on IVF and pain control.  Lipase greater than 1000.  Lipase was trending down on day of discharge.  Diet advanced as tolerated.  Pain improved.  Patient tolerated diet.  She was discharged home on p.o. Zofran and Norco.       Goals of Care Treatment Preferences:  Code Status: Full Code      Consults:     No new Assessment & Plan notes have been filed under this hospital service since the last note was generated.  Service: Hospital Medicine    Final Active Diagnoses:    Diagnosis Date Noted POA    PRINCIPAL PROBLEM:  Acute pancreatitis [K85.90] 04/26/2019 Yes    Mixed hyperlipidemia [E78.2] 10/15/2023 Yes    Essential hypertension [I10] 02/13/2018 Yes    Diabetes mellitus type 2 in obese [E11.69, E66.9] 05/22/2014 Yes    Class 2 severe obesity due to excess calories with serious comorbidity and body mass index (BMI) of 38.0 to 38.9 in adult [E66.01, Z68.38] 05/01/2014 Not Applicable    Bipolar 1 disorder [F31.9] 11/07/2013 Yes    Borderline personality disorder [F60.3] 11/07/2013 Yes      Problems Resolved During this Admission:       Discharged Condition: good    Disposition: Home or Self Care    Follow Up:    Patient Instructions:   No discharge procedures on file.    Significant Diagnostic Studies: N/A    Pending Diagnostic Studies:       None           Medications:  Reconciled Home Medications:      Medication List        CHANGE how you take these medications      HYDROcodone-acetaminophen 5-325 mg per tablet  Commonly known as: NORCO  Take 1 tablet by mouth every 6 (six) hours as needed for Pain.  What changed:   when to take  this  reasons to take this            CONTINUE taking these medications      albuterol 90 mcg/actuation inhaler  Commonly known as: PROVENTIL/VENTOLIN HFA  Inhale 2 puffs into the lungs every 4 (four) hours as needed. Take 2 puffs of the lungs every 4 hr as needed for wheezing or shortness of breath     amLODIPine 5 MG tablet  Commonly known as: NORVASC  Take 1 tablet (5 mg total) by mouth once daily.     blood sugar diagnostic Strp  1 each by Misc.(Non-Drug; Combo Route) route 3 (three) times daily.     lancets Misc  1 each by Misc.(Non-Drug; Combo Route) route 3 (three) times daily.     metFORMIN 500 MG ER 24hr tablet  Commonly known as: GLUCOPHAGE-XR  Take 2 tablets (1,000 mg total) by mouth 2 (two) times daily with meals.     ondansetron 4 MG Tbdl  Commonly known as: ZOFRAN-ODT  Take 1 tablet (4 mg total) by mouth every 8 (eight) hours as needed (nausea).     TOUJEO MAX U-300 SOLOSTAR 300 unit/mL (3 mL) insulin pen  Generic drug: insulin glargine U-300 conc  Inject 60 Units into the skin once daily.              Indwelling Lines/Drains at time of discharge:   Lines/Drains/Airways       None                   Time spent on the discharge of patient: 37 minutes         Malcom Reynolds MD  Department of Hospital Medicine  [facility]

## 2024-05-06 ENCOUNTER — HOSPITAL ENCOUNTER (EMERGENCY)
Facility: HOSPITAL | Age: 39
Discharge: HOME | End: 2024-05-06
Attending: EMERGENCY MEDICINE
Payer: COMMERCIAL

## 2024-05-06 VITALS
HEART RATE: 99 BPM | OXYGEN SATURATION: 99 % | WEIGHT: 191.13 LBS | RESPIRATION RATE: 16 BRPM | TEMPERATURE: 98 F | SYSTOLIC BLOOD PRESSURE: 135 MMHG | DIASTOLIC BLOOD PRESSURE: 94 MMHG | BODY MASS INDEX: 34.96 KG/M2
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
## 2024-05-06 DIAGNOSIS — T78.40XA ALLERGIC REACTION, INITIAL ENCOUNTER: Primary | ICD-10-CM

## 2024-05-06 DIAGNOSIS — S40.862A INSECT BITE OF LEFT UPPER EXTREMITY, INITIAL ENCOUNTER: ICD-10-CM

## 2024-05-06 PROCEDURE — 25000003 PHARM REV CODE 250: Performed by: REGISTERED NURSE

## 2024-05-06 PROCEDURE — 63600175 PHARM REV CODE 636 W HCPCS: Performed by: REGISTERED NURSE

## 2024-05-06 PROCEDURE — 96374 THER/PROPH/DIAG INJ IV PUSH: CPT

## 2024-05-06 PROCEDURE — 99284 EMERGENCY DEPT VISIT MOD MDM: CPT | Mod: 25

## 2024-05-06 RX ORDER — CETIRIZINE HYDROCHLORIDE 10 MG/1
10 TABLET ORAL DAILY
Qty: 30 TABLET | Refills: 0 | Status: SHIPPED | OUTPATIENT
Start: 2024-05-06 | End: 2025-05-06

## 2024-05-06 RX ORDER — DIPHENHYDRAMINE HYDROCHLORIDE 50 MG/ML
25 INJECTION INTRAMUSCULAR; INTRAVENOUS
Status: COMPLETED | OUTPATIENT
Start: 2024-05-06 | End: 2024-05-06

## 2024-05-06 RX ORDER — ONDANSETRON 4 MG/1
4 TABLET, ORALLY DISINTEGRATING ORAL
Status: COMPLETED | OUTPATIENT
Start: 2024-05-06 | End: 2024-05-06

## 2024-05-06 RX ORDER — CLINDAMYCIN HYDROCHLORIDE 300 MG/1
300 CAPSULE ORAL EVERY 6 HOURS
Qty: 28 CAPSULE | Refills: 0 | Status: SHIPPED | OUTPATIENT
Start: 2024-05-06 | End: 2024-05-13

## 2024-05-06 RX ORDER — FAMOTIDINE 20 MG/1
20 TABLET, FILM COATED ORAL
Status: COMPLETED | OUTPATIENT
Start: 2024-05-06 | End: 2024-05-06

## 2024-05-06 RX ORDER — FAMOTIDINE 20 MG/1
20 TABLET, FILM COATED ORAL 2 TIMES DAILY
Qty: 60 TABLET | Refills: 0 | Status: SHIPPED | OUTPATIENT
Start: 2024-05-06 | End: 2025-05-06

## 2024-05-06 RX ADMIN — ONDANSETRON 4 MG: 4 TABLET, ORALLY DISINTEGRATING ORAL at 02:05

## 2024-05-06 RX ADMIN — DIPHENHYDRAMINE HYDROCHLORIDE 25 MG: 50 INJECTION, SOLUTION INTRAMUSCULAR; INTRAVENOUS at 02:05

## 2024-05-06 RX ADMIN — FAMOTIDINE 20 MG: 20 TABLET ORAL at 02:05

## 2024-05-06 NOTE — ED PROVIDER NOTES
Encounter Date: 2024       History     Chief Complaint   Patient presents with    Allergic Reaction     Itching to face, left arm and legs. Swelling to face and left arm. Unknown allergy. Believes she may have a spider bite to left wrist.     38-year-old female presents emergency department with complaints of allergic reaction.  Patient reports itching and rash to face and extremities.  Associated symptoms include nausea.  Patient reports that she was bitten on the left arm yesterday.  Unsure of what type of insect bit her.  Patient denies any fever, chills, chest pain, shortness of breath, diarrhea or any other symptoms.    The history is provided by the patient.     Review of patient's allergies indicates:   Allergen Reactions    Demerol (pf) [meperidine (pf)] Hives    Medrol [methylprednisolone] Anaphylaxis     Any cortisone    Adhesive      Other reaction(s): Unknown    Amoxicillin-pot clavulanate      Other reaction(s): Unknown    Atarax [hydroxyzine hcl] Hives    Hydralazine analogues Hives    Iodine      Other reaction(s): Unknown    Omnicef [cefdinir]     Penicillins Hives and Nausea And Vomiting    Phenergan [promethazine] Hives and Nausea And Vomiting    Risperidone analogues Hives    Ativan [lorazepam] Hives and Anxiety     Past Medical History:   Diagnosis Date    Allergy     Asthma     Childhood only    Bipolar 1 disorder     Borderline personality disorder     Depression     GERD (gastroesophageal reflux disease)     History of psychiatric hospitalization     Hx of psychiatric care     Hypertension     pt denied    Pancreatitis     Psychiatric problem     PTSD (post-traumatic stress disorder)     S/P partial hysterectomy 2011    Substance abuse     Suicide attempt      Past Surgical History:   Procedure Laterality Date    APPENDECTOMY       SECTION      CHOLECYSTECTOMY      HYSTERECTOMY      endometrial hyperplasia    LASER LAPAROSCOPY       Family History   Problem Relation  Name Age of Onset    Diabetes Mother      Hypertension Mother      Atrial fibrillation Maternal Grandfather      Breast cancer Maternal Aunt       Social History     Tobacco Use    Smoking status: Former     Current packs/day: 0.00     Types: Cigarettes     Quit date: 6/5/2013     Years since quitting: 10.9    Smokeless tobacco: Never   Substance Use Topics    Alcohol use: Yes     Comment: occasional     Drug use: Not Currently     Review of Systems   Constitutional:  Negative for fever.   HENT:  Negative for sore throat.    Respiratory:  Negative for shortness of breath.    Cardiovascular:  Negative for chest pain.   Gastrointestinal:  Positive for nausea.   Genitourinary:  Negative for dysuria.   Musculoskeletal:  Negative for back pain.   Skin:  Positive for rash.        +itching  + insect bite to left wrist   Neurological:  Negative for weakness.   Hematological:  Does not bruise/bleed easily.   All other systems reviewed and are negative.      Physical Exam     Initial Vitals [05/06/24 1409]   BP Pulse Resp Temp SpO2   (!) 135/94 99 16 98.3 °F (36.8 °C) 99 %      MAP       --         Physical Exam    Constitutional: She appears well-developed and well-nourished. She is not diaphoretic. No distress.   HENT:   Head: Normocephalic and atraumatic.   Eyes: Conjunctivae and EOM are normal. Pupils are equal, round, and reactive to light.   Neck: Neck supple.   Normal range of motion.  Cardiovascular:  Normal rate, regular rhythm and normal heart sounds.           No murmur heard.  Pulmonary/Chest: Breath sounds normal. No respiratory distress. She has no wheezes. She has no rales.   Abdominal: Abdomen is soft. Bowel sounds are normal. There is no abdominal tenderness. There is no rebound and no guarding.   Musculoskeletal:         General: No tenderness or edema. Normal range of motion.      Cervical back: Normal range of motion and neck supple.     Neurological: She is alert and oriented to person, place, and time.  No cranial nerve deficit. GCS score is 15. GCS eye subscore is 4. GCS verbal subscore is 5. GCS motor subscore is 6.   Skin: Skin is warm and dry. Capillary refill takes less than 2 seconds. Rash noted.   Insect bite noted to the left wrist, surrounding erythema and pruritus noted   Psychiatric: She has a normal mood and affect. Thought content normal.         ED Course   Procedures  Labs Reviewed - No data to display       Imaging Results    None          Medications   diphenhydrAMINE injection 25 mg (25 mg Intravenous Given 5/6/24 1439)   famotidine tablet 20 mg (20 mg Oral Given 5/6/24 1449)   ondansetron disintegrating tablet 4 mg (4 mg Oral Given 5/6/24 1449)     Medical Decision Making  Amount and/or Complexity of Data Reviewed  Discussion of management or test interpretation with external provider(s): Rash improved after Benadryl and Pepcid.    Risk  Prescription drug management.  Risk Details: I discussed with patient and/or family/caretaker that evaluation in the ED does not suggest any emergent or life threatening medical conditions requiring immediate intervention beyond what was provided in the ED, and I believe patient is safe for discharge.  Regardless, an unremarkable evaluation in the ED does not preclude the development or presence of a serious of life threatening condition. As such, patient was instructed to return immediately for any worsening or change in current symptoms.                                        Clinical Impression:  Final diagnoses:  [T78.40XA] Allergic reaction, initial encounter (Primary)  [S40.862A, W57.XXXA] Insect bite of left upper extremity, initial encounter          ED Disposition Condition    Discharge Stable          ED Prescriptions       Medication Sig Dispense Start Date End Date Auth. Provider    clindamycin (CLEOCIN) 300 MG capsule Take 1 capsule (300 mg total) by mouth every 6 (six) hours. for 7 days 28 capsule 5/6/2024 5/13/2024 Aleksandar Beach Jr., FRANKYP     famotidine (PEPCID) 20 MG tablet Take 1 tablet (20 mg total) by mouth 2 (two) times daily. 60 tablet 5/6/2024 5/6/2025 Aleksandar Beach Jr., RICHI    cetirizine (ZYRTEC) 10 MG tablet Take 1 tablet (10 mg total) by mouth once daily. 30 tablet 5/6/2024 5/6/2025 Aleksandar Beach Jr., RICHI          Follow-up Information       Follow up With Specialties Details Why Contact Info    Sherri Kovacs, RICHI Family [facility] In 1 week  [address] [facility] [id number] [phone]               Aleksandar Beach Jr., RICHI  05/06/24 3695

## 2024-05-06 NOTE — Clinical Note
"Priti Christianaileen Obrien was seen and treated in our emergency department on 5/6/2024.  She may return to work on 05/08/2024.       If you have any questions or concerns, please don't hesitate to call.      Aleksandar Beach Jr., FNP"

## 2024-05-06 NOTE — FIRST PROVIDER EVALUATION
Medical screening examination initiated.  I have conducted a focused provider triage encounter, findings are as follows:    Brief history of present illness:  Possible bite to the left wrist with associated itching and rash that began this morning    Vitals:    05/06/24 1409   BP: (!) 135/94   BP Location: Right arm   Patient Position: Sitting   Pulse: 99   Resp: 16   Temp: 98.3 °F (36.8 °C)   TempSrc: Oral   SpO2: 99%   Weight: 86.7 kg (191 lb 2.2 oz)       Pertinent physical exam:  No acute distress, patient alert and oriented    Brief workup plan:  Medications and further evaluation    Preliminary workup initiated; this workup will be continued and followed by the physician or advanced practice provider that is assigned to the patient when roomed.

## 2024-07-06 ENCOUNTER — HOSPITAL ENCOUNTER (INPATIENT)
Facility: HOSPITAL | Age: 39
LOS: 2 days | Discharge: HOME | DRG: 440 | End: 2024-07-08
Attending: STUDENT IN AN ORGANIZED HEALTH CARE EDUCATION/TRAINING PROGRAM | Admitting: INTERNAL MEDICINE
Payer: COMMERCIAL

## 2024-07-06 DIAGNOSIS — E87.6 HYPOKALEMIA: ICD-10-CM

## 2024-07-06 DIAGNOSIS — Z79.899 HIGH RISK MEDICATION USE: ICD-10-CM

## 2024-07-06 DIAGNOSIS — K85.90 ACUTE PANCREATITIS: Primary | ICD-10-CM

## 2024-07-06 DIAGNOSIS — R07.9 CHEST PAIN: ICD-10-CM

## 2024-07-06 PROBLEM — K85.00 IDIOPATHIC ACUTE PANCREATITIS: Status: ACTIVE | Noted: 2024-07-06

## 2024-07-06 PROBLEM — E83.51 HYPOCALCEMIA: Status: ACTIVE | Noted: 2024-07-06

## 2024-07-06 PROBLEM — E11.65 HYPERGLYCEMIA DUE TO TYPE 2 DIABETES MELLITUS: Status: ACTIVE | Noted: 2024-07-06

## 2024-07-06 LAB
ALBUMIN SERPL BCP-MCNC: 3.7 G/DL (ref 3.5–5.2)
ALP SERPL-CCNC: 52 U/L (ref 55–135)
ALT SERPL W/O P-5'-P-CCNC: 15 U/L (ref 10–44)
AMPHET+METHAMPHET UR QL: NEGATIVE
ANION GAP SERPL CALC-SCNC: 13 MMOL/L (ref 8–16)
AST SERPL-CCNC: 16 U/L (ref 10–40)
BACTERIA #/AREA URNS HPF: ABNORMAL /HPF
BARBITURATES UR QL SCN>200 NG/ML: NEGATIVE
BASOPHILS # BLD AUTO: 0.04 K/UL (ref 0–0.2)
BASOPHILS NFR BLD: 0.4 % (ref 0–1.9)
BENZODIAZ UR QL SCN>200 NG/ML: NEGATIVE
BILIRUB SERPL-MCNC: 0.4 MG/DL (ref 0.1–1)
BILIRUB UR QL STRIP: NEGATIVE
BUN SERPL-MCNC: 11 MG/DL (ref 6–20)
BZE UR QL SCN: NEGATIVE
CALCIUM SERPL-MCNC: 8.3 MG/DL (ref 8.7–10.5)
CANNABINOIDS UR QL SCN: NEGATIVE
CHLORIDE SERPL-SCNC: 108 MMOL/L (ref 95–110)
CHOLEST SERPL-MCNC: 134 MG/DL (ref 120–199)
CHOLEST/HDLC SERPL: 3.6 {RATIO} (ref 2–5)
CLARITY UR: CLEAR
CO2 SERPL-SCNC: 18 MMOL/L (ref 23–29)
COLOR UR: YELLOW
CREAT SERPL-MCNC: 0.7 MG/DL (ref 0.5–1.4)
CREAT UR-MCNC: 178.9 MG/DL (ref 15–325)
DIFFERENTIAL METHOD BLD: ABNORMAL
EOSINOPHIL # BLD AUTO: 0.2 K/UL (ref 0–0.5)
EOSINOPHIL NFR BLD: 2 % (ref 0–8)
ERYTHROCYTE [DISTWIDTH] IN BLOOD BY AUTOMATED COUNT: 13 % (ref 11.5–14.5)
EST. GFR  (NO RACE VARIABLE): >60 ML/MIN/1.73 M^2
ESTIMATED AVG GLUCOSE: 103 MG/DL (ref 68–131)
ETHANOL SERPL-MCNC: <10 MG/DL
GLUCOSE SERPL-MCNC: 247 MG/DL (ref 70–110)
GLUCOSE UR QL STRIP: ABNORMAL
HBA1C MFR BLD: 5.2 % (ref 4–5.6)
HCT VFR BLD AUTO: 37.4 % (ref 37–48.5)
HDLC SERPL-MCNC: 37 MG/DL (ref 40–75)
HDLC SERPL: 27.6 % (ref 20–50)
HGB BLD-MCNC: 13.1 G/DL (ref 12–16)
HGB UR QL STRIP: NEGATIVE
HYALINE CASTS #/AREA URNS LPF: 7 /LPF
IMM GRANULOCYTES # BLD AUTO: 0.05 K/UL (ref 0–0.04)
IMM GRANULOCYTES NFR BLD AUTO: 0.5 % (ref 0–0.5)
KETONES UR QL STRIP: NEGATIVE
LACTATE SERPL-SCNC: 1.1 MMOL/L (ref 0.5–2.2)
LDLC SERPL CALC-MCNC: 82.8 MG/DL (ref 63–159)
LEUKOCYTE ESTERASE UR QL STRIP: NEGATIVE
LIPASE SERPL-CCNC: >1000 U/L (ref 4–60)
LYMPHOCYTES # BLD AUTO: 1.6 K/UL (ref 1–4.8)
LYMPHOCYTES NFR BLD: 16.7 % (ref 18–48)
MAGNESIUM SERPL-MCNC: 1.7 MG/DL (ref 1.6–2.6)
MCH RBC QN AUTO: 28.8 PG (ref 27–31)
MCHC RBC AUTO-ENTMCNC: 35 G/DL (ref 32–36)
MCV RBC AUTO: 82 FL (ref 82–98)
METHADONE UR QL SCN>300 NG/ML: NEGATIVE
MICROSCOPIC COMMENT: ABNORMAL
MONOCYTES # BLD AUTO: 0.6 K/UL (ref 0.3–1)
MONOCYTES NFR BLD: 6.1 % (ref 4–15)
NEUTROPHILS # BLD AUTO: 7.1 K/UL (ref 1.8–7.7)
NEUTROPHILS NFR BLD: 74.3 % (ref 38–73)
NITRITE UR QL STRIP: NEGATIVE
NONHDLC SERPL-MCNC: 97 MG/DL
NRBC BLD-RTO: 0 /100 WBC
OPIATES UR QL SCN: NEGATIVE
PCP UR QL SCN>25 NG/ML: NEGATIVE
PH UR STRIP: 6 [PH] (ref 5–8)
PLATELET # BLD AUTO: 271 K/UL (ref 150–450)
PMV BLD AUTO: 10.1 FL (ref 9.2–12.9)
POCT GLUCOSE: 177 MG/DL (ref 70–110)
POCT GLUCOSE: 204 MG/DL (ref 70–110)
POCT GLUCOSE: 234 MG/DL (ref 70–110)
POTASSIUM SERPL-SCNC: 3.4 MMOL/L (ref 3.5–5.1)
PROCALCITONIN SERPL IA-MCNC: 0.02 NG/ML
PROT SERPL-MCNC: 6.8 G/DL (ref 6–8.4)
PROT UR QL STRIP: ABNORMAL
RBC # BLD AUTO: 4.55 M/UL (ref 4–5.4)
RBC #/AREA URNS HPF: 0 /HPF (ref 0–4)
SODIUM SERPL-SCNC: 139 MMOL/L (ref 136–145)
SP GR UR STRIP: 1.02 (ref 1–1.03)
SQUAMOUS #/AREA URNS HPF: 1 /HPF
TOXICOLOGY INFORMATION: NORMAL
TRIGL SERPL-MCNC: 71 MG/DL (ref 30–150)
TROPONIN I SERPL DL<=0.01 NG/ML-MCNC: <0.006 NG/ML (ref 0–0.03)
TSH SERPL DL<=0.005 MIU/L-ACNC: 2.16 UIU/ML (ref 0.4–4)
URN SPEC COLLECT METH UR: ABNORMAL
UROBILINOGEN UR STRIP-ACNC: NEGATIVE EU/DL
WBC # BLD AUTO: 9.52 K/UL (ref 3.9–12.7)
WBC #/AREA URNS HPF: 1 /HPF (ref 0–5)

## 2024-07-06 PROCEDURE — 63600175 PHARM REV CODE 636 W HCPCS: Performed by: STUDENT IN AN ORGANIZED HEALTH CARE EDUCATION/TRAINING PROGRAM

## 2024-07-06 PROCEDURE — 93010 ELECTROCARDIOGRAM REPORT: CPT | Mod: ,,, | Performed by: INTERNAL MEDICINE

## 2024-07-06 PROCEDURE — 63600175 PHARM REV CODE 636 W HCPCS: Performed by: INTERNAL MEDICINE

## 2024-07-06 PROCEDURE — 21400001 HC TELEMETRY ROOM

## 2024-07-06 PROCEDURE — 25000003 PHARM REV CODE 250: Mod: JZ,JG | Performed by: INTERNAL MEDICINE

## 2024-07-06 PROCEDURE — 85025 COMPLETE CBC W/AUTO DIFF WBC: CPT | Performed by: STUDENT IN AN ORGANIZED HEALTH CARE EDUCATION/TRAINING PROGRAM

## 2024-07-06 PROCEDURE — 80307 DRUG TEST PRSMV CHEM ANLYZR: CPT | Performed by: STUDENT IN AN ORGANIZED HEALTH CARE EDUCATION/TRAINING PROGRAM

## 2024-07-06 PROCEDURE — 63600175 PHARM REV CODE 636 W HCPCS: Performed by: NURSE PRACTITIONER

## 2024-07-06 PROCEDURE — 83735 ASSAY OF MAGNESIUM: CPT | Performed by: INTERNAL MEDICINE

## 2024-07-06 PROCEDURE — 84145 PROCALCITONIN (PCT): CPT | Performed by: INTERNAL MEDICINE

## 2024-07-06 PROCEDURE — 81000 URINALYSIS NONAUTO W/SCOPE: CPT | Performed by: STUDENT IN AN ORGANIZED HEALTH CARE EDUCATION/TRAINING PROGRAM

## 2024-07-06 PROCEDURE — 96361 HYDRATE IV INFUSION ADD-ON: CPT

## 2024-07-06 PROCEDURE — 99284 EMERGENCY DEPT VISIT MOD MDM: CPT | Mod: 25

## 2024-07-06 PROCEDURE — 96375 TX/PRO/DX INJ NEW DRUG ADDON: CPT

## 2024-07-06 PROCEDURE — 80053 COMPREHEN METABOLIC PANEL: CPT | Performed by: STUDENT IN AN ORGANIZED HEALTH CARE EDUCATION/TRAINING PROGRAM

## 2024-07-06 PROCEDURE — 84484 ASSAY OF TROPONIN QUANT: CPT | Performed by: STUDENT IN AN ORGANIZED HEALTH CARE EDUCATION/TRAINING PROGRAM

## 2024-07-06 PROCEDURE — 83690 ASSAY OF LIPASE: CPT | Performed by: STUDENT IN AN ORGANIZED HEALTH CARE EDUCATION/TRAINING PROGRAM

## 2024-07-06 PROCEDURE — 93005 ELECTROCARDIOGRAM TRACING: CPT

## 2024-07-06 PROCEDURE — 82077 ASSAY SPEC XCP UR&BREATH IA: CPT | Performed by: INTERNAL MEDICINE

## 2024-07-06 PROCEDURE — 84443 ASSAY THYROID STIM HORMONE: CPT | Performed by: INTERNAL MEDICINE

## 2024-07-06 PROCEDURE — 80061 LIPID PANEL: CPT | Performed by: INTERNAL MEDICINE

## 2024-07-06 PROCEDURE — 83036 HEMOGLOBIN GLYCOSYLATED A1C: CPT | Performed by: INTERNAL MEDICINE

## 2024-07-06 PROCEDURE — 96374 THER/PROPH/DIAG INJ IV PUSH: CPT

## 2024-07-06 PROCEDURE — 83605 ASSAY OF LACTIC ACID: CPT | Performed by: INTERNAL MEDICINE

## 2024-07-06 PROCEDURE — 96376 TX/PRO/DX INJ SAME DRUG ADON: CPT

## 2024-07-06 RX ORDER — HYDROMORPHONE HYDROCHLORIDE 1 MG/ML
1 INJECTION, SOLUTION INTRAMUSCULAR; INTRAVENOUS; SUBCUTANEOUS EVERY 4 HOURS PRN
Status: DISCONTINUED | OUTPATIENT
Start: 2024-07-06 | End: 2024-07-07

## 2024-07-06 RX ORDER — POTASSIUM CHLORIDE 7.45 MG/ML
10 INJECTION INTRAVENOUS
Status: COMPLETED | OUTPATIENT
Start: 2024-07-06 | End: 2024-07-06

## 2024-07-06 RX ORDER — AMLODIPINE BESYLATE 10 MG/1
1 TABLET ORAL DAILY
COMMUNITY
Start: 2024-06-17

## 2024-07-06 RX ORDER — ENOXAPARIN SODIUM 100 MG/ML
40 INJECTION SUBCUTANEOUS EVERY 24 HOURS
Status: DISCONTINUED | OUTPATIENT
Start: 2024-07-06 | End: 2024-07-08 | Stop reason: HOSPADM

## 2024-07-06 RX ORDER — INSULIN ASPART 100 [IU]/ML
0-5 INJECTION, SOLUTION INTRAVENOUS; SUBCUTANEOUS EVERY 6 HOURS PRN
Status: DISCONTINUED | OUTPATIENT
Start: 2024-07-06 | End: 2024-07-08 | Stop reason: HOSPADM

## 2024-07-06 RX ORDER — ONDANSETRON HYDROCHLORIDE 2 MG/ML
4 INJECTION, SOLUTION INTRAVENOUS
Status: COMPLETED | OUTPATIENT
Start: 2024-07-06 | End: 2024-07-06

## 2024-07-06 RX ORDER — HALOPERIDOL 5 MG/ML
0.5 INJECTION INTRAMUSCULAR EVERY 8 HOURS PRN
Status: DISCONTINUED | OUTPATIENT
Start: 2024-07-06 | End: 2024-07-08 | Stop reason: HOSPADM

## 2024-07-06 RX ORDER — ONDANSETRON HYDROCHLORIDE 2 MG/ML
4 INJECTION, SOLUTION INTRAVENOUS EVERY 6 HOURS PRN
Status: DISCONTINUED | OUTPATIENT
Start: 2024-07-06 | End: 2024-07-08 | Stop reason: HOSPADM

## 2024-07-06 RX ORDER — GLUCAGON 1 MG
1 KIT INJECTION
Status: DISCONTINUED | OUTPATIENT
Start: 2024-07-06 | End: 2024-07-08 | Stop reason: HOSPADM

## 2024-07-06 RX ORDER — HYDROMORPHONE HYDROCHLORIDE 1 MG/ML
1 INJECTION, SOLUTION INTRAMUSCULAR; INTRAVENOUS; SUBCUTANEOUS
Status: COMPLETED | OUTPATIENT
Start: 2024-07-06 | End: 2024-07-06

## 2024-07-06 RX ORDER — NALOXONE HCL 0.4 MG/ML
0.02 VIAL (ML) INJECTION
Status: DISCONTINUED | OUTPATIENT
Start: 2024-07-06 | End: 2024-07-08 | Stop reason: HOSPADM

## 2024-07-06 RX ORDER — LOSARTAN POTASSIUM 25 MG/1
25 TABLET ORAL DAILY
COMMUNITY

## 2024-07-06 RX ORDER — IBUPROFEN 200 MG
24 TABLET ORAL
Status: DISCONTINUED | OUTPATIENT
Start: 2024-07-06 | End: 2024-07-08 | Stop reason: HOSPADM

## 2024-07-06 RX ORDER — CLONIDINE 0.1 MG/24H
1 PATCH, EXTENDED RELEASE TRANSDERMAL
Status: DISCONTINUED | OUTPATIENT
Start: 2024-07-06 | End: 2024-07-08 | Stop reason: HOSPADM

## 2024-07-06 RX ORDER — MORPHINE SULFATE 4 MG/ML
4 INJECTION, SOLUTION INTRAMUSCULAR; INTRAVENOUS
Status: COMPLETED | OUTPATIENT
Start: 2024-07-06 | End: 2024-07-06

## 2024-07-06 RX ORDER — HALOPERIDOL 5 MG/ML
1 INJECTION INTRAMUSCULAR ONCE
Status: COMPLETED | OUTPATIENT
Start: 2024-07-06 | End: 2024-07-06

## 2024-07-06 RX ORDER — SODIUM CHLORIDE 9 MG/ML
INJECTION, SOLUTION INTRAVENOUS CONTINUOUS
Status: DISCONTINUED | OUTPATIENT
Start: 2024-07-06 | End: 2024-07-07

## 2024-07-06 RX ORDER — ACETAMINOPHEN 650 MG/1
650 SUPPOSITORY RECTAL EVERY 6 HOURS PRN
Status: DISCONTINUED | OUTPATIENT
Start: 2024-07-06 | End: 2024-07-08 | Stop reason: HOSPADM

## 2024-07-06 RX ORDER — PANTOPRAZOLE SODIUM 40 MG/10ML
40 INJECTION, POWDER, LYOPHILIZED, FOR SOLUTION INTRAVENOUS DAILY
Status: DISCONTINUED | OUTPATIENT
Start: 2024-07-06 | End: 2024-07-08 | Stop reason: HOSPADM

## 2024-07-06 RX ORDER — IBUPROFEN 200 MG
16 TABLET ORAL
Status: DISCONTINUED | OUTPATIENT
Start: 2024-07-06 | End: 2024-07-08 | Stop reason: HOSPADM

## 2024-07-06 RX ORDER — SODIUM CHLORIDE 0.9 % (FLUSH) 0.9 %
10 SYRINGE (ML) INJECTION EVERY 12 HOURS PRN
Status: DISCONTINUED | OUTPATIENT
Start: 2024-07-06 | End: 2024-07-08 | Stop reason: HOSPADM

## 2024-07-06 RX ORDER — CALCIUM GLUCONATE 20 MG/ML
1 INJECTION, SOLUTION INTRAVENOUS ONCE
Status: COMPLETED | OUTPATIENT
Start: 2024-07-06 | End: 2024-07-06

## 2024-07-06 RX ADMIN — MORPHINE SULFATE 4 MG: 4 INJECTION INTRAVENOUS at 03:07

## 2024-07-06 RX ADMIN — PANTOPRAZOLE SODIUM 40 MG: 40 INJECTION, POWDER, FOR SOLUTION INTRAVENOUS at 11:07

## 2024-07-06 RX ADMIN — INSULIN ASPART 1 UNITS: 100 INJECTION, SOLUTION INTRAVENOUS; SUBCUTANEOUS at 11:07

## 2024-07-06 RX ADMIN — SODIUM CHLORIDE: 9 INJECTION, SOLUTION INTRAVENOUS at 06:07

## 2024-07-06 RX ADMIN — HYDROMORPHONE HYDROCHLORIDE 1 MG: 1 INJECTION, SOLUTION INTRAMUSCULAR; INTRAVENOUS; SUBCUTANEOUS at 04:07

## 2024-07-06 RX ADMIN — ONDANSETRON 4 MG: 2 INJECTION INTRAMUSCULAR; INTRAVENOUS at 06:07

## 2024-07-06 RX ADMIN — SODIUM CHLORIDE, POTASSIUM CHLORIDE, SODIUM LACTATE AND CALCIUM CHLORIDE 1000 ML: 600; 310; 30; 20 INJECTION, SOLUTION INTRAVENOUS at 03:07

## 2024-07-06 RX ADMIN — ONDANSETRON 4 MG: 2 INJECTION INTRAMUSCULAR; INTRAVENOUS at 04:07

## 2024-07-06 RX ADMIN — HALOPERIDOL LACTATE 1 MG: 5 INJECTION, SOLUTION INTRAMUSCULAR at 11:07

## 2024-07-06 RX ADMIN — ONDANSETRON 4 MG: 2 INJECTION INTRAMUSCULAR; INTRAVENOUS at 03:07

## 2024-07-06 RX ADMIN — POTASSIUM CHLORIDE 10 MEQ: 10 INJECTION, SOLUTION INTRAVENOUS at 09:07

## 2024-07-06 RX ADMIN — POTASSIUM CHLORIDE 10 MEQ: 10 INJECTION, SOLUTION INTRAVENOUS at 06:07

## 2024-07-06 RX ADMIN — HYDROMORPHONE HYDROCHLORIDE 1 MG: 1 INJECTION, SOLUTION INTRAMUSCULAR; INTRAVENOUS; SUBCUTANEOUS at 08:07

## 2024-07-06 RX ADMIN — POTASSIUM CHLORIDE 10 MEQ: 10 INJECTION, SOLUTION INTRAVENOUS at 08:07

## 2024-07-06 RX ADMIN — CLONIDINE 1 PATCH: 0.1 PATCH TRANSDERMAL at 06:07

## 2024-07-06 RX ADMIN — HYDROMORPHONE HYDROCHLORIDE 1 MG: 1 INJECTION, SOLUTION INTRAMUSCULAR; INTRAVENOUS; SUBCUTANEOUS at 12:07

## 2024-07-06 RX ADMIN — CALCIUM GLUCONATE 1 G: 20 INJECTION, SOLUTION INTRAVENOUS at 06:07

## 2024-07-06 RX ADMIN — POTASSIUM CHLORIDE 10 MEQ: 7.46 INJECTION, SOLUTION INTRAVENOUS at 10:07

## 2024-07-06 RX ADMIN — ONDANSETRON 4 MG: 2 INJECTION INTRAMUSCULAR; INTRAVENOUS at 12:07

## 2024-07-06 RX ADMIN — HYDROMORPHONE HYDROCHLORIDE 1 MG: 1 INJECTION, SOLUTION INTRAMUSCULAR; INTRAVENOUS; SUBCUTANEOUS at 10:07

## 2024-07-06 RX ADMIN — HALOPERIDOL LACTATE 0.5 MG: 5 INJECTION, SOLUTION INTRAMUSCULAR at 07:07

## 2024-07-06 NOTE — PHARMACY MED REC
"Admission Medication History     The home medication history was taken by Ant Ramirez.    You may go to "Admission" then "Reconcile Home Medications" tabs to review and/or act upon these items.     The home medication list has been updated by the Pharmacy department.   Please read ALL comments highlighted in yellow.   Please address this information as you see fit.    Feel free to contact us if you have any questions or require assistance.      The medications listed below were removed from the home medication list. Please reorder if appropriate:  Patient reports no longer taking the following medication(s):  METFORMIN XR 500MG    Medications listed below were obtained from: Patient/family and Analytic software- [employer]  (Not in a hospital admission)      Ant Ramirez  [id number]    Current Outpatient Medications on File Prior to Encounter   Medication Sig Dispense Refill Last Dose    amLODIPine (NORVASC) 10 MG tablet Take 1 tablet by mouth once daily.   7/5/2024    losartan (COZAAR) 25 MG tablet Take 25 mg by mouth once daily.   7/5/2024    semaglutide (OZEMPIC) 2 mg/dose (8 mg/3 mL) PnIj Inject 2 mg into the skin every 7 days. (Fridays)   Past Week    albuterol (PROVENTIL/VENTOLIN HFA) 90 mcg/actuation inhaler Inhale 2 puffs into the lungs every 4 (four) hours as needed. Take 2 puffs of the lungs every 4 hr as needed for wheezing or shortness of breath 3.5 g 0     cetirizine (ZYRTEC) 10 MG tablet Take 1 tablet (10 mg total) by mouth once daily. 30 tablet 0     insulin glargine U-300 conc (TOUJEO MAX U-300 SOLOSTAR) 300 unit/mL (3 mL) insulin pen Inject 60 Units into the skin once daily.   NOT TAKING    ondansetron (ZOFRAN-ODT) 4 MG TbDL Take 1 tablet (4 mg total) by mouth every 8 (eight) hours as needed (nausea). 20 tablet 0    .    "

## 2024-07-06 NOTE — ED NOTES
Ultrasound tech came to bedside, pt refused US, stating she was too tender to have test done. Also c/o increase in nausea after ambulating to the bathroom.

## 2024-07-06 NOTE — HPI
39-year-old white woman with history of bipolar disorder, borderline personality disorder, obesity, insulin-dependent diabetes mellitus type 2, genital herpes, idiopathic pancreatitis (diagnosed at age 8), depression, past suicidal ideation, PTSD, hypertension, hyperlipidemia, hysterectomy September 2011, and substance abuse who presented to the emergency department for abdominal pain that began 2-3 hours prior to ER arrival.  Abdominal pain is localized to the epigastric region, associated with intractable nausea and vomiting, no associated fevers or chills.  Patient further denies diarrhea, constipation, dysuria, or hematuria.  She denies any alcohol use.  She has been unable to tolerate oral intake.    Last hospitalization 1/6-01/07/2024 for acute pancreatitis.  Prior to that 10/15-10/19/23 for acute pancreatitis.

## 2024-07-06 NOTE — SUBJECTIVE & OBJECTIVE
Past Medical History:   Diagnosis Date    Allergy     Asthma     Childhood only    Bipolar 1 disorder     Borderline personality disorder     Depression     GERD (gastroesophageal reflux disease)     History of psychiatric hospitalization     Hx of psychiatric care     Hypertension     pt denied    Pancreatitis     Psychiatric problem     PTSD (post-traumatic stress disorder)     S/P partial hysterectomy 2011    Substance abuse     Suicide attempt        Past Surgical History:   Procedure Laterality Date    APPENDECTOMY       SECTION      CHOLECYSTECTOMY      HYSTERECTOMY      endometrial hyperplasia    LASER LAPAROSCOPY         Review of patient's allergies indicates:   Allergen Reactions    Demerol (pf) [meperidine (pf)] Hives    Medrol [methylprednisolone] Anaphylaxis     Any cortisone    Adhesive      Other reaction(s): Unknown    Amoxicillin-pot clavulanate      Other reaction(s): Unknown    Atarax [hydroxyzine hcl] Hives    Hydralazine analogues Hives    Iodine      Other reaction(s): Unknown    Omnicef [cefdinir]     Penicillins Hives and Nausea And Vomiting    Phenergan [promethazine] Hives and Nausea And Vomiting    Risperidone analogues Hives    Ativan [lorazepam] Hives and Anxiety       No current facility-administered medications on file prior to encounter.     Current Outpatient Medications on File Prior to Encounter   Medication Sig    albuterol (PROVENTIL/VENTOLIN HFA) 90 mcg/actuation inhaler Inhale 2 puffs into the lungs every 4 (four) hours as needed. Take 2 puffs of the lungs every 4 hr as needed for wheezing or shortness of breath    amLODIPine (NORVASC) 5 MG tablet Take 1 tablet (5 mg total) by mouth once daily.    blood sugar diagnostic Strp 1 each by Misc.(Non-Drug; Combo Route) route 3 (three) times daily.    cetirizine (ZYRTEC) 10 MG tablet Take 1 tablet (10 mg total) by mouth once daily.    famotidine (PEPCID) 20 MG tablet Take 1 tablet (20 mg total) by mouth 2 (two)  times daily.    HYDROcodone-acetaminophen (NORCO) 5-325 mg per tablet Take 1 tablet by mouth every 6 (six) hours as needed for Pain.    insulin glargine U-300 conc (TOUJEO MAX U-300 SOLOSTAR) 300 unit/mL (3 mL) insulin pen Inject 60 Units into the skin once daily.    lancets Misc 1 each by Misc.(Non-Drug; Combo Route) route 3 (three) times daily.    metFORMIN (GLUCOPHAGE-XR) 500 MG ER 24hr tablet Take 2 tablets (1,000 mg total) by mouth 2 (two) times daily with meals.    ondansetron (ZOFRAN-ODT) 4 MG TbDL Take 1 tablet (4 mg total) by mouth every 8 (eight) hours as needed (nausea).     Family History       Problem Relation (Age of Onset)    Atrial fibrillation Maternal Grandfather    Breast cancer Maternal Aunt    Diabetes Mother    Hypertension Mother          Tobacco Use    Smoking status: Former     Current packs/day: 0.00     Types: Cigarettes     Quit date: 2013     Years since quittin.0    Smokeless tobacco: Never   Substance and Sexual Activity    Alcohol use: Yes     Comment: occasional     Drug use: Not Currently    Sexual activity: Yes     Partners: Male, Female     Review of Systems   Constitutional:  Negative for chills and fever.   Gastrointestinal:  Positive for abdominal pain, nausea and vomiting. Negative for constipation and diarrhea.   Genitourinary:  Negative for dysuria and hematuria.   All other systems reviewed and are negative.    Objective:     Vital Signs (Most Recent):  Temp: 98.1 °F (36.7 °C) (24 0328)  Pulse: 73 (24 0545)  Resp: 19 (24 0545)  BP: 132/86 (24 0545)  SpO2: 100 % (24 0545) Vital Signs (24h Range):  Temp:  [98.1 °F (36.7 °C)] 98.1 °F (36.7 °C)  Pulse:  [] 73  Resp:  [16-19] 19  SpO2:  [98 %-100 %] 100 %  BP: (132-173)/() 132/86        There is no height or weight on file to calculate BMI.     Physical Exam  Vitals reviewed.   Constitutional:       General: She is in acute distress.      Appearance: She is obese. She is  ill-appearing.   HENT:      Nose: Nose normal.   Eyes:      Conjunctiva/sclera: Conjunctivae normal.   Cardiovascular:      Rate and Rhythm: Normal rate.   Pulmonary:      Effort: Pulmonary effort is normal. No respiratory distress.   Abdominal:      General: There is no distension.      Tenderness: There is abdominal tenderness. There is no rebound.   Musculoskeletal:         General: No swelling.   Skin:     General: Skin is warm and dry.   Neurological:      Mental Status: She is alert and oriented to person, place, and time.   Psychiatric:         Mood and Affect: Mood normal.         Behavior: Behavior normal.                Significant Labs: All pertinent labs within the past 24 hours have been reviewed.  Recent Lab Results         07/06/24  0350   07/06/24  0349        Albumin   3.7       ALP   52       ALT   15       Anion Gap   13       Appearance, UA Clear         AST   16       Bacteria, UA Occasional         Baso #   0.04       Basophil %   0.4       Bilirubin (UA) Negative         BILIRUBIN TOTAL   0.4  Comment: For infants and newborns, interpretation of results should be based  on gestational age, weight and in agreement with clinical  observations.    Premature Infant recommended reference ranges:  Up to 24 hours.............<8.0 mg/dL  Up to 48 hours............<12.0 mg/dL  3-5 days..................<15.0 mg/dL  6-29 days.................<15.0 mg/dL         BUN   11       Calcium   8.3       Chloride   108       CO2   18       Color, UA Yellow         Creatinine   0.7       Differential Method   Automated       eGFR   >60       Eos #   0.2       Eos %   2.0       Glucose   247       Glucose, UA 2+         Gran # (ANC)   7.1       Gran %   74.3       Hematocrit   37.4       Hemoglobin   13.1       Hyaline Casts, UA 7         Immature Grans (Abs)   0.05  Comment: Mild elevation in immature granulocytes is non specific and   can be seen in a variety of conditions including stress response,   acute  inflammation, trauma and pregnancy. Correlation with other   laboratory and clinical findings is essential.         Immature Granulocytes   0.5       Ketones, UA Negative         Leukocyte Esterase, UA Negative         Lipase   >1000       Lymph #   1.6       Lymph %   16.7       MCH   28.8       MCHC   35.0       MCV   82       Microscopic Comment SEE COMMENT  Comment: Other formed elements not mentioned in the report are not   present in the microscopic examination.            Mono #   0.6       Mono %   6.1       MPV   10.1       NITRITE UA Negative         nRBC   0       Blood, UA Negative         pH, UA 6.0         Platelet Count   271       Potassium   3.4       PROTEIN TOTAL   6.8       Protein, UA 1+  Comment: Recommend a 24 hour urine protein or a urine   protein/creatinine ratio if globulin induced proteinuria is  clinically suspected.           RBC   4.55       RBC, UA 0         RDW   13.0       Sodium   139       Spec Grav UA 1.020         Specimen UA Urine, Clean Catch         Squam Epithel, UA 1         Troponin I   <0.006  Comment: The reference interval for Troponin I represents the 99th percentile   cutoff   for our facility and is consistent with 3rd generation assay   performance.         UROBILINOGEN UA Negative         WBC, UA 1         WBC   9.52               Significant Imaging: I have reviewed all pertinent imaging results/findings within the past 24 hours.  US Abdomen Limited    (Results Pending)

## 2024-07-06 NOTE — ASSESSMENT & PLAN NOTE
"Patient has hypocalcemia due to  unclear etiology  which is currently uncontrolled, and has been confirmed with ionized and/or corrected calcium. Will replace calcium and monitor electrolytes closely. The latest calcium labs have been reviewed and are listed below.  Recent Labs   Lab 07/06/24  0349   CALCIUM 8.3*       No results for input(s): "CAION" in the last 24 hours.  -calcium 8.3, albumin 3.7, corrected calcium 8.5  -give calcium gluconate 1 g IV x1 dose  -consider additional evaluation for hypocalcemia although suspect related to pancreatitis      "

## 2024-07-06 NOTE — Clinical Note
Diagnosis: Acute pancreatitis [577.0.ICD-9-CM]   Future Attending Provider: FLAVIO FLOWERS [[id number]]   Reason for IP Medical Treatment  (Clinical interventions that can only be accomplished in the IP setting? ) :: acute pancreatitis   I certify that Inpatient services for greater than or equal to 2 midnights are medically necessary:: Yes   Plans for Post-Acute care--if anticipated (pick the single best option):: A. No post acute care anticipated at this time

## 2024-07-06 NOTE — ED PROVIDER NOTES
SCRIBE #1 NOTE: IAllison, am scribing for, and in the presence of, Devyn Savage MD. I have scribed the entire note.       History     Chief Complaint   Patient presents with    Abdominal Pain     Presents with sudden onset of abdominal pain with nausea and vomiting. Hx of pancreatitis.     Review of patient's allergies indicates:   Allergen Reactions    Demerol (pf) [meperidine (pf)] Hives    Medrol [methylprednisolone] Anaphylaxis     Any cortisone    Adhesive      Other reaction(s): Unknown    Amoxicillin-pot clavulanate      Other reaction(s): Unknown    Atarax [hydroxyzine hcl] Hives    Hydralazine analogues Hives    Iodine      Other reaction(s): Unknown    Omnicef [cefdinir]     Penicillins Hives and Nausea And Vomiting    Phenergan [promethazine] Hives and Nausea And Vomiting    Risperidone analogues Hives    Ativan [lorazepam] Hives and Anxiety         History of Present Illness     HPI    7/6/2024, 3:33 AM  History obtained from the patient      History of Present Illness: Priti Obrien is a 39 y.o. female patient with a PMHx of depression, GERD, Bipolar 1 Disorder, PTSD, HTN, borderline personality disorder, substance abuse, asthma, and chronic pancreatitis who presents to the Emergency Department for evaluation of upper abdominal pain which woke her up around 1:30 AM. Pt states that her last episode of pancreatitis was in October. Pt reports that her current symptoms are the same as her past episodes of pancreatitis. Pt denies EtOH use. Pt states that she began experiencing decreased appetite yesterday evening. Symptoms are constant and moderate in severity. No mitigating or exacerbating factors reported. Associated sxs include decreased appetite and vomiting. Patient denies any fever, CP, SOB, dysuria, and all other sxs at this time. Prior Tx includes 90 fentanyl and 4 Zofran en route. No further complaints or concerns at this time.       Arrival mode: [facility]I    PCP: Bethanie  RICHI Polanco        Past Medical History:  Past Medical History:   Diagnosis Date    Allergy     Asthma     Childhood only    Bipolar 1 disorder     Borderline personality disorder     Depression     GERD (gastroesophageal reflux disease)     History of psychiatric hospitalization     Hx of psychiatric care     Hypertension     pt denied    Pancreatitis     Psychiatric problem     PTSD (post-traumatic stress disorder)     S/P partial hysterectomy 2011    Substance abuse     Suicide attempt        Past Surgical History:  Past Surgical History:   Procedure Laterality Date    APPENDECTOMY       SECTION      CHOLECYSTECTOMY      HYSTERECTOMY  2010    endometrial hyperplasia    LASER LAPAROSCOPY           Family History:  Family History   Problem Relation Name Age of Onset    Diabetes Mother      Hypertension Mother      Atrial fibrillation Maternal Grandfather      Breast cancer Maternal Aunt         Social History:  Social History     Tobacco Use    Smoking status: Former     Current packs/day: 0.00     Types: Cigarettes     Quit date: 2013     Years since quittin.0    Smokeless tobacco: Never   Substance and Sexual Activity    Alcohol use: Yes     Comment: occasional     Drug use: Not Currently    Sexual activity: Yes     Partners: Male, Female        Review of Systems     Review of Systems   Constitutional:  Positive for appetite change (decreased). Negative for fever.   HENT:  Negative for sore throat.    Respiratory:  Negative for shortness of breath.    Cardiovascular:  Negative for chest pain.   Gastrointestinal:  Positive for abdominal pain (upper) and vomiting. Negative for nausea.   Genitourinary:  Negative for dysuria.   Musculoskeletal:  Negative for back pain.   Skin:  Negative for rash.   Neurological:  Negative for weakness.   Hematological:  Does not bruise/bleed easily.      Physical Exam     Initial Vitals [24 0328]   BP Pulse Resp Temp SpO2   (!) 155/100 101 18 98.1 °F  "(36.7 °C) 98 %      MAP       --          Physical Exam  Nursing Notes and Vital Signs Reviewed.  Constitutional: Patient is in no acute distress. Well-developed and well-nourished.  Head: Atraumatic. Normocephalic.  Eyes: PERRL. EOM intact. Conjunctivae are not pale. No scleral icterus.  ENT: Mucous membranes are moist.   Neck: Supple. Full ROM. No lymphadenopathy.  Cardiovascular: Regular rate. Regular rhythm. No murmurs, rubs, or gallops. Distal pulses are 2+ and symmetric.  Pulmonary/Chest: No respiratory distress. Clear to auscultation bilaterally. No wheezing or rales.  Abdominal: Soft and non-distended.  There is epigastric tenderness.  No rebound, guarding, or rigidity. Pt is actively vomiting upon exam.  Genitourinary: No CVA tenderness  Musculoskeletal: Moves all extremities. No obvious deformities. No edema. No calf tenderness.  Skin: Warm and dry.  Neurological:  Alert, awake, and appropriate.  Normal speech.  No acute focal neurological deficits are appreciated.  Psychiatric: Normal affect. Good eye contact. Appropriate in content.     ED Course   Procedures  ED Vital Signs:  Vitals:    07/06/24 0638 07/06/24 0710 07/06/24 0809 07/06/24 0816   BP:  (!) 151/99 (!) 141/89    Pulse:  78 67    Resp:  19 18 16   Temp:  98.1 °F (36.7 °C)     TempSrc:  Oral     SpO2:  98% 99%    Weight: 93.7 kg (206 lb 9.1 oz)      Height:        07/06/24 0832 07/06/24 0904 07/06/24 1003 07/06/24 1203   BP: 134/85 (!) 161/83 134/86    Pulse: 65 68 68    Resp: 10 12 11 18   Temp:       TempSrc:       SpO2: 96% 97% 97%    Weight:       Height:        07/06/24 1205 07/06/24 1303 07/06/24 1401 07/06/24 1558   BP: 135/85 130/67 (!) 130/94 134/87   Pulse: 75 68 77 78   Resp: 18 18  18   Temp: 98.4 °F (36.9 °C)  97.8 °F (36.6 °C) 97.7 °F (36.5 °C)   TempSrc: Oral  Oral Oral   SpO2: 96% 96% 99% 98%   Weight: 81.6 kg (180 lb)      Height: 5' 1.42" (1.56 m)       07/06/24 1637 07/06/24 1933 07/06/24 2201   BP:  125/77    Pulse:  81  "   Resp: 15 20 16   Temp:  97.9 °F (36.6 °C)    TempSrc:  Oral    SpO2:  99%    Weight:  85.4 kg (188 lb 4.4 oz)    Height:          Abnormal Lab Results:  Labs Reviewed   CBC W/ AUTO DIFFERENTIAL - Abnormal; Notable for the following components:       Result Value    Immature Grans (Abs) 0.05 (*)     Gran % 74.3 (*)     Lymph % 16.7 (*)     All other components within normal limits   COMPREHENSIVE METABOLIC PANEL - Abnormal; Notable for the following components:    Potassium 3.4 (*)     CO2 18 (*)     Glucose 247 (*)     Calcium 8.3 (*)     Alkaline Phosphatase 52 (*)     All other components within normal limits   LIPASE - Abnormal; Notable for the following components:    Lipase >1000 (*)     All other components within normal limits   URINALYSIS, REFLEX TO URINE CULTURE - Abnormal; Notable for the following components:    Protein, UA 1+ (*)     Glucose, UA 2+ (*)     All other components within normal limits    Narrative:     Specimen Source->Urine   URINALYSIS MICROSCOPIC - Abnormal; Notable for the following components:    Hyaline Casts, UA 7 (*)     All other components within normal limits    Narrative:     Specimen Source->Urine   POCT GLUCOSE - Abnormal; Notable for the following components:    POCT Glucose 234 (*)     All other components within normal limits   POCT GLUCOSE - Abnormal; Notable for the following components:    POCT Glucose 177 (*)     All other components within normal limits   TROPONIN I   MAGNESIUM   ALCOHOL,MEDICAL (ETHANOL)   TSH   LACTIC ACID, PLASMA   PROCALCITONIN   DRUG SCREEN PANEL, URINE EMERGENCY   DRUG SCREEN PANEL, URINE EMERGENCY    Narrative:     Specimen Source->Urine   POCT GLUCOSE MONITORING CONTINUOUS        All Lab Results:  Results for orders placed or performed during the hospital encounter of 07/06/24   CBC auto differential   Result Value Ref Range    WBC 9.52 3.90 - 12.70 K/uL    RBC 4.55 4.00 - 5.40 M/uL    Hemoglobin 13.1 12.0 - 16.0 g/dL    Hematocrit 37.4 37.0 -  48.5 %    MCV 82 82 - 98 fL    MCH 28.8 27.0 - 31.0 pg    MCHC 35.0 32.0 - 36.0 g/dL    RDW 13.0 11.5 - 14.5 %    Platelets 271 150 - 450 K/uL    MPV 10.1 9.2 - 12.9 fL    Immature Granulocytes 0.5 0.0 - 0.5 %    Gran # (ANC) 7.1 1.8 - 7.7 K/uL    Immature Grans (Abs) 0.05 (H) 0.00 - 0.04 K/uL    Lymph # 1.6 1.0 - 4.8 K/uL    Mono # 0.6 0.3 - 1.0 K/uL    Eos # 0.2 0.0 - 0.5 K/uL    Baso # 0.04 0.00 - 0.20 K/uL    nRBC 0 0 /100 WBC    Gran % 74.3 (H) 38.0 - 73.0 %    Lymph % 16.7 (L) 18.0 - 48.0 %    Mono % 6.1 4.0 - 15.0 %    Eosinophil % 2.0 0.0 - 8.0 %    Basophil % 0.4 0.0 - 1.9 %    Differential Method Automated    Comprehensive metabolic panel   Result Value Ref Range    Sodium 139 136 - 145 mmol/L    Potassium 3.4 (L) 3.5 - 5.1 mmol/L    Chloride 108 95 - 110 mmol/L    CO2 18 (L) 23 - 29 mmol/L    Glucose 247 (H) 70 - 110 mg/dL    BUN 11 6 - 20 mg/dL    Creatinine 0.7 0.5 - 1.4 mg/dL    Calcium 8.3 (L) 8.7 - 10.5 mg/dL    Total Protein 6.8 6.0 - 8.4 g/dL    Albumin 3.7 3.5 - 5.2 g/dL    Total Bilirubin 0.4 0.1 - 1.0 mg/dL    Alkaline Phosphatase 52 (L) 55 - 135 U/L    AST 16 10 - 40 U/L    ALT 15 10 - 44 U/L    eGFR >60 >60 mL/min/1.73 m^2    Anion Gap 13 8 - 16 mmol/L   Lipase   Result Value Ref Range    Lipase >1000 (H) 4 - 60 U/L   Urinalysis, Reflex to Urine Culture Urine, Clean Catch    Specimen: Urine, Clean Catch   Result Value Ref Range    Specimen UA Urine, Clean Catch     Color, UA Yellow Yellow, Straw, [place]    Appearance, UA Clear Clear    pH, UA 6.0 5.0 - 8.0    Specific Gravity, UA 1.020 1.005 - 1.030    Protein, UA 1+ (A) Negative    Glucose, UA 2+ (A) Negative    Ketones, UA Negative Negative    Bilirubin (UA) Negative Negative    Occult Blood UA Negative Negative    Nitrite, UA Negative Negative    Urobilinogen, UA Negative <2.0 EU/dL    Leukocytes, UA Negative Negative   Troponin I   Result Value Ref Range    Troponin I <0.006 0.000 - 0.026 ng/mL   Urinalysis Microscopic   Result Value Ref  Range    RBC, UA 0 0 - 4 /hpf    WBC, UA 1 0 - 5 /hpf    Bacteria Occasional None-Occ /hpf    Squam Epithel, UA 1 /hpf    Hyaline Casts, UA 7 (A) 0-1/lpf /lpf    Microscopic Comment SEE COMMENT    Lipid panel   Result Value Ref Range    Cholesterol 134 120 - 199 mg/dL    Triglycerides 71 30 - 150 mg/dL    HDL 37 (L) 40 - 75 mg/dL    LDL Cholesterol 82.8 63.0 - 159.0 mg/dL    HDL/Cholesterol Ratio 27.6 20.0 - 50.0 %    Total Cholesterol/HDL Ratio 3.6 2.0 - 5.0    Non-HDL Cholesterol 97 mg/dL   Magnesium   Result Value Ref Range    Magnesium 1.7 1.6 - 2.6 mg/dL   Ethanol   Result Value Ref Range    Alcohol, Serum <10 <10 mg/dL   TSH   Result Value Ref Range    TSH 2.159 0.400 - 4.000 uIU/mL   Lactic acid, plasma   Result Value Ref Range    Lactate (Lactic Acid) 1.1 0.5 - 2.2 mmol/L   Procalcitonin   Result Value Ref Range    Procalcitonin 0.02 <0.25 ng/mL   Hemoglobin A1c if not done in past 3 months   Result Value Ref Range    Hemoglobin A1C 5.2 4.0 - 5.6 %    Estimated Avg Glucose 103 68 - 131 mg/dL   Drug screen panel, in-house   Result Value Ref Range    Benzodiazepines Negative Negative    Methadone metabolites Negative Negative    Cocaine (Metab.) Negative Negative    Opiate Scrn, Ur Negative Negative    Barbiturate Screen, Ur Negative Negative    Amphetamine Screen, Ur Negative Negative    THC Negative Negative    Phencyclidine Negative Negative    Creatinine, Urine 178.9 15.0 - 325.0 mg/dL    Toxicology Information SEE COMMENT    POCT glucose   Result Value Ref Range    POCT Glucose 234 (H) 70 - 110 mg/dL   POCT glucose   Result Value Ref Range    POCT Glucose 177 (H) 70 - 110 mg/dL   POCT glucose   Result Value Ref Range    POCT Glucose 204 (H) 70 - 110 mg/dL        Imaging Results:  Imaging Results    None               The Emergency Provider reviewed the vital signs and test results, which are outlined above.     ED Discussion       5:33 AM: Discussed case with Dr. Oliva ([facility]). Dr. Oliva  agrees with current care and management of pt and accepts admission.   Admitting Service: Hospital Medicine  Admitting Physician: Dr. Oliva  Admit to: inpt med/tele     ED Course as of 07/06/24 2323   Sat Jul 06, 2024   0402 WBC: 9.52 []   0402 Hemoglobin: 13.1 []   0526 Lipase(!): >1000 []      ED Course User Index  [] Devyn Savage MD     Medical Decision Making  Differential diagnosis;  Gastroenteritis, Bowel obstruction, Colitis, Diverticulitis, Cholecystitis, Appendicitis, Perforated bowel, Herniation, Infectious etiology, UTI, Pyelonephritis,  Biliary obstruction, kidney stone, among others    Patient has a known history of idiopathic pancreatitis, presentation today consistent with previous.  Lab work shows an elevated lipase at greater than a 1000.  No significant electrolyte abnormality.  Pain not controlled despite multiple doses of IV medication here in the emergency department.  Patient will be admitted to Hospital Medicine for fluid resuscitation, pain control, and possible GI consultation if deemed necessary.    Amount and/or Complexity of Data Reviewed  External Data Reviewed: notes.     Details: Reviewed a note on 01/06/2024 documenting the patient's admission for acute pancreatitis.  This note provided insight into her previous episode of acute pancreatitis, gave provider recommendations and treatment plans, and affected my medical decision-making today.  Labs: ordered. Decision-making details documented in ED Course.  Radiology:      Details: Considered CT scan of the patient's abdomen and pelvis, but this is consistent with previous history of idiopathic pancreatitis, she has minimal right upper quadrant tenderness, no fever, does not drink alcohol.  Minimal concern for alternative diagnosis given workup.  Discussion of management or test interpretation with external provider(s): Discussed plan of care with Hospital Medicine.    Risk  Prescription drug management.  Decision regarding  hospitalization.                ED Medication(s):  Medications   sodium chloride 0.9% flush 10 mL (has no administration in time range)   naloxone 0.4 mg/mL injection 0.02 mg (has no administration in time range)   glucose chewable tablet 16 g (has no administration in time range)   glucose chewable tablet 24 g (has no administration in time range)   glucagon (human recombinant) injection 1 mg (has no administration in time range)   0.9%  NaCl infusion ( Intravenous New Bag 7/6/24 0628)   enoxaparin injection 40 mg (40 mg Subcutaneous Not Given 7/6/24 1638)   acetaminophen suppository 650 mg (has no administration in time range)   ondansetron injection 4 mg (4 mg Intravenous Given 7/6/24 1203)   HYDROmorphone injection 1 mg (1 mg Intravenous Given 7/6/24 2201)   dextrose 10% bolus 125 mL 125 mL (has no administration in time range)   dextrose 10% bolus 250 mL 250 mL (has no administration in time range)   glucagon (human recombinant) injection 1 mg (has no administration in time range)   insulin aspart U-100 pen 0-5 Units (has no administration in time range)   dextrose 10% bolus 125 mL 125 mL (has no administration in time range)   dextrose 10% bolus 250 mL 250 mL (has no administration in time range)   cloNIDine 0.1 mg/24 hr td ptwk 1 patch (1 patch Transdermal Patch Applied 7/6/24 0649)   pantoprazole injection 40 mg (40 mg Intravenous Given 7/6/24 1131)   haloperidol lactate injection 0.5 mg (0.5 mg Intravenous Given 7/6/24 1929)   ondansetron injection 4 mg (4 mg Intravenous Given 7/6/24 0350)   lactated ringers bolus 1,000 mL (0 mLs Intravenous Stopped 7/6/24 0450)   morphine injection 4 mg (4 mg Intravenous Given 7/6/24 0350)   ondansetron injection 4 mg (4 mg Intravenous Given 7/6/24 0446)   HYDROmorphone injection 1 mg (1 mg Intravenous Given 7/6/24 0446)   potassium chloride 10 mEq in 100 mL IVPB (0 mEq Intravenous Stopped 7/6/24 1026)   calcium gluconate 1 g in NS IVPB (premixed) (0 g Intravenous Stopped  7/6/24 0733)   potassium chloride 10 mEq in 100 mL IVPB (0 mEq Intravenous Stopped 7/6/24 1207)   haloperidol lactate injection 1 mg (1 mg Intravenous Given 7/6/24 1131)       Current Discharge Medication List                  Scribe Attestation:   Scribe #1: I performed the above scribed service and the documentation accurately describes the services I performed. I attest to the accuracy of the note.     Attending:   Physician Attestation Statement for Scribe #1: I, Devyn Savage MD, personally performed the services described in this documentation, as scribed by Allison Pinzon, in my presence, and it is both accurate and complete.       Scribe Attestation:   Scribe #1: I performed the above scribed service and the documentation accurately describes the services I performed. I attest to the accuracy of the note.         Clinical Impression       ICD-10-CM ICD-9-CM   1. Acute pancreatitis  K85.90 577.0   2. Chest pain  R07.9 786.50   3. High risk medication use  Z79.899 V58.69   4. Hypokalemia  E87.6 276.8       Disposition:   Disposition: Admitted  Condition: Fair        Devyn Savage MD  07/06/24 3511

## 2024-07-06 NOTE — H&P
[facility] - Emergency Dept.  [facility] Medicine  History & Physical    Patient Name: Priti Obrien  MRN: [id number]  Patient Class: IP- Inpatient  Admission Date: 7/6/2024  Attending Physician:  Mireille Oliva MD  Primary Care Provider: Sherri Kovacs FNP         Patient information was obtained from patient, ER records, and ER physician .     Subjective:     Principal Problem:Idiopathic acute pancreatitis    Chief Complaint:   Chief Complaint   Patient presents with    Abdominal Pain     Presents with sudden onset of abdominal pain with nausea and vomiting. Hx of pancreatitis.        HPI: 39-year-old white woman with history of bipolar disorder, borderline personality disorder, obesity, insulin-dependent diabetes mellitus type 2, genital herpes, idiopathic pancreatitis (diagnosed at age 8), depression, past suicidal ideation, PTSD, hypertension, hyperlipidemia, hysterectomy September 2011, and substance abuse who presented to the emergency department for abdominal pain that began 2-3 hours prior to ER arrival.  Abdominal pain is localized to the epigastric region, associated with intractable nausea and vomiting, no associated fevers or chills.  Patient further denies diarrhea, constipation, dysuria, or hematuria.  She denies any alcohol use.  She has been unable to tolerate oral intake.    Last hospitalization 1/6-01/07/2024 for acute pancreatitis.  Prior to that 10/15-10/19/23 for acute pancreatitis.    Past Medical History:   Diagnosis Date    Allergy     Asthma     Childhood only    Bipolar 1 disorder     Borderline personality disorder     Depression     GERD (gastroesophageal reflux disease)     History of psychiatric hospitalization     Hx of psychiatric care     Hypertension     pt denied    Pancreatitis     Psychiatric problem     PTSD (post-traumatic stress disorder)     S/P partial hysterectomy September 2011    Substance abuse     Suicide attempt        Past Surgical History:   Procedure Laterality Date     APPENDECTOMY       SECTION      CHOLECYSTECTOMY      HYSTERECTOMY  2010    endometrial hyperplasia    LASER LAPAROSCOPY         Review of patient's allergies indicates:   Allergen Reactions    Demerol (pf) [meperidine (pf)] Hives    Medrol [methylprednisolone] Anaphylaxis     Any cortisone    Adhesive      Other reaction(s): Unknown    Amoxicillin-pot clavulanate      Other reaction(s): Unknown    Atarax [hydroxyzine hcl] Hives    Hydralazine analogues Hives    Iodine      Other reaction(s): Unknown    Omnicef [cefdinir]     Penicillins Hives and Nausea And Vomiting    Phenergan [promethazine] Hives and Nausea And Vomiting    Risperidone analogues Hives    Ativan [lorazepam] Hives and Anxiety       No current facility-administered medications on file prior to encounter.     Current Outpatient Medications on File Prior to Encounter   Medication Sig    albuterol (PROVENTIL/VENTOLIN HFA) 90 mcg/actuation inhaler Inhale 2 puffs into the lungs every 4 (four) hours as needed. Take 2 puffs of the lungs every 4 hr as needed for wheezing or shortness of breath    amLODIPine (NORVASC) 5 MG tablet Take 1 tablet (5 mg total) by mouth once daily.    blood sugar diagnostic Strp 1 each by Misc.(Non-Drug; Combo Route) route 3 (three) times daily.    cetirizine (ZYRTEC) 10 MG tablet Take 1 tablet (10 mg total) by mouth once daily.    famotidine (PEPCID) 20 MG tablet Take 1 tablet (20 mg total) by mouth 2 (two) times daily.    HYDROcodone-acetaminophen (NORCO) 5-325 mg per tablet Take 1 tablet by mouth every 6 (six) hours as needed for Pain.    insulin glargine U-300 conc (TOUJEO MAX U-300 SOLOSTAR) 300 unit/mL (3 mL) insulin pen Inject 60 Units into the skin once daily.    lancets Misc 1 each by Misc.(Non-Drug; Combo Route) route 3 (three) times daily.    metFORMIN (GLUCOPHAGE-XR) 500 MG ER 24hr tablet Take 2 tablets (1,000 mg total) by mouth 2 (two) times daily with meals.    ondansetron (ZOFRAN-ODT) 4 MG TbDL Take 1  tablet (4 mg total) by mouth every 8 (eight) hours as needed (nausea).     Family History       Problem Relation (Age of Onset)    Atrial fibrillation Maternal Grandfather    Breast cancer Maternal Aunt    Diabetes Mother    Hypertension Mother          Tobacco Use    Smoking status: Former     Current packs/day: 0.00     Types: Cigarettes     Quit date: 2013     Years since quittin.0    Smokeless tobacco: Never   Substance and Sexual Activity    Alcohol use: Yes     Comment: occasional     Drug use: Not Currently    Sexual activity: Yes     Partners: Male, Female     Review of Systems   Constitutional:  Negative for chills and fever.   Gastrointestinal:  Positive for abdominal pain, nausea and vomiting. Negative for constipation and diarrhea.   Genitourinary:  Negative for dysuria and hematuria.   All other systems reviewed and are negative.    Objective:     Vital Signs (Most Recent):  Temp: 98.1 °F (36.7 °C) (24 0328)  Pulse: 73 (24 0545)  Resp: 19 (24 0545)  BP: 132/86 (24 0545)  SpO2: 100 % (24 0545) Vital Signs (24h Range):  Temp:  [98.1 °F (36.7 °C)] 98.1 °F (36.7 °C)  Pulse:  [] 73  Resp:  [16-19] 19  SpO2:  [98 %-100 %] 100 %  BP: (132-173)/() 132/86        There is no height or weight on file to calculate BMI.     Physical Exam  Vitals reviewed.   Constitutional:       General: She is in acute distress.      Appearance: She is obese. She is ill-appearing.   HENT:      Nose: Nose normal.   Eyes:      Conjunctiva/sclera: Conjunctivae normal.   Cardiovascular:      Rate and Rhythm: Normal rate.   Pulmonary:      Effort: Pulmonary effort is normal. No respiratory distress.   Abdominal:      General: There is no distension.      Tenderness: There is abdominal tenderness. There is no rebound.   Musculoskeletal:         General: No swelling.   Skin:     General: Skin is warm and dry.   Neurological:      Mental Status: She is alert and oriented to person, place,  and time.   Psychiatric:         Mood and Affect: Mood normal.         Behavior: Behavior normal.                Significant Labs: All pertinent labs within the past 24 hours have been reviewed.  Recent Lab Results         07/06/24  0350   07/06/24  0349        Albumin   3.7       ALP   52       ALT   15       Anion Gap   13       Appearance, UA Clear         AST   16       Bacteria, UA Occasional         Baso #   0.04       Basophil %   0.4       Bilirubin (UA) Negative         BILIRUBIN TOTAL   0.4  Comment: For infants and newborns, interpretation of results should be based  on gestational age, weight and in agreement with clinical  observations.    Premature Infant recommended reference ranges:  Up to 24 hours.............<8.0 mg/dL  Up to 48 hours............<12.0 mg/dL  3-5 days..................<15.0 mg/dL  6-29 days.................<15.0 mg/dL         BUN   11       Calcium   8.3       Chloride   108       CO2   18       Color, UA Yellow         Creatinine   0.7       Differential Method   Automated       eGFR   >60       Eos #   0.2       Eos %   2.0       Glucose   247       Glucose, UA 2+         Gran # (ANC)   7.1       Gran %   74.3       Hematocrit   37.4       Hemoglobin   13.1       Hyaline Casts, UA 7         Immature Grans (Abs)   0.05  Comment: Mild elevation in immature granulocytes is non specific and   can be seen in a variety of conditions including stress response,   acute inflammation, trauma and pregnancy. Correlation with other   laboratory and clinical findings is essential.         Immature Granulocytes   0.5       Ketones, UA Negative         Leukocyte Esterase, UA Negative         Lipase   >1000       Lymph #   1.6       Lymph %   16.7       MCH   28.8       MCHC   35.0       MCV   82       Microscopic Comment SEE COMMENT  Comment: Other formed elements not mentioned in the report are not   present in the microscopic examination.            Mono #   0.6       Mono %   6.1       MPV    10.1       NITRITE UA Negative         nRBC   0       Blood, UA Negative         pH, UA 6.0         Platelet Count   271       Potassium   3.4       PROTEIN TOTAL   6.8       Protein, UA 1+  Comment: Recommend a 24 hour urine protein or a urine   protein/creatinine ratio if globulin induced proteinuria is  clinically suspected.           RBC   4.55       RBC, UA 0         RDW   13.0       Sodium   139       Spec Grav UA 1.020         Specimen UA Urine, Clean Catch         Squam Epithel, UA 1         Troponin I   <0.006  Comment: The reference interval for Troponin I represents the 99th percentile   cutoff   for our facility and is consistent with 3rd generation assay   performance.         UROBILINOGEN UA Negative         WBC, UA 1         WBC   9.52               Significant Imaging: I have reviewed all pertinent imaging results/findings within the past 24 hours.  US Abdomen Limited    (Results Pending)      Assessment/Plan:     * Idiopathic acute pancreatitis  Acute on chronic idiopathic pancreatitis with epigastric abdominal pain and nausea with vomiting  -diagnosed at age 8 per patient  -white blood cell count 9.52, LFTs unremarkable, lipase greater than 1000, urinalysis negative, afebrile  -prior hysterectomy September 2011  -NPO, IV fluids, p.r.n. pain/nausea control  -check fasting lipid panel, TSH, lactic acid level, procalcitonin level, alcohol level, and UDS  -check right upper quadrant abdominal ultrasound  -no antibiotics at this time  -consider GI consult      Hypokalemia  Patient has hypokalemia which is Acute and currently controlled. Most recent potassium levels reviewed-   Lab Results   Component Value Date    K 3.4 (L) 07/06/2024   . Will continue potassium replacement per protocol and recheck repeat levels after replacement completed.   -give potassium chloride 40 mEq IV x1 dose  -check magnesium level  -check a.m. labs  -telemetry monitoring    Hyperglycemia due to type 2 diabetes  "mellitus  -recent A1c was 5.0 on 04/26/2024  -hold Lantus 60 units subQ daily while NPO with nausea and vomiting  -sliding scale insulin with general hypoglycemia protocol  -NPO with IV fluids      Hypocalcemia  Patient has hypocalcemia due to  unclear etiology  which is currently uncontrolled, and has been confirmed with ionized and/or corrected calcium. Will replace calcium and monitor electrolytes closely. The latest calcium labs have been reviewed and are listed below.  Recent Labs   Lab 07/06/24  0349   CALCIUM 8.3*       No results for input(s): "CAION" in the last 24 hours.  -calcium 8.3, albumin 3.7, corrected calcium 8.5  -give calcium gluconate 1 g IV x1 dose  -consider additional evaluation for hypocalcemia although suspect related to pancreatitis        Mixed hyperlipidemia  Patient is on no statin therapy.  Check fasting lipid panel.      Essential hypertension  Chronic, uncontrolled. Latest blood pressure and vitals reviewed-     Temp:  [98.1 °F (36.7 °C)]   Pulse:  []   Resp:  [16-19]   BP: (132-173)/()   SpO2:  [98 %-100 %] .   Home meds for hypertension were reviewed and noted below.   Hypertension Medications               amLODIPine (NORVASC) 5 MG tablet Take 1 tablet (5 mg total) by mouth once daily.            While in the hospital, will manage blood pressure as follows; Adjust home antihypertensive regimen as follows- hold Norvasc while NPO.  Patient is allergic to hydralazine.  Place clonidine patch 0.1 mg.        Class 2 severe obesity due to excess calories with serious comorbidity and body mass index (BMI) of 38.0 to 38.9 in adult  There is no height or weight on file to calculate BMI. Morbid obesity complicates all aspects of disease management from diagnostic modalities to treatment. Weight loss encouraged and health benefits explained to patient.         Bipolar 1 disorder  Currently on no home medications for this diagnosis, stable from a psychiatric standpoint at this " time        VTE Risk Mitigation (From admission, onward)           Ordered     enoxaparin injection 40 mg  Every 24 hours         07/06/24 0601     IP VTE HIGH RISK PATIENT  Once         07/06/24 0601     Place sequential compression device  Until discontinued         07/06/24 0601                                    Mireille Oliva MD  Department of Hospital Medicine  [facility] - Emergency Dept.

## 2024-07-06 NOTE — ASSESSMENT & PLAN NOTE
-recent A1c was 5.0 on 04/26/2024  -hold Lantus 60 units subQ daily while NPO with nausea and vomiting  -sliding scale insulin with general hypoglycemia protocol  -NPO with IV fluids

## 2024-07-06 NOTE — PROGRESS NOTES
Admitted this morning by night on-call team. Patient seen and examined in ED 22. She refused the abdominal US this morning, reporting that her abdomen is too tender per nursing. Pt currently experiencing abdominal pain with N/V. She had two episodes of vomiting this morning. She has been given a few doses of zofran with no relief. She is allergic to phenergan. She reports haldol and pantoprazole helps with her nausea. Will add pantoprazole 40 mg IVP daily and give haldol 1 mg IVP x 1 and monitor. Will continue current regimen for pancreatitis including NPO status, IVF, and pain management.     Physical Exam  Constitutional:       Appearance: She is ill-appearing.   HENT:      Head: Normocephalic.   Eyes:      Extraocular Movements: Extraocular movements intact.   Cardiovascular:      Rate and Rhythm: Normal rate.      Pulses: Normal pulses.   Pulmonary:      Effort: Pulmonary effort is normal. No respiratory distress.   Abdominal:      Palpations: Abdomen is soft.      Tenderness: There is abdominal tenderness.   Musculoskeletal:         General: Normal range of motion.      Cervical back: Normal range of motion.      Right lower leg: No edema.      Left lower leg: No edema.   Skin:     General: Skin is warm.   Neurological:      Mental Status: She is alert and oriented to person, place, and time. Mental status is at baseline.   Psychiatric:         Mood and Affect: Mood normal.         Behavior: Behavior normal.

## 2024-07-06 NOTE — ED NOTES
Pt found lying on right side c/o right upper quadrant abdominal pain. States pain is 9/10. + nausea, last vomited 30 minutes ago.  Pt is admitted waiting for bed assignment. Skin warm and dry, respirations even non-labored. CM in place with VS monitored. IV to left AC with NS infusing per pump at 150 ml/hr, Calcium infusing at 50 ml/hr and potassium infusing at 100 ml/hr, all by pump. IV site free from redness, swelling, or pain.

## 2024-07-06 NOTE — ED NOTES
Gave pt socks and offered to assist her to put them on. Pt refused to put on socks. Pt ambulated to the bathroom without assistance.

## 2024-07-06 NOTE — ASSESSMENT & PLAN NOTE
Currently on no home medications for this diagnosis, stable from a psychiatric standpoint at this time

## 2024-07-06 NOTE — ASSESSMENT & PLAN NOTE
Acute on chronic idiopathic pancreatitis with epigastric abdominal pain and nausea with vomiting  -diagnosed at age 8 per patient  -white blood cell count 9.52, LFTs unremarkable, lipase greater than 1000, urinalysis negative, afebrile  -prior hysterectomy September 2011  -NPO, IV fluids, p.r.n. pain/nausea control  -check fasting lipid panel, TSH, lactic acid level, procalcitonin level, alcohol level, and UDS  -check right upper quadrant abdominal ultrasound  -no antibiotics at this time  -consider GI consult

## 2024-07-06 NOTE — ASSESSMENT & PLAN NOTE
Chronic, uncontrolled. Latest blood pressure and vitals reviewed-     Temp:  [98.1 °F (36.7 °C)]   Pulse:  []   Resp:  [16-19]   BP: (132-173)/()   SpO2:  [98 %-100 %] .   Home meds for hypertension were reviewed and noted below.   Hypertension Medications               amLODIPine (NORVASC) 5 MG tablet Take 1 tablet (5 mg total) by mouth once daily.            While in the hospital, will manage blood pressure as follows; Adjust home antihypertensive regimen as follows- hold Norvasc while NPO.  Patient is allergic to hydralazine.  Place clonidine patch 0.1 mg.

## 2024-07-06 NOTE — ED NOTES
Spoke to Rebeca in pharmacy. Will need to put a one time order for potassium due to  time for 4th dose. This will be the 4th dose.

## 2024-07-06 NOTE — ASSESSMENT & PLAN NOTE
Patient has hypokalemia which is Acute and currently controlled. Most recent potassium levels reviewed-   Lab Results   Component Value Date    K 3.4 (L) 07/06/2024   . Will continue potassium replacement per protocol and recheck repeat levels after replacement completed.   -give potassium chloride 40 mEq IV x1 dose  -check magnesium level  -check a.m. labs  -telemetry monitoring

## 2024-07-07 LAB
ALBUMIN SERPL BCP-MCNC: 3.6 G/DL (ref 3.5–5.2)
ALP SERPL-CCNC: 55 U/L (ref 55–135)
ALT SERPL W/O P-5'-P-CCNC: 14 U/L (ref 10–44)
ANION GAP SERPL CALC-SCNC: 9 MMOL/L (ref 8–16)
AST SERPL-CCNC: 13 U/L (ref 10–40)
BASOPHILS # BLD AUTO: 0.03 K/UL (ref 0–0.2)
BASOPHILS NFR BLD: 0.4 % (ref 0–1.9)
BILIRUB SERPL-MCNC: 0.6 MG/DL (ref 0.1–1)
BUN SERPL-MCNC: 6 MG/DL (ref 6–20)
CALCIUM SERPL-MCNC: 8.5 MG/DL (ref 8.7–10.5)
CHLORIDE SERPL-SCNC: 107 MMOL/L (ref 95–110)
CO2 SERPL-SCNC: 22 MMOL/L (ref 23–29)
CREAT SERPL-MCNC: 0.7 MG/DL (ref 0.5–1.4)
DIFFERENTIAL METHOD BLD: NORMAL
EOSINOPHIL # BLD AUTO: 0.4 K/UL (ref 0–0.5)
EOSINOPHIL NFR BLD: 4.6 % (ref 0–8)
ERYTHROCYTE [DISTWIDTH] IN BLOOD BY AUTOMATED COUNT: 13.1 % (ref 11.5–14.5)
EST. GFR  (NO RACE VARIABLE): >60 ML/MIN/1.73 M^2
GLUCOSE SERPL-MCNC: 130 MG/DL (ref 70–110)
HCT VFR BLD AUTO: 37.8 % (ref 37–48.5)
HGB BLD-MCNC: 13 G/DL (ref 12–16)
IMM GRANULOCYTES # BLD AUTO: 0.02 K/UL (ref 0–0.04)
IMM GRANULOCYTES NFR BLD AUTO: 0.2 % (ref 0–0.5)
LYMPHOCYTES # BLD AUTO: 1.9 K/UL (ref 1–4.8)
LYMPHOCYTES NFR BLD: 22.5 % (ref 18–48)
MCH RBC QN AUTO: 28.8 PG (ref 27–31)
MCHC RBC AUTO-ENTMCNC: 34.4 G/DL (ref 32–36)
MCV RBC AUTO: 84 FL (ref 82–98)
MONOCYTES # BLD AUTO: 0.5 K/UL (ref 0.3–1)
MONOCYTES NFR BLD: 5.6 % (ref 4–15)
NEUTROPHILS # BLD AUTO: 5.6 K/UL (ref 1.8–7.7)
NEUTROPHILS NFR BLD: 66.7 % (ref 38–73)
NRBC BLD-RTO: 0 /100 WBC
OHS QRS DURATION: 80 MS
OHS QTC CALCULATION: 431 MS
PLATELET # BLD AUTO: 291 K/UL (ref 150–450)
PMV BLD AUTO: 9.9 FL (ref 9.2–12.9)
POCT GLUCOSE: 100 MG/DL (ref 70–110)
POCT GLUCOSE: 130 MG/DL (ref 70–110)
POCT GLUCOSE: 161 MG/DL (ref 70–110)
POTASSIUM SERPL-SCNC: 3.7 MMOL/L (ref 3.5–5.1)
PROT SERPL-MCNC: 6.8 G/DL (ref 6–8.4)
RBC # BLD AUTO: 4.51 M/UL (ref 4–5.4)
SODIUM SERPL-SCNC: 138 MMOL/L (ref 136–145)
WBC # BLD AUTO: 8.43 K/UL (ref 3.9–12.7)

## 2024-07-07 PROCEDURE — 85025 COMPLETE CBC W/AUTO DIFF WBC: CPT | Performed by: INTERNAL MEDICINE

## 2024-07-07 PROCEDURE — 25000003 PHARM REV CODE 250: Performed by: FAMILY MEDICINE

## 2024-07-07 PROCEDURE — 21400001 HC TELEMETRY ROOM

## 2024-07-07 PROCEDURE — 80053 COMPREHEN METABOLIC PANEL: CPT | Performed by: INTERNAL MEDICINE

## 2024-07-07 PROCEDURE — 63600175 PHARM REV CODE 636 W HCPCS: Performed by: FAMILY MEDICINE

## 2024-07-07 PROCEDURE — 25000003 PHARM REV CODE 250: Performed by: INTERNAL MEDICINE

## 2024-07-07 PROCEDURE — 63600175 PHARM REV CODE 636 W HCPCS: Performed by: INTERNAL MEDICINE

## 2024-07-07 PROCEDURE — 36415 COLL VENOUS BLD VENIPUNCTURE: CPT | Performed by: INTERNAL MEDICINE

## 2024-07-07 PROCEDURE — 63600175 PHARM REV CODE 636 W HCPCS: Performed by: NURSE PRACTITIONER

## 2024-07-07 RX ORDER — OXYCODONE HYDROCHLORIDE 5 MG/1
5 TABLET ORAL EVERY 6 HOURS PRN
Status: DISCONTINUED | OUTPATIENT
Start: 2024-07-07 | End: 2024-07-08 | Stop reason: HOSPADM

## 2024-07-07 RX ORDER — KETOROLAC TROMETHAMINE 30 MG/ML
15 INJECTION, SOLUTION INTRAMUSCULAR; INTRAVENOUS 3 TIMES DAILY
Status: DISCONTINUED | OUTPATIENT
Start: 2024-07-07 | End: 2024-07-07

## 2024-07-07 RX ORDER — HYDROCODONE BITARTRATE AND ACETAMINOPHEN 5; 325 MG/1; MG/1
1 TABLET ORAL EVERY 6 HOURS PRN
Status: DISCONTINUED | OUTPATIENT
Start: 2024-07-07 | End: 2024-07-08 | Stop reason: HOSPADM

## 2024-07-07 RX ADMIN — HYDROMORPHONE HYDROCHLORIDE 1 MG: 1 INJECTION, SOLUTION INTRAMUSCULAR; INTRAVENOUS; SUBCUTANEOUS at 09:07

## 2024-07-07 RX ADMIN — SODIUM CHLORIDE: 9 INJECTION, SOLUTION INTRAVENOUS at 11:07

## 2024-07-07 RX ADMIN — SODIUM CHLORIDE: 9 INJECTION, SOLUTION INTRAVENOUS at 04:07

## 2024-07-07 RX ADMIN — HYDROCODONE BITARTRATE AND ACETAMINOPHEN 1 TABLET: 5; 325 TABLET ORAL at 11:07

## 2024-07-07 RX ADMIN — PANTOPRAZOLE SODIUM 40 MG: 40 INJECTION, POWDER, FOR SOLUTION INTRAVENOUS at 09:07

## 2024-07-07 RX ADMIN — KETOROLAC TROMETHAMINE 15 MG: 30 INJECTION, SOLUTION INTRAMUSCULAR at 03:07

## 2024-07-07 RX ADMIN — ONDANSETRON 4 MG: 2 INJECTION INTRAMUSCULAR; INTRAVENOUS at 04:07

## 2024-07-07 RX ADMIN — OXYCODONE HYDROCHLORIDE 5 MG: 5 TABLET ORAL at 04:07

## 2024-07-07 RX ADMIN — HYDROMORPHONE HYDROCHLORIDE 1 MG: 1 INJECTION, SOLUTION INTRAMUSCULAR; INTRAVENOUS; SUBCUTANEOUS at 04:07

## 2024-07-07 RX ADMIN — HYDROCODONE BITARTRATE AND ACETAMINOPHEN 1 TABLET: 5; 325 TABLET ORAL at 07:07

## 2024-07-07 NOTE — NURSING
Patient ordered Sonic and had it delivered via food delivery service. RN intercepted the food and brought it to the patient. When RN reentered the room patient said someone must have had it delivered because she got a text that food was on the way, although her name is on the receipt. Patient did not want food thrown away. Dr. Mancera made aware.  Frito chili fries, chicken tenders and 4 honey mustard sauces were ordered per receipt.    No new orders at this time.

## 2024-07-07 NOTE — ASSESSMENT & PLAN NOTE
Acute on chronic idiopathic pancreatitis with epigastric abdominal pain and nausea with vomiting  -diagnosed at age 8 per patient  -white blood cell count 9.52, LFTs unremarkable, lipase greater than 1000, urinalysis negative, afebrile  -prior hysterectomy September 2011  -NPO, IV fluids, p.r.n. pain/nausea control  -check fasting lipid panel, TSH, lactic acid level, procalcitonin level, alcohol level, and UDS  -check right upper quadrant abdominal ultrasound  -no antibiotics at this time  -consider GI consult    Ready to eat  Refused abdomen us due to pain  Clear liquid diet   Deescalate intravenous narcotic analgesics

## 2024-07-07 NOTE — PROGRESS NOTES
[facility] Medicine  Progress Note    Patient Name: Priti Obrien  MRN: [id number]  Patient Class: IP- Inpatient   Admission Date: 7/6/2024  Length of Stay: 1 days  Attending Physician: Zuleyka Mancera MD  Primary Care Provider: Sherri Kovacs FNP        Subjective:     Principal Problem:Idiopathic acute pancreatitis        HPI:  39-year-old white woman with history of bipolar disorder, borderline personality disorder, obesity, insulin-dependent diabetes mellitus type 2, genital herpes, idiopathic pancreatitis (diagnosed at age 8), depression, past suicidal ideation, PTSD, hypertension, hyperlipidemia, hysterectomy September 2011, and substance abuse who presented to the emergency department for abdominal pain that began 2-3 hours prior to ER arrival.  Abdominal pain is localized to the epigastric region, associated with intractable nausea and vomiting, no associated fevers or chills.  Patient further denies diarrhea, constipation, dysuria, or hematuria.  She denies any alcohol use.  She has been unable to tolerate oral intake.    Last hospitalization 1/6-01/07/2024 for acute pancreatitis.  Prior to that 10/15-10/19/23 for acute pancreatitis.    Overview/Hospital Course:  7/7 admitted for idiopathic pancreatitis with elevated lipase. Requesting to eat but still feels pain in not managed without intravenous. Advised to attempt clear liquid diet and manage pain with po analgesics with breakthrough. Also requesting repeat lipase.    Interval History: See hospital course for today      Review of Systems   Constitutional:  Positive for appetite change (hungry). Negative for fever.   Respiratory:  Negative for shortness of breath.    Gastrointestinal:  Positive for abdominal pain, nausea and vomiting (improving).   Psychiatric/Behavioral:  Positive for agitation.      Objective:     Vital Signs (Most Recent):  Temp: 98.2 °F (36.8 °C) (07/07/24 0724)  Pulse: 74 (07/07/24 0724)  Resp: 18  (07/07/24 0914)  BP: 121/65 (07/07/24 0724)  SpO2: 97 % (07/07/24 0724) Vital Signs (24h Range):  Temp:  [97.7 °F (36.5 °C)-98.4 °F (36.9 °C)] 98.2 °F (36.8 °C)  Pulse:  [68-81] 74  Resp:  [15-20] 18  SpO2:  [94 %-99 %] 97 %  BP: (117-135)/(58-94) 121/65     Weight: 85.4 kg (188 lb 4.4 oz)  Body mass index is 35.09 kg/m².    Intake/Output Summary (Last 24 hours) at 7/7/2024 1006  Last data filed at 7/6/2024 1207  Gross per 24 hour   Intake 191.57 ml   Output --   Net 191.57 ml         Physical Exam  Vitals and nursing note reviewed.   Constitutional:       General: She is not in acute distress.     Appearance: She is obese. She is ill-appearing. She is not toxic-appearing.   HENT:      Head: Normocephalic and atraumatic.   Cardiovascular:      Rate and Rhythm: Normal rate.      Heart sounds: No murmur heard.  Pulmonary:      Effort: Pulmonary effort is normal. No respiratory distress.      Breath sounds: No wheezing or rales.   Abdominal:      General: Bowel sounds are decreased.      Palpations: Abdomen is soft.      Tenderness: There is abdominal tenderness.      Comments: Tenderness with percussion, predominantly right side   Skin:     General: Skin is warm.   Neurological:      Mental Status: She is alert and oriented to person, place, and time.   Psychiatric:         Mood and Affect: Affect is angry.         Speech: Speech normal.         Behavior: Behavior is agitated.             Significant Labs: All pertinent labs within the past 24 hours have been reviewed.  CBC:   Recent Labs   Lab 07/06/24 0349 07/07/24 0522   WBC 9.52 8.43   HGB 13.1 13.0   HCT 37.4 37.8    291     CMP:   Recent Labs   Lab 07/06/24 0349 07/07/24 0522    138   K 3.4* 3.7    107   CO2 18* 22*   * 130*   BUN 11 6   CREATININE 0.7 0.7   CALCIUM 8.3* 8.5*   PROT 6.8 6.8   ALBUMIN 3.7 3.6   BILITOT 0.4 0.6   ALKPHOS 52* 55   AST 16 13   ALT 15 14   ANIONGAP 13 9     Lipase:   Recent Labs   Lab 07/06/24  0347  "  LIPASE >1000*       Significant Imaging: I have reviewed all pertinent imaging results/findings within the past 24 hours.    Assessment/Plan:      * Idiopathic acute pancreatitis  Acute on chronic idiopathic pancreatitis with epigastric abdominal pain and nausea with vomiting  -diagnosed at age 8 per patient  -white blood cell count 9.52, LFTs unremarkable, lipase greater than 1000, urinalysis negative, afebrile  -prior hysterectomy September 2011  -NPO, IV fluids, p.r.n. pain/nausea control  -check fasting lipid panel, TSH, lactic acid level, procalcitonin level, alcohol level, and UDS  -check right upper quadrant abdominal ultrasound  -no antibiotics at this time  -consider GI consult    Ready to eat  Refused abdomen us due to pain  Clear liquid diet   Deescalate intravenous narcotic analgesics      Hypocalcemia  Patient has hypocalcemia due to  unclear etiology  which is currently uncontrolled, and has been confirmed with ionized and/or corrected calcium. Will replace calcium and monitor electrolytes closely. The latest calcium labs have been reviewed and are listed below.  Recent Labs   Lab 07/07/24  0522   CALCIUM 8.5*         No results for input(s): "CAION" in the last 24 hours.  -calcium 8.3, albumin 3.7, corrected calcium 8.5  -give calcium gluconate 1 g IV x1 dose  -consider additional evaluation for hypocalcemia although suspect related to pancreatitis    Resolved   Corrected 8.8        Hyperglycemia due to type 2 diabetes mellitus  -recent A1c was 5.0 on 04/26/2024  -hold Lantus 60 units subQ daily while NPO with nausea and vomiting  -sliding scale insulin with general hypoglycemia protocol  -NPO with IV fluids      Hypokalemia  Patient has hypokalemia which is Acute and currently controlled. Most recent potassium levels reviewed-   Lab Results   Component Value Date    K 3.7 07/07/2024   . Will continue potassium replacement per protocol and recheck repeat levels after replacement completed.   -give " potassium chloride 40 mEq IV x1 dose  -check magnesium level  -check a.m. labs  -telemetry monitoring    Resolved     Mixed hyperlipidemia  Patient is on no statin therapy.    fasting lipid panel within normal limits       Essential hypertension  Chronic, controlled. Latest blood pressure and vitals reviewed-     Temp:  [97.7 °F (36.5 °C)-98.4 °F (36.9 °C)]   Pulse:  [68-81]   Resp:  [15-20]   BP: (117-135)/(58-94)   SpO2:  [94 %-99 %] .   Home meds for hypertension were reviewed and noted below.   Hypertension Medications               amLODIPine (NORVASC) 5 MG tablet Take 1 tablet (5 mg total) by mouth once daily.            While in the hospital, will manage blood pressure as follows; Adjust home antihypertensive regimen as follows- hold Norvasc while NPO.  Patient is allergic to hydralazine.  Place clonidine patch 0.1 mg.        Class 2 severe obesity due to excess calories with serious comorbidity and body mass index (BMI) of 38.0 to 38.9 in adult  Body mass index is 35.09 kg/m². Morbid obesity complicates all aspects of disease management from diagnostic modalities to treatment. Weight loss encouraged and health benefits explained to patient.         Bipolar 1 disorder  Currently on no home medications for this diagnosis, stable from a psychiatric standpoint at this time        VTE Risk Mitigation (From admission, onward)           Ordered     enoxaparin injection 40 mg  Every 24 hours         07/06/24 0601     IP VTE HIGH RISK PATIENT  Once         07/06/24 0601     Place sequential compression device  Until discontinued         07/06/24 0601                    Discharge Planning   TANYA:      Code Status: Full Code   Is the patient medically ready for discharge?:     Reason for patient still in hospital (select all that apply): Patient trending condition, Treatment, and Pending disposition                     Zuleyka Mancera MD  Department of Hospital Medicine   [facility] Tele[facility] ([facility])

## 2024-07-07 NOTE — SUBJECTIVE & OBJECTIVE
Interval History: See hospital course for today      Review of Systems   Constitutional:  Positive for appetite change (hungry). Negative for fever.   Respiratory:  Negative for shortness of breath.    Gastrointestinal:  Positive for abdominal pain, nausea and vomiting (improving).   Psychiatric/Behavioral:  Positive for agitation.      Objective:     Vital Signs (Most Recent):  Temp: 98.2 °F (36.8 °C) (07/07/24 0724)  Pulse: 74 (07/07/24 0724)  Resp: 18 (07/07/24 0914)  BP: 121/65 (07/07/24 0724)  SpO2: 97 % (07/07/24 0724) Vital Signs (24h Range):  Temp:  [97.7 °F (36.5 °C)-98.4 °F (36.9 °C)] 98.2 °F (36.8 °C)  Pulse:  [68-81] 74  Resp:  [15-20] 18  SpO2:  [94 %-99 %] 97 %  BP: (117-135)/(58-94) 121/65     Weight: 85.4 kg (188 lb 4.4 oz)  Body mass index is 35.09 kg/m².    Intake/Output Summary (Last 24 hours) at 7/7/2024 1006  Last data filed at 7/6/2024 1207  Gross per 24 hour   Intake 191.57 ml   Output --   Net 191.57 ml         Physical Exam  Vitals and nursing note reviewed.   Constitutional:       General: She is not in acute distress.     Appearance: She is obese. She is ill-appearing. She is not toxic-appearing.   HENT:      Head: Normocephalic and atraumatic.   Cardiovascular:      Rate and Rhythm: Normal rate.      Heart sounds: No murmur heard.  Pulmonary:      Effort: Pulmonary effort is normal. No respiratory distress.      Breath sounds: No wheezing or rales.   Abdominal:      General: Bowel sounds are decreased.      Palpations: Abdomen is soft.      Tenderness: There is abdominal tenderness.      Comments: Tenderness with percussion, predominantly right side   Skin:     General: Skin is warm.   Neurological:      Mental Status: She is alert and oriented to person, place, and time.   Psychiatric:         Mood and Affect: Affect is angry.         Speech: Speech normal.         Behavior: Behavior is agitated.             Significant Labs: All pertinent labs within the past 24 hours have been  reviewed.  CBC:   Recent Labs   Lab 07/06/24 0349 07/07/24 0522   WBC 9.52 8.43   HGB 13.1 13.0   HCT 37.4 37.8    291     CMP:   Recent Labs   Lab 07/06/24 0349 07/07/24 0522    138   K 3.4* 3.7    107   CO2 18* 22*   * 130*   BUN 11 6   CREATININE 0.7 0.7   CALCIUM 8.3* 8.5*   PROT 6.8 6.8   ALBUMIN 3.7 3.6   BILITOT 0.4 0.6   ALKPHOS 52* 55   AST 16 13   ALT 15 14   ANIONGAP 13 9     Lipase:   Recent Labs   Lab 07/06/24 0349   LIPASE >1000*       Significant Imaging: I have reviewed all pertinent imaging results/findings within the past 24 hours.

## 2024-07-07 NOTE — HOSPITAL COURSE
The patient is a 40 yo female with idiopathic pancreatitis who was admitted with acute on chronic  pancreatitis on IVFs and pain control. Overnight, N/V and abdominal pain improved. Pt requested food and ate Sonic brought by visitor. Tolerated food well. Counseled to start with liquids and advance diet slowly. Pt eva be discharged to follow up with her PCP. I offered to make an appt with GI, however, pt states she ahs her own GI. The patient was seen and examined today and determined to be stable for discharge.

## 2024-07-07 NOTE — PLAN OF CARE
[facility] - Telemetry (Hospital)  Initial Discharge Assessment       Primary Care Provider: Sherri Kovacs FNP    Admission Diagnosis: Acute pancreatitis [K85.90]  Chest pain [R07.9]    Admission Date: 7/6/2024  Expected Discharge Date:     Transition of Care Barriers: None    Payor: BLUE CROSS BLUE SHIELD / Plan: BCBS ALL OUT OF STATE / Product Type: PPO /     Extended Emergency Contact Information  Primary Emergency Contact: Nicki Wills   [facility]  Home Phone: [phone]  Relation: Mother    Discharge Plan A: Home  Discharge Plan B: Home      [facility] [place], [place] - [address]  [address] [facility] [address]  Phone: [phone] Fax: [phone]    [id number] [address] [facility]  [address] [place] [address]  Phone: [phone] Fax: [phone]    [facility] [address] [place], [place] - [address]  [address]  [facility] [address]  Phone: [phone] Fax: [phone]      Initial Assessment (most recent)       Adult Discharge Assessment - 07/07/24 0852          Discharge Assessment    Assessment Type Discharge Planning Assessment     Confirmed/corrected address, phone number and insurance Yes     Confirmed Demographics Correct on Facesheet     Source of Information patient     People in Home alone     Facility Arrived From: Home     Do you expect to return to your current living situation? Yes     Do you have help at home or someone to help you manage your care at home? No     Prior to hospitilization cognitive status: Alert/Oriented     Current cognitive status: Alert/Oriented     Walking or Climbing Stairs Difficulty no     Dressing/Bathing Difficulty no     Equipment Currently Used at Home none     Readmission within 30 days? No     Patient currently being followed by outpatient case management? No     Do you currently have service(s) that help you manage your care at  home? No     Do you take prescription medications? Yes     Do you have prescription coverage? Yes     Do you have any problems affording any of your prescribed medications? No     Is the patient taking medications as prescribed? yes     Who is going to help you get home at discharge? Family     How do you get to doctors appointments? car, drives self     Are you on dialysis? No     Do you take coumadin? No     Discharge Plan A Home     Discharge Plan B Home     DME Needed Upon Discharge  none     Discharge Plan discussed with: Patient     Transition of Care Barriers None                      SW met with pt at bedside. Pt lives home al;one and have no help at home. Pt is able to perform ADLs. Pt's correct PCP is Dr. Sridevi Elder. No needs identified at this time.

## 2024-07-07 NOTE — ASSESSMENT & PLAN NOTE
Body mass index is 35.09 kg/m². Morbid obesity complicates all aspects of disease management from diagnostic modalities to treatment. Weight loss encouraged and health benefits explained to patient.

## 2024-07-07 NOTE — ASSESSMENT & PLAN NOTE
"Patient has hypocalcemia due to  unclear etiology  which is currently uncontrolled, and has been confirmed with ionized and/or corrected calcium. Will replace calcium and monitor electrolytes closely. The latest calcium labs have been reviewed and are listed below.  Recent Labs   Lab 07/07/24  0522   CALCIUM 8.5*         No results for input(s): "CAION" in the last 24 hours.  -calcium 8.3, albumin 3.7, corrected calcium 8.5  -give calcium gluconate 1 g IV x1 dose  -consider additional evaluation for hypocalcemia although suspect related to pancreatitis    Resolved   Corrected 8.8      "

## 2024-07-07 NOTE — ASSESSMENT & PLAN NOTE
Chronic, controlled. Latest blood pressure and vitals reviewed-     Temp:  [97.7 °F (36.5 °C)-98.4 °F (36.9 °C)]   Pulse:  [68-81]   Resp:  [15-20]   BP: (117-135)/(58-94)   SpO2:  [94 %-99 %] .   Home meds for hypertension were reviewed and noted below.   Hypertension Medications               amLODIPine (NORVASC) 5 MG tablet Take 1 tablet (5 mg total) by mouth once daily.            While in the hospital, will manage blood pressure as follows; Adjust home antihypertensive regimen as follows- hold Norvasc while NPO.  Patient is allergic to hydralazine.  Place clonidine patch 0.1 mg.

## 2024-07-07 NOTE — ASSESSMENT & PLAN NOTE
Patient has hypokalemia which is Acute and currently controlled. Most recent potassium levels reviewed-   Lab Results   Component Value Date    K 3.7 07/07/2024   . Will continue potassium replacement per protocol and recheck repeat levels after replacement completed.   -give potassium chloride 40 mEq IV x1 dose  -check magnesium level  -check a.m. labs  -telemetry monitoring    Resolved

## 2024-07-08 VITALS
HEIGHT: 61 IN | OXYGEN SATURATION: 100 % | BODY MASS INDEX: 35.54 KG/M2 | DIASTOLIC BLOOD PRESSURE: 72 MMHG | RESPIRATION RATE: 18 BRPM | WEIGHT: 188.25 LBS | SYSTOLIC BLOOD PRESSURE: 112 MMHG | TEMPERATURE: 98 F | HEART RATE: 75 BPM

## 2024-07-08 LAB — POCT GLUCOSE: 116 MG/DL (ref 70–110)

## 2024-07-08 PROCEDURE — 63600175 PHARM REV CODE 636 W HCPCS: Performed by: NURSE PRACTITIONER

## 2024-07-08 PROCEDURE — 25000003 PHARM REV CODE 250: Performed by: FAMILY MEDICINE

## 2024-07-08 RX ORDER — TRAMADOL HYDROCHLORIDE 50 MG/1
50 TABLET ORAL EVERY 6 HOURS
Qty: 15 EACH | Refills: 0 | Status: SHIPPED | OUTPATIENT
Start: 2024-07-08 | End: 2024-07-08

## 2024-07-08 RX ORDER — TRAMADOL HYDROCHLORIDE 50 MG/1
50 TABLET ORAL EVERY 8 HOURS PRN
Qty: 10 EACH | Refills: 0 | Status: SHIPPED | OUTPATIENT
Start: 2024-07-08

## 2024-07-08 RX ADMIN — OXYCODONE HYDROCHLORIDE 5 MG: 5 TABLET ORAL at 11:07

## 2024-07-08 RX ADMIN — HYDROCODONE BITARTRATE AND ACETAMINOPHEN 1 TABLET: 5; 325 TABLET ORAL at 06:07

## 2024-07-08 RX ADMIN — HYDROCODONE BITARTRATE AND ACETAMINOPHEN 1 TABLET: 5; 325 TABLET ORAL at 01:07

## 2024-07-08 RX ADMIN — PANTOPRAZOLE SODIUM 40 MG: 40 INJECTION, POWDER, FOR SOLUTION INTRAVENOUS at 08:07

## 2024-07-08 NOTE — PLAN OF CARE
[facility] - Tele[facility] (Hospital)  Discharge Final Note    Primary Care Provider: Sherri Kovacs FNP    Expected Discharge Date: 7/8/2024    Final Discharge Note (most recent)       Final Note - 07/08/24 0943          Final Note    Assessment Type Final Discharge Note     Anticipated Discharge Disposition Home or Self Care        Post-Acute Status    Discharge Delays None known at this time                   Pt to discharge home today.   No CM consults for discharge.     Important Message from Medicare             Contact Info       Sherri Kovacs FNP   Specialty: Family Medicine   Relationship: PCP - General    [address] [place], [place] [address] [address]   Phone: [phone]       Next Steps: Follow up in 3 day(s)

## 2024-07-10 NOTE — ASSESSMENT & PLAN NOTE
-recent A1c was 5.0 on 04/26/2024  -hold Lantus 60 units subQ daily while NPO with nausea and vomiting  -sliding scale insulin with general hypoglycemia protocol

## 2024-07-10 NOTE — DISCHARGE SUMMARY
[facility] Medicine  Discharge Summary      Patient Name: Priti Obrien  MRN: [id number]  GELACIO: [id number]  Patient Class: IP- Inpatient  Admission Date: 7/6/2024  Hospital Length of Stay: 2 days  Discharge Date and Time: 7/8/2024  3:05 PM  Attending Physician: Dr. Mancera   Discharging Provider: Sherri Bower NP  Primary Care Provider: Sherri Kovacs FNP    Primary Care Team: Networked reference to record PCT     HPI:   39-year-old white woman with history of bipolar disorder, borderline personality disorder, obesity, insulin-dependent diabetes mellitus type 2, genital herpes, idiopathic pancreatitis (diagnosed at age 8), depression, past suicidal ideation, PTSD, hypertension, hyperlipidemia, hysterectomy September 2011, and substance abuse who presented to the emergency department for abdominal pain that began 2-3 hours prior to ER arrival.  Abdominal pain is localized to the epigastric region, associated with intractable nausea and vomiting, no associated fevers or chills.  Patient further denies diarrhea, constipation, dysuria, or hematuria.  She denies any alcohol use.  She has been unable to tolerate oral intake.    Last hospitalization 1/6-01/07/2024 for acute pancreatitis.  Prior to that 10/15-10/19/23 for acute pancreatitis.          Hospital Course:   The patient is a 38 yo female with idiopathic pancreatitis who was admitted with acute on chronic  pancreatitis on IVFs and pain control. Overnight, N/V and abdominal pain improved. Pt requested food and ate Sonic brought by visitor. Tolerated food well. Counseled to start with liquids and advance diet slowly. Pt eva be discharged to follow up with her PCP. I offered to make an appt with GI, however, pt states she ahs her own GI. The patient was seen and examined today and determined to be stable for discharge.       Goals of Care Treatment Preferences:  Code Status: Full Code            Final Active Diagnoses:    Diagnosis  Date Noted POA    PRINCIPAL PROBLEM:  Idiopathic acute pancreatitis [K85.00] 07/06/2024 Yes    Hypokalemia [E87.6] 07/06/2024 Yes    Hyperglycemia due to type 2 diabetes mellitus [E11.65] 07/06/2024 Yes    Hypocalcemia [E83.51] 07/06/2024 Yes    Mixed hyperlipidemia [E78.2] 10/15/2023 Yes    Essential hypertension [I10] 02/13/2018 Yes    Class 2 severe obesity due to excess calories with serious comorbidity and body mass index (BMI) of 38.0 to 38.9 in adult [E66.01, Z68.38] 05/01/2014 Not Applicable    Bipolar 1 disorder [F31.9] 11/07/2013 Yes      Problems Resolved During this Admission:    Diagnosis Date Noted Date Resolved POA    Acute pancreatitis [K85.90] 04/26/2019 07/06/2024 Yes       Discharged Condition: stable    Disposition: Home or Self Care    Follow Up:   Follow-up Information       Sherri Kovacs FNP Follow up in 3 day(s).    Specialty: Family Medicine  Contact information:  [address] [place] [address] [phone]                           Patient Instructions:      Diet clear liquid   Order Comments: Advance as tolerated     Activity as tolerated       Significant Diagnostic Studies:   Imaging Results    None          Pending Diagnostic Studies:       None           Medications:  Reconciled Home Medications:      Medication List        START taking these medications      traMADoL 50 mg tablet  Commonly known as: ULTRAM  Take 1 tablet (50 mg total) by mouth every 8 (eight) hours as needed for Pain.            CONTINUE taking these medications      albuterol 90 mcg/actuation inhaler  Commonly known as: PROVENTIL/VENTOLIN HFA  Inhale 2 puffs into the lungs every 4 (four) hours as needed. Take 2 puffs of the lungs every 4 hr as needed for wheezing or shortness of breath     amLODIPine 10 MG tablet  Commonly known as: NORVASC  Take 1 tablet by mouth once daily.     cetirizine 10 MG tablet  Commonly known as: ZYRTEC  Take 1 tablet (10 mg total) by mouth once daily.     losartan 25 MG  tablet  Commonly known as: COZAAR  Take 25 mg by mouth once daily.     ondansetron 4 MG Tbdl  Commonly known as: ZOFRAN-ODT  Take 1 tablet (4 mg total) by mouth every 8 (eight) hours as needed (nausea).     semaglutide 2 mg/dose (8 mg/3 mL) Pnij  Commonly known as: OZEMPIC  Inject 2 mg into the skin every 7 days. (Fridays)            STOP taking these medications      TOUJEO MAX U-300 SOLOSTAR 300 unit/mL (3 mL) insulin pen  Generic drug: insulin glargine U-300 conc              Indwelling Lines/Drains at time of discharge:   Lines/Drains/Airways       None                   Time spent on the discharge of patient: 45 minutes         Sherri Bower NP  Department of Hospital Medicine  [facility] - Telemetry ([facility])

## 2024-07-10 NOTE — ASSESSMENT & PLAN NOTE
Chronic, controlled. Latest blood pressure and vitals reviewed-      .   Home meds for hypertension were reviewed and noted below.   Hypertension Medications               amLODIPine (NORVASC) 5 MG tablet Take 1 tablet (5 mg total) by mouth once daily.            While in the hospital, will manage blood pressure as follows; Adjust home antihypertensive regimen as follows- hold Norvasc while NPO.  Patient is allergic to hydralazine.  Place clonidine patch 0.1 mg.

## 2024-09-26 ENCOUNTER — HOSPITAL ENCOUNTER (EMERGENCY)
Facility: HOSPITAL | Age: 39
Discharge: LEFT BEFORE DISCHARGE | End: 2024-09-26
Payer: COMMERCIAL

## 2024-09-26 VITALS
DIASTOLIC BLOOD PRESSURE: 102 MMHG | HEART RATE: 96 BPM | SYSTOLIC BLOOD PRESSURE: 139 MMHG | RESPIRATION RATE: 22 BRPM | TEMPERATURE: 99 F | OXYGEN SATURATION: 100 %

## 2024-09-26 DIAGNOSIS — R07.9 CHEST PAIN: ICD-10-CM

## 2024-09-26 PROCEDURE — 93010 ELECTROCARDIOGRAM REPORT: CPT | Mod: ,,, | Performed by: INTERNAL MEDICINE

## 2024-09-26 PROCEDURE — 93005 ELECTROCARDIOGRAM TRACING: CPT

## 2024-09-26 PROCEDURE — 99900041 HC LEFT WITHOUT BEING SEEN- EMERGENCY

## 2024-09-27 LAB
OHS QRS DURATION: 74 MS
OHS QTC CALCULATION: 454 MS

## 2025-03-24 ENCOUNTER — HOSPITAL ENCOUNTER (EMERGENCY)
Facility: HOSPITAL | Age: 40
Discharge: HOME | End: 2025-03-24
Attending: EMERGENCY MEDICINE
Payer: MEDICAID

## 2025-03-24 VITALS
RESPIRATION RATE: 22 BRPM | SYSTOLIC BLOOD PRESSURE: 101 MMHG | HEART RATE: 100 BPM | BODY MASS INDEX: 33.49 KG/M2 | HEIGHT: 61 IN | WEIGHT: 177.38 LBS | OXYGEN SATURATION: 99 % | DIASTOLIC BLOOD PRESSURE: 61 MMHG | TEMPERATURE: 98 F

## 2025-03-24 DIAGNOSIS — R11.2 NAUSEA AND VOMITING, UNSPECIFIED VOMITING TYPE: ICD-10-CM

## 2025-03-24 DIAGNOSIS — R10.13 EPIGASTRIC PAIN: Primary | ICD-10-CM

## 2025-03-24 LAB
ABSOLUTE EOSINOPHIL (OHS): 0.57 K/UL
ABSOLUTE MONOCYTE (OHS): 0.41 K/UL (ref 0.3–1)
ABSOLUTE NEUTROPHIL COUNT (OHS): 4.53 K/UL (ref 1.8–7.7)
ALBUMIN SERPL BCP-MCNC: 3.8 G/DL (ref 3.5–5.2)
ALP SERPL-CCNC: 38 UNIT/L (ref 40–150)
ALT SERPL W/O P-5'-P-CCNC: 16 UNIT/L (ref 10–44)
ANION GAP (OHS): 7 MMOL/L (ref 8–16)
AST SERPL-CCNC: 18 UNIT/L (ref 11–45)
BASOPHILS # BLD AUTO: 0.07 K/UL
BASOPHILS NFR BLD AUTO: 0.9 %
BILIRUB SERPL-MCNC: 0.6 MG/DL (ref 0.1–1)
BILIRUB UR QL STRIP.AUTO: NEGATIVE
BUN SERPL-MCNC: 16 MG/DL (ref 6–20)
CALCIUM SERPL-MCNC: 8.5 MG/DL (ref 8.7–10.5)
CHLORIDE SERPL-SCNC: 107 MMOL/L (ref 95–110)
CLARITY UR: CLEAR
CO2 SERPL-SCNC: 23 MMOL/L (ref 23–29)
COLOR UR AUTO: YELLOW
CREAT SERPL-MCNC: 0.7 MG/DL (ref 0.5–1.4)
ERYTHROCYTE [DISTWIDTH] IN BLOOD BY AUTOMATED COUNT: 12.9 % (ref 11.5–14.5)
GFR SERPLBLD CREATININE-BSD FMLA CKD-EPI: >60 ML/MIN/1.73/M2
GLUCOSE SERPL-MCNC: 93 MG/DL (ref 70–110)
GLUCOSE UR QL STRIP: ABNORMAL
HCT VFR BLD AUTO: 38.2 % (ref 37–48.5)
HGB BLD-MCNC: 13.3 GM/DL (ref 12–16)
HGB UR QL STRIP: NEGATIVE
IMM GRANULOCYTES # BLD AUTO: 0.02 K/UL (ref 0–0.04)
IMM GRANULOCYTES NFR BLD AUTO: 0.3 % (ref 0–0.5)
KETONES UR QL STRIP: NEGATIVE
LEUKOCYTE ESTERASE UR QL STRIP: NEGATIVE
LIPASE SERPL-CCNC: 18 U/L (ref 4–60)
LYMPHOCYTES # BLD AUTO: 2 K/UL (ref 1–4.8)
MCH RBC QN AUTO: 29.8 PG (ref 27–50)
MCHC RBC AUTO-ENTMCNC: 34.8 G/DL (ref 32–36)
MCV RBC AUTO: 86 FL (ref 82–98)
NITRITE UR QL STRIP: NEGATIVE
NUCLEATED RBC (/100WBC) (OHS): 0 /100 WBC
OHS QRS DURATION: 68 MS
OHS QTC CALCULATION: 430 MS
PH UR STRIP: 7 [PH]
PLATELET # BLD AUTO: 255 K/UL (ref 150–450)
PMV BLD AUTO: 10.5 FL (ref 9.2–12.9)
POTASSIUM SERPL-SCNC: 4.3 MMOL/L (ref 3.5–5.1)
PROT SERPL-MCNC: 6.7 GM/DL (ref 6–8.4)
PROT UR QL STRIP: NEGATIVE
RBC # BLD AUTO: 4.46 M/UL (ref 4–5.4)
RELATIVE EOSINOPHIL (OHS): 7.5 %
RELATIVE LYMPHOCYTE (OHS): 26.3 % (ref 18–48)
RELATIVE MONOCYTE (OHS): 5.4 % (ref 4–15)
RELATIVE NEUTROPHIL (OHS): 59.6 % (ref 38–73)
SODIUM SERPL-SCNC: 137 MMOL/L (ref 136–145)
SP GR UR STRIP: 1.02
UROBILINOGEN UR STRIP-ACNC: ABNORMAL EU/DL
WBC # BLD AUTO: 7.6 K/UL (ref 3.9–12.7)

## 2025-03-24 PROCEDURE — 63600175 PHARM REV CODE 636 W HCPCS

## 2025-03-24 PROCEDURE — 96376 TX/PRO/DX INJ SAME DRUG ADON: CPT

## 2025-03-24 PROCEDURE — 96374 THER/PROPH/DIAG INJ IV PUSH: CPT

## 2025-03-24 PROCEDURE — 96375 TX/PRO/DX INJ NEW DRUG ADDON: CPT

## 2025-03-24 PROCEDURE — 36415 COLL VENOUS BLD VENIPUNCTURE: CPT | Performed by: EMERGENCY MEDICINE

## 2025-03-24 PROCEDURE — 63600175 PHARM REV CODE 636 W HCPCS: Performed by: EMERGENCY MEDICINE

## 2025-03-24 PROCEDURE — 93010 ELECTROCARDIOGRAM REPORT: CPT | Mod: ,,, | Performed by: INTERNAL MEDICINE

## 2025-03-24 PROCEDURE — 80053 COMPREHEN METABOLIC PANEL: CPT | Performed by: EMERGENCY MEDICINE

## 2025-03-24 PROCEDURE — 93005 ELECTROCARDIOGRAM TRACING: CPT

## 2025-03-24 PROCEDURE — 99285 EMERGENCY DEPT VISIT HI MDM: CPT | Mod: 25

## 2025-03-24 PROCEDURE — 85025 COMPLETE CBC W/AUTO DIFF WBC: CPT | Performed by: EMERGENCY MEDICINE

## 2025-03-24 PROCEDURE — 25000003 PHARM REV CODE 250: Performed by: EMERGENCY MEDICINE

## 2025-03-24 PROCEDURE — 81003 URINALYSIS AUTO W/O SCOPE: CPT | Performed by: EMERGENCY MEDICINE

## 2025-03-24 PROCEDURE — 83690 ASSAY OF LIPASE: CPT | Performed by: EMERGENCY MEDICINE

## 2025-03-24 RX ORDER — ONDANSETRON 4 MG/1
4 TABLET, ORALLY DISINTEGRATING ORAL EVERY 8 HOURS PRN
Qty: 15 TABLET | Refills: 0 | Status: SHIPPED | OUTPATIENT
Start: 2025-03-24

## 2025-03-24 RX ORDER — HALOPERIDOL LACTATE 5 MG/ML
5 INJECTION, SOLUTION INTRAMUSCULAR
Status: DISCONTINUED | OUTPATIENT
Start: 2025-03-24 | End: 2025-03-24

## 2025-03-24 RX ORDER — LIDOCAINE HYDROCHLORIDE 20 MG/ML
15 SOLUTION OROPHARYNGEAL ONCE
Status: COMPLETED | OUTPATIENT
Start: 2025-03-24 | End: 2025-03-24

## 2025-03-24 RX ORDER — ONDANSETRON HYDROCHLORIDE 2 MG/ML
4 INJECTION, SOLUTION INTRAVENOUS
Status: COMPLETED | OUTPATIENT
Start: 2025-03-24 | End: 2025-03-24

## 2025-03-24 RX ORDER — SODIUM CHLORIDE, SODIUM LACTATE, POTASSIUM CHLORIDE, CALCIUM CHLORIDE 600; 310; 30; 20 MG/100ML; MG/100ML; MG/100ML; MG/100ML
INJECTION, SOLUTION INTRAVENOUS CONTINUOUS
Status: DISCONTINUED | OUTPATIENT
Start: 2025-03-24 | End: 2025-03-24 | Stop reason: HOSPADM

## 2025-03-24 RX ORDER — ONDANSETRON HYDROCHLORIDE 2 MG/ML
INJECTION, SOLUTION INTRAVENOUS
Status: COMPLETED
Start: 2025-03-24 | End: 2025-03-24

## 2025-03-24 RX ORDER — HYDROCODONE BITARTRATE AND ACETAMINOPHEN 5; 325 MG/1; MG/1
1 TABLET ORAL EVERY 6 HOURS PRN
Qty: 12 TABLET | Refills: 0 | Status: SHIPPED | OUTPATIENT
Start: 2025-03-24

## 2025-03-24 RX ORDER — MORPHINE SULFATE 4 MG/ML
6 INJECTION, SOLUTION INTRAMUSCULAR; INTRAVENOUS
Status: COMPLETED | OUTPATIENT
Start: 2025-03-24 | End: 2025-03-24

## 2025-03-24 RX ORDER — DIPHENHYDRAMINE HYDROCHLORIDE 50 MG/ML
50 INJECTION, SOLUTION INTRAMUSCULAR; INTRAVENOUS
Status: DISCONTINUED | OUTPATIENT
Start: 2025-03-24 | End: 2025-03-24

## 2025-03-24 RX ORDER — ALUMINUM HYDROXIDE, MAGNESIUM HYDROXIDE, AND SIMETHICONE 1200; 120; 1200 MG/30ML; MG/30ML; MG/30ML
30 SUSPENSION ORAL ONCE
Status: COMPLETED | OUTPATIENT
Start: 2025-03-24 | End: 2025-03-24

## 2025-03-24 RX ADMIN — ALUMINUM HYDROXIDE, MAGNESIUM HYDROXIDE, AND DIMETHICONE 30 ML: 200; 20; 200 SUSPENSION ORAL at 10:03

## 2025-03-24 RX ADMIN — ONDANSETRON 4 MG: 2 INJECTION INTRAMUSCULAR; INTRAVENOUS at 11:03

## 2025-03-24 RX ADMIN — ONDANSETRON 4 MG: 2 INJECTION INTRAMUSCULAR; INTRAVENOUS at 08:03

## 2025-03-24 RX ADMIN — LIDOCAINE HYDROCHLORIDE 15 ML: 20 SOLUTION ORAL at 10:03

## 2025-03-24 RX ADMIN — MORPHINE SULFATE 6 MG: 4 INJECTION INTRAVENOUS at 08:03

## 2025-03-24 RX ADMIN — SODIUM CHLORIDE, POTASSIUM CHLORIDE, SODIUM LACTATE AND CALCIUM CHLORIDE: 600; 310; 30; 20 INJECTION, SOLUTION INTRAVENOUS at 08:03

## 2025-03-24 RX ADMIN — ONDANSETRON HYDROCHLORIDE 4 MG: 2 INJECTION, SOLUTION INTRAVENOUS at 11:03

## 2025-03-24 NOTE — Clinical Note
"Priti Burgess" Micah was seen and treated in our emergency department on 3/24/2025.  She may return to work on 03/25/2025.       If you have any questions or concerns, please don't hesitate to call.      Lucretia RN RN    "

## 2025-03-24 NOTE — ED PROVIDER NOTES
SCRIBE #1 NOTE: I, Kasandra Cifuentes, am scribing for, and in the presence of, Nico Rizo DO. I have scribed the entire note.       History     Chief Complaint   Patient presents with    Abdominal Pain     Right upper quadrant pain started this morning, +vomiting, denies diarrhea or fever, radiates to the back. Hx of pancreatitis.      Review of patient's allergies indicates:   Allergen Reactions    Demerol (pf) [meperidine (pf)] Hives    Medrol [methylprednisolone] Anaphylaxis     Any cortisone    Adhesive      Other reaction(s): Unknown    Amoxicillin-pot clavulanate      Other reaction(s): Unknown    Atarax [hydroxyzine hcl] Hives    Hydralazine analogues Hives    Iodine      Other reaction(s): Unknown    Omnicef [cefdinir]     Penicillins Hives and Nausea And Vomiting    Phenergan [promethazine] Hives and Nausea And Vomiting    Risperidone analogues Hives    Ativan [lorazepam] Hives and Anxiety         History of Present Illness     HPI    3/24/2025, 8:00 AM  History obtained from the patient and medical records      History of Present Illness: Priti Obrien is a 39 y.o. female patient with a PMHx of pancreatitis, depression, GERD, bipolar 1 disorder, PTSD, HTN, BPD, substance abuse, psychiatric hospitalization, and asthma who presents to the Emergency Department for evaluation of epigastric abdominal pain which began this morning. Epigastric abdominal pain is mostly at the center with some pain at the RUQ. Symptoms are constant and moderate in severity. Pain worsens when lying down. Associated sxs include nausea and vomiting. Pt is allergic to Demerol, Medrol, amoxicillin-pot clavulanate, Atarax, hydralazine analogues, iodine, Omnicef, penicillin, Phenergan, risperidone analogues, and Ativan. No prior Tx specified. No further complaints or concerns at this time.       Arrival mode: Personal Transportation    PCP: Sherri Kovacs FNP        Past Medical History:  Past Medical History:   Diagnosis  Date    Allergy     Asthma     Childhood only    Bipolar 1 disorder     Borderline personality disorder     Depression     GERD (gastroesophageal reflux disease)     History of psychiatric hospitalization     Hx of psychiatric care     Hypertension     pt denied    Pancreatitis     Psychiatric problem     PTSD (post-traumatic stress disorder)     S/P partial hysterectomy 2011    Substance abuse     Suicide attempt        Past Surgical History:  Past Surgical History:   Procedure Laterality Date    APPENDECTOMY       SECTION      CHOLECYSTECTOMY      HYSTERECTOMY  2010    endometrial hyperplasia    LASER LAPAROSCOPY           Family History:  Family History   Problem Relation Name Age of Onset    Diabetes Mother      Hypertension Mother      Atrial fibrillation Maternal Grandfather      Breast cancer Maternal Aunt         Social History:  Social History     Tobacco Use    Smoking status: Former     Current packs/day: 0.00     Types: Cigarettes     Quit date: 2013     Years since quittin.8    Smokeless tobacco: Never   Substance and Sexual Activity    Alcohol use: Yes     Comment: occasional     Drug use: Not Currently    Sexual activity: Yes     Partners: Male, Female        Review of Systems     Review of Systems   Gastrointestinal:  Positive for abdominal pain (epigrastric; mostly at the center with some pain at the RUQ), nausea and vomiting.      Physical Exam     Initial Vitals [25 0659]   BP Pulse Resp Temp SpO2   123/81 103 18 97.8 °F (36.6 °C) 99 %      MAP       --          Physical Exam  Constitutional:       General: She is not in acute distress.     Appearance: She is well-developed.   Abdominal:      Comments: Abdomen is soft, epigastric tenderness to palpation no rigidity   Musculoskeletal:         General: Normal range of motion.   Skin:     General: Skin is warm and dry.      Findings: No rash.   Neurological:      General: No focal deficit present.      Mental Status:  "She is alert and oriented to person, place, and time.       Nursing Notes and Vital Signs Reviewed.     ED Course   Procedures  ED Vital Signs:  Vitals:    03/24/25 0659 03/24/25 0835 03/24/25 1050 03/24/25 1230   BP: 123/81   101/61   Pulse: 103   100   Resp: 18 (!) 22  (!) 22   Temp: 97.8 °F (36.6 °C)      TempSrc: Oral      SpO2: 99%   99%   Weight: 80.5 kg (177 lb 6.4 oz)      Height:   5' 1" (1.549 m)        Abnormal Lab Results:  Labs Reviewed   COMPREHENSIVE METABOLIC PANEL - Abnormal       Result Value    Sodium 137      Potassium 4.3      Chloride 107      CO2 23      Glucose 93      BUN 16      Creatinine 0.7      Calcium 8.5 (*)     Protein Total 6.7      Albumin 3.8      Bilirubin Total 0.6      ALP 38 (*)     AST 18      ALT 16      Anion Gap 7 (*)     eGFR >60     URINALYSIS, REFLEX TO URINE CULTURE - Abnormal    Color, UA Yellow      Appearance, UA Clear      pH, UA 7.0      Spec Grav UA 1.025      Protein, UA Negative      Glucose, UA Trace (*)     Ketones, UA Negative      Bilirubin, UA Negative      Blood, UA Negative      Nitrites, UA Negative      Urobilinogen, UA 2.0-3.0 (*)     Leukocyte Esterase, UA Negative     CBC WITH DIFFERENTIAL - Abnormal    WBC 7.60      RBC 4.46      HGB 13.3      HCT 38.2      MCV 86      MCH 29.8      MCHC 34.8      RDW 12.9      Platelet Count 255      MPV 10.5      Nucleated RBC 0      Neut % 59.6      Lymph % 26.3      Mono % 5.4      Eos % 7.5      Basophil % 0.9      Imm Grans % 0.3      Neut # 4.53      Lymph # 2.00      Mono # 0.41      Eos # 0.57 (*)     Baso # 0.07      Imm Grans # 0.02     LIPASE - Normal    Lipase Level 18     CBC W/ AUTO DIFFERENTIAL    Narrative:     The following orders were created for panel order CBC W/ AUTO DIFFERENTIAL.  Procedure                               Abnormality         Status                     ---------                               -----------         ------                     CBC with Differential[[id number]]       " Abnormal            Final result                 Please view results for these tests on the individual orders.        All Lab Results:  Results for orders placed or performed during the hospital encounter of 03/24/25   EKG 12-lead    Collection Time: 03/24/25  7:28 AM   Result Value Ref Range    QRS Duration 68 ms    [facility] QTC Calculation 430 ms   Urinalysis, Reflex to Urine Culture Urine, Clean Catch    Collection Time: 03/24/25  7:50 AM    Specimen: Urine   Result Value Ref Range    Color, UA Yellow Straw, [place], Yellow, Light-Orange    Appearance, UA Clear Clear    pH, UA 7.0 5.0 - 8.0    Spec Grav UA 1.025 1.005 - 1.030    Protein, UA Negative Negative    Glucose, UA Trace (A) Negative    Ketones, UA Negative Negative    Bilirubin, UA Negative Negative    Blood, UA Negative Negative    Nitrites, UA Negative Negative    Urobilinogen, UA 2.0-3.0 (A) <2.0 EU/dL    Leukocyte Esterase, UA Negative Negative   CBC with Differential    Collection Time: 03/24/25  8:36 AM   Result Value Ref Range    WBC 7.60 3.90 - 12.70 K/uL    RBC 4.46 4.00 - 5.40 M/uL    HGB 13.3 12.0 - 16.0 gm/dL    HCT 38.2 37.0 - 48.5 %    MCV 86 82 - 98 fL    MCH 29.8 27.0 - 50.0 pg    MCHC 34.8 32.0 - 36.0 g/dL    RDW 12.9 11.5 - 14.5 %    Platelet Count 255 150 - 450 K/uL    MPV 10.5 9.2 - 12.9 fL    Nucleated RBC 0 <=0 /100 WBC    Neut % 59.6 38 - 73 %    Lymph % 26.3 18 - 48 %    Mono % 5.4 4 - 15 %    Eos % 7.5 <=8 %    Basophil % 0.9 <=1.9 %    Imm Grans % 0.3 0.0 - 0.5 %    Neut # 4.53 1.8 - 7.7 K/uL    Lymph # 2.00 1 - 4.8 K/uL    Mono # 0.41 0.3 - 1 K/uL    Eos # 0.57 (H) <=0.5 K/uL    Baso # 0.07 <=0.2 K/uL    Imm Grans # 0.02 0.00 - 0.04 K/uL   Comp. Metabolic Panel    Collection Time: 03/24/25  9:56 AM   Result Value Ref Range    Sodium 137 136 - 145 mmol/L    Potassium 4.3 3.5 - 5.1 mmol/L    Chloride 107 95 - 110 mmol/L    CO2 23 23 - 29 mmol/L    Glucose 93 70 - 110 mg/dL    BUN 16 6 - 20 mg/dL    Creatinine 0.7 0.5 - 1.4 mg/dL     Calcium 8.5 (L) 8.7 - 10.5 mg/dL    Protein Total 6.7 6.0 - 8.4 gm/dL    Albumin 3.8 3.5 - 5.2 g/dL    Bilirubin Total 0.6 0.1 - 1.0 mg/dL    ALP 38 (L) 40 - 150 unit/L    AST 18 11 - 45 unit/L    ALT 16 10 - 44 unit/L    Anion Gap 7 (L) 8 - 16 mmol/L    eGFR >60 >60 mL/min/1.73/m2   Lipase    Collection Time: 03/24/25  9:56 AM   Result Value Ref Range    Lipase Level 18 4 - 60 U/L       Imaging Results:  Imaging Results              CT Abdomen Pelvis  Without Contrast (Final result)  Result time 03/24/25 11:32:56      Final result by Grey Maxwell MD (03/24/25 11:32:56)                   Impression:      No acute abnormality identified in the abdomen or pelvis.    All CT scans at this facility are performed  using dose modulation techniques as appropriate to performed exam including the following:  automated exposure control; adjustment of mA and/or kV according to the patients size (this includes techniques or standardized protocols for targeted exams where dose is matched to indication/reason for exam: i.e. extremities or head);  iterative reconstruction technique.      Electronically signed by: Grey Maxwell MD  Date:    03/24/2025  Time:    11:32               Narrative:    EXAMINATION:  CT ABDOMEN PELVIS WITHOUT CONTRAST    CLINICAL HISTORY:  Nausea/vomiting;    TECHNIQUE:  Axial CT images performed through the abdomen and pelvis without intravenous contrast. Multiplanar reformats were performed and interpreted.    COMPARISON:  07/05/2023    FINDINGS:  Stable left basilar pulmonary nodule.    No urolithiasis, hydronephrosis, or perinephric stranding.    The liver, spleen, kidneys, adrenal glands, and pancreas have a normal noncontrasted appearance.    The gallbladder has been removed.  No biliary ductal dilatation.    No free fluid, free air, or inflammatory change.    The bowel is nondistended and within normal limits.  The appendix is been removed.    The abdominal aorta is normal in caliber.    The urinary  bladder is unremarkable.    No significant osseous abnormality is identified.                                       The EKG was ordered, reviewed, and independently interpreted by the ED provider.  Interpretation time: 7:28  Rate: 92 BPM  Rhythm: normal sinus rhythm  Interpretation: Septal infarct, age undetermined. No STEMI.         The Emergency Provider reviewed the vital signs and test results, which are outlined above.     ED Discussion     12:35 PM: Reassessed pt at this time. Discussed with patient all pertinent ED information and results. Discussed pt dx and plan of tx. Gave the patient all f/u and return to the ED instructions. All questions and concerns were addressed at this time. Patient expresses understanding of information and instructions, and is comfortable with plan to discharge. Pt is stable for discharge.     I discussed with patient that evaluation in the ED does not suggest any emergent or life threatening medical conditions requiring immediate intervention beyond what was provided in the ED, and I believe patient is safe for discharge. Regardless, an unremarkable evaluation in the ED does not preclude the development or presence of a serious of life threatening condition. As such, I instructed that the patient is to return immediately for any worsening or change in current symptoms.    ED Course as of 03/27/25 1353   Mon Mar 24, 2025   1026 Lipase  Within normal limits [CD]   1027 Comp. Metabolic Panel(!)  Nonspecific findings [CD]   1027 CBC W/ AUTO DIFFERENTIAL(!)  Nonspecific findings [CD]   1027 Urinalysis, Reflex to Urine Culture Urine, Clean Catch(!)  No UTI [CD]   1145 CT Abdomen Pelvis  Without Contrast  No acute findings [CD]      ED Course User Index  [CD] Nico Rizo, DO     Medical Decision Making  Patient able to tolerate fluids, pain is improved.  Instructed to return immediately for any new or worsening symptoms and she verbalized understanding.    Amount and/or  Complexity of Data Reviewed  Labs: ordered. Decision-making details documented in ED Course.  Radiology: ordered. Decision-making details documented in ED Course.  ECG/medicine tests: ordered and independent interpretation performed. Decision-making details documented in ED Course.    Risk  OTC drugs.  Prescription drug management.  Risk Details: Differential diagnosis includes but is not limited to:  GERD, gastritis, peptic ulcer disease, pancreatitis, UTI                ED Medication(s):  Medications   morphine injection 6 mg (6 mg Intravenous Given 3/24/25 0835)   ondansetron injection 4 mg (4 mg Intravenous Given 3/24/25 0835)   aluminum-magnesium hydroxide-simethicone 200-200-20 mg/5 mL suspension 30 mL (30 mLs Oral Given 3/24/25 1048)     And   LIDOcaine viscous HCl 2% oral solution 15 mL (15 mLs Oral Given 3/24/25 1048)   ondansetron injection 4 mg (4 mg Intravenous Given 3/24/25 1112)       Discharge Medication List as of 3/24/2025 12:36 PM        START taking these medications    Details   HYDROcodone-acetaminophen (NORCO) 5-325 mg per tablet Take 1 tablet by mouth every 6 (six) hours as needed for Pain., Starting Mon 3/24/2025, Print      !! ondansetron (ZOFRAN-ODT) 4 MG TbDL Take 1 tablet (4 mg total) by mouth every 8 (eight) hours as needed (Nausea/vomiting)., Starting Mon 3/24/2025, Print       !! - Potential duplicate medications found. Please discuss with provider.           Follow-up Information       Schedule an appointment as soon as possible for a visit  to follow up.    Contact information:  Sridevi Elder MD   [address] [facility]   [address]   [place] [address] [phone]                               Scribe Attestation:   Scribe #1: I performed the above scribed service and the documentation accurately describes the services I performed. I attest to the accuracy of the note.     Attending:   Physician Attestation Statement for Scribe #1: Darrius PRESLEY Christopher B., DO, personally performed  the services described in this documentation, as scribed by Kasandra Cifuentes, in my presence, and it is both accurate and complete.           Clinical Impression       ICD-10-CM ICD-9-CM   1. Epigastric pain  R10.13 789.06   2. Nausea and vomiting, unspecified vomiting type  R11.2 787.01       Disposition:   Disposition: Discharged  Condition: Stable       Nico Rizo,   03/27/25 1353

## 2025-04-01 ENCOUNTER — HOSPITAL ENCOUNTER (EMERGENCY)
Facility: HOSPITAL | Age: 40
Discharge: HOME | End: 2025-04-02
Attending: EMERGENCY MEDICINE
Payer: MEDICAID

## 2025-04-01 DIAGNOSIS — K65.4 MESENTERIC PANNICULITIS: Primary | ICD-10-CM

## 2025-04-01 LAB
ABSOLUTE EOSINOPHIL (OHS): 0.12 K/UL
ABSOLUTE MONOCYTE (OHS): 0.81 K/UL (ref 0.3–1)
ABSOLUTE NEUTROPHIL COUNT (OHS): 13.78 K/UL (ref 1.8–7.7)
ALBUMIN SERPL BCP-MCNC: 3.8 G/DL (ref 3.5–5.2)
ALP SERPL-CCNC: 48 UNIT/L (ref 40–150)
ALT SERPL W/O P-5'-P-CCNC: 13 UNIT/L (ref 10–44)
ANION GAP (OHS): 7 MMOL/L (ref 8–16)
AST SERPL-CCNC: 14 UNIT/L (ref 11–45)
B-HCG UR QL: NEGATIVE
BASOPHILS # BLD AUTO: 0.04 K/UL
BASOPHILS NFR BLD AUTO: 0.2 %
BILIRUB SERPL-MCNC: 0.8 MG/DL (ref 0.1–1)
BILIRUB UR QL STRIP.AUTO: NEGATIVE
BUN SERPL-MCNC: 15 MG/DL (ref 6–20)
CALCIUM SERPL-MCNC: 8.5 MG/DL (ref 8.7–10.5)
CHLORIDE SERPL-SCNC: 106 MMOL/L (ref 95–110)
CLARITY UR: CLEAR
CO2 SERPL-SCNC: 22 MMOL/L (ref 23–29)
COLOR UR AUTO: YELLOW
CREAT SERPL-MCNC: 0.8 MG/DL (ref 0.5–1.4)
ERYTHROCYTE [DISTWIDTH] IN BLOOD BY AUTOMATED COUNT: 12.9 % (ref 11.5–14.5)
GFR SERPLBLD CREATININE-BSD FMLA CKD-EPI: >60 ML/MIN/1.73/M2
GLUCOSE SERPL-MCNC: 114 MG/DL (ref 70–110)
GLUCOSE UR QL STRIP: NEGATIVE
HCT VFR BLD AUTO: 34 % (ref 37–48.5)
HCV AB SERPL QL IA: NEGATIVE
HGB BLD-MCNC: 12.2 GM/DL (ref 12–16)
HGB UR QL STRIP: NEGATIVE
HIV 1+2 AB+HIV1 P24 AG SERPL QL IA: NEGATIVE
IMM GRANULOCYTES # BLD AUTO: 0.07 K/UL (ref 0–0.04)
IMM GRANULOCYTES NFR BLD AUTO: 0.4 % (ref 0–0.5)
KETONES UR QL STRIP: ABNORMAL
LEUKOCYTE ESTERASE UR QL STRIP: NEGATIVE
LIPASE SERPL-CCNC: 7 U/L (ref 4–60)
LYMPHOCYTES # BLD AUTO: 1.6 K/UL (ref 1–4.8)
MCH RBC QN AUTO: 30 PG (ref 27–31)
MCHC RBC AUTO-ENTMCNC: 35.9 G/DL (ref 32–36)
MCV RBC AUTO: 84 FL (ref 82–98)
NITRITE UR QL STRIP: NEGATIVE
NUCLEATED RBC (/100WBC) (OHS): 0 /100 WBC
PH UR STRIP: 6 [PH]
PLATELET # BLD AUTO: 226 K/UL (ref 150–450)
PMV BLD AUTO: 10.5 FL (ref 9.2–12.9)
POTASSIUM SERPL-SCNC: 3.9 MMOL/L (ref 3.5–5.1)
PROT SERPL-MCNC: 7.3 GM/DL (ref 6–8.4)
PROT UR QL STRIP: NEGATIVE
RBC # BLD AUTO: 4.07 M/UL (ref 4–5.4)
RELATIVE EOSINOPHIL (OHS): 0.7 %
RELATIVE LYMPHOCYTE (OHS): 9.7 % (ref 18–48)
RELATIVE MONOCYTE (OHS): 4.9 % (ref 4–15)
RELATIVE NEUTROPHIL (OHS): 84.1 % (ref 38–73)
SODIUM SERPL-SCNC: 135 MMOL/L (ref 136–145)
SP GR UR STRIP: 1.03
UROBILINOGEN UR STRIP-ACNC: NEGATIVE EU/DL
WBC # BLD AUTO: 16.42 K/UL (ref 3.9–12.7)

## 2025-04-01 PROCEDURE — 96374 THER/PROPH/DIAG INJ IV PUSH: CPT

## 2025-04-01 PROCEDURE — 85025 COMPLETE CBC W/AUTO DIFF WBC: CPT | Performed by: EMERGENCY MEDICINE

## 2025-04-01 PROCEDURE — 96361 HYDRATE IV INFUSION ADD-ON: CPT

## 2025-04-01 PROCEDURE — 96375 TX/PRO/DX INJ NEW DRUG ADDON: CPT

## 2025-04-01 PROCEDURE — 96376 TX/PRO/DX INJ SAME DRUG ADON: CPT

## 2025-04-01 PROCEDURE — 63600175 PHARM REV CODE 636 W HCPCS: Performed by: EMERGENCY MEDICINE

## 2025-04-01 PROCEDURE — 87389 HIV-1 AG W/HIV-1&-2 AB AG IA: CPT | Performed by: EMERGENCY MEDICINE

## 2025-04-01 PROCEDURE — 99285 EMERGENCY DEPT VISIT HI MDM: CPT | Mod: 25

## 2025-04-01 PROCEDURE — 81025 URINE PREGNANCY TEST: CPT | Performed by: EMERGENCY MEDICINE

## 2025-04-01 PROCEDURE — 86803 HEPATITIS C AB TEST: CPT | Performed by: EMERGENCY MEDICINE

## 2025-04-01 PROCEDURE — 81003 URINALYSIS AUTO W/O SCOPE: CPT | Performed by: EMERGENCY MEDICINE

## 2025-04-01 PROCEDURE — 82040 ASSAY OF SERUM ALBUMIN: CPT | Performed by: EMERGENCY MEDICINE

## 2025-04-01 PROCEDURE — 25000003 PHARM REV CODE 250: Performed by: EMERGENCY MEDICINE

## 2025-04-01 PROCEDURE — 83690 ASSAY OF LIPASE: CPT | Performed by: EMERGENCY MEDICINE

## 2025-04-01 RX ORDER — MORPHINE SULFATE 4 MG/ML
4 INJECTION, SOLUTION INTRAMUSCULAR; INTRAVENOUS
Refills: 0 | Status: COMPLETED | OUTPATIENT
Start: 2025-04-01 | End: 2025-04-01

## 2025-04-01 RX ORDER — ONDANSETRON HYDROCHLORIDE 2 MG/ML
4 INJECTION, SOLUTION INTRAVENOUS
Status: COMPLETED | OUTPATIENT
Start: 2025-04-01 | End: 2025-04-01

## 2025-04-01 RX ORDER — ONDANSETRON HYDROCHLORIDE 2 MG/ML
8 INJECTION, SOLUTION INTRAVENOUS
Status: COMPLETED | OUTPATIENT
Start: 2025-04-01 | End: 2025-04-01

## 2025-04-01 RX ORDER — HYDROMORPHONE HYDROCHLORIDE 2 MG/ML
1 INJECTION, SOLUTION INTRAMUSCULAR; INTRAVENOUS; SUBCUTANEOUS
Refills: 0 | Status: COMPLETED | OUTPATIENT
Start: 2025-04-01 | End: 2025-04-01

## 2025-04-01 RX ADMIN — ONDANSETRON 4 MG: 2 INJECTION INTRAMUSCULAR; INTRAVENOUS at 09:04

## 2025-04-01 RX ADMIN — ONDANSETRON 8 MG: 2 INJECTION INTRAMUSCULAR; INTRAVENOUS at 10:04

## 2025-04-01 RX ADMIN — HYDROMORPHONE HYDROCHLORIDE 1 MG: 2 INJECTION INTRAMUSCULAR; INTRAVENOUS; SUBCUTANEOUS at 11:04

## 2025-04-01 RX ADMIN — MORPHINE SULFATE 4 MG: 4 INJECTION INTRAVENOUS at 09:04

## 2025-04-01 RX ADMIN — SODIUM CHLORIDE 1000 ML: 9 INJECTION, SOLUTION INTRAVENOUS at 09:04

## 2025-04-02 VITALS
DIASTOLIC BLOOD PRESSURE: 74 MMHG | OXYGEN SATURATION: 98 % | TEMPERATURE: 98 F | SYSTOLIC BLOOD PRESSURE: 122 MMHG | RESPIRATION RATE: 14 BRPM | HEART RATE: 98 BPM

## 2025-04-02 RX ORDER — HYDROCODONE BITARTRATE AND ACETAMINOPHEN 10; 325 MG/1; MG/1
1 TABLET ORAL EVERY 6 HOURS PRN
Qty: 12 TABLET | Refills: 0 | Status: SHIPPED | OUTPATIENT
Start: 2025-04-02

## 2025-04-02 RX ORDER — ONDANSETRON 4 MG/1
4 TABLET, FILM COATED ORAL EVERY 6 HOURS PRN
Qty: 12 TABLET | Refills: 0 | Status: SHIPPED | OUTPATIENT
Start: 2025-04-02

## 2025-04-02 NOTE — ED PROVIDER NOTES
Encounter Date: 2025       History     Chief Complaint   Patient presents with    Vomiting     RUQ pain with N/V that started today. Deneis CP and SOB     HPI  39-year-old white female presents complaining of right upper quadrant abdominal pain radiating to her back and her shoulder with nausea vomiting low-grade fevers.  Patient's symptoms began this morning upon awakening.  She denies any chills.  She denies any diarrhea and has been having normal bowel movements.  She has no urinary complaints.  Patient has a history of appendectomy as well as cholecystectomy remotely    Review of patient's allergies indicates:   Allergen Reactions    Demerol (pf) [meperidine (pf)] Hives    Medrol [methylprednisolone] Anaphylaxis     Any cortisone    Adhesive      Other reaction(s): Unknown    Amoxicillin-pot clavulanate      Other reaction(s): Unknown    Atarax [hydroxyzine hcl] Hives    Hydralazine analogues Hives    Iodine      Other reaction(s): Unknown    Omnicef [cefdinir]     Penicillins Hives and Nausea And Vomiting    Phenergan [promethazine] Hives and Nausea And Vomiting    Risperidone analogues Hives    Ativan [lorazepam] Hives and Anxiety     Past Medical History:   Diagnosis Date    Allergy     Asthma     Childhood only    Bipolar 1 disorder     Borderline personality disorder     Depression     GERD (gastroesophageal reflux disease)     History of psychiatric hospitalization     Hx of psychiatric care     Hypertension     pt denied    Pancreatitis     Psychiatric problem     PTSD (post-traumatic stress disorder)     S/P partial hysterectomy 2011    Substance abuse     Suicide attempt      Past Surgical History:   Procedure Laterality Date    APPENDECTOMY       SECTION      CHOLECYSTECTOMY      HYSTERECTOMY  2010    endometrial hyperplasia    LASER LAPAROSCOPY       Family History   Problem Relation Name Age of Onset    Diabetes Mother      Hypertension Mother      Atrial fibrillation Maternal  Grandfather      Breast cancer Maternal Aunt       Social History[1]  Review of Systems   Constitutional:  Positive for fever. Negative for chills.   Respiratory:  Negative for cough and shortness of breath.    Cardiovascular:  Negative for chest pain.   Gastrointestinal:  Positive for abdominal pain, nausea and vomiting. Negative for diarrhea.   Genitourinary:  Negative for dysuria.       Physical Exam     Initial Vitals [04/01/25 1921]   BP Pulse Resp Temp SpO2   (!) 151/86 (!) 115 20 100.1 °F (37.8 °C) 98 %      MAP       --         Physical Exam  Nursing Notes and Vital Signs Reviewed.  Constitutional: Patient appears very uncomfortable.. Well-developed and well-nourished.  Head: Atraumatic. Normocephalic.  Eyes:  EOM intact.  No scleral icterus.  ENT: Mucous membranes are moist.  Nares clear   Neck:  Full ROM. No JVD.  Cardiovascular: Regular rate. Regular rhythm No murmurs, rubs, or gallops. Distal pulses are 2+ and symmetric  Pulmonary/Chest: No respiratory distress. Clear to auscultation bilaterally. No wheezing or rales.  Equal chest wall rise bilaterally  Abdominal: Soft and non-distended.  That has tenderness in the right upper and right lower quadrant with guarding.  No rebound..  No rebound, guarding, or rigidity. Good bowel sounds.  Genitourinary: No CVA tenderness.  No suprapubic tenderness  Musculoskeletal: Moves all extremities. No obvious deformities.  5 x 5 strength in all extremities   Skin: Warm and dry.  Neurological:  Alert, awake, and appropriate.  Normal speech.  No acute focal neurological deficits are appreciated.  Two through 12 intact bilaterally.  Psychiatric: Normal affect. Good eye contact. Appropriate in content.    ED Course   Procedures  Labs Reviewed   COMPREHENSIVE METABOLIC PANEL - Abnormal       Result Value    Sodium 135 (*)     Potassium 3.9      Chloride 106      CO2 22 (*)     Glucose 114 (*)     BUN 15      Creatinine 0.8      Calcium 8.5 (*)     Protein Total 7.3       Albumin 3.8      Bilirubin Total 0.8      ALP 48      AST 14      ALT 13      Anion Gap 7 (*)     eGFR >60     URINALYSIS, REFLEX TO URINE CULTURE - Abnormal    Color, UA Yellow      Appearance, UA Clear      pH, UA 6.0      Spec Grav UA 1.030      Protein, UA Negative      Glucose, UA Negative      Ketones, UA Trace (*)     Bilirubin, UA Negative      Blood, UA Negative      Nitrites, UA Negative      Urobilinogen, UA Negative      Leukocyte Esterase, UA Negative     CBC WITH DIFFERENTIAL - Abnormal    WBC 16.42 (*)     RBC 4.07      HGB 12.2      HCT 34.0 (*)     MCV 84      MCH 30.0      MCHC 35.9      RDW 12.9      Platelet Count 226      MPV 10.5      Nucleated RBC 0      Neut % 84.1 (*)     Lymph % 9.7 (*)     Mono % 4.9      Eos % 0.7      Basophil % 0.2      Imm Grans % 0.4      Neut # 13.78 (*)     Lymph # 1.60      Mono # 0.81      Eos # 0.12      Baso # 0.04      Imm Grans # 0.07 (*)    HEPATITIS C ANTIBODY - Normal    Hep C Ab Interp Negative     HIV 1 / 2 ANTIBODY - Normal    HIV 1/2 Ag/Ab Negative     LIPASE - Normal    Lipase Level 7     PREGNANCY TEST, URINE RAPID - Normal    hCG Qualitative, Urine Negative     CBC W/ AUTO DIFFERENTIAL    Narrative:     The following orders were created for panel order CBC auto differential.  Procedure                               Abnormality         Status                     ---------                               -----------         ------                     CBC with Differential[[id number]]       Abnormal            Final result                 Please view results for these tests on the individual orders.   HEP C VIRUS HOLD SPECIMEN          Imaging Results              CT Renal Stone Study ABD Pelvis WO (In process)                 Type of Interpretation: Outside Written Report  STAT Radiology Procedure Done:  CT Abdomen and Pelvis without Intravenous Contrast, Renal stone protocol  Interpretation: 1. No hydronephrosis, nephrolithiasis, or obstructive  uropathy.   2. Mesenteric edema with increased mesenteric lymph nodes, spanning a distance of approximately 12.4 cm x 9.3 cm, concerning for mesenteric panniculitis.   3. Mild patchy consolidations in the LEFT upper lobe, concerning for mild pneumonia.   4. Diverticulosis, without acute diverticulitis. No small bowel obstruction. No free intraperitoneal air.   5. Appendectomy    Radiologist:  Markos Markham MD  04/01/2025  23:01            Medications   sodium chloride 0.9% bolus 1,000 mL 1,000 mL (0 mLs Intravenous Stopped 4/1/25 4924)   ondansetron injection 4 mg (4 mg Intravenous Given 4/1/25 2145)   morphine injection 4 mg (4 mg Intravenous Given 4/1/25 2145)   ondansetron injection 8 mg (8 mg Intravenous Given 4/1/25 2238)   HYDROmorphone (PF) injection 1 mg (1 mg Intravenous Given 4/1/25 2358)     12:57 AM: Reassessed pt at this time. Discussed with patient and/or family/caretaker all pertinent ED information and results. Discussed pt dx and plan of tx. Gave the patient all f/u and return to the ED instructions. All questions and concerns were addressed at this time. Patient and/or family/caretaker expresses understanding of information and instructions, and is comfortable with plan to discharge. Pt is stable for discharge.     I discussed with patient and/or family/caretaker that evaluation in the ED does not suggest any emergent or life threatening medical conditions requiring immediate intervention beyond what was provided in the ED, and I believe patient is safe for discharge.  Regardless, an unremarkable evaluation in the ED does not preclude the development or presence of a serious of life threatening condition. As such, I instructed that the patient is to return immediately for any worsening or change in current symptoms.     Medical Decision Making  Differential diagnosis: Abdominal pain, intra-abdominal infection, abscess, kidney stone, UTI, pyelonephritis    Patient with the abdominal pain.  This is  greater in the right side.  Workup is benign save for mild leukocytosis with mesenteric panniculitis noted.  Will refer to GI and treat with pain and Zofran.  Discussed all findings with the patient's well with the plan of care.  She verbalized agreement understanding with all instructions seems reliable.  Stable safe for discharge in my opinion    Amount and/or Complexity of Data Reviewed  External Data Reviewed: notes.     Details:  reviewed.  Recent notes reviewed  Labs: ordered. Decision-making details documented in ED Course.     Details: Second UA is negative CMP was benign this is a 16,000 white count with a mild left shift and normal hemoglobin  Radiology: ordered. Decision-making details documented in ED Course.     Details:    Mesenteric panniculitis    Risk  OTC drugs.  Prescription drug management.  Parenteral controlled substances.  Decision regarding hospitalization.                                      Clinical Impression:  Final diagnoses:  [K65.4] Mesenteric panniculitis (Primary)          ED Disposition Condition    Discharge Stable          ED Prescriptions       Medication Sig Dispense Start Date End Date Auth. Provider    HYDROcodone-acetaminophen (NORCO)  mg per tablet Take 1 tablet by mouth every 6 (six) hours as needed. 12 tablet 2025 -- Peter Melgoza Jr., MD    ondansetron (ZOFRAN) 4 MG tablet Take 1 tablet (4 mg total) by mouth every 6 (six) hours as needed for Nausea. 12 tablet 2025 -- Peter Melgoza Jr., MD          Follow-up Information       Follow up With Specialties Details Why Contact Info    Sherri Kovacs, [facility]   [address] [place] [id number] [phone]                   [1]   Social History  Tobacco Use    Smoking status: Former     Current packs/day: 0.00     Types: Cigarettes     Quit date: 2013     Years since quittin.8    Smokeless tobacco: Never   Substance Use Topics    Alcohol use: Yes     Comment: occasional      Drug use: Not Currently        Peter Melgoza Jr., MD  04/02/25 0117

## 2025-04-02 NOTE — DISCHARGE INSTRUCTIONS
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  This is causes abdominal pain that has rarely serious.  I will refer you to GI.  Use ibuprofen for pain Norco for breakthrough pain and Zofran for nausea.  Follow up with her doctor at next available return as needed for any worsening symptoms, problems, questions or concerns

## 2025-04-25 PROCEDURE — 86480 TB TEST CELL IMMUN MEASURE: CPT | Performed by: EMERGENCY MEDICINE

## 2025-04-25 PROCEDURE — 86762 RUBELLA ANTIBODY: CPT | Performed by: EMERGENCY MEDICINE

## 2025-04-25 PROCEDURE — 86787 VARICELLA-ZOSTER ANTIBODY: CPT | Performed by: EMERGENCY MEDICINE

## 2025-04-25 PROCEDURE — 86735 MUMPS ANTIBODY: CPT | Performed by: EMERGENCY MEDICINE

## 2025-04-25 PROCEDURE — 86765 RUBEOLA ANTIBODY: CPT | Performed by: EMERGENCY MEDICINE

## 2025-04-25 PROCEDURE — 86706 HEP B SURFACE ANTIBODY: CPT | Mod: 59 | Performed by: EMERGENCY MEDICINE
